# Patient Record
Sex: FEMALE | Race: WHITE | NOT HISPANIC OR LATINO | Employment: OTHER | URBAN - METROPOLITAN AREA
[De-identification: names, ages, dates, MRNs, and addresses within clinical notes are randomized per-mention and may not be internally consistent; named-entity substitution may affect disease eponyms.]

---

## 2017-07-25 ENCOUNTER — ALLSCRIPTS OFFICE VISIT (OUTPATIENT)
Dept: OTHER | Facility: OTHER | Age: 67
End: 2017-07-25

## 2017-12-04 ENCOUNTER — GENERIC CONVERSION - ENCOUNTER (OUTPATIENT)
Dept: OTHER | Facility: OTHER | Age: 67
End: 2017-12-04

## 2018-01-12 VITALS
WEIGHT: 158.13 LBS | SYSTOLIC BLOOD PRESSURE: 120 MMHG | HEART RATE: 64 BPM | BODY MASS INDEX: 23.35 KG/M2 | DIASTOLIC BLOOD PRESSURE: 78 MMHG

## 2018-01-23 NOTE — MISCELLANEOUS
Message  GI Reminder Recall [facility]:   Date: 12/04/2017   Dear Cameron Mejia:     Review of our records shows you are due for the following: EGD  Or records indicate that you are due at this time to have a follow-up examination for a EGD  As you know, these tests are done to prevent cancer, a very common disease in the [place] and responsible for thousands of patient deaths each year  We at [facility] are concerned for your health, and would very much appreciate you getting in touch with us at your earliest convenience  Again, this examination is vital to your proper health maintenance and for the prevention of cancer  Please call the following office to schedule your appointment:   [address] [phone]  We look forward to hearing from you!      Sincerely,         Signatures   Electronically signed by : Linda Lesches, ; Dec  4 2017 12:21PM EST                       (Author)

## 2018-03-23 DIAGNOSIS — G62.9 NEUROPATHY: Primary | ICD-10-CM

## 2018-03-23 RX ORDER — GABAPENTIN 300 MG/1
CAPSULE ORAL
Qty: 270 CAPSULE | Refills: 0 | Status: SHIPPED | OUTPATIENT
Start: 2018-03-23 | End: 2018-06-24 | Stop reason: SDUPTHER

## 2018-06-24 DIAGNOSIS — G62.9 NEUROPATHY: ICD-10-CM

## 2018-06-25 RX ORDER — GABAPENTIN 300 MG/1
CAPSULE ORAL
Qty: 270 CAPSULE | Refills: 0 | Status: SHIPPED | OUTPATIENT
Start: 2018-06-25 | End: 2018-06-29 | Stop reason: SDUPTHER

## 2018-06-29 DIAGNOSIS — G62.9 NEUROPATHY: ICD-10-CM

## 2018-06-29 RX ORDER — GABAPENTIN 300 MG/1
CAPSULE ORAL
Qty: 270 CAPSULE | Refills: 0 | Status: SHIPPED | OUTPATIENT
Start: 2018-06-29 | End: 2020-08-25

## 2018-12-12 DIAGNOSIS — G62.9 NEUROPATHY: ICD-10-CM

## 2018-12-13 RX ORDER — GABAPENTIN 300 MG/1
CAPSULE ORAL
Qty: 270 CAPSULE | Refills: 0 | OUTPATIENT
Start: 2018-12-13

## 2018-12-13 NOTE — TELEPHONE ENCOUNTER
Left message to return our call to let her know that she needs an appointment before we can refill her gabapentin

## 2018-12-22 DIAGNOSIS — G62.9 NEUROPATHY: ICD-10-CM

## 2018-12-24 RX ORDER — GABAPENTIN 300 MG/1
CAPSULE ORAL
Qty: 270 CAPSULE | Refills: 0 | OUTPATIENT
Start: 2018-12-24

## 2019-01-14 ENCOUNTER — TELEPHONE (OUTPATIENT)
Dept: INTERNAL MEDICINE CLINIC | Facility: CLINIC | Age: 69
End: 2019-01-14

## 2019-04-04 ENCOUNTER — TELEPHONE (OUTPATIENT)
Dept: INTERNAL MEDICINE CLINIC | Facility: CLINIC | Age: 69
End: 2019-04-04

## 2020-08-13 ENCOUNTER — OFFICE VISIT (OUTPATIENT)
Dept: OBGYN CLINIC | Facility: CLINIC | Age: 70
End: 2020-08-13
Payer: MEDICARE

## 2020-08-13 VITALS
DIASTOLIC BLOOD PRESSURE: 82 MMHG | BODY MASS INDEX: 24.28 KG/M2 | TEMPERATURE: 96.4 F | WEIGHT: 164.4 LBS | SYSTOLIC BLOOD PRESSURE: 112 MMHG

## 2020-08-13 DIAGNOSIS — N90.89 VULVAR FISSURE: Primary | ICD-10-CM

## 2020-08-13 DIAGNOSIS — L30.9 VULVAR DERMATITIS: ICD-10-CM

## 2020-08-13 PROCEDURE — 4040F PNEUMOC VAC/ADMIN/RCVD: CPT | Performed by: OBSTETRICS & GYNECOLOGY

## 2020-08-13 PROCEDURE — 99203 OFFICE O/P NEW LOW 30 MIN: CPT | Performed by: OBSTETRICS & GYNECOLOGY

## 2020-08-13 PROCEDURE — 1036F TOBACCO NON-USER: CPT | Performed by: OBSTETRICS & GYNECOLOGY

## 2020-08-13 PROCEDURE — 1160F RVW MEDS BY RX/DR IN RCRD: CPT | Performed by: OBSTETRICS & GYNECOLOGY

## 2020-08-13 RX ORDER — TRIAMCINOLONE ACETONIDE 1 MG/G
CREAM TOPICAL 2 TIMES DAILY
Qty: 30 G | Refills: 0 | Status: SHIPPED | OUTPATIENT
Start: 2020-08-13 | End: 2020-09-09

## 2020-08-13 RX ORDER — ESTRADIOL 0.1 MG/G
1 CREAM VAGINAL
Qty: 42.5 G | Refills: 0 | Status: SHIPPED | OUTPATIENT
Start: 2020-08-13 | End: 2020-09-09

## 2020-08-14 NOTE — PROGRESS NOTES
Gynecology   Lamar Zimmer 79 y o  female MRN: [id number]    Assessment/Plan     Assessment:  Atrophic vaginitis  Vulvar fissure  Vulvar dermatitis, allergic    Plan:  Estrace nightly x 14 days  Kenalog BID x 14 days  Recheck 2-3 weeks to ensure healed    History of Present Illness     HPI:  Lamar Zimmer is a 79 y o  female who presents with vulvar swelling, pain, and cuts  States she used a tampon for a full day this past weekend in order to prevent urinary incontinence while swimming  Noted symptoms within 24 hours of removal   Burning and tender  No bleeding, dicharge, or fever  Denies abdominal pain or cramping  Historical Information   Past Medical History:   Diagnosis Date    Esophagitis     Urinary incontinence         Past Surgical History:   Procedure Laterality Date    ANKLE SURGERY Right     BREAST LUMPECTOMY       OB/GYN History:   OB History        1    Para   1    Term                AB        Living           SAB        TAB        Ectopic        Multiple        Live Births                       Family History   Problem Relation Age of Onset    Heart disease Father      Social History   Social History     Substance and Sexual Activity   Alcohol Use Yes    Comment: Social     Social History     Substance and Sexual Activity   Drug Use Never     Social History     Tobacco Use   Smoking Status Former Smoker    Last attempt to quit:     Years since quittin 6   Smokeless Tobacco Never Used     E-Cigarette/Vaping    E-Cigarette Use Never User      E-Cigarette/Vaping Substances    Nicotine No     THC No     CBD No     Flavoring No     Other No     Unknown No        Meds/Allergies   Current Outpatient Medications on File Prior to Visit   Medication Sig    gabapentin (NEURONTIN) 300 mg capsule TAKE ONE CAPSULE BY MOUTH 3 TIMES A DAY (Patient not taking: Reported on 2020)     No current facility-administered medications on file prior to visit        Allergies Allergen Reactions    Penicillins      Review of Systems   Constitution: Negative for chills, decreased appetite, fever and malaise/fatigue  Respiratory: Negative for cough, shortness of breath, sputum production and wheezing  Gastrointestinal: Negative for abdominal pain, change in bowel habit, nausea and vomiting  Genitourinary: Positive for bladder incontinence  Negative for dysuria, frequency and non-menstrual bleeding  Objective   Vitals: Blood pressure 112/82, temperature (!) 96 4 °F (35 8 °C), temperature source Tympanic, weight 74 6 kg (164 lb 6 4 oz)  Physical Exam  Constitutional:       Appearance: Normal appearance  She is not ill-appearing  Genitourinary:      Urethra normal             Vaginal atrophy present  No vaginal bleeding  Uterus is tender  Genitourinary Comments: Bilateral vulvar edema and fissures noted   HENT:      Head: Normocephalic  Cardiovascular:      Rate and Rhythm: Normal rate and regular rhythm  Pulmonary:      Effort: Pulmonary effort is normal    Abdominal:      Palpations: Abdomen is soft  Tenderness: There is no abdominal tenderness  Musculoskeletal:         General: No swelling  Neurological:      General: No focal deficit present  Mental Status: She is alert and oriented to person, place, and time  Skin:     General: Skin is warm and dry     Psychiatric:         Mood and Affect: Mood normal          Behavior: Behavior normal

## 2020-08-25 ENCOUNTER — OFFICE VISIT (OUTPATIENT)
Dept: OBGYN CLINIC | Age: 70
End: 2020-08-25
Payer: MEDICARE

## 2020-08-25 VITALS
SYSTOLIC BLOOD PRESSURE: 110 MMHG | WEIGHT: 165 LBS | HEIGHT: 69 IN | BODY MASS INDEX: 24.44 KG/M2 | DIASTOLIC BLOOD PRESSURE: 70 MMHG

## 2020-08-25 DIAGNOSIS — N90.89 VULVAR FISSURE: ICD-10-CM

## 2020-08-25 DIAGNOSIS — Z78.0 ASYMPTOMATIC POSTMENOPAUSAL STATUS: ICD-10-CM

## 2020-08-25 DIAGNOSIS — Z01.411 ENCOUNTER FOR GYNECOLOGICAL EXAMINATION WITH ABNORMAL FINDING: Primary | ICD-10-CM

## 2020-08-25 DIAGNOSIS — Z12.31 ENCOUNTER FOR SCREENING MAMMOGRAM FOR MALIGNANT NEOPLASM OF BREAST: ICD-10-CM

## 2020-08-25 DIAGNOSIS — L30.9 VULVAR DERMATITIS: ICD-10-CM

## 2020-08-25 DIAGNOSIS — Z13.820 SCREENING FOR OSTEOPOROSIS: ICD-10-CM

## 2020-08-25 PROCEDURE — G0124 SCREEN C/V THIN LAYER BY MD: HCPCS | Performed by: PATHOLOGY

## 2020-08-25 PROCEDURE — G0145 SCR C/V CYTO,THINLAYER,RESCR: HCPCS | Performed by: PATHOLOGY

## 2020-08-25 PROCEDURE — G0101 CA SCREEN;PELVIC/BREAST EXAM: HCPCS | Performed by: NURSE PRACTITIONER

## 2020-08-25 NOTE — PATIENT INSTRUCTIONS
Vaginal Atrophy   WHAT YOU NEED TO KNOW:   What is vaginal atrophy? Vaginal atrophy is a condition that causes thinning, drying, and inflammation of vaginal tissue  This condition is caused by decreased levels of estrogen (a female sex hormone)  Vaginal atrophy can increase your risk for vaginal and urinary tract infections  Vaginal atrophy can worsen over time if not treated  What causes or increases your risk of vaginal atrophy? · Menopause     · Medicines that lower your estrogen levels, such as those used to treat breast cancer, endometriosis, or fibroids    · Radiation to your pelvic area     · Surgery to remove the ovaries    · Breastfeeding  What are the signs and symptoms of vaginal atrophy? · Vaginal dryness, itching, and burning    · Vaginal discharge    · Pain or discomfort during sex    · Light bleeding after sex    · Burning during urination    · Frequent, sudden, strong urges to urinate    · Urinary incontinence (loss of control of your bladder)  How is vaginal atrophy diagnosed? Your healthcare provider will ask about your symptoms  A pelvic exam will be done to examine your vagina and cervix  Your healthcare provider will place a speculum into your vagina to open and examine it  A sample of discharge from your vagina may be collected and tested  A urine test may also be done  How is vaginal atrophy treated? · Over-the counter vaginal moisturizers  can help reduce dryness  Your healthcare provider may recommend that you use a vaginal moisturizer several times each week and during sex  Only use creams that are made for vaginal use  Do  not  use petroleum jelly  Lubricants can be used during sex to decrease pain and discomfort  · Estrogen  may help decrease dryness  It may also lower your risk of vaginal infections if you are going through menopause  It can also help to relieve urinary symptoms  Estrogen may be prescribed in the form of a cream, tablet, or ring   These medicines can be applied or inserted into the vagina  Estrogen can also be prescribed in the form of a pill  When should I contact my healthcare provider? · You have a foul-smelling odor coming from your vagina  · You have a thick, cheese-like discharge from your vagina  · You have itching, swelling, or redness in your vagina  · You have pain or burning when you urinate  · Your urine smells bad  · Your symptoms do not improve, or they get worse  · You have questions or concerns about your condition or care  CARE AGREEMENT:   You have the right to help plan your care  Learn about your health condition and how it may be treated  Discuss treatment options with your caregivers to decide what care you want to receive  You always have the right to refuse treatment  The above information is an  only  It is not intended as medical advice for individual conditions or treatments  Talk to your doctor, nurse or pharmacist before following any medical regimen to see if it is safe and effective for you  © 2017 [employer] Information is for End User's use only and may not be sold, redistributed or otherwise used for commercial purposes  All illustrations and images included in CareNotes® are the copyrighted property of A D A M , Inc  or [employer]

## 2020-08-25 NOTE — PROGRESS NOTES
Diagnoses and all orders for this visit:    Encounter for gynecological examination with abnormal finding  -     Liquid-based pap, screening    Asymptomatic postmenopausal status  -     DXA bone density spine hip and pelvis; Future    Encounter for screening mammogram for malignant neoplasm of breast  -     Mammo screening bilateral w 3d & cad; Future    Screening for osteoporosis  -     DXA bone density spine hip and pelvis; Future    Vulvar fissure    Vulvar dermatitis        Call as needed, encouraged calcium/vit D in her diet, call with any PMB, all questions answered  ADVISED TO DECREASE VAGINAL ESTROGEN TO TWICE WEEKLY NOW  DOES NOT NEED REFILLS YET  Pleasant 79 y o  postmenopausal female here for annual exam and follow up to vulvar fissures which resolved with estrogen cream  She denies postmenopausal bleeding  +history of abnormal pap smears, last Pap ASCUS HPV NEG , pap today  Denies vaginal issues  Denies pelvic pain  Denies postmenopausal issues  Sexually active without concerns  Colonoscopy due ? Meme Gaytanny DXA and mammo overdue      Past Medical History:   Diagnosis Date    Esophagitis     Urinary incontinence      Past Surgical History:   Procedure Laterality Date    ANKLE SURGERY Right     BREAST LUMPECTOMY       Family History   Problem Relation Age of Onset    Heart disease Father      Social History     Tobacco Use    Smoking status: Former Smoker     Last attempt to quit:      Years since quittin 6    Smokeless tobacco: Never Used   Substance Use Topics    Alcohol use: Yes     Comment: Social    Drug use: Never       Current Outpatient Medications:     estradiol (ESTRACE) 0 1 mg/g vaginal cream, Insert 1 g into the vagina daily at bedtime for 14 days, Disp: 42 5 g, Rfl: 0    triamcinolone (KENALOG) 0 1 % cream, Apply topically 2 (two) times a day for 14 days, Disp: 30 g, Rfl: 0  Patient Active Problem List    Diagnosis Date Noted    Asymptomatic postmenopausal status 2020    Hemorrhoids, external 10/15/2015    Gastroparesis 10/01/2015    Dysphagia, pharyngoesophageal 2015    Vitamin B12 deficiency 2015    Chronic atrophic gastritis 2014    Urinary incontinence 2014       Allergies   Allergen Reactions    Penicillins        OB History    Para Term  AB Living   1 1           SAB TAB Ectopic Multiple Live Births                  # Outcome Date GA Lbr Mehdi/2nd Weight Sex Delivery Anes PTL Lv   1 Para 1969     Vag-Spont        2 grandchildren    Vitals:    20 0909   BP: 110/70   BP Location: Right arm   Patient Position: Sitting   Weight: 74 8 kg (165 lb)   Height: 5' 9" (1 753 m)     Body mass index is 24 37 kg/m²  Review of Systems   Constitutional: Negative for chills, fatigue, fever and unexpected weight change  Respiratory: Negative for shortness of breath  Gastrointestinal: Negative for anal bleeding, blood in stool, constipation and diarrhea  Genitourinary: Negative for difficulty urinating, dysuria and hematuria  Physical Exam   Constitutional: She appears well-developed and well-nourished  No distress  HENT: atraumatic, EOMI  Head: Normocephalic  Neck: Normal range of motion  Neck supple  Pulmonary: Effort normal   Breasts: bilateral without masses, skin changes or nipple discharge  Bilaterally soft and warm to touch  No areas of erythema or pain  Abdominal: Soft  Pelvic exam was performed with patient supine  No labial fusion  There is no rash, tenderness, lesion or injury on the right labia  There is no rash, tenderness, lesion or injury on the left labia  SOME  ATROPHY NOTED  BILATERAL MILD INGUINAL ERYTHEMA FOR WHICH SHE STATES IT IMPROVED WITH KENALOG/ESTROGEN  SEE PREVIOUS NOTE  NO FISSURES SEEN TODAY  Urethral meatus does not show any tenderness, inflammation or discharge  Palpation of midline bladder without pain or discomfort   Uterus is not deviated, not enlarged, not fixed and not tender  Cervix exhibits no motion tenderness, no discharge and no friability  Right adnexum displays no mass, no tenderness and no fullness  Left adnexum displays no mass, no tenderness and no fullness  No erythema or tenderness in the vagina  No foreign body in the vagina  No signs of injury around the vagina or anus  Perineum without lesions, signs of injury, erythema or swelling  No vaginal discharge found  Lymphadenopathy:        Right: No inguinal adenopathy present  Left: No inguinal adenopathy present

## 2020-09-04 LAB
LAB AP GYN PRIMARY INTERPRETATION: NORMAL
Lab: NORMAL
PATH INTERP SPEC-IMP: NORMAL

## 2020-09-09 ENCOUNTER — OFFICE VISIT (OUTPATIENT)
Dept: INTERNAL MEDICINE CLINIC | Facility: CLINIC | Age: 70
End: 2020-09-09
Payer: MEDICARE

## 2020-09-09 VITALS
HEART RATE: 77 BPM | DIASTOLIC BLOOD PRESSURE: 66 MMHG | WEIGHT: 166 LBS | OXYGEN SATURATION: 98 % | HEIGHT: 69 IN | BODY MASS INDEX: 24.59 KG/M2 | SYSTOLIC BLOOD PRESSURE: 150 MMHG | TEMPERATURE: 98.5 F

## 2020-09-09 DIAGNOSIS — K29.40 CHRONIC ATROPHIC GASTRITIS: ICD-10-CM

## 2020-09-09 DIAGNOSIS — Z78.0 ASYMPTOMATIC POSTMENOPAUSAL STATUS: ICD-10-CM

## 2020-09-09 DIAGNOSIS — E53.8 VITAMIN B12 DEFICIENCY: Primary | ICD-10-CM

## 2020-09-09 DIAGNOSIS — Z11.59 ENCOUNTER FOR HEPATITIS C SCREENING TEST FOR LOW RISK PATIENT: ICD-10-CM

## 2020-09-09 DIAGNOSIS — I10 ESSENTIAL HYPERTENSION: ICD-10-CM

## 2020-09-09 DIAGNOSIS — Z12.11 ENCOUNTER FOR SCREENING FOR MALIGNANT NEOPLASM OF COLON: ICD-10-CM

## 2020-09-09 PROBLEM — Z12.39 ENCOUNTER FOR SCREENING FOR MALIGNANT NEOPLASM OF BREAST: Status: ACTIVE | Noted: 2020-09-09

## 2020-09-09 PROCEDURE — 99203 OFFICE O/P NEW LOW 30 MIN: CPT | Performed by: INTERNAL MEDICINE

## 2020-09-09 NOTE — PROGRESS NOTES
Assessment/Plan:       Diagnoses and all orders for this visit:    Vitamin B12 deficiency  -     Ambulatory referral to Gastroenterology; Future  -     Vitamin B12; Future    Chronic atrophic gastritis  -     Ambulatory referral to Gastroenterology; Future    Asymptomatic postmenopausal status    Essential hypertension  -     CBC and differential; Future  -     Lipid Panel with Direct LDL reflex; Future  -     Comprehensive metabolic panel; Future  -     TSH, 3rd generation with Free T4 reflex; Future  -     UA (URINE) with reflex to Scope    Encounter for screening for malignant neoplasm of colon  -     Ambulatory referral to Gastroenterology; Future    Encounter for hepatitis C screening test for low risk patient  -     Hepatitis C antibody; Future                Subjective:      Patient ID: Harpal Arguello is a 79 y o  female  HPI    The following portions of the patient's history were reviewed and updated as appropriate:   She has a past medical history of Esophagitis and Urinary incontinence  ,  does not have any pertinent problems on file  ,   has a past surgical history that includes Breast lumpectomy (1998) and Ankle surgery (Right)  ,  family history includes Heart disease in her father  ,   reports that she quit smoking about 19 years ago  She has never used smokeless tobacco  She reports current alcohol use  She reports that she does not use drugs  ,  is allergic to penicillins     No current outpatient medications on file  No current facility-administered medications for this visit  Review of Systems   Constitutional: Negative for chills and fever  HENT: Negative for sore throat and trouble swallowing  Eyes: Negative for pain  Respiratory: Negative for cough, shortness of breath and wheezing  Cardiovascular: Negative for chest pain and leg swelling  Gastrointestinal: Negative for abdominal pain, diarrhea, nausea and vomiting     Endocrine: Negative for cold intolerance and heat intolerance  Genitourinary: Negative for dysuria, frequency and pelvic pain  Musculoskeletal: Negative for arthralgias and joint swelling  Skin: Negative for rash and wound  Allergic/Immunologic: Negative for immunocompromised state  Neurological: Negative for dizziness, seizures, syncope and headaches  Psychiatric/Behavioral: Negative for dysphoric mood  The patient is not nervous/anxious  Objective:  Vitals:    09/09/20 1820   BP: 150/66   Pulse: 77   Temp: 98 5 °F (36 9 °C)   SpO2: 98%      Physical Exam  Constitutional:       Appearance: She is well-developed  HENT:      Head: Normocephalic and atraumatic  Eyes:      Pupils: Pupils are equal, round, and reactive to light  Neck:      Musculoskeletal: Normal range of motion and neck supple  Thyroid: No thyromegaly  Trachea: No tracheal deviation  Cardiovascular:      Rate and Rhythm: Normal rate and regular rhythm  Heart sounds: Normal heart sounds  No murmur  No gallop  Pulmonary:      Effort: No respiratory distress  Breath sounds: No wheezing or rales  Abdominal:      General: Bowel sounds are normal       Palpations: Abdomen is soft  Tenderness: There is no abdominal tenderness  Musculoskeletal: Normal range of motion  General: No tenderness or deformity  Skin:     General: Skin is warm  Neurological:      Mental Status: She is alert and oriented to person, place, and time  Coordination: Coordination normal    Psychiatric:         Judgment: Judgment normal            Patient Instructions    A patient with no complaints and a normal exam except for elevated blood pressure   History of atrophic gastritis  Recommendations: Laboratory testing  GI referral for atrophic gastritis and colon screen   Mammogram  Monitor blood pressure at home and return in 6 weeks with a written record

## 2020-09-09 NOTE — PATIENT INSTRUCTIONS
A patient with no complaints and a normal exam except for elevated blood pressure   History of atrophic gastritis  Recommendations: Laboratory testing  GI referral for atrophic gastritis and colon screen   Mammogram  Monitor blood pressure at home and return in 6 weeks with a written record

## 2021-05-07 ENCOUNTER — OFFICE VISIT (OUTPATIENT)
Dept: GASTROENTEROLOGY | Facility: CLINIC | Age: 71
End: 2021-05-07
Payer: MEDICARE

## 2021-05-07 VITALS
SYSTOLIC BLOOD PRESSURE: 140 MMHG | HEIGHT: 69 IN | RESPIRATION RATE: 18 BRPM | WEIGHT: 165 LBS | DIASTOLIC BLOOD PRESSURE: 62 MMHG | BODY MASS INDEX: 24.44 KG/M2

## 2021-05-07 DIAGNOSIS — K59.09 OTHER CONSTIPATION: Primary | ICD-10-CM

## 2021-05-07 DIAGNOSIS — R15.9 INCONTINENCE OF FECES, UNSPECIFIED FECAL INCONTINENCE TYPE: ICD-10-CM

## 2021-05-07 PROCEDURE — 99204 OFFICE O/P NEW MOD 45 MIN: CPT | Performed by: INTERNAL MEDICINE

## 2021-05-07 NOTE — LETTER
May 7, 2021     Joanne Ross MD  [address] [address]    Patient: Jordy Nelson   YOB: 1950   Date of Visit: 5/7/2021       Dear Dr  [address] : Thank you for referring Sofya Lees to me for evaluation  Below are my notes for this consultation  If you have questions, please do not hesitate to call me  I look forward to following your patient along with you  Sincerely,        Javon Choi MD        CC: No Recipients  Javon Choi MD  5/7/2021  9:21 AM  Incomplete  [facility]    Dear   Cheo Lima,     I had the pleasure of seeing your patient Jordy Nelson in the office today and I thank you for this kind referral        Chief Complaint:  Stool problems      HPI:  Jordy Nelson is a 70 y o  female who presents with  A long history of chronic constipation  Patient has been having fecal incontinence with no warning while she is walking  She does not have it at other times  She does not have any nocturnal symptomatology  There is no apparent exacerbating or remitting factor for this  This had happened years ago and the patient was advised Metamucil  Last colonoscopy was in 2013 and was normal at that time period she has no history of colon polyps  No family history of colon cancer  Patient has been meaning to go back on Metamucil but has not yet started it  She has no abdominal pain, change in stool caliber, rectal bleeding, tenesmus, sense of urgency, or any other significant GI symptomatology  She has had no weight loss fever or chills  She has no upper GI symptomatology  There is no association with any particular type of food  She has chronic urinary incontinence and has to wear depends for this  Patient has no other symptomatology  She denies any chest pain, shortness of breath, loss of consciousness, dizziness, joint pains or any other issues         Review of Systems:   Constitutional: No fever or chills, feels well, no tiredness, no recent weight gain or weight loss  HENT: No complaints of earache, no hearing loss, no nosebleeds, no nasal discharge, no sore throat, no hoarseness  Eyes: No complaints of eye pain, no red eyes, no discharge from eyes, no itchy eyes  Cardiovascular: No complaints of slow heart rate, no fast heart rate, no chest pain, no palpitations, no leg claudication, no lower extremity edema  Respiratory: No complaints of shortness of breath, no wheezing, no cough, no SOB on exertion, no orthopnea  Gastrointestinal: As noted in HPI  Genitourinary: No complaints of dysuria, no incontinence, no hesitancy, no nocturia  Positive incontinence  Musculoskeletal: No complaints of arthralgia, no myalgias, no joint swelling or stiffness, no limb pain or swelling  Neurological: No complaints of headache, no confusion, no convulsions, no numbness or tingling, no dizziness or fainting, no limb weakness, no difficulty walking  Skin: No complaints of skin rash or skin lesions, no itching, no skin wound, no dry skin  Hematological/Lymphatic: No complaints of swollen glands, does not bleed easy  Allergic/Immunologic: No immunocompromised state  Endocrine:  No complaints of polyuria, no polydipsia  Psychiatric/Behavioral: is not suicidal, no sleep disturbances, no anxiety or depression, no change in personality, no emotional problems         Historical Information   Past Medical History:   Diagnosis Date    Esophagitis     Urinary incontinence      Past Surgical History:   Procedure Laterality Date    ANKLE SURGERY Right     BREAST LUMPECTOMY       Social History   Social History     Substance and Sexual Activity   Alcohol Use Yes    Comment: Social     Social History     Substance and Sexual Activity   Drug Use Never     Social History     Tobacco Use   Smoking Status Former Smoker    Quit date:     Years since quittin 3   Smokeless Tobacco Never Used     Family History   Problem Relation Age of Onset    Heart disease Father          Current Medications: currently has no medications in their medication list        Vital Signs: /62   Resp 18   Ht 5' 9" (1 753 m)   Wt 74 8 kg (165 lb)   BMI 24 37 kg/m²     Physical Exam:   Constitutional  General Appearance: No acute distress, well appearing and well nourished  Head  Normocephalic  Eyes  Conjunctivae and lids: No swelling, erythema, or discharge  Pupils and irises: Equal, round and reactive to light  Ears, Nose, Mouth, and Throat  External inspection of ears and nose: Normal  Nasal mucosa, septum and turbinates: Normal without edema or erythema/   Oropharynx: Normal with no erythema, edema, exudate or lesions  Neck  Normal range of motion  Neck supple  Cardiovascular  Auscultation of the heart: Normal rate and rhythm, normal S1 and S2 without murmurs  Examination of the extremities for edema and/or varicosities: Normal  Pulmonary/Chest  Respiratory effort: No increased work of breathing or signs of respiratory distress  Auscultation of lungs: Clear to auscultation, equal breath sounds bilaterally, no wheezes, rales, no rhonchi  Abdomen  Abdomen: Non-tender, no masses  Liver and spleen: No hepatomegaly or splenomegaly  Musculoskeletal  Gait and station: normal   Digits and Nails: normal without clubbing or cyanosis  Inspection/palpation of joints, bones, and muscles: Normal  Neurological  No nystagmus or asterixis  Skin  Skin and subcutaneous tissue: Normal without rashes or lesions  Lymphatic  Palpation of the lymph nodes in neck: No lymphadenopathy     Psychiatric  Orientation to person, place and time: Normal   Mood and affect: Normal          Labs:   Lab Results   Component Value Date    ALT 32 08/25/2015    AST 21 08/25/2015    BUN 12 08/25/2015    CALCIUM 8 5 08/25/2015     08/25/2015    CO2 27 8 08/25/2015    CREATININE 0 5 (L) 08/25/2015    HCT 37 3 08/25/2015    HGB 12 5 08/25/2015     08/25/2015    K 3 8 08/25/2015     08/25/2015    WBC 4 0 (L) 08/25/2015         X-Rays & Procedures:   No orders to display         ______________________________________________________________________      Assessment & Plan:      Diagnoses and all orders for this visit:    Other constipation  -     Colonoscopy; Future    Incontinence of feces, unspecified fecal incontinence type  -     Colonoscopy; Future         I have taken liberty of scheduling the patient for colonoscopy  Metamucil has worked for her in the past for this particular symptom complex  I have advised that she begin this again  I will be happy to inform you of her progress and any further recommendations  I would like to thank you for allowing me to participate in her care              With warmest regards,    Eve Gardner MD, [facility]

## 2021-05-07 NOTE — H&P (VIEW-ONLY)
[facility]    Dear   Chuck,     I had the pleasure of seeing your patient Abdias Devi in the office today and I thank you for this kind referral        Chief Complaint:  Stool problems      HPI:  Abdias Devi is a 70 y o  female who presents with  A long history of chronic constipation  Patient has been having fecal incontinence with no warning while she is walking  She does not have it at other times  She does not have any nocturnal symptomatology  There is no apparent exacerbating or remitting factor for this  This had happened years ago and the patient was advised Metamucil  Last colonoscopy was in 2013 and was normal at that time period she has no history of colon polyps  No family history of colon cancer  Patient has been meaning to go back on Metamucil but has not yet started it  She has no abdominal pain, change in stool caliber, rectal bleeding, tenesmus, sense of urgency, or any other significant GI symptomatology  She has had no weight loss fever or chills  She has no upper GI symptomatology  There is no association with any particular type of food  She has chronic urinary incontinence and has to wear depends for this  Patient has no other symptomatology  She denies any chest pain, shortness of breath, loss of consciousness, dizziness, joint pains or any other issues         Review of Systems:   Constitutional: No fever or chills, feels well, no tiredness, no recent weight gain or weight loss  HENT: No complaints of earache, no hearing loss, no nosebleeds, no nasal discharge, no sore throat, no hoarseness  Eyes: No complaints of eye pain, no red eyes, no discharge from eyes, no itchy eyes  Cardiovascular: No complaints of slow heart rate, no fast heart rate, no chest pain, no palpitations, no leg claudication, no lower extremity edema  Respiratory: No complaints of shortness of breath, no wheezing, no cough, no SOB on exertion, no orthopnea  Gastrointestinal: As noted in HPI  Genitourinary: No complaints of dysuria, no incontinence, no hesitancy, no nocturia  Positive incontinence  Musculoskeletal: No complaints of arthralgia, no myalgias, no joint swelling or stiffness, no limb pain or swelling  Neurological: No complaints of headache, no confusion, no convulsions, no numbness or tingling, no dizziness or fainting, no limb weakness, no difficulty walking  Skin: No complaints of skin rash or skin lesions, no itching, no skin wound, no dry skin  Hematological/Lymphatic: No complaints of swollen glands, does not bleed easy  Allergic/Immunologic: No immunocompromised state  Endocrine:  No complaints of polyuria, no polydipsia  Psychiatric/Behavioral: is not suicidal, no sleep disturbances, no anxiety or depression, no change in personality, no emotional problems  Historical Information   Past Medical History:   Diagnosis Date    Esophagitis     Urinary incontinence      Past Surgical History:   Procedure Laterality Date    ANKLE SURGERY Right     BREAST LUMPECTOMY       Social History   Social History     Substance and Sexual Activity   Alcohol Use Yes    Comment: Social     Social History     Substance and Sexual Activity   Drug Use Never     Social History     Tobacco Use   Smoking Status Former Smoker    Quit date:     Years since quittin 3   Smokeless Tobacco Never Used     Family History   Problem Relation Age of Onset    Heart disease Father          Current Medications: currently has no medications in their medication list        Vital Signs: /62   Resp 18   Ht 5' 9" (1 753 m)   Wt 74 8 kg (165 lb)   BMI 24 37 kg/m²     Physical Exam:   Constitutional  General Appearance: No acute distress, well appearing and well nourished  Head  Normocephalic  Eyes  Conjunctivae and lids: No swelling, erythema, or discharge  Pupils and irises: Equal, round and reactive to light     Ears, Nose, Mouth, and Throat  External inspection of ears and nose: Normal  Nasal mucosa, septum and turbinates: Normal without edema or erythema/   Oropharynx: Normal with no erythema, edema, exudate or lesions  Neck  Normal range of motion  Neck supple  Cardiovascular  Auscultation of the heart: Normal rate and rhythm, normal S1 and S2 without murmurs  Examination of the extremities for edema and/or varicosities: Normal  Pulmonary/Chest  Respiratory effort: No increased work of breathing or signs of respiratory distress  Auscultation of lungs: Clear to auscultation, equal breath sounds bilaterally, no wheezes, rales, no rhonchi  Abdomen  Abdomen: Non-tender, no masses  Liver and spleen: No hepatomegaly or splenomegaly  Musculoskeletal  Gait and station: normal   Digits and Nails: normal without clubbing or cyanosis  Inspection/palpation of joints, bones, and muscles: Normal  Neurological  No nystagmus or asterixis  Skin  Skin and subcutaneous tissue: Normal without rashes or lesions  Lymphatic  Palpation of the lymph nodes in neck: No lymphadenopathy  Psychiatric  Orientation to person, place and time: Normal   Mood and affect: Normal          Labs:   Lab Results   Component Value Date    ALT 32 08/25/2015    AST 21 08/25/2015    BUN 12 08/25/2015    CALCIUM 8 5 08/25/2015     08/25/2015    CO2 27 8 08/25/2015    CREATININE 0 5 (L) 08/25/2015    HCT 37 3 08/25/2015    HGB 12 5 08/25/2015     08/25/2015    K 3 8 08/25/2015     08/25/2015    WBC 4 0 (L) 08/25/2015         X-Rays & Procedures:   No orders to display         ______________________________________________________________________      Assessment & Plan:      Diagnoses and all orders for this visit:    Other constipation  -     Colonoscopy; Future    Incontinence of feces, unspecified fecal incontinence type  -     Colonoscopy; Future         I have taken liberty of scheduling the patient for colonoscopy    Metamucil has worked for her in the past for this particular symptom complex  I have advised that she begin this again  I will be happy to inform you of her progress and any further recommendations  I would like to thank you for allowing me to participate in her care              With warmest regards,    Irene Mahoney MD, [facility]

## 2021-05-07 NOTE — PROGRESS NOTES
[facility]    Dear   Reyes Escobar,     I had the pleasure of seeing your patient Bobby Xavier in the office today and I thank you for this kind referral        Chief Complaint:  Stool problems      HPI:  Bobby Xavier is a 70 y o  female who presents with  A long history of chronic constipation  Patient has been having fecal incontinence with no warning while she is walking  She does not have it at other times  She does not have any nocturnal symptomatology  There is no apparent exacerbating or remitting factor for this  This had happened years ago and the patient was advised Metamucil  Last colonoscopy was in 2013 and was normal at that time period she has no history of colon polyps  No family history of colon cancer  Patient has been meaning to go back on Metamucil but has not yet started it  She has no abdominal pain, change in stool caliber, rectal bleeding, tenesmus, sense of urgency, or any other significant GI symptomatology  She has had no weight loss fever or chills  She has no upper GI symptomatology  There is no association with any particular type of food  She has chronic urinary incontinence and has to wear depends for this  Patient has no other symptomatology  She denies any chest pain, shortness of breath, loss of consciousness, dizziness, joint pains or any other issues         Review of Systems:   Constitutional: No fever or chills, feels well, no tiredness, no recent weight gain or weight loss  HENT: No complaints of earache, no hearing loss, no nosebleeds, no nasal discharge, no sore throat, no hoarseness  Eyes: No complaints of eye pain, no red eyes, no discharge from eyes, no itchy eyes  Cardiovascular: No complaints of slow heart rate, no fast heart rate, no chest pain, no palpitations, no leg claudication, no lower extremity edema  Respiratory: No complaints of shortness of breath, no wheezing, no cough, no SOB on exertion, no orthopnea  Gastrointestinal: As noted in HPI  Genitourinary: No complaints of dysuria, no incontinence, no hesitancy, no nocturia  Positive incontinence  Musculoskeletal: No complaints of arthralgia, no myalgias, no joint swelling or stiffness, no limb pain or swelling  Neurological: No complaints of headache, no confusion, no convulsions, no numbness or tingling, no dizziness or fainting, no limb weakness, no difficulty walking  Skin: No complaints of skin rash or skin lesions, no itching, no skin wound, no dry skin  Hematological/Lymphatic: No complaints of swollen glands, does not bleed easy  Allergic/Immunologic: No immunocompromised state  Endocrine:  No complaints of polyuria, no polydipsia  Psychiatric/Behavioral: is not suicidal, no sleep disturbances, no anxiety or depression, no change in personality, no emotional problems  Historical Information   Past Medical History:   Diagnosis Date    Esophagitis     Urinary incontinence      Past Surgical History:   Procedure Laterality Date    ANKLE SURGERY Right     BREAST LUMPECTOMY       Social History   Social History     Substance and Sexual Activity   Alcohol Use Yes    Comment: Social     Social History     Substance and Sexual Activity   Drug Use Never     Social History     Tobacco Use   Smoking Status Former Smoker    Quit date:     Years since quittin 3   Smokeless Tobacco Never Used     Family History   Problem Relation Age of Onset    Heart disease Father          Current Medications: currently has no medications in their medication list        Vital Signs: /62   Resp 18   Ht 5' 9" (1 753 m)   Wt 74 8 kg (165 lb)   BMI 24 37 kg/m²     Physical Exam:   Constitutional  General Appearance: No acute distress, well appearing and well nourished  Head  Normocephalic  Eyes  Conjunctivae and lids: No swelling, erythema, or discharge  Pupils and irises: Equal, round and reactive to light     Ears, Nose, Mouth, and Throat  External inspection of ears and nose: Normal  Nasal mucosa, septum and turbinates: Normal without edema or erythema/   Oropharynx: Normal with no erythema, edema, exudate or lesions  Neck  Normal range of motion  Neck supple  Cardiovascular  Auscultation of the heart: Normal rate and rhythm, normal S1 and S2 without murmurs  Examination of the extremities for edema and/or varicosities: Normal  Pulmonary/Chest  Respiratory effort: No increased work of breathing or signs of respiratory distress  Auscultation of lungs: Clear to auscultation, equal breath sounds bilaterally, no wheezes, rales, no rhonchi  Abdomen  Abdomen: Non-tender, no masses  Liver and spleen: No hepatomegaly or splenomegaly  Musculoskeletal  Gait and station: normal   Digits and Nails: normal without clubbing or cyanosis  Inspection/palpation of joints, bones, and muscles: Normal  Neurological  No nystagmus or asterixis  Skin  Skin and subcutaneous tissue: Normal without rashes or lesions  Lymphatic  Palpation of the lymph nodes in neck: No lymphadenopathy  Psychiatric  Orientation to person, place and time: Normal   Mood and affect: Normal          Labs:   Lab Results   Component Value Date    ALT 32 08/25/2015    AST 21 08/25/2015    BUN 12 08/25/2015    CALCIUM 8 5 08/25/2015     08/25/2015    CO2 27 8 08/25/2015    CREATININE 0 5 (L) 08/25/2015    HCT 37 3 08/25/2015    HGB 12 5 08/25/2015     08/25/2015    K 3 8 08/25/2015     08/25/2015    WBC 4 0 (L) 08/25/2015         X-Rays & Procedures:   No orders to display         ______________________________________________________________________      Assessment & Plan:      Diagnoses and all orders for this visit:    Other constipation  -     Colonoscopy; Future    Incontinence of feces, unspecified fecal incontinence type  -     Colonoscopy; Future         I have taken liberty of scheduling the patient for colonoscopy    Metamucil has worked for her in the past for this particular symptom complex  I have advised that she begin this again  I will be happy to inform you of her progress and any further recommendations  I would like to thank you for allowing me to participate in her care              With warmest regards,    Chi Garcia MD, [facility]

## 2021-05-24 ENCOUNTER — ANESTHESIA EVENT (OUTPATIENT)
Dept: GASTROENTEROLOGY | Facility: HOSPITAL | Age: 71
End: 2021-05-24

## 2021-05-25 ENCOUNTER — ANESTHESIA (OUTPATIENT)
Dept: GASTROENTEROLOGY | Facility: HOSPITAL | Age: 71
End: 2021-05-25

## 2021-05-25 ENCOUNTER — HOSPITAL ENCOUNTER (OUTPATIENT)
Dept: GASTROENTEROLOGY | Facility: HOSPITAL | Age: 71
Setting detail: OUTPATIENT SURGERY
Discharge: HOME | End: 2021-05-25
Attending: INTERNAL MEDICINE | Admitting: INTERNAL MEDICINE
Payer: MEDICARE

## 2021-05-25 VITALS
DIASTOLIC BLOOD PRESSURE: 60 MMHG | RESPIRATION RATE: 17 BRPM | SYSTOLIC BLOOD PRESSURE: 138 MMHG | HEIGHT: 69 IN | TEMPERATURE: 98 F | WEIGHT: 162.92 LBS | BODY MASS INDEX: 24.13 KG/M2 | HEART RATE: 60 BPM | OXYGEN SATURATION: 96 %

## 2021-05-25 DIAGNOSIS — R15.9 INCONTINENCE OF FECES, UNSPECIFIED FECAL INCONTINENCE TYPE: ICD-10-CM

## 2021-05-25 DIAGNOSIS — K59.09 OTHER CONSTIPATION: ICD-10-CM

## 2021-05-25 PROCEDURE — 88305 TISSUE EXAM BY PATHOLOGIST: CPT | Performed by: PATHOLOGY

## 2021-05-25 PROCEDURE — 45385 COLONOSCOPY W/LESION REMOVAL: CPT | Performed by: INTERNAL MEDICINE

## 2021-05-25 RX ORDER — SODIUM CHLORIDE, SODIUM LACTATE, POTASSIUM CHLORIDE, CALCIUM CHLORIDE 600; 310; 30; 20 MG/100ML; MG/100ML; MG/100ML; MG/100ML
INJECTION, SOLUTION INTRAVENOUS CONTINUOUS PRN
Status: DISCONTINUED | OUTPATIENT
Start: 2021-05-25 | End: 2021-05-25

## 2021-05-25 RX ORDER — PROPOFOL 10 MG/ML
INJECTION, EMULSION INTRAVENOUS AS NEEDED
Status: DISCONTINUED | OUTPATIENT
Start: 2021-05-25 | End: 2021-05-25

## 2021-05-25 RX ORDER — SODIUM CHLORIDE, SODIUM LACTATE, POTASSIUM CHLORIDE, CALCIUM CHLORIDE 600; 310; 30; 20 MG/100ML; MG/100ML; MG/100ML; MG/100ML
125 INJECTION, SOLUTION INTRAVENOUS CONTINUOUS
Status: DISCONTINUED | OUTPATIENT
Start: 2021-05-25 | End: 2021-05-29 | Stop reason: HOSPADM

## 2021-05-25 RX ORDER — MULTIVIT-MIN/IRON FUM/FOLIC AC 7.5 MG-4
1 TABLET ORAL DAILY
COMMUNITY

## 2021-05-25 RX ORDER — RIBOFLAVIN (VITAMIN B2) 100 MG
100 TABLET ORAL DAILY
COMMUNITY

## 2021-05-25 RX ORDER — B-COMPLEX WITH VITAMIN C
TABLET ORAL
COMMUNITY

## 2021-05-25 RX ADMIN — PROPOFOL 80 MG: 10 INJECTION, EMULSION INTRAVENOUS at 09:27

## 2021-05-25 RX ADMIN — SODIUM CHLORIDE, SODIUM LACTATE, POTASSIUM CHLORIDE, AND CALCIUM CHLORIDE: .6; .31; .03; .02 INJECTION, SOLUTION INTRAVENOUS at 09:16

## 2021-05-25 RX ADMIN — PROPOFOL 10 MG: 10 INJECTION, EMULSION INTRAVENOUS at 09:41

## 2021-05-25 RX ADMIN — PROPOFOL 20 MG: 10 INJECTION, EMULSION INTRAVENOUS at 09:36

## 2021-05-25 RX ADMIN — PROPOFOL 30 MG: 10 INJECTION, EMULSION INTRAVENOUS at 09:32

## 2021-05-25 RX ADMIN — SODIUM CHLORIDE, SODIUM LACTATE, POTASSIUM CHLORIDE, AND CALCIUM CHLORIDE 125 ML/HR: .6; .31; .03; .02 INJECTION, SOLUTION INTRAVENOUS at 08:44

## 2021-05-25 NOTE — ANESTHESIA PREPROCEDURE EVALUATION
Procedure:  COLONOSCOPY    Relevant Problems   CARDIO   (+) Essential hypertension      GI/HEPATIC   (+) Dysphagia, pharyngoesophageal      Other   (+) Gastroparesis      Chronic atrophic gastritis   Dysphagia, pharyngoesophageal   Urinary incontinence   Gastroparesis   Hemorrhoids, external   Vitamin B12 deficiency   Asymptomatic postmenopausal status   Colon cancer screening   Encounter for screening for malignant neoplasm of breast   Essential hypertension       Physical Exam    Airway    Mallampati score: I  TM Distance: >3 FB  Neck ROM: full     Dental       Cardiovascular  Cardiovascular exam normal    Pulmonary  Pulmonary exam normal     Other Findings        Anesthesia Plan  ASA Score- 2     Anesthesia Type- IV sedation with anesthesia with ASA Monitors  Additional Monitors:   Airway Plan:           Plan Factors-Exercise tolerance (METS): >4 METS  Chart reviewed  EKG reviewed  Imaging results reviewed  Existing labs reviewed  Patient summary reviewed  Induction- intravenous  Postoperative Plan-     Informed Consent- Anesthetic plan and risks discussed with patient  I personally reviewed this patient with the CRNA  Discussed and agreed on the Anesthesia Plan with the CRNA  Srinivasan Delarosa

## 2021-05-25 NOTE — INTERVAL H&P NOTE
H&P reviewed  After examining the patient I find no changes in the patients condition since the H&P had been written      Vitals:    05/25/21 0831   BP: (!) 202/84   Pulse: 69   Resp: 16   Temp: 97 9 °F (36 6 °C)   SpO2: 97%

## 2021-06-03 ENCOUNTER — TELEPHONE (OUTPATIENT)
Dept: GASTROENTEROLOGY | Facility: CLINIC | Age: 71
End: 2021-06-03

## 2021-09-16 ENCOUNTER — OFFICE VISIT (OUTPATIENT)
Dept: INTERNAL MEDICINE CLINIC | Facility: CLINIC | Age: 71
End: 2021-09-16
Payer: MEDICARE

## 2021-09-16 ENCOUNTER — APPOINTMENT (OUTPATIENT)
Dept: LAB | Facility: CLINIC | Age: 71
End: 2021-09-16
Payer: MEDICARE

## 2021-09-16 VITALS
HEIGHT: 69 IN | OXYGEN SATURATION: 97 % | SYSTOLIC BLOOD PRESSURE: 134 MMHG | RESPIRATION RATE: 14 BRPM | TEMPERATURE: 97 F | BODY MASS INDEX: 23.11 KG/M2 | DIASTOLIC BLOOD PRESSURE: 82 MMHG | WEIGHT: 156 LBS | HEART RATE: 62 BPM

## 2021-09-16 DIAGNOSIS — I10 ESSENTIAL HYPERTENSION: ICD-10-CM

## 2021-09-16 DIAGNOSIS — Z13.29 SCREENING FOR THYROID DISORDER: ICD-10-CM

## 2021-09-16 DIAGNOSIS — Z13.220 SCREENING CHOLESTEROL LEVEL: ICD-10-CM

## 2021-09-16 DIAGNOSIS — R20.2 PARESTHESIA: ICD-10-CM

## 2021-09-16 DIAGNOSIS — R20.2 PARESTHESIA OF SKIN: ICD-10-CM

## 2021-09-16 DIAGNOSIS — Z79.899 OTHER LONG TERM (CURRENT) DRUG THERAPY: ICD-10-CM

## 2021-09-16 DIAGNOSIS — R47.9 DIFFICULTY WITH SPEECH: ICD-10-CM

## 2021-09-16 DIAGNOSIS — R53.83 FATIGUE, UNSPECIFIED TYPE: ICD-10-CM

## 2021-09-16 DIAGNOSIS — E53.8 VITAMIN B12 DEFICIENCY: Primary | ICD-10-CM

## 2021-09-16 LAB
25(OH)D3 SERPL-MCNC: 14.1 NG/ML (ref 30–100)
ALBUMIN SERPL BCP-MCNC: 4.2 G/DL (ref 3.5–5)
ALP SERPL-CCNC: 87 U/L (ref 46–116)
ALT SERPL W P-5'-P-CCNC: 29 U/L (ref 12–78)
ANION GAP SERPL CALCULATED.3IONS-SCNC: 2 MMOL/L (ref 4–13)
AST SERPL W P-5'-P-CCNC: 25 U/L (ref 5–45)
BASOPHILS # BLD AUTO: 0.03 THOUSANDS/ΜL (ref 0–0.1)
BASOPHILS NFR BLD AUTO: 1 % (ref 0–1)
BILIRUB SERPL-MCNC: 1.63 MG/DL (ref 0.2–1)
BUN SERPL-MCNC: 13 MG/DL (ref 5–25)
CALCIUM SERPL-MCNC: 9.6 MG/DL (ref 8.3–10.1)
CHLORIDE SERPL-SCNC: 106 MMOL/L (ref 100–108)
CHOLEST SERPL-MCNC: 298 MG/DL (ref 50–200)
CO2 SERPL-SCNC: 28 MMOL/L (ref 21–32)
CREAT SERPL-MCNC: 0.72 MG/DL (ref 0.6–1.3)
EOSINOPHIL # BLD AUTO: 0.1 THOUSAND/ΜL (ref 0–0.61)
EOSINOPHIL NFR BLD AUTO: 3 % (ref 0–6)
ERYTHROCYTE [DISTWIDTH] IN BLOOD BY AUTOMATED COUNT: 14.9 % (ref 11.6–15.1)
GFR SERPL CREATININE-BSD FRML MDRD: 85 ML/MIN/1.73SQ M
GLUCOSE SERPL-MCNC: 97 MG/DL (ref 65–140)
HCT VFR BLD AUTO: 42.6 % (ref 34.8–46.1)
HDLC SERPL-MCNC: 44 MG/DL
HGB BLD-MCNC: 13.6 G/DL (ref 11.5–15.4)
IMM GRANULOCYTES # BLD AUTO: 0.01 THOUSAND/UL (ref 0–0.2)
IMM GRANULOCYTES NFR BLD AUTO: 0 % (ref 0–2)
LDLC SERPL CALC-MCNC: 210 MG/DL (ref 0–100)
LYMPHOCYTES # BLD AUTO: 0.93 THOUSANDS/ΜL (ref 0.6–4.47)
LYMPHOCYTES NFR BLD AUTO: 23 % (ref 14–44)
MAGNESIUM SERPL-MCNC: 2.1 MG/DL (ref 1.6–2.6)
MCH RBC QN AUTO: 30.8 PG (ref 26.8–34.3)
MCHC RBC AUTO-ENTMCNC: 31.9 G/DL (ref 31.4–37.4)
MCV RBC AUTO: 96 FL (ref 82–98)
MONOCYTES # BLD AUTO: 0.36 THOUSAND/ΜL (ref 0.17–1.22)
MONOCYTES NFR BLD AUTO: 9 % (ref 4–12)
NEUTROPHILS # BLD AUTO: 2.55 THOUSANDS/ΜL (ref 1.85–7.62)
NEUTS SEG NFR BLD AUTO: 64 % (ref 43–75)
NONHDLC SERPL-MCNC: 254 MG/DL
NRBC BLD AUTO-RTO: 0 /100 WBCS
PLATELET # BLD AUTO: 149 THOUSANDS/UL (ref 149–390)
PMV BLD AUTO: 11.6 FL (ref 8.9–12.7)
POTASSIUM SERPL-SCNC: 4.4 MMOL/L (ref 3.5–5.3)
PROT SERPL-MCNC: 7.4 G/DL (ref 6.4–8.2)
RBC # BLD AUTO: 4.42 MILLION/UL (ref 3.81–5.12)
SODIUM SERPL-SCNC: 136 MMOL/L (ref 136–145)
TRIGL SERPL-MCNC: 221 MG/DL
TSH SERPL DL<=0.05 MIU/L-ACNC: 1.32 UIU/ML (ref 0.36–3.74)
VIT B12 SERPL-MCNC: 118 PG/ML (ref 100–900)
WBC # BLD AUTO: 3.98 THOUSAND/UL (ref 4.31–10.16)

## 2021-09-16 PROCEDURE — 82306 VITAMIN D 25 HYDROXY: CPT

## 2021-09-16 PROCEDURE — 80053 COMPREHEN METABOLIC PANEL: CPT

## 2021-09-16 PROCEDURE — 36415 COLL VENOUS BLD VENIPUNCTURE: CPT

## 2021-09-16 PROCEDURE — 84443 ASSAY THYROID STIM HORMONE: CPT

## 2021-09-16 PROCEDURE — 85025 COMPLETE CBC W/AUTO DIFF WBC: CPT

## 2021-09-16 PROCEDURE — 1124F ACP DISCUSS-NO DSCNMKR DOCD: CPT | Performed by: NURSE PRACTITIONER

## 2021-09-16 PROCEDURE — 99214 OFFICE O/P EST MOD 30 MIN: CPT | Performed by: NURSE PRACTITIONER

## 2021-09-16 PROCEDURE — 83735 ASSAY OF MAGNESIUM: CPT

## 2021-09-16 PROCEDURE — 82607 VITAMIN B-12: CPT

## 2021-09-16 PROCEDURE — 80061 LIPID PANEL: CPT

## 2021-09-16 NOTE — PROGRESS NOTES
INTERNAL MEDICINE FOLLOW-UP OFFICE VISIT  [facility] - [facility] BOBBI NESBITT    NAME: Glory Yusuf  AGE: 70 y o  SEX: female    DATE OF ENCOUNTER: 9/16/2021   Assessment and Plan:   Patient with complaints of paresthesia of the left arm, now resolving and only present in the left hand  Does have history of raynaud's and carpal tunnel  Patient is alert and oriented today in office  She has normal strength, sensation and pulses to bilateral UE's  Her recalled symptoms from the weekend of paresthesia to the left arm, face, loss of balance and speech difficulty are suspicious for TIA  Will check labs today and advised MRI of brain to rule out underlying pathology  Advised if these symptoms present again to go to ED immediately for work up  Problem List Items Addressed This Visit        Cardiovascular and Mediastinum    Essential hypertension    Relevant Orders    CBC and differential    Comprehensive metabolic panel    Vitamin B12       Other    Vitamin B12 deficiency - Primary      Other Visit Diagnoses     Fatigue, unspecified type        Relevant Orders    Vitamin D 25 hydroxy    Screening for thyroid disorder        Relevant Orders    TSH, 3rd generation with Free T4 reflex    Screening cholesterol level        Relevant Orders    Lipid panel    Paresthesia        Relevant Orders    Magnesium    MRI brain w wo contrast    Other long term (current) drug therapy         Relevant Orders    Vitamin D 25 hydroxy    Paresthesia of skin         Relevant Orders    Vitamin B12    Difficulty with speech        Relevant Orders    MRI brain w wo contrast          No follow-ups on file       Counseling:     · Medication Side Effects - Adverse side effects of medications were reviewed with the patient/guardian today: Yes  · Counseling was given regarding: Prognosis, Risks and benefits of tx options, Intructions for management, Patient and family education, Importance of tx compliance, Risk factor reductions and Impressions  · Barriers to treatment include: No identified barriers      Chief Complaint:     Chief Complaint   Patient presents with    Numbness     left arm/hand        History of Present Illness:     Patient states over the weekend, 5 days ago she was out to lunch with her friend  She thought she was talking funny and could not get her words out  This lasted about 30 minutes, states her friend did not notice this  She also was having numbness to the left arm and left side of face  The facial numbness lasted about one minute  The arm numbness has persisted since this time  It is slightly improving to now it is more the hand that is numb versus the whole arm  She did need help from her friend walking to the bathroom initially and felt off balanced  This has since resolved  The following portions of the patient's history were reviewed and updated as appropriate: allergies, current medications, past family history, past medical history, past social history, past surgical history and problem list      Review of Systems:     Review of Systems   Constitutional: Negative for chills, diaphoresis, fatigue and fever  Respiratory: Negative for chest tightness and shortness of breath  Cardiovascular: Negative for chest pain, palpitations and leg swelling  Musculoskeletal: Negative for gait problem  Neurological: Positive for weakness (now resolved) and numbness (left arm)  Negative for dizziness and light-headedness  Psychiatric/Behavioral: Negative for confusion and decreased concentration          Problem List:     Patient Active Problem List   Diagnosis    Chronic atrophic gastritis    Dysphagia, pharyngoesophageal    Urinary incontinence    Gastroparesis    Hemorrhoids, external    Vitamin B12 deficiency    Asymptomatic postmenopausal status    Colon cancer screening    Encounter for screening for malignant neoplasm of breast    Essential hypertension        Objective: /82 Comment: unable to read  Pulse 62   Temp (!) 97 °F (36 1 °C) (Tympanic)   Resp 14   Ht 5' 9" (1 753 m)   Wt 70 8 kg (156 lb)   SpO2 97%   BMI 23 04 kg/m²     Physical Exam  Vitals reviewed  Constitutional:       General: She is not in acute distress  Appearance: Normal appearance  She is well-developed, well-groomed and normal weight  She is not ill-appearing  HENT:      Head: Normocephalic and atraumatic  Eyes:      General: Lids are normal       Extraocular Movements: Extraocular movements intact  Conjunctiva/sclera: Conjunctivae normal       Pupils: Pupils are equal, round, and reactive to light  Cardiovascular:      Rate and Rhythm: Normal rate and regular rhythm  Pulses: Normal pulses  Heart sounds: Normal heart sounds, S1 normal and S2 normal    Pulmonary:      Effort: Pulmonary effort is normal  No accessory muscle usage  Breath sounds: Normal breath sounds  No wheezing  Musculoskeletal:      Left shoulder: Normal  No tenderness or bony tenderness  Normal strength  Normal pulse  Left upper arm: Normal  No tenderness  Left elbow: Normal  Normal range of motion  No tenderness  Left forearm: Normal  No swelling or tenderness  Left wrist: Normal  No tenderness  Normal range of motion  Left hand: Normal  No swelling or tenderness  Normal range of motion  Normal strength  Normal sensation  Normal capillary refill  Normal pulse  Right lower leg: No edema  Left lower leg: No edema  Skin:     General: Skin is warm and dry  Capillary Refill: Capillary refill takes less than 2 seconds  Findings: No rash  Neurological:      General: No focal deficit present  Mental Status: She is alert and oriented to person, place, and time  Sensory: No sensory deficit  Motor: Motor function is intact  Coordination: Coordination is intact  Gait: Gait is intact     Psychiatric:         Attention and Perception: Attention and perception normal          Mood and Affect: Mood and affect normal          Speech: Speech normal          Behavior: Behavior normal  Behavior is cooperative  Thought Content: Thought content normal          Pertinent Laboratory/Diagnostic Studies:    Laboratory Results: I have personally reviewed the pertinent laboratory results/reports   Radiology/Other Diagnostic Testing Results: I have personally reviewed pertinent reports  Current Medications:     Current Outpatient Medications   Medication Sig Dispense Refill    Ascorbic Acid (vitamin C) 100 MG tablet Take 100 mg by mouth daily      Multiple Vitamins-Minerals (multivitamin with minerals) tablet Take 1 tablet by mouth daily      Zinc 100 MG TABS Take by mouth       No current facility-administered medications for this visit  Patient Instructions     Transient Ischemic Attack   WHAT YOU NEED TO KNOW:   What is a transient ischemic attack (TIA)? A TIA, or mini-stroke, happens when blood cannot flow to part of the brain  A TIA only lasts minutes to hours and does not cause lasting damage  It is still important to get immediate medical care  A TIA may be a warning that you are about to have an ischemic stroke  An ischemic stroke happens when blood flow to the brain is suddenly blocked, usually by a blood clot  What are the warning signs of a stroke? The words BE FAST can help you remember and recognize warning signs of a stroke:  · B = Balance:  Sudden loss of balance    · E = Eyes:  Loss of vision in one or both eyes    · F = Face:  Face droops on one side    · A = Arms:  Arm drops when both arms are raised    · S = Speech:  Speech is slurred or sounds different    · T = Time:  Time to get help immediately     What are the signs and symptoms of a TIA?   Any of the following may happen suddenly and be gone quickly:  · Numb or weak areas of your face, arm, or leg, or paralysis on one side of your body    · Trouble walking or keeping your balance    · Dizziness or a severe headache    · Slurred speech, trouble talking, or not understanding language    · Blurry or double vision, or sudden blindness in one or both eyes    What increases my risk for a TIA? · Being male or 54years of age or older    · Use of birth control pills or hormone replacement medicine (in women)    · A family history of stroke, or a low birthweight    · High blood pressure, blood vessel disease, or sickle cell anemia that is not being treated    · Atrial fibrillation, diabetes, or another heart condition    How is a TIA diagnosed? Your healthcare provider will ask you about the TIA  He or she may want to talk to anyone who witnessed the TIA or found you after it  You or the person should describe your signs and symptoms and when they started  Tell your provider about any medical conditions you have  You may also need any of the following:  · Blood tests  may be used to check your overall condition and how well your blood clots  The tests may also check for a high or low glucose (sugar) level  This can sometimes cause effects that are like a stroke or TIA  · A carotid ultrasound  shows the blood flow in your carotid arteries  The carotid arteries are blood vessels in your neck that carry blood to your brain  A carotid ultrasound checks for narrow or blocked carotid arteries  · CT or MRI  pictures may show blood flow blockage in your brain  You may be given contrast liquid to help the pictures show up better  Tell the healthcare provider if you have ever had an allergic reaction to contrast liquid  Do not enter the MRI room with anything metal  Metal can cause serious injury  Tell the healthcare provider if you have any metal in or on your body  How is a TIA treated? A TIA does not need to be treated  The following may be used to treat the cause of your TIA to prevent a stroke:  · Medicines  may be used to prevent blood clots from forming   Other medicines may be needed to treat diabetes, depression, high cholesterol, or blood pressure problems  You may also need medicine to decrease the pressure in your brain, reduce pain, or prevent seizures  · Surgery  may be needed to open a blocked artery (blood vessel)  Blocked carotid arteries cause poor blood flow to the brain or heart  What can I do to prevent another TIA or a stroke? · Manage health conditions  A condition such as diabetes can increase your risk for a stroke  Control your blood sugar level if you have hyperglycemia or diabetes  Take your prescribed medicines and check your blood sugar level as directed  · Check your blood pressure as directed  High blood pressure can increase your risk for a stroke  If you have high blood pressure, follow your healthcare provider's directions  · Do not use nicotine products or illegal drugs  Nicotine and other chemicals in cigarettes and cigars can cause blood vessel damage  Nicotine and illegal drugs both increase your risk for a stroke  Ask your healthcare provider for information if you currently smoke or use drugs and need help to quit  E- cigarettes or smokeless tobacco still contain nicotine  Talk to your healthcare provider before you use these products  · Talk to your healthcare provider about alcohol  Alcohol can raise your blood pressure  The recommended limit is 2 drinks in a day for men and 1 drink in a day for women  Do not binge drink or save a week's worth of alcohol to drink in 1 or 2 days  Limit weekly amounts as directed by your provider  · Eat a variety of healthy foods  Healthy foods include whole-grain breads, low-fat dairy products, beans, lean meats, and fish  Eat at least 5 servings of fruits and vegetables each day  Choose foods that are low in fat, cholesterol, salt, and sugar  Eat foods that are high in potassium, such as potatoes and bananas  A dietitian can help you create healthy meal plans           · Maintain a healthy weight  Ask your healthcare provider how much you should weigh  Ask him or her to help you create a weight loss plan if you are overweight  He or she can help you create small goals if you have a lot of weight to lose  · Exercise as directed  Exercise can lower your blood pressure, cholesterol, weight, and blood sugar levels  Healthcare providers will help you create exercise goals  They can also help you make a plan to reach your goals  For example, you can break exercise into 10 minute periods, 3 times in the day  Find an exercise that you enjoy  This will make it easier for you to reach your exercise goals  · Manage stress  Stress can raise your blood pressure  Find ways to relax, such as deep breathing or listening to music  Call your local emergency number (911 in the [address] [facility]) or have someone else call if:   · You have any of the following signs of a stroke:      ? Numbness or drooping on one side of your face     ? Weakness in an arm or leg    ? Confusion or difficulty speaking    ? Dizziness, a severe headache, or vision loss       · You have a seizure  · You have chest pain or shortness of breath  · You cough up blood  When should I seek immediate care? · Your arm or leg feels warm, tender, and painful  It may look swollen and red  · You have unusual or heavy bleeding  · You have a severe headache or feel dizzy  When should I call my doctor? · Your blood pressure or blood sugar level is higher or lower than you were told it should be  · You have questions or concerns about your condition or care  CARE AGREEMENT:   You have the right to help plan your care  Learn about your health condition and how it may be treated  Discuss treatment options with your healthcare providers to decide what care you want to receive  You always have the right to refuse treatment  The above information is an  only   It is not intended as medical advice for individual conditions or treatments  Talk to your doctor, nurse or pharmacist before following any medical regimen to see if it is safe and effective for you  © Copyright [facility] 2021 Information is for End User's use only and may not be sold, redistributed or otherwise used for commercial purposes   All illustrations and images included in CareNotes® are the copyrighted property of Javier WEISS  or [address] [place] [address]

## 2021-09-16 NOTE — PATIENT INSTRUCTIONS
Transient Ischemic Attack   WHAT YOU NEED TO KNOW:   What is a transient ischemic attack (TIA)? A TIA, or mini-stroke, happens when blood cannot flow to part of the brain  A TIA only lasts minutes to hours and does not cause lasting damage  It is still important to get immediate medical care  A TIA may be a warning that you are about to have an ischemic stroke  An ischemic stroke happens when blood flow to the brain is suddenly blocked, usually by a blood clot  What are the warning signs of a stroke? The words BE FAST can help you remember and recognize warning signs of a stroke:  · B = Balance:  Sudden loss of balance    · E = Eyes:  Loss of vision in one or both eyes    · F = Face:  Face droops on one side    · A = Arms:  Arm drops when both arms are raised    · S = Speech:  Speech is slurred or sounds different    · T = Time:  Time to get help immediately     What are the signs and symptoms of a TIA? Any of the following may happen suddenly and be gone quickly:  · Numb or weak areas of your face, arm, or leg, or paralysis on one side of your body    · Trouble walking or keeping your balance    · Dizziness or a severe headache    · Slurred speech, trouble talking, or not understanding language    · Blurry or double vision, or sudden blindness in one or both eyes    What increases my risk for a TIA? · Being male or 54years of age or older    · Use of birth control pills or hormone replacement medicine (in women)    · A family history of stroke, or a low birthweight    · High blood pressure, blood vessel disease, or sickle cell anemia that is not being treated    · Atrial fibrillation, diabetes, or another heart condition    How is a TIA diagnosed? Your healthcare provider will ask you about the TIA  He or she may want to talk to anyone who witnessed the TIA or found you after it  You or the person should describe your signs and symptoms and when they started   Tell your provider about any medical conditions you have  You may also need any of the following:  · Blood tests  may be used to check your overall condition and how well your blood clots  The tests may also check for a high or low glucose (sugar) level  This can sometimes cause effects that are like a stroke or TIA  · A carotid ultrasound  shows the blood flow in your carotid arteries  The carotid arteries are blood vessels in your neck that carry blood to your brain  A carotid ultrasound checks for narrow or blocked carotid arteries  · CT or MRI  pictures may show blood flow blockage in your brain  You may be given contrast liquid to help the pictures show up better  Tell the healthcare provider if you have ever had an allergic reaction to contrast liquid  Do not enter the MRI room with anything metal  Metal can cause serious injury  Tell the healthcare provider if you have any metal in or on your body  How is a TIA treated? A TIA does not need to be treated  The following may be used to treat the cause of your TIA to prevent a stroke:  · Medicines  may be used to prevent blood clots from forming  Other medicines may be needed to treat diabetes, depression, high cholesterol, or blood pressure problems  You may also need medicine to decrease the pressure in your brain, reduce pain, or prevent seizures  · Surgery  may be needed to open a blocked artery (blood vessel)  Blocked carotid arteries cause poor blood flow to the brain or heart  What can I do to prevent another TIA or a stroke? · Manage health conditions  A condition such as diabetes can increase your risk for a stroke  Control your blood sugar level if you have hyperglycemia or diabetes  Take your prescribed medicines and check your blood sugar level as directed  · Check your blood pressure as directed  High blood pressure can increase your risk for a stroke  If you have high blood pressure, follow your healthcare provider's directions           · Do not use nicotine products or illegal drugs  Nicotine and other chemicals in cigarettes and cigars can cause blood vessel damage  Nicotine and illegal drugs both increase your risk for a stroke  Ask your healthcare provider for information if you currently smoke or use drugs and need help to quit  E- cigarettes or smokeless tobacco still contain nicotine  Talk to your healthcare provider before you use these products  · Talk to your healthcare provider about alcohol  Alcohol can raise your blood pressure  The recommended limit is 2 drinks in a day for men and 1 drink in a day for women  Do not binge drink or save a week's worth of alcohol to drink in 1 or 2 days  Limit weekly amounts as directed by your provider  · Eat a variety of healthy foods  Healthy foods include whole-grain breads, low-fat dairy products, beans, lean meats, and fish  Eat at least 5 servings of fruits and vegetables each day  Choose foods that are low in fat, cholesterol, salt, and sugar  Eat foods that are high in potassium, such as potatoes and bananas  A dietitian can help you create healthy meal plans  · Maintain a healthy weight  Ask your healthcare provider how much you should weigh  Ask him or her to help you create a weight loss plan if you are overweight  He or she can help you create small goals if you have a lot of weight to lose  · Exercise as directed  Exercise can lower your blood pressure, cholesterol, weight, and blood sugar levels  Healthcare providers will help you create exercise goals  They can also help you make a plan to reach your goals  For example, you can break exercise into 10 minute periods, 3 times in the day  Find an exercise that you enjoy  This will make it easier for you to reach your exercise goals  · Manage stress  Stress can raise your blood pressure  Find ways to relax, such as deep breathing or listening to music      Call your local emergency number [phone] in the [address]) or have someone else call if: · You have any of the following signs of a stroke:      ? Numbness or drooping on one side of your face     ? Weakness in an arm or leg    ? Confusion or difficulty speaking    ? Dizziness, a severe headache, or vision loss       · You have a seizure  · You have chest pain or shortness of breath  · You cough up blood  When should I seek immediate care? · Your arm or leg feels warm, tender, and painful  It may look swollen and red  · You have unusual or heavy bleeding  · You have a severe headache or feel dizzy  When should I call my doctor? · Your blood pressure or blood sugar level is higher or lower than you were told it should be  · You have questions or concerns about your condition or care  CARE AGREEMENT:   You have the right to help plan your care  Learn about your health condition and how it may be treated  Discuss treatment options with your healthcare providers to decide what care you want to receive  You always have the right to refuse treatment  The above information is an  only  It is not intended as medical advice for individual conditions or treatments  Talk to your doctor, nurse or pharmacist before following any medical regimen to see if it is safe and effective for you  © Copyright [employer] 2021 Information is for End User's use only and may not be sold, redistributed or otherwise used for commercial purposes   All illustrations and images included in CareNotes® are the copyrighted property of [employer]  or [address] [place] [address] [facility]

## 2021-09-17 ENCOUNTER — TELEPHONE (OUTPATIENT)
Dept: INTERNAL MEDICINE CLINIC | Facility: CLINIC | Age: 71
End: 2021-09-17

## 2021-09-17 NOTE — TELEPHONE ENCOUNTER
----- Message from Bakari Figueroa, Jerson Alexia  sent at 9/17/2021  8:09 AM EDT -----  Vitamin D is low- advise vitamin d 5000 IU daily  Cholesterol and triglycerides are high  Would recommend starting a statin medication  Pravastatin 20 mg daily if agreeable

## 2021-09-18 ENCOUNTER — TELEPHONE (OUTPATIENT)
Dept: INTERNAL MEDICINE CLINIC | Facility: CLINIC | Age: 71
End: 2021-09-18

## 2021-09-18 DIAGNOSIS — E78.2 MIXED HYPERLIPIDEMIA: ICD-10-CM

## 2021-09-18 RX ORDER — PRAVASTATIN SODIUM 20 MG
20 TABLET ORAL
Qty: 30 TABLET | Refills: 5 | Status: CANCELLED | OUTPATIENT
Start: 2021-09-18

## 2021-10-04 ENCOUNTER — HOSPITAL ENCOUNTER (OUTPATIENT)
Dept: MRI IMAGING | Facility: HOSPITAL | Age: 71
Discharge: HOME | End: 2021-10-04

## 2021-10-04 DIAGNOSIS — R47.9 DIFFICULTY WITH SPEECH: ICD-10-CM

## 2021-10-04 DIAGNOSIS — R20.2 PARESTHESIA: ICD-10-CM

## 2021-10-06 ENCOUNTER — TELEPHONE (OUTPATIENT)
Dept: INTERNAL MEDICINE CLINIC | Facility: CLINIC | Age: 71
End: 2021-10-06

## 2021-10-06 NOTE — TELEPHONE ENCOUNTER
Patient called to say that she did not get the MRI brain done  She had a panic attack and could not go into the tube

## 2022-01-10 ENCOUNTER — OFFICE VISIT (OUTPATIENT)
Dept: INTERNAL MEDICINE CLINIC | Facility: CLINIC | Age: 72
End: 2022-01-10
Payer: MEDICARE

## 2022-01-10 VITALS
WEIGHT: 156.4 LBS | TEMPERATURE: 98.6 F | HEART RATE: 97 BPM | RESPIRATION RATE: 18 BRPM | OXYGEN SATURATION: 97 % | DIASTOLIC BLOOD PRESSURE: 72 MMHG | BODY MASS INDEX: 23.16 KG/M2 | HEIGHT: 69 IN | SYSTOLIC BLOOD PRESSURE: 162 MMHG

## 2022-01-10 DIAGNOSIS — L97.311 VENOUS STASIS ULCER OF RIGHT ANKLE LIMITED TO BREAKDOWN OF SKIN WITHOUT VARICOSE VEINS (HCC): Primary | ICD-10-CM

## 2022-01-10 DIAGNOSIS — I87.2 VENOUS STASIS ULCER OF RIGHT ANKLE LIMITED TO BREAKDOWN OF SKIN WITHOUT VARICOSE VEINS (HCC): Primary | ICD-10-CM

## 2022-01-10 PROBLEM — I83.003 VENOUS ULCER OF ANKLE (HCC): Status: ACTIVE | Noted: 2022-01-10

## 2022-01-10 PROBLEM — L97.309 VENOUS ULCER OF ANKLE (HCC): Status: ACTIVE | Noted: 2022-01-10

## 2022-01-10 PROCEDURE — 99213 OFFICE O/P EST LOW 20 MIN: CPT | Performed by: INTERNAL MEDICINE

## 2022-01-10 NOTE — PATIENT INSTRUCTIONS
Venous insufficiency with chronic ulcer noted:  Culture to look for predominant organism but refer to wound care

## 2022-01-10 NOTE — PROGRESS NOTES
Assessment/Plan:       Diagnoses and all orders for this visit:    Venous stasis ulcer of right ankle limited to breakdown of skin without varicose veins ([facility] )  -     Ambulatory referral to Wound Care; Future  -     Wound culture and Gram stain; Future                Subjective:      Patient ID: Nidia Wilkerson is a 70 y o  female  A six-month venous insufficiency ulcer of the right medial calf just above the malleolus  No systemic symptoms  No documented precipitant  A podiatrist instructed the patient to apply topical antibiotic which she has been doing but the wound is not healing  She has pretty minimal venous insufficiency edema but the ulcer is clearly present and has white eschar  Needs to go to [address] [facility],[address]  The following portions of the patient's history were reviewed and updated as appropriate:   She has a past medical history of Colon polyp, Esophagitis, History of benign breast tumor, and Urinary incontinence  ,  does not have any pertinent problems on file  ,   has a past surgical history that includes Breast lumpectomy (1998); Ankle surgery (Right); Bunionectomy (Bilateral); and Correction hammer toe (Bilateral)  ,  family history includes Heart disease in her father  ,   reports that she quit smoking about 21 years ago  She has never used smokeless tobacco  She reports current alcohol use of about 3 0 standard drinks of alcohol per week  She reports that she does not use drugs  ,  is allergic to penicillins     Current Outpatient Medications   Medication Sig Dispense Refill    Ascorbic Acid (vitamin C) 100 MG tablet Take 100 mg by mouth daily      Multiple Vitamins-Minerals (multivitamin with minerals) tablet Take 1 tablet by mouth daily      pravastatin (PRAVACHOL) 20 mg tablet Take 1 tablet (20 mg total) by mouth daily at bedtime 30 tablet 5    Zinc 100 MG TABS Take by mouth       No current facility-administered medications for this visit         Review of Systems   Skin: Venous ulcer   All other systems reviewed and are negative  Objective:  Vitals:    01/10/22 0842   BP: 162/72   Pulse: 97   Resp: 18   Temp: 98 6 °F (37 °C)   SpO2: 97%      Physical Exam  Constitutional:       Appearance: Normal appearance  Cardiovascular:      Rate and Rhythm: Normal rate  Pulmonary:      Effort: Pulmonary effort is normal    Skin:     General: Skin is warm  Findings: Lesion present  Comments:  0 5 cm venous ulcer right medial ankle  Pretty modest ankle edema   Associated stasis dermatosis           Patient Instructions    Venous insufficiency with chronic ulcer noted:  Culture to look for predominant organism but refer to wound care

## 2022-01-13 ENCOUNTER — OFFICE VISIT (OUTPATIENT)
Dept: WOUND CARE | Facility: CLINIC | Age: 72
End: 2022-01-13
Payer: MEDICARE

## 2022-01-13 VITALS — TEMPERATURE: 97.6 F | BODY MASS INDEX: 23.11 KG/M2 | WEIGHT: 156 LBS | RESPIRATION RATE: 18 BRPM | HEIGHT: 69 IN

## 2022-01-13 DIAGNOSIS — L97.312 VENOUS STASIS ULCER OF RIGHT ANKLE WITH FAT LAYER EXPOSED WITH VARICOSE VEINS (HCC): Primary | ICD-10-CM

## 2022-01-13 DIAGNOSIS — R60.0 LEG EDEMA, RIGHT: ICD-10-CM

## 2022-01-13 DIAGNOSIS — I83.013 VENOUS STASIS ULCER OF RIGHT ANKLE WITH FAT LAYER EXPOSED WITH VARICOSE VEINS (HCC): Primary | ICD-10-CM

## 2022-01-13 PROCEDURE — 87070 CULTURE OTHR SPECIMN AEROBIC: CPT | Performed by: PODIATRIST

## 2022-01-13 PROCEDURE — 99213 OFFICE O/P EST LOW 20 MIN: CPT | Performed by: PODIATRIST

## 2022-01-13 PROCEDURE — 11042 DBRDMT SUBQ TIS 1ST 20SQCM/<: CPT | Performed by: PODIATRIST

## 2022-01-13 PROCEDURE — 87205 SMEAR GRAM STAIN: CPT | Performed by: PODIATRIST

## 2022-01-13 RX ORDER — LIDOCAINE HYDROCHLORIDE 40 MG/ML
5 SOLUTION TOPICAL ONCE
Status: COMPLETED | OUTPATIENT
Start: 2022-01-13 | End: 2022-01-13

## 2022-01-13 RX ADMIN — LIDOCAINE HYDROCHLORIDE 5 ML: 40 SOLUTION TOPICAL at 14:08

## 2022-01-13 NOTE — PATIENT INSTRUCTIONS
Orders Placed This Encounter   Procedures    Wound cleansing and dressings     Right ankle    Wash your hands with soap and water  Remove old dressing, discard into plastic bag and place in trash  Cleanse the wound with mild soap and water or nss prior to applying a clean dressing  Do not use tissue or cotton balls  Do not scrub the wound  Pat dry using gauze  Shower yes but  do not get wet in the shower      Apply silver alginate to the  wound    Cover with gauze  Secure with drew and tape  Change dressing three times a week  Wrap with ace wrap for light compression        MD ordered vascular studies for your legs today    Tissue culture taken today at wound center    Elevate your legs as much as possible     Standing Status:   Future     Standing Expiration Date:   1/13/2023

## 2022-01-16 LAB
BACTERIA TISS AEROBE CULT: NO GROWTH
GRAM STN SPEC: NORMAL

## 2022-01-20 ENCOUNTER — OFFICE VISIT (OUTPATIENT)
Dept: WOUND CARE | Facility: CLINIC | Age: 72
End: 2022-01-20
Payer: MEDICARE

## 2022-01-20 VITALS
HEART RATE: 64 BPM | SYSTOLIC BLOOD PRESSURE: 180 MMHG | TEMPERATURE: 98 F | RESPIRATION RATE: 18 BRPM | DIASTOLIC BLOOD PRESSURE: 78 MMHG

## 2022-01-20 DIAGNOSIS — L97.312 VENOUS STASIS ULCER OF RIGHT ANKLE WITH FAT LAYER EXPOSED WITH VARICOSE VEINS (HCC): Primary | ICD-10-CM

## 2022-01-20 DIAGNOSIS — I83.013 VENOUS STASIS ULCER OF RIGHT ANKLE WITH FAT LAYER EXPOSED WITH VARICOSE VEINS (HCC): Primary | ICD-10-CM

## 2022-01-20 PROCEDURE — 97597 DBRDMT OPN WND 1ST 20 CM/<: CPT | Performed by: PODIATRIST

## 2022-01-20 RX ORDER — LIDOCAINE HYDROCHLORIDE 40 MG/ML
5 SOLUTION TOPICAL ONCE
Status: COMPLETED | OUTPATIENT
Start: 2022-01-20 | End: 2022-01-20

## 2022-01-20 RX ADMIN — LIDOCAINE HYDROCHLORIDE 5 ML: 40 SOLUTION TOPICAL at 15:02

## 2022-01-20 NOTE — PATIENT INSTRUCTIONS
Orders Placed This Encounter   Procedures    Wound cleansing and dressings     Wound cleansing and dressings        Right ankle     Wash your hands with soap and water  Remove old dressing, discard into plastic bag and place in trash  Cleanse the wound with mild soap and water or nss prior to applying a clean dressing  Do not use tissue or cotton balls  Do not scrub the wound  Pat dry using gauze      Shower yes but  do not get wet in the shower        Apply silver alginate to the  wound    Cover with gauze  Secure with drew and tape  Change dressing three times a week  Wrap with ace wrap for light compression          Elevate your legs as much as possible     Standing Status:   Future     Standing Expiration Date:   1/20/2023

## 2022-01-20 NOTE — PROGRESS NOTES
Patient ID: Vikash Turner is a 70 y o  female Date of Birth 1950       Chief Complaint   Patient presents with    Follow Up Wound Care Visit     right ankle stasis ulcer       Allergies:  Penicillins    Diagnosis:  1  Venous stasis ulcer of right ankle with fat layer exposed with varicose veins (HCC)  -     lidocaine (XYLOCAINE) 4 % topical solution 5 mL  -     Wound cleansing and dressings; Future       Diagnosis ICD-10-CM Associated Orders   1  Venous stasis ulcer of right ankle with fat layer exposed with varicose veins (HCC)  I83 013 lidocaine (XYLOCAINE) 4 % topical solution 5 mL    L97 312 Wound cleansing and dressings        Assessment & Plan:  See wound orders  Wound culture reviewed with the patient  There is not growth or need for oral antibiotics  Continue to elevate her leg and wrap with an ACE  It is somewhat improved  Plan to keep appointment for venous study on Monday  Subjective: The patient has been keeping her leg elevated as much as possible          The following portions of the patient's history were reviewed and updated as appropriate:   Patient Active Problem List   Diagnosis    Chronic atrophic gastritis    Dysphagia, pharyngoesophageal    Urinary incontinence    Gastroparesis    Hemorrhoids, external    Vitamin B12 deficiency    Asymptomatic postmenopausal status    Colon cancer screening    Encounter for screening for malignant neoplasm of breast    Essential hypertension    Venous ulcer of ankle ([address] )     Past Medical History:   Diagnosis Date    Colon polyp     Esophagitis     History of benign breast tumor     Urinary incontinence      Past Surgical History:   Procedure Laterality Date    ANKLE SURGERY Right     BREAST LUMPECTOMY  1998    BUNIONECTOMY Bilateral     CORRECTION HAMMER TOE Bilateral      Social History     Socioeconomic History    Marital status: /Civil Union     Spouse name: None    Number of children: None    Years of education: None    Highest education level: None   Occupational History    None   Tobacco Use    Smoking status: Former Smoker     Quit date:      Years since quittin 0    Smokeless tobacco: Never Used   Vaping Use    Vaping Use: Never used   Substance and Sexual Activity    Alcohol use: Yes     Alcohol/week: 3 0 standard drinks     Types: 3 Standard drinks or equivalent per week     Comment: Social    Drug use: Never    Sexual activity: Yes     Partners: Male   Other Topics Concern    None   Social History Narrative    None     Social Determinants of Health     Financial Resource Strain: Not on file   Food Insecurity: Not on file   Transportation Needs: Not on file   Physical Activity: Not on file   Stress: Not on file   Social Connections: Not on file   Intimate Partner Violence: Not on file   Housing Stability: Not on file        Current Outpatient Medications:     Ascorbic Acid (vitamin C) 100 MG tablet, Take 100 mg by mouth daily, Disp: , Rfl:     Multiple Vitamins-Minerals (multivitamin with minerals) tablet, Take 1 tablet by mouth daily, Disp: , Rfl:     pravastatin (PRAVACHOL) 20 mg tablet, Take 1 tablet (20 mg total) by mouth daily at bedtime, Disp: 30 tablet, Rfl: 5    Zinc 100 MG TABS, Take by mouth, Disp: , Rfl:   No current facility-administered medications for this visit  Family History   Problem Relation Age of Onset    Heart disease Father       Review of Systems   Constitutional: Negative for chills and fever  Objective:  BP (!) 180/78   Pulse 64   Temp 98 °F (36 7 °C)   Resp 18     Physical Exam  Neurological:      Mental Status: She is alert  Decreased right leg edema        Wound 22 Venous Ulcer Pretibial Distal;Right (Active)   Wound Image   22 1423   Wound Description Yellow 22 1406   Julia-wound Assessment Pink;Fragile 22 1406   Wound Length (cm) 0 7 cm 22 1406   Wound Width (cm) 0 6 cm 22 1406   Wound Depth (cm) 0 2 cm 22 1406   Wound Surface Area (cm^2) 0 42 cm^2 01/13/22 1406   Wound Volume (cm^3) 0 084 cm^3 01/13/22 1406   Calculated Wound Volume (cm^3) 0 08 cm^3 01/13/22 1406   Drainage Amount Moderate 01/13/22 1406   Drainage Description Serosanguineous 01/13/22 1406   Non-staged Wound Description Full thickness 01/13/22 1406   Dressing Status Intact 01/13/22 1406         Culture, tissue and Gram stain  Order: [id number]   Status: Final result     Visible to patient: Yes (not seen)     Next appt: Today at 03:15 PM in [facility]  ([place], [place])     Dx: Venous stasis ulcer of right ankle wi       Specimen Information: Ankle; Tissue         0 Result Notes    Tissue Culture No growth                GRAM STAIN RESULT  No Polys or Bacteria seen                    Specimen Collected: 01/13/22 15:29 Last Resulted: 01/16/22 07:50                              Debridement   Wound 01/13/22 Venous Ulcer Pretibial Distal;Right    Universal Protocol:  Consent: Verbal consent obtained  Consent given by: patient  Patient understanding: patient states understanding of the procedure being performed  Patient identity confirmed: verbally with patient      Debridement type: selective  Pain control: lidocaine 1%  Pre-debridement measurements  Length (cm): 0 7  Width (cm): 0 6  Depth (cm): 0 2  Surface Area (cm^2): 0 42  Volume (cm^3): 0 08    Post-debridement measurements  Length (cm): 0 7  Width (cm): 0 6  Depth (cm): 0 2  Percent debrided: 100%  Surface Area (cm^2): 0 42  Area debrided (cm^2): 0 42  Volume (cm^3): 0 08  Devitalized tissue debrided: slough  Instrument(s) utilized: curette  Bleeding: small  Procedural pain (0-10): 6  Post-procedural pain: 0   Response to treatment: procedure was tolerated well                   Wound Instructions:  Orders Placed This Encounter   Procedures    Wound cleansing and dressings     Wound cleansing and dressings        Right ankle     Wash your hands with soap and water    Remove old dressing, discard into plastic bag and place in trash  Cleanse the wound with mild soap and water or nss prior to applying a clean dressing  Do not use tissue or cotton balls  Do not scrub the wound  Pat dry using gauze      Shower yes but  do not get wet in the shower        Apply silver alginate to the  wound  Cover with gauze  Secure with drew and tape  Change dressing three times a week  Wrap with ace wrap for light compression          Elevate your legs as much as possible     Standing Status:   Future     Standing Expiration Date:   1/20/2023         Mary Benavidez DPM      Portions of the record may have been created with voice recognition software  Occasional wrong word or "sound a like" substitutions may have occurred due to the inherent limitations of voice recognition software  Read the chart carefully and recognize, using context, where substitutions have occurred

## 2022-01-24 ENCOUNTER — HOSPITAL ENCOUNTER (OUTPATIENT)
Dept: VASCULAR ULTRASOUND | Facility: HOSPITAL | Age: 72
Discharge: HOME | End: 2022-01-24
Attending: PODIATRIST
Payer: MEDICARE

## 2022-01-24 DIAGNOSIS — I83.013 VENOUS STASIS ULCER OF RIGHT ANKLE WITH FAT LAYER EXPOSED WITH VARICOSE VEINS (HCC): ICD-10-CM

## 2022-01-24 DIAGNOSIS — L97.312 VENOUS STASIS ULCER OF RIGHT ANKLE WITH FAT LAYER EXPOSED WITH VARICOSE VEINS (HCC): ICD-10-CM

## 2022-01-24 PROCEDURE — 93970 EXTREMITY STUDY: CPT

## 2022-01-24 PROCEDURE — 93970 EXTREMITY STUDY: CPT | Performed by: SURGERY

## 2022-01-26 ENCOUNTER — OFFICE VISIT (OUTPATIENT)
Dept: WOUND CARE | Facility: CLINIC | Age: 72
End: 2022-01-26
Payer: MEDICARE

## 2022-01-26 VITALS
RESPIRATION RATE: 18 BRPM | DIASTOLIC BLOOD PRESSURE: 78 MMHG | HEART RATE: 73 BPM | SYSTOLIC BLOOD PRESSURE: 174 MMHG | TEMPERATURE: 97.5 F

## 2022-01-26 DIAGNOSIS — R60.0 LEG EDEMA, RIGHT: ICD-10-CM

## 2022-01-26 DIAGNOSIS — L97.312 VENOUS STASIS ULCER OF RIGHT ANKLE WITH FAT LAYER EXPOSED WITH VARICOSE VEINS (HCC): Primary | ICD-10-CM

## 2022-01-26 DIAGNOSIS — I83.013 VENOUS STASIS ULCER OF RIGHT ANKLE WITH FAT LAYER EXPOSED WITH VARICOSE VEINS (HCC): Primary | ICD-10-CM

## 2022-01-26 PROCEDURE — 99213 OFFICE O/P EST LOW 20 MIN: CPT | Performed by: STUDENT IN AN ORGANIZED HEALTH CARE EDUCATION/TRAINING PROGRAM

## 2022-01-26 PROCEDURE — 97597 DBRDMT OPN WND 1ST 20 CM/<: CPT | Performed by: STUDENT IN AN ORGANIZED HEALTH CARE EDUCATION/TRAINING PROGRAM

## 2022-01-26 RX ORDER — LIDOCAINE HYDROCHLORIDE 40 MG/ML
5 SOLUTION TOPICAL ONCE
Status: COMPLETED | OUTPATIENT
Start: 2022-01-26 | End: 2022-01-26

## 2022-01-26 RX ADMIN — LIDOCAINE HYDROCHLORIDE 5 ML: 40 SOLUTION TOPICAL at 08:01

## 2022-01-26 NOTE — PROGRESS NOTES
Patient ID: Joy Vazquez is a 70 y o  female Date of Birth 1950       Chief Complaint   Patient presents with    Follow Up Wound Care Visit     R ankle wound       Allergies:  Penicillins    Diagnosis:  1  Venous stasis ulcer of right ankle with fat layer exposed with varicose veins (HCC)  -     lidocaine (XYLOCAINE) 4 % topical solution 5 mL  -     Wound cleansing and dressings; Future  -     Wound compression and edema control; Future  -     Ambulatory Referral to Vascular Surgery; Future  -     Debridement    2  Leg edema, right  -     Debridement       Diagnosis ICD-10-CM Associated Orders   1  Venous stasis ulcer of right ankle with fat layer exposed with varicose veins (HCC)  I83 013 lidocaine (XYLOCAINE) 4 % topical solution 5 mL    L97 312 Wound cleansing and dressings     Wound compression and edema control     Ambulatory Referral to Vascular Surgery     Debridement   2  Leg edema, right  R60 0 Debridement        Assessment & Plan:   Wound is much improved today and decreased in size since last visit  No signs of infection today   Debrided as below   Continue wound management with silver alginate, DSD and ACE wrap for light compression, see wound orders below    No harsh cleansers such as alcohol, peroxide, or antibacterial soap, do not submerge in water   Patient instructed to use gentle fragrance free lotion to periwound skin at dressing changes   Continue compression with ACE wrap daily    Counseled on importance of frequent elevation of leg for edema control    Continue proper offloading with pillows in bed   Counseled on eating protein to aid in wound healing    Reviewed patients venous duplex studied today with the patient  Patient instructed to follow up and make appointment with vascular surgeon to review study and see if intervention is warranted to prevent future ulceration formation   Followup in 1 week or call sooner with questions or concerns          Subjective:    This is a 70year old female patient that presents for follow up for her right lower extremity venous stasis ulceration  Patient states that she has been dressing the ulcer every other day with silver alginate  She states that she has been elevating and wearing the ace wrap  States that she had the venous studies completed on Monday  States that the ulcer is painful during debridements and when touched  No other pedal or LE problems at this time  The following portions of the patient's history were reviewed and updated as appropriate:   Patient Active Problem List   Diagnosis    Chronic atrophic gastritis    Dysphagia, pharyngoesophageal    Urinary incontinence    Gastroparesis    Hemorrhoids, external    Vitamin B12 deficiency    Asymptomatic postmenopausal status    Colon cancer screening    Encounter for screening for malignant neoplasm of breast    Essential hypertension    Venous ulcer of ankle ([address] )     Past Medical History:   Diagnosis Date    Colon polyp     Esophagitis     History of benign breast tumor     Urinary incontinence      Past Surgical History:   Procedure Laterality Date    ANKLE SURGERY Right     BREAST LUMPECTOMY      BUNIONECTOMY Bilateral     CORRECTION HAMMER TOE Bilateral      Social History     Socioeconomic History    Marital status: /Civil Union     Spouse name: Not on file    Number of children: Not on file    Years of education: Not on file    Highest education level: Not on file   Occupational History    Not on file   Tobacco Use    Smoking status: Former Smoker     Quit date:      Years since quittin 0    Smokeless tobacco: Never Used   Vaping Use    Vaping Use: Never used   Substance and Sexual Activity    Alcohol use:  Yes     Alcohol/week: 3 0 standard drinks     Types: 3 Standard drinks or equivalent per week     Comment: Social    Drug use: Never    Sexual activity: Yes     Partners: Male   Other Topics Concern    Not on file Social History Narrative    Not on file     Social Determinants of Health     Financial Resource Strain: Not on file   Food Insecurity: Not on file   Transportation Needs: Not on file   Physical Activity: Not on file   Stress: Not on file   Social Connections: Not on file   Intimate Partner Violence: Not on file   Housing Stability: Not on file        Current Outpatient Medications:     Ascorbic Acid (vitamin C) 100 MG tablet, Take 100 mg by mouth daily, Disp: , Rfl:     Multiple Vitamins-Minerals (multivitamin with minerals) tablet, Take 1 tablet by mouth daily, Disp: , Rfl:     pravastatin (PRAVACHOL) 20 mg tablet, Take 1 tablet (20 mg total) by mouth daily at bedtime, Disp: 30 tablet, Rfl: 5    Zinc 100 MG TABS, Take by mouth, Disp: , Rfl:   No current facility-administered medications for this visit  Family History   Problem Relation Age of Onset    Heart disease Father       Review of Systems   Constitutional: Negative for chills and fever  Respiratory: Negative for shortness of breath  Cardiovascular: Positive for leg swelling  Gastrointestinal: Negative for diarrhea, nausea and vomiting  Skin: Positive for wound  Objective:  BP (!) 174/78   Pulse 73   Temp 97 5 °F (36 4 °C)   Resp 18     Physical Exam  Vitals and nursing note reviewed  Constitutional:       Appearance: Normal appearance  Cardiovascular:      Pulses:           Dorsalis pedis pulses are 1+ on the right side  Posterior tibial pulses are 1+ on the right side  Musculoskeletal:      Right lower leg: Edema present  Feet:    Feet:      Right foot:      Skin integrity: Ulcer and dry skin present  No erythema  Comments: #1- Right distal LE venous stasis ulceration with granular wound base and moderate serosang  Drainage  Mild POP  No signs of infection  Decreased in size  Skin:     General: Skin is dry  Capillary Refill: Capillary refill takes less than 2 seconds  Findings: No erythema  Neurological:      Mental Status: She is alert  Psychiatric:         Mood and Affect: Mood normal          Behavior: Behavior normal              Wound 01/13/22 Venous Ulcer Pretibial Distal;Right (Active)   Wound Image   01/26/22 0756   Wound Description Pink 01/26/22 0756   Julia-wound Assessment Pink;Fragile 01/26/22 0756   Wound Length (cm) 0 4 cm 01/26/22 0756   Wound Width (cm) 0 3 cm 01/26/22 0756   Wound Depth (cm) 0 1 cm 01/26/22 0756   Wound Surface Area (cm^2) 0 12 cm^2 01/26/22 0756   Wound Volume (cm^3) 0 012 cm^3 01/26/22 0756   Calculated Wound Volume (cm^3) 0 01 cm^3 01/26/22 0756   Change in Wound Size % 87 5 01/26/22 0756   Drainage Amount Small 01/26/22 0756   Drainage Description Serosanguineous 01/26/22 0756   Non-staged Wound Description Full thickness 01/26/22 0756   Dressing Status Intact 01/26/22 0756                         Debridement   Wound 01/13/22 Venous Ulcer Pretibial Distal;Right    Universal Protocol:  Consent: Verbal consent obtained  Written consent not obtained  Risks and benefits: risks, benefits and alternatives were discussed  Consent given by: patient  Patient understanding: patient states understanding of the procedure being performed  Patient identity confirmed: verbally with patient      Performed by: physician  Debridement type: selective  Pain control: lidocaine 4%  Post-debridement measurements  Length (cm): 0 4  Width (cm): 0 3  Depth (cm): 0 2  Percent debrided: 100%  Surface Area (cm^2): 0 12  Area debrided (cm^2): 0 12  Volume (cm^3): 0 02  Devitalized tissue debrided: biofilm, exudate, fibrin and slough  Instrument(s) utilized: curette  Bleeding: small  Hemostasis obtained with: pressure  Procedural pain (0-10): 1  Post-procedural pain: 1   Response to treatment: procedure was tolerated well                   Wound Instructions:  Orders Placed This Encounter   Procedures    Wound cleansing and dressings     Right ankle     Wash your hands with soap and water  Remove old dressing, discard into plastic bag and place in trash  Cleanse the wound with mild soap and water or nss prior to applying a clean dressing  Do not use tissue or cotton balls  Do not scrub the wound  Pat dry using gauze      Ok to shower but do not get wound wet in the shower        Apply silver alginate to the  wound  Cover with gauze, secure with rolled gauze and tape  Change dressing three times a week  Wrap with ace wrap for light compression    Referral to a vascular doctor to discuss vascular issues      Elevate your legs as much as possible  This was done today at the wound care center  Standing Status:   Future     Standing Expiration Date:   1/26/2023    Wound compression and edema control     Elevate legs as much as possible  Continue to wear ace wrap for light compression     Standing Status:   Future     Standing Expiration Date:   1/26/2023    Debridement     This order was created via procedure documentation    Ambulatory Referral to Vascular Surgery     Standing Status:   Future     Standing Expiration Date:   1/26/2023     Referral Priority:   Routine     Referral Type:   Consult - AMB     Referral Reason:   Specialty Services Required     Requested Specialty:   Vascular Surgery     Number of Visits Requested:   1     Expiration Date:   1/26/2023         Emilie Moore DPM      Portions of the record may have been created with voice recognition software  Occasional wrong word or "sound a like" substitutions may have occurred due to the inherent limitations of voice recognition software  Read the chart carefully and recognize, using context, where substitutions have occurred

## 2022-01-26 NOTE — PATIENT INSTRUCTIONS
Orders Placed This Encounter   Procedures    Wound cleansing and dressings     Right ankle     Wash your hands with soap and water  Remove old dressing, discard into plastic bag and place in trash  Cleanse the wound with mild soap and water or nss prior to applying a clean dressing  Do not use tissue or cotton balls  Do not scrub the wound  Pat dry using gauze      Ok to shower but do not get wound wet in the shower        Apply silver alginate to the  wound  Cover with gauze, secure with rolled gauze and tape  Change dressing three times a week  Wrap with ace wrap for light compression    Referral to a vascular doctor to discuss vascular issues      Elevate your legs as much as possible  This was done today at the wound care center  Standing Status:   Future     Standing Expiration Date:   1/26/2023    Wound compression and edema control     Elevate legs as much as possible   Continue to wear ace wrap for light compression     Standing Status:   Future     Standing Expiration Date:   1/26/2023

## 2022-02-03 ENCOUNTER — OFFICE VISIT (OUTPATIENT)
Dept: WOUND CARE | Facility: CLINIC | Age: 72
End: 2022-02-03
Payer: MEDICARE

## 2022-02-03 ENCOUNTER — APPOINTMENT (OUTPATIENT)
Dept: WOUND CARE | Facility: CLINIC | Age: 72
End: 2022-02-03
Payer: MEDICARE

## 2022-02-03 VITALS — RESPIRATION RATE: 18 BRPM | TEMPERATURE: 98.7 F

## 2022-02-03 DIAGNOSIS — L97.312 VENOUS STASIS ULCER OF RIGHT ANKLE WITH FAT LAYER EXPOSED WITH VARICOSE VEINS (HCC): Primary | ICD-10-CM

## 2022-02-03 DIAGNOSIS — I83.013 VENOUS STASIS ULCER OF RIGHT ANKLE WITH FAT LAYER EXPOSED WITH VARICOSE VEINS (HCC): Primary | ICD-10-CM

## 2022-02-03 PROCEDURE — 99213 OFFICE O/P EST LOW 20 MIN: CPT | Performed by: PODIATRIST

## 2022-02-03 RX ORDER — LIDOCAINE HYDROCHLORIDE 40 MG/ML
5 SOLUTION TOPICAL ONCE
Status: COMPLETED | OUTPATIENT
Start: 2022-02-03 | End: 2022-02-03

## 2022-02-03 RX ADMIN — LIDOCAINE HYDROCHLORIDE 5 ML: 40 SOLUTION TOPICAL at 13:43

## 2022-02-03 NOTE — PROGRESS NOTES
Patient ID: Danielle Garcia is a 67 y o  female Date of Birth 1950       Chief Complaint   Patient presents with    Follow Up Wound Care Visit     right ankle ulcer       Allergies:  Penicillins    Diagnosis:  1  Venous stasis ulcer of right ankle with fat layer exposed with varicose veins (HCC)  -     lidocaine (XYLOCAINE) 4 % topical solution 5 mL  -     Wound cleansing and dressings; Future       Diagnosis ICD-10-CM Associated Orders   1  Venous stasis ulcer of right ankle with fat layer exposed with varicose veins (HCC)  I83 013 lidocaine (XYLOCAINE) 4 % topical solution 5 mL    L97 312 Wound cleansing and dressings        Assessment & Plan:  Venous Stasis Ulcer right ankle and venous incompetence: Moderate risk problem  See wound orders  The patient will change from an alginate to topical bacitracin as the wound is dry  Continue leg elevation and compression  Can be changed daily with showers  Patient can use ACE or spandigrip  Patient is also advised to go for a vascular consultation  She needs to explore surgical options for her vein incompetence  This may help avoid the ulcer from becoming a recurrent ulcer  Subjective: The patient is doing well with less pain in the wound  She is using the ACE wrap and elevating her leg        The following portions of the patient's history were reviewed and updated as appropriate:   Patient Active Problem List   Diagnosis    Chronic atrophic gastritis    Dysphagia, pharyngoesophageal    Urinary incontinence    Gastroparesis    Hemorrhoids, external    Vitamin B12 deficiency    Asymptomatic postmenopausal status    Colon cancer screening    Encounter for screening for malignant neoplasm of breast    Essential hypertension    Venous ulcer of ankle ([address] )     Past Medical History:   Diagnosis Date    Colon polyp     Esophagitis     History of benign breast tumor     Urinary incontinence      Past Surgical History:   Procedure Laterality Date    ANKLE SURGERY Right     BREAST LUMPECTOMY  1998    BUNIONECTOMY Bilateral     CORRECTION HAMMER TOE Bilateral      Social History     Socioeconomic History    Marital status: /Civil Union     Spouse name: None    Number of children: None    Years of education: None    Highest education level: None   Occupational History    None   Tobacco Use    Smoking status: Former Smoker     Quit date:      Years since quittin 1    Smokeless tobacco: Never Used   Vaping Use    Vaping Use: Never used   Substance and Sexual Activity    Alcohol use: Yes     Alcohol/week: 3 0 standard drinks     Types: 3 Standard drinks or equivalent per week     Comment: Social    Drug use: Never    Sexual activity: Yes     Partners: Male   Other Topics Concern    None   Social History Narrative    None     Social Determinants of Health     Financial Resource Strain: Not on file   Food Insecurity: Not on file   Transportation Needs: Not on file   Physical Activity: Not on file   Stress: Not on file   Social Connections: Not on file   Intimate Partner Violence: Not on file   Housing Stability: Not on file        Current Outpatient Medications:     Ascorbic Acid (vitamin C) 100 MG tablet, Take 100 mg by mouth daily, Disp: , Rfl:     Multiple Vitamins-Minerals (multivitamin with minerals) tablet, Take 1 tablet by mouth daily, Disp: , Rfl:     pravastatin (PRAVACHOL) 20 mg tablet, Take 1 tablet (20 mg total) by mouth daily at bedtime, Disp: 30 tablet, Rfl: 5    Zinc 100 MG TABS, Take by mouth, Disp: , Rfl:   No current facility-administered medications for this visit  Family History   Problem Relation Age of Onset    Heart disease Father       Review of Systems   Constitutional: Negative for chills and fever           Objective:  Temp 98 7 °F (37 1 °C)   Resp 18     Physical Exam        Wound 22 Venous Ulcer Pretibial Distal;Right (Active)   Wound Image   22 1342   Wound Description Pink;Yellow;Eschar 02/03/22 1342   Julia-wound Assessment Pink;Fragile 02/03/22 1342   Wound Length (cm) 0 7 cm 02/03/22 1342   Wound Width (cm) 0 4 cm 02/03/22 1342   Wound Depth (cm) 0 1 cm 02/03/22 1342   Wound Surface Area (cm^2) 0 28 cm^2 02/03/22 1342   Wound Volume (cm^3) 0 028 cm^3 02/03/22 1342   Calculated Wound Volume (cm^3) 0 03 cm^3 02/03/22 1342   Change in Wound Size % 62 5 02/03/22 1342   Drainage Amount Small 02/03/22 1342   Drainage Description Serosanguineous 02/03/22 1342   Non-staged Wound Description Full thickness 02/03/22 1342   Dressing Status Intact 02/03/22 1342           [facility] REPORT  CLINICAL:  Indications: Varicose veins of right lower extremity with ulcer of ankle  [I83 013]  Patient presents with non-healing right leg veins ulcer     Risk Factors  The patient has history of previous smoking (quit >10yrs ago)  She has no  history of DVT       FINDINGS:     FV Prox, Right       Valve: Reflux       Reflux Time: 1 12  FV Mid, Right       Valve: Reflux       Reflux Time: 1 74  FV Dist, Right       Valve: Reflux       Reflux Time: 0 93  GSV Inguinal, Right       Diameter AP: 6 5  GSV Prox Thigh, Right       Diameter AP: 4 6  GSV Mid Thigh, Right       Diameter AP: 5 2       Valve: Reflux       Reflux Time: 2 02  GSV Dist Thigh, Right       Diameter AP: 4 5       Valve: Reflux       Reflux Time: 2 07  GSV Knee, Right       Impression: Multiple branches       Diameter AP: 3 4       Valve: Reflux       Reflux Time: 1 24  GSV Prox Calf, Right       Diameter AP: 8 2       Valve: Reflux       Reflux Time: 1 49  GSV Mid Calf, Right       Impression: Multiple branches       Diameter AP: 4 3  GSV Dist Calf, Right       Diameter AP: 2 8  SSV Mid Calf, Right       Impression: Multiple branches       Diameter AP: 4 6       Valve: Reflux       Reflux Time: 2 17  SSV Knee, Right       Diameter AP: 7 8       Valve: Reflux       Reflux Time: 3 49  SSV Ankle, Right       Diameter AP: 3 5 Valve: Reflux       Reflux Time: 1 19  Popliteal, Right       Valve: Reflux       Reflux Time: 1 44  Cockett's perforators (1,23), Right       Diameter AP: 3 0       Valve: Reflux       Reflux Time: 0 69  FV Dist, Left       Valve: Reflux       Reflux Time: 0 89  GSV Inguinal, Left       Diameter AP: 6 6  GSV Prox Thigh, Left       Diameter AP: 5 9  GSV Mid Thigh, Left       Diameter AP: 5 0       Valve: Reflux       Reflux Time: 0 56  GSV Dist Thigh, Left       Diameter AP: 5 6       Valve: Reflux       Reflux Time: 2 35  GSV Knee, Left       Diameter AP: 3 8       Valve: Reflux       Reflux Time: 3 34  GSV Prox Calf, Left       Diameter AP: 4 1       Valve: Reflux       Reflux Time: 1 86  GSV Mid Calf, Left       Diameter AP: 2 5       Valve: Reflux       Reflux Time: 1 37  GSV Dist Calf, Left       Diameter AP: 1 7  SSV Mid Calf, Left       Diameter AP: 1 9  SSV Knee, Left       Diameter AP: 2 2  SSV Ankle, Left       Diameter AP: 2 2  Popliteal, Left       Valve: Reflux       Reflux Time: 0 83           CONCLUSION:     Impression:     RIGHT LIMB:  Deep venous incompetence is noted  The great saphenous vein is incompetent  The great saphenous vein exits the saphenous compartment in the mid thigh  The small saphenous vein is incompetent and communicates with the popliteal  vein  There is evidence of incompetent  in the mid calf  There is no evidence of deep vein thrombosis in the CFV, the proximal PFV, the  femoral vein and the popliteal vein  LEFT LIMB:  Deep venous incompetence is noted  The great saphenous vein is incompetent  The great saphenous vein exits the saphenous compartment in the mid thigh  The small saphenous vein is competent and does not communicate with the  popliteal vein  There is no evidence of incompetent perforators in the thigh or calf  There is no evidence of deep vein thrombosis in the CFV, the proximal PFV, the  femoral vein and the popliteal vein       Study performed with patient in steep Reverse Trendelenburg  SIGNATURE:  Electronically Signed by: Yessica Caraballo on 2022-01-24 08:56:08 PM               Procedures             Wound Instructions:  Orders Placed This Encounter   Procedures    Wound cleansing and dressings     Right ankle     Wash your hands with soap and water  Remove old dressing, discard into plastic bag and place in trash  Cleanse the wound with mild soap and water or nss prior to applying a clean dressing  Do not use tissue or cotton balls  Do not scrub the wound  Pat dry using gauze      Ok to shower         Apply bacitracin to the  wound  Cover with gauze, secure with rolled gauze and tape  Change dressing daily  Spandigrip F or Ace wrap for light compression          Elevate your legs as much as possible      This was done today at the wound care center                Wound compression and edema control      Elevate legs as much as possible  Wear spandigrip F or ace wrap for light compression     Standing Status:   Future     Standing Expiration Date:   2/3/2023         Freddie Vivas DPM      Portions of the record may have been created with voice recognition software  Occasional wrong word or "sound a like" substitutions may have occurred due to the inherent limitations of voice recognition software  Read the chart carefully and recognize, using context, where substitutions have occurred

## 2022-02-03 NOTE — PATIENT INSTRUCTIONS
Orders Placed This Encounter   Procedures    Wound cleansing and dressings     Right ankle     Wash your hands with soap and water  Remove old dressing, discard into plastic bag and place in trash  Cleanse the wound with mild soap and water or nss prior to applying a clean dressing  Do not use tissue or cotton balls  Do not scrub the wound  Pat dry using gauze      Ok to shower         Apply bacitracin to the  wound  Cover with gauze, secure with rolled gauze and tape  Change dressing daily  Spandigrip F or Ace wrap for light compression          Elevate your legs as much as possible      This was done today at the wound care center                Wound compression and edema control      Elevate legs as much as possible   Wear spandigrip F or ace wrap for light compression     Standing Status:   Future     Standing Expiration Date:   2/3/2023

## 2022-02-10 ENCOUNTER — OFFICE VISIT (OUTPATIENT)
Dept: WOUND CARE | Facility: CLINIC | Age: 72
End: 2022-02-10
Payer: MEDICARE

## 2022-02-10 VITALS
HEART RATE: 62 BPM | SYSTOLIC BLOOD PRESSURE: 182 MMHG | DIASTOLIC BLOOD PRESSURE: 77 MMHG | TEMPERATURE: 98.1 F | RESPIRATION RATE: 18 BRPM

## 2022-02-10 DIAGNOSIS — L97.312 VENOUS STASIS ULCER OF RIGHT ANKLE WITH FAT LAYER EXPOSED WITH VARICOSE VEINS (HCC): Primary | ICD-10-CM

## 2022-02-10 DIAGNOSIS — L97.421 NON-PRESSURE CHRONIC ULCER OF LEFT HEEL AND MIDFOOT LIMITED TO BREAKDOWN OF SKIN (HCC): ICD-10-CM

## 2022-02-10 DIAGNOSIS — I83.013 VENOUS STASIS ULCER OF RIGHT ANKLE WITH FAT LAYER EXPOSED WITH VARICOSE VEINS (HCC): Primary | ICD-10-CM

## 2022-02-10 PROCEDURE — 99213 OFFICE O/P EST LOW 20 MIN: CPT | Performed by: PODIATRIST

## 2022-02-10 RX ORDER — LIDOCAINE HYDROCHLORIDE 40 MG/ML
5 SOLUTION TOPICAL ONCE
Status: COMPLETED | OUTPATIENT
Start: 2022-02-10 | End: 2022-02-10

## 2022-02-10 RX ADMIN — LIDOCAINE HYDROCHLORIDE 5 ML: 40 SOLUTION TOPICAL at 12:59

## 2022-02-10 NOTE — PROGRESS NOTES
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 ([address] )       Diagnosis ICD-10-CM Associated Orders   1  Venous stasis ulcer of right ankle with fat layer exposed with varicose veins (HCC)  I83 013 lidocaine (XYLOCAINE) 4 % topical solution 5 mL    L97 312 Wound cleansing and dressings   2  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 ([address] )  L97 421         Assessment & Plan:  See wound orders  1   Venous stasis ulcer right ankle  Patient will continue topical antibiotic ointment and DSD  Compression and leg elevation  Patient will see vascular surgery  She is encouraged to pursue surgical intervention if offered to avoid recurrence of the ulcer as venous ulcers are likely to return  2   Non-pressure ulcer left heel to skin  At this point I recommend topical antibiotic ointment and a bandaid  She will call if the site becomes more sore or red as this may indicate infection  Try to stay off the foot to avoid pressure on the wound  3   HTN  I do not treat this condition  I do recommend the patient discuss with her PCP  She states it is elevated due to aggravation  She also notes some white coat syndrome  Suggest checking herself at home and reviewing these values with her primary care physician  Subjective: The patient is doing better  She is going to Vascular on Monday  She has a new sore left heel  She went to podiatry who cut a corn  It bled and now it is sore to walk on        The following portions of the patient's history were reviewed and updated as appropriate:   Patient Active Problem List   Diagnosis    Chronic atrophic gastritis    Dysphagia, pharyngoesophageal    Urinary incontinence    Gastroparesis    Hemorrhoids, external    Vitamin B12 deficiency    Asymptomatic postmenopausal status    Colon cancer screening    Encounter for screening for malignant neoplasm of breast    Essential hypertension    Venous ulcer of ankle ([address] )     Past Medical History:   Diagnosis Date    Colon polyp     Esophagitis     History of benign breast tumor     Urinary incontinence      Past Surgical History:   Procedure Laterality Date    ANKLE SURGERY Right     BREAST LUMPECTOMY      BUNIONECTOMY Bilateral     CORRECTION HAMMER TOE Bilateral      Social History     Socioeconomic History    Marital status: /Civil Union     Spouse name: None    Number of children: None    Years of education: None    Highest education level: None   Occupational History    None   Tobacco Use    Smoking status: Former Smoker     Quit date:      Years since quittin 1    Smokeless tobacco: Never Used   Vaping Use    Vaping Use: Never used   Substance and Sexual Activity    Alcohol use:  Yes     Alcohol/week: 3 0 standard drinks     Types: 3 Standard drinks or equivalent per week     Comment: Social    Drug use: Never    Sexual activity: Yes     Partners: Male   Other Topics Concern    None   Social History Narrative    None     Social Determinants of Health     Financial Resource Strain: Not on file   Food Insecurity: Not on file   Transportation Needs: Not on file   Physical Activity: Not on file   Stress: Not on file   Social Connections: Not on file   Intimate Partner Violence: Not on file   Housing Stability: Not on file        Current Outpatient Medications:     Ascorbic Acid (vitamin C) 100 MG tablet, Take 100 mg by mouth daily, Disp: , Rfl:     Multiple Vitamins-Minerals (multivitamin with minerals) tablet, Take 1 tablet by mouth daily, Disp: , Rfl:     pravastatin (PRAVACHOL) 20 mg tablet, Take 1 tablet (20 mg total) by mouth daily at bedtime, Disp: 30 tablet, Rfl: 5    Zinc 100 MG TABS, Take by mouth, Disp: , Rfl:   No current facility-administered medications for this visit  Family History   Problem Relation Age of Onset    Heart disease Father       Review of Systems   Constitutional: Negative for chills and fever  Objective:  BP (!) 182/77 Comment: recommended follow up with pcp  Pulse 62   Temp 98 1 °F (36 7 °C)   Resp 18     Physical Exam  Neurological:      Mental Status: She is alert               Wound 01/13/22 Venous Ulcer Pretibial Distal;Right (Active)   Wound Image   02/10/22 1256   Wound Description Pink;Yellow 02/10/22 1256   Julia-wound Assessment Pink;Fragile 02/10/22 1256   Wound Length (cm) 0 4 cm 02/10/22 1256   Wound Width (cm) 0 3 cm 02/10/22 1256   Wound Depth (cm) 0 1 cm 02/10/22 1256   Wound Surface Area (cm^2) 0 12 cm^2 02/10/22 1256   Wound Volume (cm^3) 0 012 cm^3 02/10/22 1256   Calculated Wound Volume (cm^3) 0 01 cm^3 02/10/22 1256   Change in Wound Size % 87 5 02/10/22 1256   Drainage Amount Scant 02/10/22 1256   Drainage Description Serosanguineous 02/10/22 1256   Non-staged Wound Description Full thickness 02/10/22 1256   Dressing Status Intact 02/10/22 1256       Wound 02/10/22 Traumatic Heel Left;Posterior (Active)   Wound Image   02/10/22 1321   Wound Description Pink 02/10/22 1320   Julia-wound Assessment Dry 02/10/22 1320   Wound Length (cm) 0 3 cm 02/10/22 1320   Wound Width (cm) 0 4 cm 02/10/22 1320   Wound Depth (cm) 0 1 cm 02/10/22 1320   Wound Surface Area (cm^2) 0 12 cm^2 02/10/22 1320   Wound Volume (cm^3) 0 012 cm^3 02/10/22 1320   Calculated Wound Volume (cm^3) 0 01 cm^3 02/10/22 1320   Drainage Amount None 02/10/22 1320   Dressing Status Intact 02/10/22 1320                         Procedures             Wound Instructions:  Orders Placed This Encounter Procedures    Wound cleansing and dressings     Wound cleansing and dressings         Right ankle     Wash your hands with soap and water  Remove old dressing, discard into plastic bag and place in trash  Cleanse the wound with mild soap and water or nss prior to applying a clean dressing  Do not use tissue or cotton balls  Do not scrub the wound  Pat dry using gauze      Ok to shower       Right ankle and Left bottom foot  Apply bacitracin to the wound  Cover with gauze, secure with rolled gauze and tape  Change dressing daily  Spandigrip F or Ace wrap for light compression           Elevate your legs as much as possible      This was done today at the wound care center                                                             Wound compression and edema control                          Elevate legs as much as possible  Wear spandigrip F or ace wrap for light compression     Standing Status:   Future     Standing Expiration Date:   2/10/2023         Shary Sever, DPM      Portions of the record may have been created with voice recognition software  Occasional wrong word or "sound a like" substitutions may have occurred due to the inherent limitations of voice recognition software  Read the chart carefully and recognize, using context, where substitutions have occurred

## 2022-02-10 NOTE — PATIENT INSTRUCTIONS
Orders Placed This Encounter   Procedures    Wound cleansing and dressings     Wound cleansing and dressings         Right ankle     Wash your hands with soap and water  Remove old dressing, discard into plastic bag and place in trash  Cleanse the wound with mild soap and water or nss prior to applying a clean dressing  Do not use tissue or cotton balls  Do not scrub the wound  Pat dry using gauze      Ok to shower       Right ankle and Left bottom foot  Apply bacitracin to the wound  Cover with gauze, secure with rolled gauze and tape  Change dressing daily  Spandigrip F or Ace wrap for light compression           Elevate your legs as much as possible      This was done today at the wound care center                                                             Wound compression and edema control                          Elevate legs as much as possible   Wear spandigrip F or ace wrap for light compression     Standing Status:   Future     Standing Expiration Date:   2/10/2023

## 2022-02-14 ENCOUNTER — CONSULT (OUTPATIENT)
Dept: VASCULAR SURGERY | Facility: CLINIC | Age: 72
End: 2022-02-14
Payer: MEDICARE

## 2022-02-14 VITALS
HEIGHT: 69 IN | HEART RATE: 62 BPM | SYSTOLIC BLOOD PRESSURE: 132 MMHG | BODY MASS INDEX: 23.31 KG/M2 | DIASTOLIC BLOOD PRESSURE: 82 MMHG | WEIGHT: 157.4 LBS

## 2022-02-14 DIAGNOSIS — L97.312 VENOUS STASIS ULCER OF RIGHT ANKLE WITH FAT LAYER EXPOSED WITH VARICOSE VEINS (HCC): ICD-10-CM

## 2022-02-14 DIAGNOSIS — I83.013 VENOUS STASIS ULCER OF RIGHT ANKLE WITH FAT LAYER EXPOSED WITH VARICOSE VEINS (HCC): ICD-10-CM

## 2022-02-14 PROCEDURE — 99203 OFFICE O/P NEW LOW 30 MIN: CPT | Performed by: NURSE PRACTITIONER

## 2022-02-14 NOTE — PATIENT INSTRUCTIONS
Continue daily wound care as prescribed  Follow up with wound care/ podiatry as scheduled  Return to office with surgeon to review LEVDR and make further recommendations if indicated    Venous Insufficiency   AMBULATORY CARE:   Venous insufficiency  is a condition that prevents blood from flowing out of your legs and back to your heart  Veins contain valves that help blood flow in one direction  Venous insufficiency means the valves do not close correctly or fully  Blood flows back and pools in your leg  This can cause problems such as varicose veins  Venous insufficiency may also be called chronic venous insufficiency or venous stasis  Common signs and symptoms:   · Visible veins on your legs that may be small and red or large, thick, and blue         · Swelling in your ankles or calves         · Changes in skin color, such as dark or purple skin    · An ulcer (open sore) on your leg    · Leg pain that is worse when you are menstruating (women) or when you stand, and better when you elevate your legs    · Burning or itching    · Cramps that happen at night    · Thick, hard skin on your legs and ankles    · Feeling of heaviness in your legs    Seek care immediately if:   · You have a wound that does not heal or is infected  · You have an injury that has broken your skin and caused your varicose veins to bleed  · Your leg is swollen and hard  · You have pain in your leg that does not go away or gets worse  · Your legs or feet are turning blue or black  · Your leg feels warm, tender, and painful  It may look swollen and red  Call your doctor if:   · You have a fever  · You have varicose veins and they are painful  · You have new or worsening leg pain, swelling, or redness  · You have new or worsening ulcers or other sores on your leg  · You have questions or concerns about your condition or care      Treatment  may include any of the following:  · Medicine  may be given to improve blood flow  The medicines may thin your blood or reduce swelling to help blood flow  You may also need medicine to treat a bacterial infection  · Ablation  is a procedure used to close varicose veins  A catheter is guided until it is near the vein  A device will then be guided to the area  The device may produce energy through radiofrequency or a laser  The energy creates heat that will close the blood vessel  · Sclerotherapy  is a procedure used to fade visible veins  Your healthcare provider will inject a liquid into a spider vein or varicose vein  The liquid causes irritation in the vein  The vein swells and sticks together  Your body will then absorb the vein  · Surgery  may be needed if other treatments do not work  Surgery may be used to repair a leg vein valve or to clip or tie off a vein so blood cannot flow through it  You may need to have a veins removed during surgery called stripping  Surgery may be used to bypass (go around) the damaged vein  Blood will flow through a vein transplanted from another part of your body  Manage your symptoms:   · Wear pressure stockings as directed  Pressure stockings help keep blood from pooling in your leg veins  Your healthcare provider can prescribe stockings that are right for you  Do not buy over-the-counter pressure stockings unless your healthcare provider says it is okay  They may not fit correctly or may have elastic that cuts off your circulation  Ask your healthcare provider when to start wearing pressure stockings and how long to wear them each day  · Do not sit or stand for long periods of time  If you have to sit for a long time, flex and extend your legs, feet, and ankles  Do this about 10 times every 30 minutes to help keep blood flowing  If you have to stand for a long time, take breaks and sit with your legs elevated  · Elevate your legs  Elevate your legs above the level of your heart to reduce swelling   Your healthcare provider may recommend that you keep your legs elevated for 30 minutes at a time  You may need to do this 3 to 4 times per day, or more if your healthcare provider recommends  · Do not smoke  Nicotine and other chemicals in cigarettes and cigars can cause blood vessel damage  Ask your healthcare provider for information if you currently smoke and need help to quit  E-cigarettes or smokeless tobacco still contain nicotine  Talk to your healthcare provider before you use these products  · Reach or maintain a healthy weight  Extra weight can make venous insufficiency worse  Ask your healthcare provider what a healthy weight is for you  He or she can help you create a weight loss plan if you need to lose weight  · Exercise as directed  Walking can help increase blood flow in your calves  Ask your healthcare provider how much exercise you need each day and which exercises are best for you  · Care for your skin  Keep your skin clean  Do not use any soaps or lotions that may dry your skin  For example, do not use products that contain fragrance or alcohol  If you have a skin ulcer, your healthcare provider may recommend a wet-to-dry bandage  To do this, apply a wet bandage to your wound and allow it to dry  This will help remove drainage from your wound each time you change the bandage  Your healthcare provider will tell you how often to change your bandage and which kind of bandage to use  Check your wound for signs of infection, such as swelling or pus  · Go to physical therapy (PT) as directed  A physical therapist can help you increase movement and range of motion in your legs  Follow up with your doctor as directed:  Write down your questions so you remember to ask them during your visits  © Copyright [employer] 2021 Information is for End User's use only and may not be sold, redistributed or otherwise used for commercial purposes   All illustrations and images included in CareNotes® are the copyrighted property of A [employer] M [employer] Inc  or [facility]   The above information is an  only  It is not intended as medical advice for individual conditions or treatments  Talk to your doctor, nurse or pharmacist before following any medical regimen to see if it is safe and effective for you

## 2022-02-14 NOTE — PROGRESS NOTES
Assessment/Plan:    Venous ulcer of ankle [facility])  72-year-old female, former smoker with HTN, multiple bilateral foot/toes surgeries and venous insufficiency presents with right medial ankle venous ulceration x 6 months     - right medial ankle ulceration, healing  0 5 cm x 0 5 cm, pink, no drainage or bleeding  Noninfected  - patient complains of pain just at wound  - patient follows with Podiatry  - daily wound care with bacitracin ointment, and compression  - h/o multiple foot/toe surgeries (bunionectomy, hammertoe) 2006, 2009 with diminished ROM  Sensation intact   - BLE dilated varicosities and telangiectasia with venous stasis changes  - no edema  - patient denies claudication or rest pain  - DP/PT Doppler signals appreciated    Plan:   - continue daily local wound care per Podiatry recommendations  - although ulcer is healing improving, recent imaging demonstrates vein incompetence  Return to office with surgeon to review LEVDR and make further recommendations if indicated  - Tubigrip compression daily  - follow-up with Podiatry as scheduled       Diagnoses and all orders for this visit:    Venous stasis ulcer of right ankle with fat layer exposed with varicose veins ([address] )  -     Ambulatory Referral to Vascular Surgery          Subjective:      Patient ID: Sue Stern is a 67 y o  female  Pt is new and was referred by Maggie Denny DPM for venous stasis ulcer of right ankle  Pt c/o ulcer on right ankle that she has had since last august  Pt denies swelling, tiredness and heaviness in legs  Pt is taking Pravastatin  Pt wears compression everyday  72-year-old female, former smoker with venous insufficiency presents with right medial ankle ulcer x 6 months  Patient complains of pain at site of wound only  No edema  Patient denies claudication, rest pain  She has PT/DP Doppler signals bilaterally      Lynn Rodriguez has history of multiple foot/toe surgeries for bunionectomy and hammertoes bilaterally  She reports right ankle ulcer began about 6 months ago with what she thought may have been spider bite  She follows with wound care/Podiatry and currently does daily local wound care with bacitracin ointment and nonstick dressing  Media images reviewed, ulceration is improving  Today wound bed is pink, superficial, no drainage or bleeding, noninfected  Recent LEVDR  (ordered by Podiatry)  demonstrates reflux  We discussed follow-up with vascular surgeon to review imaging and make further recommendations for possible intervention  Although ulceration appears to be healing, has been nonhealing for quite some time and also may be recommended for prevention of future opening  Patient wears daily Tubigrip compression  Patient verbalized understanding is without further questions  The following portions of the patient's history were reviewed and updated as appropriate: allergies, current medications, past family history, past medical history, past social history, past surgical history and problem list     Review of Systems   Constitutional: Negative  HENT: Negative  Eyes: Negative  Respiratory: Negative  Cardiovascular: Negative  Gastrointestinal: Negative  Endocrine: Negative  Genitourinary: Negative  Musculoskeletal: Negative  Skin: Positive for color change (red) and wound (right ankle )  Allergic/Immunologic: Negative  Neurological: Negative  Hematological: Negative  Psychiatric/Behavioral: Negative  I have reviewed and made appropriate changes to the review of systems input by the medical assistant  Objective:      /82 (BP Location: Right arm, Patient Position: Sitting, Cuff Size: Standard)   Pulse 62   Ht 5' 9" (1 753 m)   Wt 71 4 kg (157 lb 6 4 oz)   BMI 23 24 kg/m²          Physical Exam  Vitals reviewed  Constitutional:       Appearance: Normal appearance     Cardiovascular:      Rate and Rhythm: Normal rate and regular rhythm  Pulses:           Radial pulses are 2+ on the right side and 2+ on the left side  Femoral pulses are 2+ on the right side and 2+ on the left side  Dorsalis pedis pulses are detected w/ Doppler on the right side and detected w/ Doppler on the left side  Posterior tibial pulses are detected w/ Doppler on the right side and detected w/ Doppler on the left side  Heart sounds: Normal heart sounds  Pulmonary:      Effort: Pulmonary effort is normal       Breath sounds: Normal breath sounds  Musculoskeletal:         General: Deformity present  Right lower leg: No edema  Left lower leg: No edema  Comments: -Diminished ROM bilaterally at level of toes  Sensation intact  - bilateral hammertoes    Skin:     General: Skin is warm and dry  Capillary Refill: Capillary refill takes less than 2 seconds  Comments: -BLE Dilated varicose veins and telangiectasia R>L  -BLE venous stasis changes  - R medial ankle ulcer 0 5cm x 0 5cm superficial, pink, no drainage or bleeding  Non- infected   Neurological:      Mental Status: She is alert and oriented to person, place, and time     Psychiatric:         Behavior: Behavior normal                  Vitals:    02/14/22 0856   BP: 132/82   BP Location: Right arm   Patient Position: Sitting   Cuff Size: Standard   Pulse: 62   Weight: 71 4 kg (157 lb 6 4 oz)   Height: 5' 9" (1 753 m)       Patient Active Problem List   Diagnosis    Chronic atrophic gastritis    Dysphagia, pharyngoesophageal    Urinary incontinence    Gastroparesis    Hemorrhoids, external    Vitamin B12 deficiency    Asymptomatic postmenopausal status    Colon cancer screening    Encounter for screening for malignant neoplasm of breast    Essential hypertension    Venous ulcer of ankle (HCC)       Past Surgical History:   Procedure Laterality Date    ANKLE SURGERY Right     BREAST LUMPECTOMY  1998    BUNIONECTOMY Bilateral     CORRECTION HAMMER TOE Bilateral        Family History   Problem Relation Age of Onset    Heart disease Father        Social History     Socioeconomic History    Marital status: /Civil Union     Spouse name: Not on file    Number of children: Not on file    Years of education: Not on file    Highest education level: Not on file   Occupational History    Not on file   Tobacco Use    Smoking status: Former Smoker     Quit date:      Years since quittin 1    Smokeless tobacco: Never Used   Vaping Use    Vaping Use: Never used   Substance and Sexual Activity    Alcohol use:  Yes     Alcohol/week: 3 0 standard drinks     Types: 3 Standard drinks or equivalent per week     Comment: Social    Drug use: Never    Sexual activity: Yes     Partners: Male   Other Topics Concern    Not on file   Social History Narrative    Not on file     Social Determinants of Health     Financial Resource Strain: Not on file   Food Insecurity: Not on file   Transportation Needs: Not on file   Physical Activity: Not on file   Stress: Not on file   Social Connections: Not on file   Intimate Partner Violence: Not on file   Housing Stability: Not on file       Allergies   Allergen Reactions    Penicillins Other (See Comments)     Unknown reaction, positive on allergy testing         Current Outpatient Medications:     Ascorbic Acid (vitamin C) 100 MG tablet, Take 100 mg by mouth daily, Disp: , Rfl:     Multiple Vitamins-Minerals (multivitamin with minerals) tablet, Take 1 tablet by mouth daily, Disp: , Rfl:     pravastatin (PRAVACHOL) 20 mg tablet, Take 1 tablet (20 mg total) by mouth daily at bedtime, Disp: 30 tablet, Rfl: 5    Zinc 100 MG TABS, Take by mouth, Disp: , Rfl:

## 2022-02-14 NOTE — ASSESSMENT & PLAN NOTE
80-year-old female, former smoker with HTN, multiple bilateral foot/toes surgeries and venous insufficiency presents with right medial ankle venous ulceration x 6 months     - right medial ankle ulceration, healing  0 5 cm x 0 5 cm, pink, no drainage or bleeding  Noninfected  - patient complains of pain just at wound  - patient follows with Podiatry  - daily wound care with bacitracin ointment, and compression  - h/o multiple foot/toe surgeries (bunionectomy, hammertoe) 2006, 2009 with diminished ROM  Sensation intact   - BLE dilated varicosities and telangiectasia with venous stasis changes  - no edema  - patient denies claudication or rest pain  - DP/PT Doppler signals appreciated    Plan:   - continue daily local wound care per Podiatry recommendations  - although ulcer is healing improving, recent imaging demonstrates vein incompetence    Return to office with surgeon to review LEVDR and make further recommendations if indicated  - Tubigrip compression daily  - follow-up with Podiatry as scheduled

## 2022-03-03 ENCOUNTER — OFFICE VISIT (OUTPATIENT)
Dept: WOUND CARE | Facility: CLINIC | Age: 72
End: 2022-03-03
Payer: MEDICARE

## 2022-03-03 VITALS
RESPIRATION RATE: 20 BRPM | DIASTOLIC BLOOD PRESSURE: 75 MMHG | SYSTOLIC BLOOD PRESSURE: 178 MMHG | TEMPERATURE: 99 F | HEART RATE: 68 BPM

## 2022-03-03 DIAGNOSIS — L97.312 VENOUS STASIS ULCER OF RIGHT ANKLE WITH FAT LAYER EXPOSED WITH VARICOSE VEINS (HCC): Primary | ICD-10-CM

## 2022-03-03 DIAGNOSIS — I83.013 VENOUS STASIS ULCER OF RIGHT ANKLE WITH FAT LAYER EXPOSED WITH VARICOSE VEINS (HCC): Primary | ICD-10-CM

## 2022-03-03 PROCEDURE — 99212 OFFICE O/P EST SF 10 MIN: CPT | Performed by: PODIATRIST

## 2022-03-03 NOTE — PROGRESS NOTES
Patient ID: Joy Vazquez is a 67 y o  female Date of Birth 1950       Chief Complaint   Patient presents with    Follow Up Wound Care Visit     right ankle wound       Allergies:  Penicillins    Diagnosis:  1  Venous stasis ulcer of right ankle with fat layer exposed with varicose veins (HCC)  -     Wound cleansing and dressings; Future       Diagnosis ICD-10-CM Associated Orders   1  Venous stasis ulcer of right ankle with fat layer exposed with varicose veins (HCC)  I83 013 Wound cleansing and dressings    L97 [id number]         Assessment & Plan:  See wound orders  1   Venous stasis with ulcer right ankle  2   Venous incompetence  Continue local wound care for one week or until the skin is not sticky to a tissue  Continue leg elevation and compression  Await guidance from the vascular service regarding surgical treatment of venous disease  Subjective: The patient is doing well  No pain in the leg  She saw the PA from vascular and is scheduled to see the physician in April        The following portions of the patient's history were reviewed and updated as appropriate:   Patient Active Problem List   Diagnosis    Chronic atrophic gastritis    Dysphagia, pharyngoesophageal    Urinary incontinence    Gastroparesis    Hemorrhoids, external    Vitamin B12 deficiency    Asymptomatic postmenopausal status    Colon cancer screening    Encounter for screening for malignant neoplasm of breast    Essential hypertension    Venous ulcer of ankle ([address] )     Past Medical History:   Diagnosis Date    Colon polyp     Esophagitis     History of benign breast tumor     Urinary incontinence      Past Surgical History:   Procedure Laterality Date    ANKLE SURGERY Right     BREAST LUMPECTOMY  1998    BUNIONECTOMY Bilateral     CORRECTION HAMMER TOE Bilateral      Social History     Socioeconomic History    Marital status: /Civil Union     Spouse name: None    Number of children: None    Years of education: None    Highest education level: None   Occupational History    None   Tobacco Use    Smoking status: Former Smoker     Quit date:      Years since quittin 1    Smokeless tobacco: Never Used   Vaping Use    Vaping Use: Never used   Substance and Sexual Activity    Alcohol use: Yes     Alcohol/week: 3 0 standard drinks     Types: 3 Standard drinks or equivalent per week     Comment: Social    Drug use: Never    Sexual activity: Yes     Partners: Male   Other Topics Concern    None   Social History Narrative    None     Social Determinants of Health     Financial Resource Strain: Not on file   Food Insecurity: Not on file   Transportation Needs: Not on file   Physical Activity: Not on file   Stress: Not on file   Social Connections: Not on file   Intimate Partner Violence: Not on file   Housing Stability: Not on file        Current Outpatient Medications:     Ascorbic Acid (vitamin C) 100 MG tablet, Take 100 mg by mouth daily, Disp: , Rfl:     Multiple Vitamins-Minerals (multivitamin with minerals) tablet, Take 1 tablet by mouth daily, Disp: , Rfl:     pravastatin (PRAVACHOL) 20 mg tablet, Take 1 tablet (20 mg total) by mouth daily at bedtime, Disp: 30 tablet, Rfl: 5    Zinc 100 MG TABS, Take by mouth, Disp: , Rfl:   Family History   Problem Relation Age of Onset    Heart disease Father       Review of Systems   Constitutional: Negative for chills and fever  Objective:  BP (!) 178/75   Pulse 68   Temp 99 °F (37 2 °C)   Resp 20     Physical Exam  Neurological:      Mental Status: She is alert  Wound 22 Venous Ulcer Pretibial Distal;Right (Active)   Wound Image   22 1414   Wound Description Epithelialization 22 1422   Julia-wound Assessment Fragile; Hyperpigmented 22 1422   Wound Length (cm) 0 cm 22 1422   Wound Width (cm) 0 cm 22 1422   Wound Depth (cm) 0 cm 22 1422   Wound Surface Area (cm^2) 0 cm^2 22 1422   Wound Volume (cm^3) 0 cm^3 03/03/22 1422   Calculated Wound Volume (cm^3) 0 cm^3 03/03/22 1422   Change in Wound Size % 100 03/03/22 1422   Drainage Amount None 03/03/22 1422   Drainage Description Serosanguineous 02/10/22 1256   Non-staged Wound Description Not applicable 68/33/87 8260   Dressing Status Intact 03/03/22 1422       Wound 02/10/22 Traumatic Heel Left;Posterior (Active)   Wound Image   03/03/22 1425   Wound Description Epithelialization 03/03/22 1429   Julia-wound Assessment Dry 03/03/22 1429   Wound Length (cm) 0 cm 03/03/22 1429   Wound Width (cm) 0 cm 03/03/22 1429   Wound Depth (cm) 0 cm 03/03/22 1429   Wound Surface Area (cm^2) 0 cm^2 03/03/22 1429   Wound Volume (cm^3) 0 cm^3 03/03/22 1429   Calculated Wound Volume (cm^3) 0 cm^3 03/03/22 1429   Change in Wound Size % 100 03/03/22 1429   Drainage Amount None 03/03/22 1429   Non-staged Wound Description Not applicable 80/89/18 6991   Dressing Status Intact 03/03/22 1429            2/14/2022 note from JAQUELIN Gu  Venous ulcer of ankle [facility])  45-year-old female, former smoker with HTN, multiple bilateral foot/toes surgeries and venous insufficiency presents with right medial ankle venous ulceration x 6 months      - right medial ankle ulceration, healing  0 5 cm x 0 5 cm, pink, no drainage or bleeding  Noninfected  - patient complains of pain just at wound  - patient follows with Podiatry  - daily wound care with bacitracin ointment, and compression  - h/o multiple foot/toe surgeries (bunionectomy, hammertoe) 2006, 2009 with diminished ROM  Sensation intact   - BLE dilated varicosities and telangiectasia with venous stasis changes  - no edema  - patient denies claudication or rest pain  - DP/PT Doppler signals appreciated     Plan:   - continue daily local wound care per Podiatry recommendations  - although ulcer is healing improving, recent imaging demonstrates vein incompetence    Return to office with surgeon to review LEVDR and make further recommendations if indicated  - Tubigrip compression daily  - follow-up with Podiatry as scheduled              Procedures             Wound Instructions:  Orders Placed This Encounter   Procedures    Wound cleansing and dressings     Left heel and Right medial ankle  Healed  Cover right medial ankle with dry dressing for 1 week then open to air  Continue with compression stocking daily on in am and off in pm     Standing Status:   Future     Standing Expiration Date:   3/3/2023         Adin Desai DPM      Portions of the record may have been created with voice recognition software  Occasional wrong word or "sound a like" substitutions may have occurred due to the inherent limitations of voice recognition software  Read the chart carefully and recognize, using context, where substitutions have occurred

## 2022-03-03 NOTE — PATIENT INSTRUCTIONS
Orders Placed This Encounter   Procedures    Wound cleansing and dressings     Left heel and Right medial ankle  Healed  Cover right medial ankle with dry dressing for 1 week then open to air  Continue with compression stocking daily on in am and off in pm     Standing Status:   Future     Standing Expiration Date:   3/3/2023

## 2022-04-07 DIAGNOSIS — E78.2 MIXED HYPERLIPIDEMIA: ICD-10-CM

## 2022-04-07 RX ORDER — PRAVASTATIN SODIUM 20 MG
TABLET ORAL
Qty: 90 TABLET | Refills: 1 | Status: SHIPPED | OUTPATIENT
Start: 2022-04-07

## 2022-04-19 ENCOUNTER — TELEPHONE (OUTPATIENT)
Dept: VASCULAR SURGERY | Facility: CLINIC | Age: 72
End: 2022-04-19

## 2022-04-19 ENCOUNTER — OFFICE VISIT (OUTPATIENT)
Dept: VASCULAR SURGERY | Facility: CLINIC | Age: 72
End: 2022-04-19
Payer: MEDICARE

## 2022-04-19 VITALS
TEMPERATURE: 99.2 F | DIASTOLIC BLOOD PRESSURE: 72 MMHG | HEART RATE: 69 BPM | BODY MASS INDEX: 23.11 KG/M2 | HEIGHT: 69 IN | WEIGHT: 156 LBS | SYSTOLIC BLOOD PRESSURE: 130 MMHG

## 2022-04-19 DIAGNOSIS — L97.312 VENOUS STASIS ULCER OF RIGHT ANKLE WITH FAT LAYER EXPOSED WITH VARICOSE VEINS (HCC): Primary | ICD-10-CM

## 2022-04-19 DIAGNOSIS — I83.013 VENOUS STASIS ULCER OF RIGHT ANKLE WITH FAT LAYER EXPOSED WITH VARICOSE VEINS (HCC): Primary | ICD-10-CM

## 2022-04-19 PROCEDURE — 99213 OFFICE O/P EST LOW 20 MIN: CPT | Performed by: SURGERY

## 2022-04-19 RX ORDER — CLINDAMYCIN PHOSPHATE 900 MG/50ML
900 INJECTION INTRAVENOUS ONCE
Status: CANCELLED | OUTPATIENT
Start: 2022-04-19 | End: 2022-04-19

## 2022-04-19 NOTE — PROGRESS NOTES
Assessment/Plan:    Venous ulcer of ankle (HCC)  Right leg non healing venous ulcer that was ongong for about 6 months  She went to wound care and it eventually healed after months of compression and local wound care  This is first time she had wound like this  It was very painful  She would like to have procedure to prevent recurrence  Venous doppler shows entire length of GSV has reflux with several large branches in calf  She will benefit from venaseal treatment of the GSV and continued compression therapy  Risks of DVT and phlebitis  EVLT Operative Scheduling Information:    Hospital:  [place] [address]    Physician:  Clare Otoole    Surgery: Right Leg venaseal treatment of GSV    Urgency:  Standard    Level:  Level 4: Outpatients to be scheduled for screening procedures and elective surgery that can be delayed for longer than one month without reasonable expectation of detriment to patient  Case Length:  Normal    Post-op Bed:  Outpatient    OR Table:  Standard    Equipment Needs:  Rep: veaseal    Medication Instructions:  None    Hydration:  No    Venous Clinical Severity Scores (VCSS)  Item Absent   (0 points) Mild   (1 point) Moderate   (2 points) Severe   (3 points)   Pain [] None [] Occasional [] Daily [x] Daily limiting   Varicose veins [] None [] Few [] Calf or thigh [x] Calf and thigh   Venous edema [x] None [] Foot and ankle [] Above ankle, below knee [] To knee of above   Skin pigmentation [] None [x] Perimalleolar [] Diffuse, lower 1/3 calf [] Wider, above lower 1/3 calf   Inflammation [] None [x] Perimalleolar [] Diffuse, lower 1/3 calf [] Wider, above lower 1/3 calf   Induration [] None [x] Perimalleolar [] Diffuse, lower 1/3 calf [] Wider, above lower 1/3 calf   No  active ulcers [] None [x] 1 [] 2 [] ? 3   Active ulcer size [] None [x] <2 cm [] 2 - 6 cm [] >6 cm   Ulcer duration [] None [] <3 months [] 3 - 12 months [] >1 year   Compression therapy [] None [] Intermittent [] Most days [x] Fully comply   Total 14          CEAP Clinical Classification  [x] Symptomatic   [] Asymptomatic     [] Class 0 No visible or palpable signs of venous disease   [] Class 1 Telangiectasies or reticular veins   [] Class 2 Varicose veins; distinguished from reticular veins by a diameter of 3mm or more   [] Class 3 Edema   [] Class 4 Changes in skin and subcutaneous tissue secondary to CVD    [] Class 4a Pigmentation or eczema   [] Class 4b Lipodermatosclerosis or atrophie viki   [x] Class 5 Healed venous ulcer   [] Class 6 Active venous ulcer                Diagnoses and all orders for this visit:    Venous stasis ulcer of right ankle with fat layer exposed with varicose veins ([address] )  -     Case request operating room: Venaseal thearpy  and stab phlebectomy; Standing  -     Ambulatory referral to Cardiology; Future  -     CBC and Platelet; Future  -     Basic metabolic panel; Future  -     EKG 12 lead; Future  -     Case request operating room: Venaseal thearpy  and stab phlebectomy          Subjective:      Patient ID: Nette Spann is a 67 y o  female  Pt is here to rev LEVDR done on 1/24/22  Pt has a R ankle ulcer that is now healed  HPI  Right leg non healing venous ulcer that was ongong for about 6 months  She went to wound care and it eventually healed after months of compression and local wound care  This is first time she had wound like this  It was very painful  She would like to have procedure to prevent recurrence  The following portions of the patient's history were reviewed and updated as appropriate: allergies, current medications, past family history, past medical history, past social history, past surgical history and problem list     Review of Systems   Constitutional: Negative  HENT: Negative  Eyes: Negative  Respiratory: Negative  Cardiovascular: Negative  Gastrointestinal: Negative  Endocrine: Negative  Genitourinary: Negative  Musculoskeletal: Negative      Skin: Positive for wound  Allergic/Immunologic: Negative  Neurological: Negative  Hematological: Negative  Psychiatric/Behavioral: Negative  I have reviewed the ROS as entered and made changes as necessary  Objective:      /72 (BP Location: Right arm, Patient Position: Sitting, Cuff Size: Standard)   Pulse 69   Temp 99 2 °F (37 3 °C) (Tympanic)   Ht 5' 9" (1 753 m)   Wt 70 8 kg (156 lb)   BMI 23 04 kg/m²          Physical Exam  Vitals and nursing note reviewed  Constitutional:       Appearance: Normal appearance  HENT:      Head: Normocephalic and atraumatic  Cardiovascular:      Rate and Rhythm: Normal rate and regular rhythm  Pulses:           Dorsalis pedis pulses are 2+ on the right side and 2+ on the left side  Posterior tibial pulses are 2+ on the right side and 2+ on the left side  Heart sounds: Normal heart sounds  Pulmonary:      Effort: Pulmonary effort is normal       Breath sounds: Normal breath sounds  Musculoskeletal:      Right lower leg: Edema present  Left lower leg: Edema present  Skin:     General: Skin is warm and dry  Capillary Refill: Capillary refill takes less than 2 seconds  Comments: Healed ulcer over right lower leg medial malleolar area  Neurological:      General: No focal deficit present  Mental Status: She is alert and oriented to person, place, and time     Psychiatric:         Mood and Affect: Mood normal          Behavior: Behavior normal

## 2022-04-19 NOTE — ASSESSMENT & PLAN NOTE
Right leg non healing venous ulcer that was ongong for about 6 months  She went to wound care and it eventually healed after months of compression and local wound care  This is first time she had wound like this  It was very painful  She would like to have procedure to prevent recurrence  Venous doppler shows entire length of GSV has reflux with several large branches in calf  She will benefit from venaseal treatment of the GSV and continued compression therapy  Risks of DVT and phlebitis  EVLT Operative Scheduling Information:    Hospital:  [facility]    Physician:  Suellen Jenkins    Surgery: Right Leg venaseal treatment of GSV    Urgency:  Standard    Level:  Level 4: Outpatients to be scheduled for screening procedures and elective surgery that can be delayed for longer than one month without reasonable expectation of detriment to patient  Case Length:  Normal    Post-op Bed:  Outpatient    OR Table:  Standard    Equipment Needs:  Rep: veaseal    Medication Instructions:  None    Hydration:  No    Venous Clinical Severity Scores (VCSS)  Item Absent   (0 points) Mild   (1 point) Moderate   (2 points) Severe   (3 points)   Pain [] None [] Occasional [] Daily [x] Daily limiting   Varicose veins [] None [] Few [] Calf or thigh [x] Calf and thigh   Venous edema [x] None [] Foot and ankle [] Above ankle, below knee [] To knee of above   Skin pigmentation [] None [x] Perimalleolar [] Diffuse, lower 1/3 calf [] Wider, above lower 1/3 calf   Inflammation [] None [x] Perimalleolar [] Diffuse, lower 1/3 calf [] Wider, above lower 1/3 calf   Induration [] None [x] Perimalleolar [] Diffuse, lower 1/3 calf [] Wider, above lower 1/3 calf   No  active ulcers [] None [x] 1 [] 2 [] ? 3   Active ulcer size [] None [x] <2 cm [] 2 - 6 cm [] >6 cm   Ulcer duration [] None [] <3 months [] 3 - 12 months [] >1 year   Compression therapy [] None [] Intermittent [] Most days [x] Fully comply   Total 14          CEAP Clinical Classification  [x] Symptomatic   [] Asymptomatic     [] Class 0 No visible or palpable signs of venous disease   [] Class 1 Telangiectasies or reticular veins   [] Class 2 Varicose veins; distinguished from reticular veins by a diameter of 3mm or more   [] Class 3 Edema   [] Class 4 Changes in skin and subcutaneous tissue secondary to CVD    [] Class 4a Pigmentation or eczema   [] Class 4b Lipodermatosclerosis or atrophie viki   [x] Class 5 Healed venous ulcer   [] Class 6 Active venous ulcer

## 2022-04-19 NOTE — PATIENT INSTRUCTIONS
Venous ulcer of ankle (HCC)  Right leg non healing venous ulcer that was ongong for about 6 months  She went to wound care and it eventually healed after months of compression and local wound care  This is first time she had wound like this  It was very painful  She would like to have procedure to prevent recurrence  Recommend venaseal procedure to shut leaky vein with leaky valves causing problem

## 2022-04-19 NOTE — TELEPHONE ENCOUNTER
REMINDER: Under Reason For Call, comments MUST be formatted as:   (Surgeon's Initials) / (Procedure)    Physician / DANIKA MCCRACKEN  PHILLIP: GAY // Christopher Felix (NPI: [id number]) / [facility] (Tax: [id number] / NPI: [id number])    Procedure: Right Leg venaseal treatment of GSV    Level: 4 - Route clearance(s) to [facility] Surgery Coordinator Pool    Equipment / Rep Needs: Yes, Rep: Jase Benavidez  Assistant Surgeon: No    Allergies: Penicillins    Instructions Given: EVLT Packet with NO Bowel Prep General Instructions     Blood Thinners / Medication Hold: Patient is not taking any blood thinners  Hydration Required: Patient does not require hydration  Dialysis: Patient is not on dialysis  Consent: I certify that patient has signed, printed, timed, and dated their surgery consent  I certify that the patient's LEGAL NAME and DATE OF BIRTH are written in the upper left corner on BOTH sides of the consent  I certify that BOTH sides of the completed surgery consent have been scanned into the patient's Epic chart by myself on 4/19/2022  Yes, I have LABELED the consent in Epic as Consent for Vascular Procedure  Yes, I have LABELED the consent in Epic as Consent for Vascular Procedure  Clearances     Levels   1-3 ROUTE this encounter to [facility] Clearance Pool   AND   SEND Clearance Form(s) to Vascular Nursing e-mail group   Level   4 ROUTE this encounter to [facility] Surgery Coordinator Pool  AND   SEND Clearance Form(s) to Vascular Surgery Schedulers e-mail group     Patient does not require any pre operative clearance  Yes, I have ROUTED this encounter to [facility] Surgery Coordinator and/or [facility] Clearance Pool  CARDIAC CLEARANCE NOT NEEDED PER DR Gautam 39  ORDER ENTERED IN ERROR

## 2022-04-19 NOTE — LETTER
22   RE: Medicare ID: [id number]    To whom it may concern:    Patient, Zuly Dowd (1950), has a routine procedure scheduled on 2022 at [address] [place] [address] (Tax: [id number] / NPI: [id number]) [place] with Dr Art Gtz // Tiffanie Maldonado (NPI: [id number])  We are requesting authorization for one (1) unit each of outpatient CPT code(s) [id number], AND 97063  Patient's VCSS = 14 and CEAP Classification = Class 5  Please review the attached clinical documentation and provide your determination  Please Note:   The right lower extremity tributaries will be treated with CPT code 98062  Should you have any questions or concerns regarding the details of this case, please do not hesitate to reach out  Respectfully,      Meme Perea  Prior Authorization & Referral  [facility]  [place] [address] [facility]  [address]  O Gov [facility], [address] [place] [address]   P: [phone]  F: [phone]  [email or web address]  [email or web address]                            Please Do Not Copy        Prior [address] Outpatient Procedure  Medicare Part A Fax/Mail Cover Sheet  Complete all fields; attach supporting medical documentation and fax to [phone] or mail to the applicable address/number provided at the bottom of the page  Complete ONE (1) Medicare Fax/ Mail Cover Sheet for each prior authorization request for which documentation is being submitted      Beneficiary Last Name  Abimbola Self First Name  Raji Moralez ID       [id number] Gender  [] Male [x] Female   1950    Facility/Agency NPIs      [id number] CMS Certification Number        [id number]   Facility Name and Address  [address] [place] [address] // [address] [place] [address] Rowan MURRELLgatanvir    Provider's NPI      [id number]   Provider's CMS Certification Number  [id number]          Provider's Name and Address  Tiffanie Maldonado // [address], [address], [place], [place] [address]     Requestor Name  Bart Ray Phone Number   [phone]   Requestor Fax Number/Email address  [phone] / [email or web address] Procedure Code(s)   [id number] AND 99390[right]   Paired Code(s) for Botulinum Toxin Injections                                                                                                  Not Applicable (N/A)    Diagnosis Codes (providers who submit using esMD must include diagnosis code(s)):   I83 813   Start Date of Authorization    06/06/22 State (location) [place] [address] Units of Service  One (1) unit of each code   Request Completed by: (please print and sign)  [facility] Date  04/29/22        This document is intended solely for the use of the individual or entity to which it is addressed and may contain information that is privileged, confidential, and exempt from disclosure under applicable law  If the reader of this notice is not the intended recipient or individual responsible for delivering the message to the intended recipient, you are hereby advised that any dissemination, distribution or copying of this information is strictly prohibited  If you receive this communication in error, please advise us by telephone and destroy these papers

## 2022-04-21 NOTE — TELEPHONE ENCOUNTER
Left message for patient to call me back so that we can schedule her surgery for 6-6-22 at [facility]/OR with Dr Flaco Smith

## 2022-04-28 NOTE — TELEPHONE ENCOUNTER
Verified patient's insurance   CONFIRMED - Patient's insurance is Medicare  Is patient requesting a call when authorization has been obtained? Patient did not request a call  Surgery Date: 6-6-22  Primary Surgeon: GAY // Ritu Hunt (NPI: [id number])  Assisting Surgeon: Not Applicable (N/A)  Facility: [facility] (Tax: [id number] / NPI: [id number])  Inpatient / Outpatient: Outpatient  Level: 4    Clearance Received: No clearance ordered  Consent Received: Yes, scanned into Epic on 4-19-22  Medication Hold / Last Dose: Not Applicable (N/A)  VQI Spreadsheet: Not Applicable (N/A)  IR Notified: Not Applicable (N/A)  Rep  Notified: Venaseal  Equipment Needs: Not Applicable (N/A)  Vas Lab Requested: Yes  Patient Contacted: 4-28-22    Diagnosis: I83 813    Procedure/ CPT Code(s): VenaSeal WITH Stab Phlebectomies of the right upper leg // CPT: 88815, 64175     For varicose vein related procedures:   Last LEVDR: 1-24-22, patient's Rufina Denver was completed within 12-months of their procedure date  CEAP Classification: Symptomatic class 5  VCSS: 14    Post Operative Date/ Time: To Be Determined (TBD)     *Please review medication hold(s), PATs, and check H&P with patient  *  PATIENT WAS MAILED SURGERY/SHOWERING/DISCHARGE/COVID INSTRUCTIONS AFTER REVIEWING WITH THEM VIA PHONE CALL       Spoke to patient to schedule her surgery patient was mailed out pre-op testing and post op appointments [facility]

## 2022-04-29 ENCOUNTER — PREP FOR PROCEDURE (OUTPATIENT)
Dept: VASCULAR SURGERY | Facility: CLINIC | Age: 72
End: 2022-04-29

## 2022-04-29 DIAGNOSIS — L97.312 VENOUS STASIS ULCER OF RIGHT ANKLE WITH FAT LAYER EXPOSED WITH VARICOSE VEINS (HCC): Primary | ICD-10-CM

## 2022-04-29 DIAGNOSIS — I83.013 VENOUS STASIS ULCER OF RIGHT ANKLE WITH FAT LAYER EXPOSED WITH VARICOSE VEINS (HCC): Primary | ICD-10-CM

## 2022-04-29 NOTE — TELEPHONE ENCOUNTER
Authorization requirements reviewed  Please refer to Charly García / Lev Suarez number [id number] for case updates

## 2022-05-10 ENCOUNTER — OFFICE VISIT (OUTPATIENT)
Dept: LAB | Facility: HOSPITAL | Age: 72
End: 2022-05-10
Payer: MEDICARE

## 2022-05-10 ENCOUNTER — APPOINTMENT (OUTPATIENT)
Dept: LAB | Facility: HOSPITAL | Age: 72
End: 2022-05-10
Payer: MEDICARE

## 2022-05-10 DIAGNOSIS — I83.013 VENOUS STASIS ULCER OF RIGHT ANKLE WITH FAT LAYER EXPOSED WITH VARICOSE VEINS (HCC): ICD-10-CM

## 2022-05-10 DIAGNOSIS — L97.312 VENOUS STASIS ULCER OF RIGHT ANKLE WITH FAT LAYER EXPOSED WITH VARICOSE VEINS (HCC): ICD-10-CM

## 2022-05-10 LAB
ANION GAP SERPL CALCULATED.3IONS-SCNC: 8 MMOL/L (ref 4–13)
ATRIAL RATE: 59 BPM
BUN SERPL-MCNC: 18 MG/DL (ref 5–25)
CALCIUM SERPL-MCNC: 9 MG/DL (ref 8.3–10.1)
CHLORIDE SERPL-SCNC: 104 MMOL/L (ref 100–108)
CO2 SERPL-SCNC: 29 MMOL/L (ref 21–32)
CREAT SERPL-MCNC: 0.7 MG/DL (ref 0.6–1.3)
ERYTHROCYTE [DISTWIDTH] IN BLOOD BY AUTOMATED COUNT: 14.6 % (ref 11.6–15.1)
GFR SERPL CREATININE-BSD FRML MDRD: 86 ML/MIN/1.73SQ M
GLUCOSE SERPL-MCNC: 121 MG/DL (ref 65–140)
HCT VFR BLD AUTO: 39.6 % (ref 34.8–46.1)
HGB BLD-MCNC: 12.8 G/DL (ref 11.5–15.4)
MCH RBC QN AUTO: 31.2 PG (ref 26.8–34.3)
MCHC RBC AUTO-ENTMCNC: 32.3 G/DL (ref 31.4–37.4)
MCV RBC AUTO: 97 FL (ref 82–98)
P AXIS: 39 DEGREES
PLATELET # BLD AUTO: 126 THOUSANDS/UL (ref 149–390)
PMV BLD AUTO: 10.5 FL (ref 8.9–12.7)
POTASSIUM SERPL-SCNC: 4.6 MMOL/L (ref 3.5–5.3)
PR INTERVAL: 156 MS
QRS AXIS: -16 DEGREES
QRSD INTERVAL: 78 MS
QT INTERVAL: 428 MS
QTC INTERVAL: 423 MS
RBC # BLD AUTO: 4.1 MILLION/UL (ref 3.81–5.12)
SODIUM SERPL-SCNC: 141 MMOL/L (ref 136–145)
T WAVE AXIS: 135 DEGREES
VENTRICULAR RATE: 59 BPM
WBC # BLD AUTO: 3.82 THOUSAND/UL (ref 4.31–10.16)

## 2022-05-10 PROCEDURE — 93005 ELECTROCARDIOGRAM TRACING: CPT

## 2022-05-10 PROCEDURE — 93010 ELECTROCARDIOGRAM REPORT: CPT | Performed by: INTERNAL MEDICINE

## 2022-05-10 PROCEDURE — 36415 COLL VENOUS BLD VENIPUNCTURE: CPT

## 2022-05-10 PROCEDURE — 85027 COMPLETE CBC AUTOMATED: CPT

## 2022-05-10 PROCEDURE — 80048 BASIC METABOLIC PNL TOTAL CA: CPT

## 2022-06-02 RX ORDER — ACETAMINOPHEN 325 MG/1
650 TABLET ORAL EVERY 6 HOURS PRN
COMMUNITY

## 2022-06-02 NOTE — PRE-PROCEDURE INSTRUCTIONS
Pre-Surgery Instructions:   Medication Instructions    acetaminophen (TYLENOL) 325 mg tablet Uses PRN- OK to take day of surgery    Ascorbic Acid (vitamin C) 100 MG tablet Stop 6/2  Do not take morning of surgery    Multiple Vitamins-Minerals (multivitamin with minerals) tablet Stop 6/2  Do not take morning of surgery    pravastatin (PRAVACHOL) 20 mg tablet Take night before surgery    Zinc 100 MG TABS Stop 6/2  Do not take morning of surgery   Covid screening negative as per patient  Fully vaccinated  Reviewed showering and medication instructions  Instructed to stop NSAIDS and non prescribed vitamins today  Tylenol is OK to take  Patient verbalized understanding  Advised NPO after MN and ASC will call with scheduled surgical time

## 2022-06-03 ENCOUNTER — ANESTHESIA EVENT (OUTPATIENT)
Dept: PERIOP | Facility: HOSPITAL | Age: 72
End: 2022-06-03
Payer: MEDICARE

## 2022-06-06 ENCOUNTER — HOSPITAL ENCOUNTER (OUTPATIENT)
Facility: HOSPITAL | Age: 72
Setting detail: OUTPATIENT SURGERY
Discharge: HOME | End: 2022-06-06
Attending: SURGERY | Admitting: SURGERY
Payer: MEDICARE

## 2022-06-06 ENCOUNTER — ANESTHESIA (OUTPATIENT)
Dept: PERIOP | Facility: HOSPITAL | Age: 72
End: 2022-06-06
Payer: MEDICARE

## 2022-06-06 ENCOUNTER — HOSPITAL ENCOUNTER (OUTPATIENT)
Dept: VASCULAR ULTRASOUND | Facility: HOSPITAL | Age: 72
Discharge: HOME | End: 2022-06-06
Payer: MEDICARE

## 2022-06-06 VITALS
OXYGEN SATURATION: 93 % | HEIGHT: 69 IN | BODY MASS INDEX: 23.12 KG/M2 | DIASTOLIC BLOOD PRESSURE: 77 MMHG | HEART RATE: 81 BPM | WEIGHT: 156.09 LBS | SYSTOLIC BLOOD PRESSURE: 174 MMHG | TEMPERATURE: 98.5 F | RESPIRATION RATE: 20 BRPM

## 2022-06-06 DIAGNOSIS — L97.312 VENOUS STASIS ULCER OF RIGHT ANKLE WITH FAT LAYER EXPOSED WITH VARICOSE VEINS (HCC): ICD-10-CM

## 2022-06-06 DIAGNOSIS — I83.013 VENOUS STASIS ULCER OF RIGHT ANKLE WITH FAT LAYER EXPOSED WITH VARICOSE VEINS (HCC): ICD-10-CM

## 2022-06-06 PROCEDURE — 93971 EXTREMITY STUDY: CPT

## 2022-06-06 PROCEDURE — C1888 ENDOVAS NON-CARDIAC ABL CATH: HCPCS | Performed by: SURGERY

## 2022-06-06 PROCEDURE — 99024 POSTOP FOLLOW-UP VISIT: CPT | Performed by: SURGERY

## 2022-06-06 PROCEDURE — 36482 ENDOVEN THER CHEM ADHES 1ST: CPT | Performed by: SURGERY

## 2022-06-06 PROCEDURE — 37799 UNLISTED PX VASCULAR SURGERY: CPT | Performed by: SURGERY

## 2022-06-06 PROCEDURE — C1894 INTRO/SHEATH, NON-LASER: HCPCS | Performed by: SURGERY

## 2022-06-06 DEVICE — VENASEAL CLOSURE SYSTEM: Type: IMPLANTABLE DEVICE | Site: LEG | Status: FUNCTIONAL

## 2022-06-06 RX ORDER — CLINDAMYCIN PHOSPHATE 900 MG/50ML
900 INJECTION INTRAVENOUS ONCE
Status: COMPLETED | OUTPATIENT
Start: 2022-06-06 | End: 2022-06-06

## 2022-06-06 RX ORDER — PROPOFOL 10 MG/ML
INJECTION, EMULSION INTRAVENOUS AS NEEDED
Status: DISCONTINUED | OUTPATIENT
Start: 2022-06-06 | End: 2022-06-06

## 2022-06-06 RX ORDER — FENTANYL CITRATE 50 UG/ML
INJECTION, SOLUTION INTRAMUSCULAR; INTRAVENOUS AS NEEDED
Status: DISCONTINUED | OUTPATIENT
Start: 2022-06-06 | End: 2022-06-06

## 2022-06-06 RX ORDER — ONDANSETRON 2 MG/ML
INJECTION INTRAMUSCULAR; INTRAVENOUS AS NEEDED
Status: DISCONTINUED | OUTPATIENT
Start: 2022-06-06 | End: 2022-06-06

## 2022-06-06 RX ORDER — GLYCOPYRROLATE 0.2 MG/ML
INJECTION INTRAMUSCULAR; INTRAVENOUS AS NEEDED
Status: DISCONTINUED | OUTPATIENT
Start: 2022-06-06 | End: 2022-06-06

## 2022-06-06 RX ORDER — LIDOCAINE HYDROCHLORIDE 10 MG/ML
INJECTION, SOLUTION EPIDURAL; INFILTRATION; INTRACAUDAL; PERINEURAL AS NEEDED
Status: DISCONTINUED | OUTPATIENT
Start: 2022-06-06 | End: 2022-06-06

## 2022-06-06 RX ORDER — SODIUM CHLORIDE, SODIUM LACTATE, POTASSIUM CHLORIDE, CALCIUM CHLORIDE 600; 310; 30; 20 MG/100ML; MG/100ML; MG/100ML; MG/100ML
50 INJECTION, SOLUTION INTRAVENOUS CONTINUOUS
Status: DISPENSED | OUTPATIENT
Start: 2022-06-06 | End: 2022-06-06

## 2022-06-06 RX ORDER — ACETAMINOPHEN 325 MG/1
650 TABLET ORAL EVERY 6 HOURS PRN
Status: DISCONTINUED | OUTPATIENT
Start: 2022-06-06 | End: 2022-06-06 | Stop reason: HOSPADM

## 2022-06-06 RX ORDER — EPHEDRINE SULFATE 50 MG/ML
INJECTION INTRAVENOUS AS NEEDED
Status: DISCONTINUED | OUTPATIENT
Start: 2022-06-06 | End: 2022-06-06

## 2022-06-06 RX ORDER — SODIUM CHLORIDE, SODIUM LACTATE, POTASSIUM CHLORIDE, CALCIUM CHLORIDE 600; 310; 30; 20 MG/100ML; MG/100ML; MG/100ML; MG/100ML
125 INJECTION, SOLUTION INTRAVENOUS CONTINUOUS
Status: DISCONTINUED | OUTPATIENT
Start: 2022-06-06 | End: 2022-06-06 | Stop reason: HOSPADM

## 2022-06-06 RX ORDER — DEXAMETHASONE SODIUM PHOSPHATE 10 MG/ML
INJECTION, SOLUTION INTRAMUSCULAR; INTRAVENOUS AS NEEDED
Status: DISCONTINUED | OUTPATIENT
Start: 2022-06-06 | End: 2022-06-06

## 2022-06-06 RX ORDER — FENTANYL CITRATE/PF 50 MCG/ML
25 SYRINGE (ML) INJECTION
Status: DISCONTINUED | OUTPATIENT
Start: 2022-06-06 | End: 2022-06-06 | Stop reason: HOSPADM

## 2022-06-06 RX ORDER — ONDANSETRON 2 MG/ML
4 INJECTION INTRAMUSCULAR; INTRAVENOUS ONCE AS NEEDED
Status: DISCONTINUED | OUTPATIENT
Start: 2022-06-06 | End: 2022-06-06 | Stop reason: HOSPADM

## 2022-06-06 RX ORDER — MAGNESIUM HYDROXIDE 1200 MG/15ML
LIQUID ORAL AS NEEDED
Status: DISCONTINUED | OUTPATIENT
Start: 2022-06-06 | End: 2022-06-06 | Stop reason: HOSPADM

## 2022-06-06 RX ADMIN — EPHEDRINE SULFATE 5 MG: 50 INJECTION, SOLUTION INTRAVENOUS at 12:11

## 2022-06-06 RX ADMIN — LIDOCAINE HYDROCHLORIDE 50 MG: 10 INJECTION, SOLUTION EPIDURAL; INFILTRATION; INTRACAUDAL at 11:40

## 2022-06-06 RX ADMIN — FENTANYL CITRATE 50 MCG: 50 INJECTION INTRAMUSCULAR; INTRAVENOUS at 11:34

## 2022-06-06 RX ADMIN — SODIUM CHLORIDE, SODIUM LACTATE, POTASSIUM CHLORIDE, AND CALCIUM CHLORIDE 125 ML/HR: .6; .31; .03; .02 INJECTION, SOLUTION INTRAVENOUS at 10:42

## 2022-06-06 RX ADMIN — ONDANSETRON 4 MG: 2 INJECTION INTRAMUSCULAR; INTRAVENOUS at 11:43

## 2022-06-06 RX ADMIN — CLINDAMYCIN PHOSPHATE 900 MG: 900 INJECTION, SOLUTION INTRAVENOUS at 11:31

## 2022-06-06 RX ADMIN — PROPOFOL 150 MG: 10 INJECTION, EMULSION INTRAVENOUS at 11:40

## 2022-06-06 RX ADMIN — EPHEDRINE SULFATE 10 MG: 50 INJECTION, SOLUTION INTRAVENOUS at 11:58

## 2022-06-06 RX ADMIN — DEXAMETHASONE SODIUM PHOSPHATE 10 MG: 10 INJECTION INTRAMUSCULAR; INTRAVENOUS at 11:43

## 2022-06-06 RX ADMIN — EPHEDRINE SULFATE 5 MG: 50 INJECTION, SOLUTION INTRAVENOUS at 12:21

## 2022-06-06 RX ADMIN — SODIUM CHLORIDE, SODIUM LACTATE, POTASSIUM CHLORIDE, AND CALCIUM CHLORIDE 50 ML/HR: .6; .31; .03; .02 INJECTION, SOLUTION INTRAVENOUS at 13:11

## 2022-06-06 RX ADMIN — EPHEDRINE SULFATE 10 MG: 50 INJECTION, SOLUTION INTRAVENOUS at 11:51

## 2022-06-06 RX ADMIN — GLYCOPYRROLATE 0.2 MCG: 0.2 INJECTION, SOLUTION INTRAMUSCULAR; INTRAVENOUS at 12:24

## 2022-06-06 RX ADMIN — FENTANYL CITRATE 50 MCG: 50 INJECTION INTRAMUSCULAR; INTRAVENOUS at 11:41

## 2022-06-06 RX ADMIN — PHENYLEPHRINE HYDROCHLORIDE 10 MCG/MIN: 10 INJECTION INTRAVENOUS at 12:36

## 2022-06-06 NOTE — H&P
Assessment/Plan:     Venous ulcer of ankle (HCC)  Right leg non healing venous ulcer that was ongong for about 6 months  She went to wound care and it eventually healed after months of compression and local wound care  This is first time she had wound like this  It was very painful  She would like to have procedure to prevent recurrence      Venous doppler shows entire length of GSV has reflux with several large branches in calf  She will benefit from venaseal treatment of the GSV and continued compression therapy  Risks of DVT and phlebitis         EVLT Operative Scheduling Information:     Hospital:  [facility]     Physician:  Raven Machuca     Surgery: Right Leg venaseal treatment of GSV     Urgency:  Standard     Level:  Level 4: Outpatients to be scheduled for screening procedures and elective surgery that can be delayed for longer than one month without reasonable expectation of detriment to patient      Case Length:  Normal     Post-op Bed:  Outpatient     OR Table:  Standard     Equipment Needs:  Rep: veaseal     Medication Instructions:  None     Hydration:  No     Venous Clinical Severity Scores (VCSS)  Item Absent   (0 points) Mild   (1 point) Moderate   (2 points) Severe   (3 points)   Pain []? None []? Occasional []? Daily [x]? Daily limiting   Varicose veins []? None []? Few []? Calf or thigh [x]? Calf and thigh   Venous edema [x]? None []? Foot and ankle []? Above ankle, below knee []? To knee of above   Skin pigmentation []? None [x]? Perimalleolar []? Diffuse, lower 1/3 calf []? Wider, above lower 1/3 calf   Inflammation []? None [x]? Perimalleolar []? Diffuse, lower 1/3 calf []? Wider, above lower 1/3 calf   Induration []? None [x]? Perimalleolar []? Diffuse, lower 1/3 calf []? Wider, above lower 1/3 calf   No  active ulcers []? None [x]? 1 []? 2 []? ? 3   Active ulcer size []? None [x]? <2 cm []? 2 - 6 cm []? >6 cm   Ulcer duration []? None []? <3 months []?  3 - 12 months []? >1 year   Compression therapy []? None []? Intermittent []? Most days [x]? Fully comply   Total 14               CEAP Clinical Classification  [x]? Symptomatic   []? Asymptomatic      []? Class 0 No visible or palpable signs of venous disease   []? Class 1 Telangiectasies or reticular veins   []? Class 2 Varicose veins; distinguished from reticular veins by a diameter of 3mm or more   []? Class 3 Edema   []? Class 4 Changes in skin and subcutaneous tissue secondary to CVD    []? Class 4a Pigmentation or eczema   []? Class 4b Lipodermatosclerosis or atrophie viki   [x]? Class 5 Healed venous ulcer   []? Class 6 Active venous ulcer                      Diagnoses and all orders for this visit:     Venous stasis ulcer of right ankle with fat layer exposed with varicose veins ([address] )  -     Case request operating room: Elta Agustina  and stab phlebectomy; Standing  -     Ambulatory referral to Cardiology; Future  -     CBC and Platelet; Future  -     Basic metabolic panel; Future  -     EKG 12 lead; Future  -     Case request operating room: Elta Agustina  and stab phlebectomy            Subjective:       Patient ID: Ericka Shah is a 67 y o  female      Pt is here to rev LEVDR done on 1/24/22  Pt has a R ankle ulcer that is now healed      HPI  Right leg non healing venous ulcer that was ongong for about 6 months  She went to wound care and it eventually healed after months of compression and local wound care  This is first time she had wound like this  It was very painful  She would like to have procedure to prevent recurrence      The following portions of the patient's history were reviewed and updated as appropriate: allergies, current medications, past family history, past medical history, past social history, past surgical history and problem list      Review of Systems   Constitutional: Negative  HENT: Negative  Eyes: Negative  Respiratory: Negative  Cardiovascular: Negative  Gastrointestinal: Negative  Endocrine: Negative  Genitourinary: Negative  Musculoskeletal: Negative  Skin: Positive for wound  Allergic/Immunologic: Negative  Neurological: Negative  Hematological: Negative  Psychiatric/Behavioral: Negative  I have reviewed the ROS as entered and made changes as necessary         Objective:     BP (!) 225/92   Pulse 60   Temp 97 6 °F (36 4 °C) (Oral)   Resp 17   Ht 5' 9" (1 753 m)   Wt 70 8 kg (156 lb 1 4 oz)   SpO2 100%   BMI 23 05 kg/m²               Physical Exam  Vitals and nursing note reviewed  Constitutional:       Appearance: Normal appearance  HENT:      Head: Normocephalic and atraumatic  Cardiovascular:      Rate and Rhythm: Normal rate and regular rhythm  Pulses:           Dorsalis pedis pulses are 2+ on the right side and 2+ on the left side  Posterior tibial pulses are 2+ on the right side and 2+ on the left side  Heart sounds: Normal heart sounds  Pulmonary:      Effort: Pulmonary effort is normal       Breath sounds: Normal breath sounds  Musculoskeletal:      Right lower leg: Edema present  Left lower leg: Edema present  Skin:     General: Skin is warm and dry  Capillary Refill: Capillary refill takes less than 2 seconds  Comments: Healed ulcer over right lower leg medial malleolar area  Neurological:      General: No focal deficit present  Mental Status: She is alert and oriented to person, place, and time     Psychiatric:         Mood and Affect: Mood normal          Behavior: Behavior normal

## 2022-06-06 NOTE — OP NOTE
OPERATIVE REPORT  PATIENT NAME: Jaida Busby    :  1950  MRN: [id number]  Pt Location: [facility] OR ROOM 02    SURGERY DATE: 2022    Surgeon(s) and Role:     Janny Nguyen MD - Primary    Preop Diagnosis:  Venous stasis ulcer of right ankle with fat layer exposed with varicose veins ([facility] [address] ) [I83 013, L97 312]    Post-Op Diagnosis Codes: * Venous stasis ulcer of right ankle with fat layer exposed with varicose veins (HCC) [I83 013, L97 312]    Procedure(s) (LRB):  Venaseal therapy and stab phlebectomy x3  (Right)    Specimen(s):  * No specimens in log *    Estimated Blood Loss:   Minimal    Drains:  * No LDAs found *    Anesthesia Type:   General/LMA    Operative Indications:  Venous stasis ulcer of right ankle with fat layer exposed with varicose veins ([facility] [address] ) [I83 013, L97 312]      Operative Findings:  Successful closure of right GSV  There is discontinuous segment of right GSV  Complications:   None    Procedure and Technique: In the preoperative holding area the patient was examined in standing position and superficial varicosities were marked  Patient was brought to the operating room placed in the supine position and general anesthesia was induced via endotracheal intubation  Intravenous antibiotic prophylaxis was administered  Ultrasound was performed and the greater saphenous vein was identified and marked in the medial thigh  Patient's entire    right leg was then prepped and draped in the usual standard sterile fashion  Duplex ultrasound was used to map out the insufficient saphenous vein, and access was determined and marked on the   overlying skin  The depth and diameter of the vein to be treated was documented  Using ultrasound guidance, access was gained at this location with the 19 gauge thin walled access needle and followed by introduction of a short guidewire, location confirmed with ultrasound   A small, 3 mm incision was made at the access site to allow for introduction and placement of the 7 Fr x7cm introducer/dilator  The dilator and guidewire were removed  The 0 035 guidewire from the  [facility] kit was then introduced and positioned at the saphenofemoral junction using ultrasound guidance  The 80 cm 7 Fr introducer sheath/dilator was positioned 5cm from the saphenofemoral junction  The guidewire and dilator were removed, and the remaining sheath was flushed with sterile saline, with the syringe remaining in place prior to the next steps  The cyanoacrylate adhesive was precisely primed into the 5 F delivery catheter and this catheter/syringe combination was attached within the dispenser gun  This "assembly" was introduced through the 7 F sheath and positioned 5 cm caudal of the saphenofemoral junction under ultrasound guidance  The steps from the IFU were followed for dispensing amounts, locations and compression times, 2 aliquots proximally with 3 minutes of compression, and 1 aliquot every 3cm distally with 30 sec of compression along the course of the vessel  Following the last injection and compression sequence, the catheter and introducer sheath were pulled out from the access site  Hemostasis was achieved with manual compression and an adhesive bandage was applied to the incision  Ultrasound confirmed complete coaptation and closure of the treated segments of the GSV, and the absence of any DVT at the saphenofemoral junction  Multiple stab incisions (total 3) were made using 11 blade over the previously marked varicosities  Using mosquito forceps and  Stab phlebectomy hooks we removed these varicosities  Manual pressure was held to achieve hemostasis over the stab incisions  Histoacryl was applied over the stab incisions  The leg was wrapped in a multilayered compression bandage with web roll, Ace wrap  Patient was awakened from general anesthesia and transferred to recovery room in a stable fashion   In the end of the case instrument sponge and needle counts were found to be correct      I was present for the entire procedure  A qualified resident physician was not available      Patient Disposition:  PACU       SIGNATURE: Alysa Pickett MD  DATE: June 6, 2022  TIME: 1:00 PM

## 2022-06-06 NOTE — ANESTHESIA PREPROCEDURE EVALUATION
Procedure:  Venaseal therapy and stab phlebectomy (Right Leg)    Relevant Problems   CARDIO   (+) Essential hypertension      Musculoskeletal and Integument   (+) Venous ulcer of ankle (HCC)      Former smoker  White coat HTN  Vitals:    06/06/22 1120   BP: (!) 177/75   Pulse:    Resp:    Temp:    SpO2:        Physical Exam    Airway    Mallampati score: III  TM Distance: >3 FB  Neck ROM: full     Dental   Comment: edentulous,     Cardiovascular  Cardiovascular exam normal    Pulmonary  Pulmonary exam normal     Other Findings  Portions of exam deferred due to low yield and/or unknown COVID status      Anesthesia Plan  ASA Score- 2     Anesthesia Type- general with ASA Monitors  Additional Monitors:   Airway Plan: LMA  Plan Factors-Exercise tolerance (METS): >4 METS  Chart reviewed  Existing labs reviewed  Patient summary reviewed  Patient is not a current smoker  Induction- intravenous  Postoperative Plan-     Informed Consent- Anesthetic plan and risks discussed with patient  I personally reviewed this patient with the CRNA  Discussed and agreed on the Anesthesia Plan with the CRNA  Anisa Ortiz

## 2022-06-06 NOTE — ANESTHESIA POSTPROCEDURE EVALUATION
Post-Op Assessment Note    CV Status:  Stable    Pain management: adequate     Mental Status:  Awake and sleepy   Hydration Status:  Euvolemic   PONV Controlled:  Controlled   Airway Patency:  Patent      Post Op Vitals Reviewed: Yes      Staff: Anesthesiologist         No complications documented    Vitals:    06/06/22 1304   BP: 155/70   Pulse: 85   Resp: 14   Temp: 98 5 °F (36 9 °C)   SpO2: 95

## 2022-06-06 NOTE — DISCHARGE INSTRUCTIONS
DISCHARGE INSTRUCTIONS   VARICOSE VEIN SURGERY    When released from the hospital, you should have a compression bandage in place on the operated leg  This bandage should feel snug but not too tight  If the bandage becomes blood soaked or painfully tight, elevate your leg and call your surgeon immediately  If the operated leg becomes increasingly painful or swollen, or if there is increasing redness or pain around your incisions, contact our office  On the day of your operation, take it easy and elevate your leg as much as possible  You can take short walks around the house  When sitting, the leg should be elevated  The preferred position is to have the leg at or above the level of the heart  Starting on the first day after surgery, light walking is strongly encouraged as tolerated  After your ultrasound test, you can resume your normal activity, but no heavy lifting or strenuous exercise for 2 weeks  Some bruising and redness of the skin is common after varicose vein surgery  This can be lessened by strict elevation of the leg  Many patients will notice some numbness of the shin, ankle, calf, or the top of the foot  This usually fades with time, but may be persistent  After surgery you can expect bruising, swelling and hard knots on your leg  As your body heals the bruising will fade and the swelling and knots will subside  Keep your operative dressings on for till your scheduled followup  However if the bandages feel too tight before the office visit then  you can remove all bandages  Your incisions are covered with a surgical glue which will wear away in 1-2 weeks  Start wearing your compression stockings after the bandage is removed  You can use the ACE wraps instead if your leg is too swollen for the compression stocking  Observe incisions daily  Report to our office any of the following:  Any areas that are red and angry in appearance    Any drainage that is milky or cloudy in appearance or that has a foul odor  Elevated temperature of 100 5 degrees F or greater  Apply sunscreen with SPF 30 to incisions while sun bathing for up to one year after surgery to reduce the chances of your incisions darkening  Your first post-operative appointment will be 2 to 3 days after your surgery  At this appointment, you will have an ultrasound  You will follow-up with your surgeon ~2 weeks after your procedure  If have any questions, please call our office at [phone]  [phone]  [facility] FREE [phone]  [address] [facility] , [address] [facility], [place], [address]  [address], [place], [address]  [address], [facility], [address]  [address] [facility], [address] [facility] [address]  [address], [address], [address]  [facility] [address], [address] [facility], [address] [facility] [address]   [address] [facility], [address] [facility], [address]  [address] [facility], [address] [facility], [facility],[address], [place] [address]

## 2022-06-07 PROCEDURE — NC001 PR NO CHARGE: Performed by: SURGERY

## 2022-06-09 ENCOUNTER — HOSPITAL ENCOUNTER (OUTPATIENT)
Dept: NON INVASIVE DIAGNOSTICS | Facility: CLINIC | Age: 72
Discharge: HOME | End: 2022-06-09
Payer: MEDICARE

## 2022-06-09 ENCOUNTER — OFFICE VISIT (OUTPATIENT)
Dept: VASCULAR SURGERY | Facility: CLINIC | Age: 72
End: 2022-06-09

## 2022-06-09 VITALS
SYSTOLIC BLOOD PRESSURE: 162 MMHG | WEIGHT: 154.6 LBS | BODY MASS INDEX: 22.9 KG/M2 | DIASTOLIC BLOOD PRESSURE: 74 MMHG | TEMPERATURE: 97.6 F | HEIGHT: 69 IN | HEART RATE: 82 BPM

## 2022-06-09 DIAGNOSIS — I83.013 VENOUS STASIS ULCER OF RIGHT ANKLE WITH FAT LAYER EXPOSED WITH VARICOSE VEINS (HCC): ICD-10-CM

## 2022-06-09 DIAGNOSIS — L97.312 VENOUS STASIS ULCER OF RIGHT ANKLE WITH FAT LAYER EXPOSED WITH VARICOSE VEINS (HCC): ICD-10-CM

## 2022-06-09 DIAGNOSIS — L97.312 VENOUS STASIS ULCER OF RIGHT ANKLE WITH FAT LAYER EXPOSED WITH VARICOSE VEINS (HCC): Primary | ICD-10-CM

## 2022-06-09 DIAGNOSIS — I83.013 VENOUS STASIS ULCER OF RIGHT ANKLE WITH FAT LAYER EXPOSED WITH VARICOSE VEINS (HCC): Primary | ICD-10-CM

## 2022-06-09 PROCEDURE — 99024 POSTOP FOLLOW-UP VISIT: CPT | Performed by: NURSE PRACTITIONER

## 2022-06-09 PROCEDURE — 93971 EXTREMITY STUDY: CPT

## 2022-06-09 PROCEDURE — 93971 EXTREMITY STUDY: CPT | Performed by: SURGERY

## 2022-06-09 NOTE — PROGRESS NOTES
Assessment/Plan:    Venous ulcer of ankle [facility])  77-year-old female with HTN, HLD, smoker, bilateral lower extremity venous insufficiency, right medial ankle venous ulceration s/p R GSV Venaseal therapy and stab phlebectomy x3  by Dr Nedra Vizcarra 6/6/22  Patient presents to the office for post op visit   -postoperative dressing removed   -Phlebectomy sites intact, mild ecchymosis and swelling  -Venous ulcer healed  -Postoperative venous duplex today   -Resume compression  -No contralateral left lower extremity symptoms or venous ulceration  Recommended compression  -Follow up in the office in 4-6 weeks to recheck  Ok w/ AP   -Notify the office if any increased pain, redness around incisions or drainage       Diagnoses and all orders for this visit:    Venous stasis ulcer of right ankle with fat layer exposed with varicose veins (HCC)          Subjective:      Patient ID: Darline Sifuentes is a 67 y o  female  Pt is p/o RL venaseal w/ stab and phleb 6/6/22  Pt denies fevers , chills and pain  Pt is taking Pravastatin  Pt is a former smoker  HPI  Patient presents to the office for postoperative visit  She is status post right TSA venous seal and stab phlebectomies by Dr Nedra Vizcarra  She had mild lower extremity discomfort relieved with Tylenol  Postoperative dressing removed  The following portions of the patient's history were reviewed and updated as appropriate: allergies, current medications, past family history, past medical history, past social history, past surgical history and problem list   ROS reviewed    Review of Systems   Constitutional: Negative  HENT: Negative  Eyes: Negative  Respiratory: Negative  Cardiovascular: Negative  Gastrointestinal: Negative  Endocrine: Negative  Genitourinary: Negative  Musculoskeletal: Negative  Skin: Negative  Allergic/Immunologic: Negative  Neurological: Negative  Hematological: Negative  Psychiatric/Behavioral: Negative  Objective:  I have reviewed and made appropriate changes to the review of systems input by the medical assistant  Vitals:    22 1422   BP: 162/74   BP Location: Right arm   Patient Position: Sitting   Cuff Size: Standard   Pulse: 82   Temp: 97 6 °F (36 4 °C)   TempSrc: Tympanic   Weight: 70 1 kg (154 lb 9 6 oz)   Height: 5' 9" (1 753 m)       Patient Active Problem List   Diagnosis    Chronic atrophic gastritis    Dysphagia, pharyngoesophageal    Urinary incontinence    Gastroparesis    Hemorrhoids, external    Vitamin B12 deficiency    Asymptomatic postmenopausal status    Colon cancer screening    Encounter for screening for malignant neoplasm of breast    Essential hypertension    Venous ulcer of ankle (HCC)       Past Surgical History:   Procedure Laterality Date    ANKLE SURGERY Right     BREAST LUMPECTOMY      BUNIONECTOMY Bilateral     COLONOSCOPY      CORRECTION HAMMER TOE Bilateral     VEIN LIGATION Right 2022    Procedure: Venaseal therapy and stab phlebectomy;  Surgeon: Kristen Gee MD;  Location: [facility] MAIN OR;  Service: Vascular       Family History   Problem Relation Age of Onset    Heart disease Father        Social History     Socioeconomic History    Marital status: /Civil Union     Spouse name: Not on file    Number of children: Not on file    Years of education: Not on file    Highest education level: Not on file   Occupational History    Not on file   Tobacco Use    Smoking status: Former Smoker     Years:      Quit date:      Years since quittin 4    Smokeless tobacco: Never Used   Vaping Use    Vaping Use: Never used   Substance and Sexual Activity    Alcohol use:  Yes     Alcohol/week: 1 0 standard drink     Types: 1 Standard drinks or equivalent per week     Comment: Socially    Drug use: Never    Sexual activity: Yes     Partners: Male   Other Topics Concern    Not on file   Social History Narrative    Not on file Social Determinants of Health     Financial Resource Strain: Not on file   Food Insecurity: Not on file   Transportation Needs: Not on file   Physical Activity: Not on file   Stress: Not on file   Social Connections: Not on file   Intimate Partner Violence: Not on file   Housing Stability: Not on file       Allergies   Allergen Reactions    Penicillins Other (See Comments)     Unknown reaction, positive on allergy testing         Current Outpatient Medications:     acetaminophen (TYLENOL) 325 mg tablet, Take 650 mg by mouth every 6 (six) hours as needed for mild pain, Disp: , Rfl:     Ascorbic Acid (vitamin C) 100 MG tablet, Take 100 mg by mouth daily, Disp: , Rfl:     Multiple Vitamins-Minerals (multivitamin with minerals) tablet, Take 1 tablet by mouth daily, Disp: , Rfl:     pravastatin (PRAVACHOL) 20 mg tablet, TAKE 1 TABLET BY MOUTH DAILY AT BEDTIME, Disp: 90 tablet, Rfl: 1    Zinc 100 MG TABS, Take by mouth, Disp: , Rfl:       /74 (BP Location: Right arm, Patient Position: Sitting, Cuff Size: Standard)   Pulse 82   Temp 97 6 °F (36 4 °C) (Tympanic)   Ht 5' 9" (1 753 m)   Wt 70 1 kg (154 lb 9 6 oz)   BMI 22 83 kg/m²          Physical Exam  Vitals reviewed  Constitutional:       Appearance: Normal appearance  HENT:      Head: Normocephalic and atraumatic  Eyes:      Extraocular Movements: Extraocular movements intact  Cardiovascular:      Heart sounds: Normal heart sounds  Pulmonary:      Effort: Pulmonary effort is normal       Breath sounds: Normal breath sounds  Musculoskeletal:         General: No swelling  Comments: Right medial proximal thigh flexor sites intact with surgical glue  Mild swelling and ecchymosis  No dehiscence or drainage  Skin:     General: Skin is warm  Neurological:      Mental Status: She is alert and oriented to person, place, and time

## 2022-06-09 NOTE — ASSESSMENT & PLAN NOTE
69-year-old female with HTN, HLD, smoker, bilateral lower extremity venous insufficiency, right medial ankle venous ulceration s/p R GSV Venaseal therapy and stab phlebectomy x3  by Dr Reinier Sun 6/6/22  Patient presents to the office for post op visit   -postoperative dressing removed   -Phlebectomy sites intact, mild ecchymosis and swelling  -Venous ulcer healed  -Postoperative venous duplex today   -Resume compression  -No contralateral left lower extremity symptoms or venous ulceration  Recommended compression  -Follow up in the office in 4-6 weeks to recheck   Ok w/ AP   -Notify the office if any increased pain, redness around incisions or drainage

## 2022-07-26 ENCOUNTER — OFFICE VISIT (OUTPATIENT)
Dept: VASCULAR SURGERY | Facility: CLINIC | Age: 72
End: 2022-07-26

## 2022-07-26 VITALS
BODY MASS INDEX: 21.92 KG/M2 | HEIGHT: 69 IN | HEART RATE: 62 BPM | WEIGHT: 148 LBS | SYSTOLIC BLOOD PRESSURE: 156 MMHG | DIASTOLIC BLOOD PRESSURE: 62 MMHG

## 2022-07-26 DIAGNOSIS — I83.013 VENOUS STASIS ULCER OF RIGHT ANKLE WITH FAT LAYER EXPOSED WITH VARICOSE VEINS (HCC): Primary | ICD-10-CM

## 2022-07-26 DIAGNOSIS — L97.312 VENOUS STASIS ULCER OF RIGHT ANKLE WITH FAT LAYER EXPOSED WITH VARICOSE VEINS (HCC): Primary | ICD-10-CM

## 2022-07-26 PROCEDURE — 99024 POSTOP FOLLOW-UP VISIT: CPT | Performed by: SURGERY

## 2022-07-26 NOTE — PATIENT INSTRUCTIONS
Venous ulcer of ankle ([address] )  Underwent venaseal treatment of right  leg vein for venous wound over the ankle area  No issues postop  There is harness along the saphenous vein that is expected and should settle down over time

## 2022-07-26 NOTE — PROGRESS NOTES
Assessment/Plan:    Venous ulcer of ankle ([address] )  Underwent venaseal treatment of right  leg vein for venous wound over the ankle area  No issues postop  There is harness along the saphenous vein that is expected and should settle down over time  Diagnoses and all orders for this visit:    Venous stasis ulcer of right ankle with fat layer exposed with varicose veins (HCC)          Subjective:      Patient ID: Graciela Morrison is a 67 y o  female  Pt is here s/p R venaseal and stab phlebs done on 6/06  Pt R ankle ulcer has healed  HPI    The following portions of the patient's history were reviewed and updated as appropriate: allergies, current medications, past family history, past medical history, past social history, past surgical history and problem list     Review of Systems   Constitutional: Negative  HENT: Negative  Eyes: Negative  Respiratory: Negative  Cardiovascular: Negative  Gastrointestinal: Negative  Endocrine: Negative  Genitourinary: Negative  Musculoskeletal: Negative  Skin: Negative  Allergic/Immunologic: Negative  Neurological: Negative  Hematological: Negative  Psychiatric/Behavioral: Negative  I have reviewed the ROS as entered and made changes as necessary  Objective:      /62 (BP Location: Left arm, Patient Position: Sitting, Cuff Size: Standard)   Pulse 62   Ht 5' 9" (1 753 m)   Wt 67 1 kg (148 lb)   BMI 21 86 kg/m²          Physical Exam    Indurated cord over the right GSV in thigh  No erythema or venous ulcer

## 2022-10-21 DIAGNOSIS — E78.2 MIXED HYPERLIPIDEMIA: ICD-10-CM

## 2022-10-21 RX ORDER — PRAVASTATIN SODIUM 20 MG
TABLET ORAL
Qty: 90 TABLET | Refills: 1 | Status: SHIPPED | OUTPATIENT
Start: 2022-10-21

## 2022-11-29 ENCOUNTER — TELEPHONE (OUTPATIENT)
Dept: VASCULAR SURGERY | Facility: CLINIC | Age: 72
End: 2022-11-29

## 2022-11-29 DIAGNOSIS — I83.013 VENOUS STASIS ULCER OF RIGHT ANKLE WITH FAT LAYER EXPOSED WITH VARICOSE VEINS (HCC): Primary | ICD-10-CM

## 2022-11-29 DIAGNOSIS — L97.312 VENOUS STASIS ULCER OF RIGHT ANKLE WITH FAT LAYER EXPOSED WITH VARICOSE VEINS (HCC): Primary | ICD-10-CM

## 2022-11-29 NOTE — TELEPHONE ENCOUNTER
Spoke with pt  Reviewed all recommendations with her  Pt verbalized understanding of all  Transferred to the Call Center to schedule the LEV and OV

## 2022-11-29 NOTE — TELEPHONE ENCOUNTER
Pt s/p Venaseal therapy and stab phlebectomy (Right: Leg) by Dr Atif Grimaldo 6/6/22  Pt called because she suddenly developed pain in her right lateral ankle, at the same site where her venous ulcer was  Pt denies open areas/wounds at this time  Has pain in her ankle at rest, but it is worse when she walks  No known injury to this area  Denies swelling or redness  Has not taken any medication for the pain  Pt states that she was supposed to follow up with Dr Atif Grimaldo, but an OV was not made  Routed to triage for recommendations

## 2022-11-29 NOTE — TELEPHONE ENCOUNTER
Reviewed  Recommend LEV for completeness with OV with Dr Jackson Iba to review  Please advise patient to wear daily compression hose as tolerated, leg elevation, warm compresses for discomfort, tylenol prn for pain, and keep skin well moisturized

## 2022-12-02 ENCOUNTER — HOSPITAL ENCOUNTER (OUTPATIENT)
Dept: NON INVASIVE DIAGNOSTICS | Facility: CLINIC | Age: 72
Discharge: HOME | End: 2022-12-02

## 2022-12-02 DIAGNOSIS — I83.013 VENOUS STASIS ULCER OF RIGHT ANKLE WITH FAT LAYER EXPOSED WITH VARICOSE VEINS (HCC): ICD-10-CM

## 2022-12-02 DIAGNOSIS — L97.312 VENOUS STASIS ULCER OF RIGHT ANKLE WITH FAT LAYER EXPOSED WITH VARICOSE VEINS (HCC): ICD-10-CM

## 2022-12-20 ENCOUNTER — OFFICE VISIT (OUTPATIENT)
Dept: VASCULAR SURGERY | Facility: CLINIC | Age: 72
End: 2022-12-20

## 2022-12-20 VITALS
WEIGHT: 151 LBS | SYSTOLIC BLOOD PRESSURE: 156 MMHG | HEART RATE: 67 BPM | DIASTOLIC BLOOD PRESSURE: 70 MMHG | HEIGHT: 69 IN | BODY MASS INDEX: 22.36 KG/M2

## 2022-12-20 DIAGNOSIS — L97.312 VENOUS STASIS ULCER OF RIGHT ANKLE WITH FAT LAYER EXPOSED WITH VARICOSE VEINS (HCC): Primary | ICD-10-CM

## 2022-12-20 DIAGNOSIS — I83.013 VENOUS STASIS ULCER OF RIGHT ANKLE WITH FAT LAYER EXPOSED WITH VARICOSE VEINS (HCC): Primary | ICD-10-CM

## 2022-12-20 NOTE — PATIENT INSTRUCTIONS
Venous ulcer of ankle ([facility] [address] )  Venous ulcer has completely healed  There is no recurrence  Some hardness in the area of the saphenous vein is expected and will slowly heal over time  Continue use of compression stockings and good quality moisturizer and avoid standing for prolonged periods of time

## 2022-12-20 NOTE — PROGRESS NOTES
Assessment/Plan:    Venous ulcer of ankle ([address] )  Venous ulcer has completely healed  There is no recurrence  Some hardness in the area of the saphenous vein is expected and will slowly heal over time  2+ DP pulse bilateral     Continue use of compression stockings and good quality moisturizer and avoid standing for prolonged periods of time  Diagnoses and all orders for this visit:    Venous stasis ulcer of right ankle with fat layer exposed with varicose veins (HCC)        Subjective:      Patient ID: Chalo De Los Santos is a 67 y o  female  Pt presents to rev LEV 12/02 s/p R venaseal and stabs 6/06/22    HPI    The following portions of the patient's history were reviewed and updated as appropriate: allergies, current medications, past family history, past medical history, past social history, past surgical history and problem list     Review of Systems   Constitutional: Negative  HENT: Negative  Eyes: Negative  Respiratory: Negative  Cardiovascular: Negative  Gastrointestinal: Negative  Endocrine: Negative  Genitourinary: Negative  Musculoskeletal: Negative  Skin: Positive for wound  Allergic/Immunologic: Negative  Neurological: Negative  Hematological: Negative  Psychiatric/Behavioral: Negative  I have reviewed the ROS as entered and made changes as necessary  Objective:      /70 (BP Location: Right arm, Patient Position: Sitting, Cuff Size: Standard)   Pulse 67   Ht 5' 9" (1 753 m)   Wt 68 5 kg (151 lb)   BMI 22 30 kg/m²          Physical Exam  Vitals and nursing note reviewed  Constitutional:       Appearance: Normal appearance  HENT:      Head: Normocephalic and atraumatic  Cardiovascular:      Rate and Rhythm: Normal rate and regular rhythm  Pulses: Normal pulses  Dorsalis pedis pulses are 2+ on the right side and 2+ on the left side  Skin:     General: Skin is warm and dry        Capillary Refill: Capillary refill takes less than 2 seconds  Comments: Truncal varicosities  No new venous ulcers  Old ulcer site is completely healed  Neurological:      General: No focal deficit present  Mental Status: She is alert and oriented to person, place, and time

## 2022-12-20 NOTE — ASSESSMENT & PLAN NOTE
Venous ulcer has completely healed  There is no recurrence  Some hardness in the area of the saphenous vein is expected and will slowly heal over time  2+ DP pulse bilateral     Continue use of compression stockings and good quality moisturizer and avoid standing for prolonged periods of time

## 2023-04-21 DIAGNOSIS — E78.2 MIXED HYPERLIPIDEMIA: ICD-10-CM

## 2023-04-24 RX ORDER — PRAVASTATIN SODIUM 20 MG
TABLET ORAL
Qty: 90 TABLET | Refills: 1 | Status: SHIPPED | OUTPATIENT
Start: 2023-04-24

## 2023-11-16 ENCOUNTER — OFFICE VISIT (OUTPATIENT)
Age: 73
End: 2023-11-16
Payer: MEDICARE

## 2023-11-16 VITALS
RESPIRATION RATE: 18 BRPM | HEART RATE: 62 BPM | BODY MASS INDEX: 21.91 KG/M2 | DIASTOLIC BLOOD PRESSURE: 72 MMHG | OXYGEN SATURATION: 98 % | SYSTOLIC BLOOD PRESSURE: 142 MMHG | WEIGHT: 148.4 LBS | TEMPERATURE: 97.5 F

## 2023-11-16 DIAGNOSIS — E78.2 MIXED HYPERLIPIDEMIA: ICD-10-CM

## 2023-11-16 DIAGNOSIS — K59.00 CONSTIPATION, UNSPECIFIED CONSTIPATION TYPE: ICD-10-CM

## 2023-11-16 DIAGNOSIS — Z78.0 ASYMPTOMATIC POSTMENOPAUSAL STATUS: ICD-10-CM

## 2023-11-16 DIAGNOSIS — Z00.00 WELCOME TO MEDICARE PREVENTIVE VISIT: Primary | ICD-10-CM

## 2023-11-16 DIAGNOSIS — I10 ESSENTIAL HYPERTENSION: ICD-10-CM

## 2023-11-16 DIAGNOSIS — M79.671 BILATERAL FOOT PAIN: ICD-10-CM

## 2023-11-16 DIAGNOSIS — Z12.31 BREAST CANCER SCREENING BY MAMMOGRAM: ICD-10-CM

## 2023-11-16 DIAGNOSIS — L97.312 VENOUS STASIS ULCER OF RIGHT ANKLE WITH FAT LAYER EXPOSED WITH VARICOSE VEINS (HCC): ICD-10-CM

## 2023-11-16 DIAGNOSIS — E53.8 VITAMIN B12 DEFICIENCY: ICD-10-CM

## 2023-11-16 DIAGNOSIS — Z12.31 ENCOUNTER FOR SCREENING MAMMOGRAM FOR BREAST CANCER: ICD-10-CM

## 2023-11-16 DIAGNOSIS — M79.672 BILATERAL FOOT PAIN: ICD-10-CM

## 2023-11-16 DIAGNOSIS — I83.013 VENOUS STASIS ULCER OF RIGHT ANKLE WITH FAT LAYER EXPOSED WITH VARICOSE VEINS (HCC): ICD-10-CM

## 2023-11-16 PROBLEM — I83.003 VENOUS ULCER OF ANKLE (HCC): Status: RESOLVED | Noted: 2022-01-10 | Resolved: 2023-11-16

## 2023-11-16 PROBLEM — L97.309 VENOUS ULCER OF ANKLE (HCC): Status: RESOLVED | Noted: 2022-01-10 | Resolved: 2023-11-16

## 2023-11-16 PROCEDURE — G0438 PPPS, INITIAL VISIT: HCPCS | Performed by: FAMILY MEDICINE

## 2023-11-16 PROCEDURE — 99214 OFFICE O/P EST MOD 30 MIN: CPT | Performed by: FAMILY MEDICINE

## 2023-11-16 RX ORDER — PRAVASTATIN SODIUM 20 MG
20 TABLET ORAL
Qty: 90 TABLET | Refills: 1 | Status: SHIPPED | OUTPATIENT
Start: 2023-11-16

## 2023-11-16 RX ORDER — HYDROCHLOROTHIAZIDE 25 MG/1
25 TABLET ORAL DAILY
Qty: 90 TABLET | Refills: 0 | Status: SHIPPED | OUTPATIENT
Start: 2023-11-16

## 2023-11-16 RX ORDER — NAPROXEN 500 MG/1
500 TABLET ORAL 2 TIMES DAILY WITH MEALS
Qty: 30 TABLET | Refills: 0 | Status: SHIPPED | OUTPATIENT
Start: 2023-11-16

## 2023-11-16 NOTE — PROGRESS NOTES
Assessment and Plan:     Problem List Items Addressed This Visit       Vitamin B12 deficiency-noted on chart. Patient not on supplementation. We will check current serum level and resolve issue if indicated. Relevant Orders    Vitamin B12    Asymptomatic postmenopausal status    Relevant Orders    DXA bone density spine hip and pelvis    Essential hypertension-history of elevated BPs. Not currently on medication. Patient reports elevated BPs at home. Amendable to antihypertensive. We will start with thiazide. Patient to follow-up Monday for BP check. Relevant Medications    hydrochlorothiazide (HYDRODIURIL) 25 mg tablet    Other Relevant Orders    Comprehensive metabolic panel    RESOLVED: Venous ulcer of ankle (HCC)     Other Visit Diagnoses       Welcome to Medicare preventive visit    -  Primary    Constipation, unspecified constipation type    -longstanding history. Patient encouraged encouraged to use daily fiber supplementation and increase water intake. Will evaluate for secondary causes with TSH. Relevant Orders    TSH, 3rd generation with Free T4 reflex    Mixed hyperlipidemia    -on statin. Tolerating. Will evaluate efficacy with lipid panel. Relevant Medications    pravastatin (PRAVACHOL) 20 mg tablet    Other Relevant Orders    Lipid Panel with Direct LDL reflex    Bilateral foot pain    -chronic. h/o multiple foot/toe surgeries (bunionectomy, hammertoe). Pt say toes are shifting again. On exam there is diminished ROM of the toes. Needs to see a new podiatrist concerning surgical intervention. Referral placed. Pain refractory to Tylenol. Will trial naproxen as alternative.     Relevant Medications    naproxen (Naprosyn) 500 mg tablet    Other Relevant Orders    Ambulatory Referral to Podiatry    Breast cancer screening by mammogram        Relevant Medications    hydrochlorothiazide (HYDRODIURIL) 25 mg tablet    Encounter for screening mammogram for breast cancer        Relevant Orders    Mammo screening bilateral w 3d & cad              Depression Screening and Follow-up Plan: Patient was screened for depression during today's encounter. They screened negative with a PHQ-2 score of 0. Preventive health issues were discussed with patient, and age appropriate screening tests were ordered as noted in patient's After Visit Summary. Personalized health advice and appropriate referrals for health education or preventive services given if needed, as noted in patient's After Visit Summary. History of Present Illness:     Patient presents for a Medicare Wellness Visit    Patient presents to establish care. Previous PCP has since retired. Discussed medical history with patient. Pt c/o b/l foot pain. Most aggrevated with prolonged stanidng. Throbbing type pain. Does not radiate. Using Tylenol without significant relief. Retired. Lives with . Denies personal history of CVA or MI. Patient Care Team:  Lula Kamara DO as PCP - General (Family Medicine)     Review of Systems:     Review of Systems   Constitutional:  Negative for fever. Respiratory:  Negative for shortness of breath. Cardiovascular:  Negative for chest pain. Gastrointestinal:  Positive for constipation. Negative for diarrhea. Musculoskeletal:  Positive for arthralgias and gait problem (Uses cane).         Problem List:     Patient Active Problem List   Diagnosis    Chronic atrophic gastritis    Dysphagia, pharyngoesophageal    Urinary incontinence    Gastroparesis    Hemorrhoids, external    Vitamin B12 deficiency    Asymptomatic postmenopausal status    Colon cancer screening    Encounter for screening for malignant neoplasm of breast    Essential hypertension      Past Medical and Surgical History:     Past Medical History:   Diagnosis Date    Colon polyp     Esophagitis     History of benign breast tumor     Hyperlipidemia     Urinary incontinence     Venous ulcer of ankle ([address]) 01/10/2022     Past Surgical History:   Procedure Laterality Date    ANKLE SURGERY Right     BREAST LUMPECTOMY  1998    BUNIONECTOMY Bilateral     COLONOSCOPY      CORRECTION HAMMER TOE Bilateral     VEIN LIGATION Right 2022    Procedure: Venaseal therapy and stab phlebectomy;  Surgeon: Nella Franco MD;  Location: [facility];  Service: Vascular      Family History:     Family History   Problem Relation Age of Onset    Heart disease Father       Social History:     Social History     Socioeconomic History    Marital status: /Civil Union     Spouse name: None    Number of children: None    Years of education: None    Highest education level: None   Occupational History    None   Tobacco Use    Smoking status: Former     Years: 25.00     Types: Cigarettes     Quit date:      Years since quittin.8    Smokeless tobacco: Never   Vaping Use    Vaping Use: Never used   Substance and Sexual Activity    Alcohol use: Yes     Alcohol/week: 1.0 standard drink of alcohol     Types: 1 Standard drinks or equivalent per week     Comment: Socially    Drug use: Never    Sexual activity: Yes     Partners: Male   Other Topics Concern    None   Social History Narrative    None     Social Determinants of Health     Financial Resource Strain: Low Risk  (2023)    Overall Financial Resource Strain (CARDIA)     Difficulty of Paying Living Expenses: Not hard at all   Food Insecurity: Not on file   Transportation Needs: No Transportation Needs (2023)    PRAPARE - Transportation     Lack of Transportation (Medical): No     Lack of Transportation (Non-Medical):  No   Physical Activity: Inactive (2020)    Exercise Vital Sign     Days of Exercise per Week: 0 days     Minutes of Exercise per Session: 0 min   Stress: No Stress Concern Present (2020)    [address] [facility]     Feeling of Stress : Not at all   Social Connections: Not on file Intimate Partner Violence: Not on file   Housing Stability: Not on file      Medications and Allergies:     Current Outpatient Medications   Medication Sig Dispense Refill    acetaminophen (TYLENOL) 325 mg tablet Take 650 mg by mouth every 6 (six) hours as needed for mild pain      Ascorbic Acid (vitamin C) 100 MG tablet Take 100 mg by mouth daily      hydrochlorothiazide (HYDRODIURIL) 25 mg tablet Take 1 tablet (25 mg total) by mouth daily 90 tablet 0    Multiple Vitamins-Minerals (multivitamin with minerals) tablet Take 1 tablet by mouth daily      naproxen (Naprosyn) 500 mg tablet Take 1 tablet (500 mg total) by mouth 2 (two) times a day with meals 30 tablet 0    pravastatin (PRAVACHOL) 20 mg tablet Take 1 tablet (20 mg total) by mouth daily at bedtime 90 tablet 1    Zinc 100 MG TABS Take by mouth       No current facility-administered medications for this visit. Allergies   Allergen Reactions    Penicillins Other (See Comments)     Unknown reaction, positive on allergy testing      Immunizations:     Immunization History   Administered Date(s) Administered    COVID-19 MODERNA VACC 0.5 ML IM 05/15/2021, 06/12/2021    Influenza, seasonal, injectable 1950    Pneumococcal Polysaccharide PPV23 1950    Tdap 1950    Zoster 1950      Health Maintenance:         Topic Date Due    Hepatitis C Screening  Never done    Breast Cancer Screening: Mammogram  10/30/2016    Colorectal Cancer Screening  05/25/2026         Topic Date Due    Pneumococcal Vaccine: 65+ Years (1 - PCV) 01/31/2015    COVID-19 Vaccine (3 - Moderna series) 08/07/2021    Influenza Vaccine (1) 09/01/2023      Medicare Screening Tests and Risk Assessments:     Divya Puga is here for her Welcome to Medicare visit. Health Risk Assessment:   Patient rates overall health as fair. Patient feels that their physical health rating is same. Patient is very satisfied with their life. Eyesight was rated as same.  Hearing was rated as slightly worse. Patient feels that their emotional and mental health rating is slightly better. Patients states they are never, rarely angry. Patient states they are always unusually tired/fatigued. Pain experienced in the last 7 days has been a lot. Patient's pain rating has been 10/10. Patient states that she has experienced weight loss or gain in last 6 months. Depression Screening:   PHQ-2 Score: 0      Fall Risk Screening: In the past year, patient has experienced: no history of falling in past year      Urinary Incontinence Screening:   Patient has leaked urine accidently in the last six months. Home Safety:  Patient does not have trouble with stairs inside or outside of their home. Patient has working smoke alarms and has working carbon monoxide detector. Home safety hazards include: none. Nutrition:   Current diet is Regular. Medications:   Patient is not currently taking any over-the-counter supplements. Patient is able to manage medications. Activities of Daily Living (ADLs)/Instrumental Activities of Daily Living (IADLs):   Walk and transfer into and out of bed and chair?: Yes  Dress and groom yourself?: Yes    Bathe or shower yourself?: Yes    Feed yourself?  Yes  Do your laundry/housekeeping?: Yes  Manage your money, pay your bills and track your expenses?: Yes  Make your own meals?: Yes    Do your own shopping?: Yes    Previous Hospitalizations:   Any hospitalizations or ED visits within the last 12 months?: No      Advance Care Planning:   Living will: No      PREVENTIVE SCREENINGS      Cardiovascular Screening:    General: Screening Not Indicated and History Lipid Disorder      Colorectal Cancer Screening:     General: Screening Current      Cervical Cancer Screening:    General: Screening Not Indicated      Lung Cancer Screening:     General: Screening Not Indicated    Screening, Brief Intervention, and Referral to Treatment (SBIRT)    Screening  Typical number of drinks in a day: 0  Typical number of drinks in a week: 0  Interpretation: Low risk drinking behavior. Single Item Drug Screening:  How often have you used an illegal drug (including marijuana) or a prescription medication for non-medical reasons in the past year? never    Single Item Drug Screen Score: 0  Interpretation: Negative screen for possible drug use disorder    Vision Screening    Right eye Left eye Both eyes   Without correction 20/30 20/30 20/15   With correction 20/25 20/25 20/25        Physical Exam:     /72 (BP Location: Left arm, Patient Position: Sitting, Cuff Size: Standard)   Pulse 62   Temp 97.5 °F (36.4 °C) (Temporal)   Resp 18   Wt 67.3 kg (148 lb 6.4 oz)   SpO2 98%   BMI 21.91 kg/m²     Physical Exam  HENT:      Head: Normocephalic and atraumatic. Right Ear: External ear normal.      Left Ear: External ear normal.   Eyes:      Conjunctiva/sclera: Conjunctivae normal.      Pupils: Pupils are equal, round, and reactive to light. Cardiovascular:      Rate and Rhythm: Normal rate and regular rhythm. Pulmonary:      Effort: Pulmonary effort is normal.      Breath sounds: Normal breath sounds. Abdominal:      General: Bowel sounds are normal.      Palpations: Abdomen is soft. Musculoskeletal:      Right lower leg: No edema. Left lower leg: No edema. Right foot: Decreased range of motion. Deformity present. Left foot: Decreased range of motion. Deformity present. Neurological:      Mental Status: She is alert and oriented to person, place, and time. Gait: Gait abnormal (Antalgic).    Psychiatric:         Mood and Affect: Mood normal.         Behavior: Behavior normal.          Ashly Lafleur DO

## 2023-11-20 ENCOUNTER — APPOINTMENT (OUTPATIENT)
Age: 73
End: 2023-11-20
Payer: MEDICARE

## 2023-11-20 DIAGNOSIS — K59.00 CONSTIPATION, UNSPECIFIED CONSTIPATION TYPE: ICD-10-CM

## 2023-11-20 DIAGNOSIS — E78.2 MIXED HYPERLIPIDEMIA: ICD-10-CM

## 2023-11-20 DIAGNOSIS — I10 ESSENTIAL HYPERTENSION: ICD-10-CM

## 2023-11-20 DIAGNOSIS — E53.8 VITAMIN B12 DEFICIENCY: ICD-10-CM

## 2023-11-20 LAB
ALBUMIN SERPL BCP-MCNC: 4.3 G/DL (ref 3.5–5)
ALP SERPL-CCNC: 61 U/L (ref 34–104)
ALT SERPL W P-5'-P-CCNC: 13 U/L (ref 7–52)
ANION GAP SERPL CALCULATED.3IONS-SCNC: 6 MMOL/L
AST SERPL W P-5'-P-CCNC: 20 U/L (ref 13–39)
BILIRUB SERPL-MCNC: 1.71 MG/DL (ref 0.2–1)
BUN SERPL-MCNC: 19 MG/DL (ref 5–25)
CALCIUM SERPL-MCNC: 9.4 MG/DL (ref 8.4–10.2)
CHLORIDE SERPL-SCNC: 103 MMOL/L (ref 96–108)
CHOLEST SERPL-MCNC: 210 MG/DL
CO2 SERPL-SCNC: 31 MMOL/L (ref 21–32)
CREAT SERPL-MCNC: 0.82 MG/DL (ref 0.6–1.3)
GFR SERPL CREATININE-BSD FRML MDRD: 71 ML/MIN/1.73SQ M
GLUCOSE P FAST SERPL-MCNC: 95 MG/DL (ref 65–99)
HDLC SERPL-MCNC: 44 MG/DL
LDLC SERPL CALC-MCNC: 135 MG/DL (ref 0–100)
POTASSIUM SERPL-SCNC: 4.6 MMOL/L (ref 3.5–5.3)
PROT SERPL-MCNC: 6.1 G/DL (ref 6.4–8.4)
SODIUM SERPL-SCNC: 140 MMOL/L (ref 135–147)
TRIGL SERPL-MCNC: 154 MG/DL
TSH SERPL DL<=0.05 MIU/L-ACNC: 1.75 UIU/ML (ref 0.45–4.5)
VIT B12 SERPL-MCNC: <50 PG/ML (ref 180–914)

## 2023-11-20 PROCEDURE — 36415 COLL VENOUS BLD VENIPUNCTURE: CPT

## 2023-11-20 PROCEDURE — 84443 ASSAY THYROID STIM HORMONE: CPT

## 2023-11-20 PROCEDURE — 80053 COMPREHEN METABOLIC PANEL: CPT

## 2023-11-20 PROCEDURE — 80061 LIPID PANEL: CPT

## 2023-11-20 PROCEDURE — 82607 VITAMIN B-12: CPT

## 2023-12-04 DIAGNOSIS — M79.671 BILATERAL FOOT PAIN: ICD-10-CM

## 2023-12-04 DIAGNOSIS — M79.672 BILATERAL FOOT PAIN: ICD-10-CM

## 2023-12-04 RX ORDER — NAPROXEN 500 MG/1
500 TABLET ORAL 2 TIMES DAILY WITH MEALS
Qty: 30 TABLET | Refills: 0 | Status: SHIPPED | OUTPATIENT
Start: 2023-12-04

## 2023-12-05 ENCOUNTER — TELEPHONE (OUTPATIENT)
Age: 73
End: 2023-12-05

## 2023-12-05 DIAGNOSIS — E53.8 VITAMIN B12 DEFICIENCY: Primary | ICD-10-CM

## 2023-12-05 RX ORDER — CYANOCOBALAMIN 1000 UG/ML
1000 INJECTION, SOLUTION INTRAMUSCULAR; SUBCUTANEOUS
Status: SHIPPED | OUTPATIENT
Start: 2023-12-07

## 2023-12-05 NOTE — TELEPHONE ENCOUNTER
----- Message from Vibha Gallardo DO sent at 12/5/2023 10:59 AM EST -----  Blood work is significant for extremely low vitamin D, high cholesterol and high liver enzymes. I recommend follow up blood work to evaluate as to why her b12 is so low and to start monthly B12 shots for at least 6 months.  Please schedule pt

## 2023-12-07 ENCOUNTER — APPOINTMENT (OUTPATIENT)
Age: 73
End: 2023-12-07
Payer: MEDICARE

## 2023-12-07 ENCOUNTER — CLINICAL SUPPORT (OUTPATIENT)
Age: 73
End: 2023-12-07
Payer: MEDICARE

## 2023-12-07 DIAGNOSIS — E53.8 VITAMIN B12 DEFICIENCY: Primary | ICD-10-CM

## 2023-12-07 DIAGNOSIS — E53.8 VITAMIN B12 DEFICIENCY: ICD-10-CM

## 2023-12-07 LAB — FOLATE SERPL-MCNC: 21.8 NG/ML

## 2023-12-07 PROCEDURE — 86340 INTRINSIC FACTOR ANTIBODY: CPT

## 2023-12-07 PROCEDURE — 83918 ORGANIC ACIDS TOTAL QUANT: CPT

## 2023-12-07 PROCEDURE — 82746 ASSAY OF FOLIC ACID SERUM: CPT

## 2023-12-07 PROCEDURE — 96372 THER/PROPH/DIAG INJ SC/IM: CPT

## 2023-12-07 PROCEDURE — 36415 COLL VENOUS BLD VENIPUNCTURE: CPT

## 2023-12-07 RX ADMIN — CYANOCOBALAMIN 1000 MCG: 1000 INJECTION, SOLUTION INTRAMUSCULAR; SUBCUTANEOUS at 08:56

## 2023-12-12 LAB — IF BLOCK AB SER QL RIA: 16.2 AU/ML (ref 0–1.1)

## 2023-12-14 ENCOUNTER — TELEPHONE (OUTPATIENT)
Age: 73
End: 2023-12-14

## 2023-12-14 PROBLEM — D51.0 PERNICIOUS ANEMIA: Status: ACTIVE | Noted: 2023-12-14

## 2023-12-14 NOTE — TELEPHONE ENCOUNTER
----- Message from Soco Irizarry DO sent at 12/14/2023  1:30 PM EST -----  Notify patient that her blood work is positive for something called pernicious anemia. This means that she has autoimmune disorder that is causing her low vitamin B12. Basically she will have to be on B12 shots once a month for at least 12 months. After that we will check the blood level of B12.   If it is normal she can switch from once a month injections to daily oral B12

## 2023-12-14 NOTE — TELEPHONE ENCOUNTER
Spoke with patient and informed her with Dr. rodriguez and she said that she already started taking them and has one scheduled for January.

## 2023-12-14 NOTE — TELEPHONE ENCOUNTER
----- Message from Barbara Nguyen DO sent at 12/14/2023  1:30 PM EST -----  Notify patient that her blood work is positive for something called pernicious anemia. This means that she has autoimmune disorder that is causing her low vitamin B12. Basically she will have to be on B12 shots once a month for at least 12 months. After that we will check the blood level of B12.   If it is normal she can switch from once a month injections to daily oral B12

## 2023-12-17 LAB — METHYLMALONATE SERPL-SCNC: 9833 NMOL/L (ref 0–378)

## 2023-12-23 DIAGNOSIS — M79.672 BILATERAL FOOT PAIN: ICD-10-CM

## 2023-12-23 DIAGNOSIS — M79.671 BILATERAL FOOT PAIN: ICD-10-CM

## 2023-12-23 RX ORDER — NAPROXEN 500 MG/1
500 TABLET ORAL 2 TIMES DAILY WITH MEALS
Qty: 30 TABLET | Refills: 0 | Status: SHIPPED | OUTPATIENT
Start: 2023-12-23

## 2024-01-09 ENCOUNTER — CLINICAL SUPPORT (OUTPATIENT)
Age: 74
End: 2024-01-09
Payer: MEDICARE

## 2024-01-09 DIAGNOSIS — D51.0 PERNICIOUS ANEMIA: Primary | ICD-10-CM

## 2024-01-09 PROCEDURE — 96372 THER/PROPH/DIAG INJ SC/IM: CPT

## 2024-01-09 RX ADMIN — CYANOCOBALAMIN 1000 MCG: 1000 INJECTION, SOLUTION INTRAMUSCULAR; SUBCUTANEOUS at 08:57

## 2024-02-13 DIAGNOSIS — Z12.31 BREAST CANCER SCREENING BY MAMMOGRAM: ICD-10-CM

## 2024-02-13 RX ORDER — HYDROCHLOROTHIAZIDE 25 MG/1
25 TABLET ORAL DAILY
Qty: 90 TABLET | Refills: 0 | Status: SHIPPED | OUTPATIENT
Start: 2024-02-13

## 2024-02-15 ENCOUNTER — CLINICAL SUPPORT (OUTPATIENT)
Age: 74
End: 2024-02-15
Payer: MEDICARE

## 2024-02-15 DIAGNOSIS — Z23 ENCOUNTER FOR IMMUNIZATION: Primary | ICD-10-CM

## 2024-02-15 PROCEDURE — 96372 THER/PROPH/DIAG INJ SC/IM: CPT

## 2024-02-15 RX ADMIN — CYANOCOBALAMIN 1000 MCG: 1000 INJECTION, SOLUTION INTRAMUSCULAR; SUBCUTANEOUS at 09:05

## 2024-02-21 PROBLEM — Z12.39 ENCOUNTER FOR SCREENING FOR MALIGNANT NEOPLASM OF BREAST: Status: RESOLVED | Noted: 2020-09-09 | Resolved: 2024-02-21

## 2024-02-21 PROBLEM — Z12.11 COLON CANCER SCREENING: Status: RESOLVED | Noted: 2020-09-09 | Resolved: 2024-02-21

## 2024-02-29 ENCOUNTER — HOSPITAL ENCOUNTER (OUTPATIENT)
Age: 74
Discharge: HOME | End: 2024-02-29
Payer: MEDICARE

## 2024-02-29 VITALS — BODY MASS INDEX: 23.59 KG/M2 | HEIGHT: 67 IN

## 2024-02-29 DIAGNOSIS — Z78.0 ASYMPTOMATIC POSTMENOPAUSAL STATUS: ICD-10-CM

## 2024-02-29 DIAGNOSIS — Z12.31 ENCOUNTER FOR SCREENING MAMMOGRAM FOR BREAST CANCER: ICD-10-CM

## 2024-02-29 PROCEDURE — 77080 DXA BONE DENSITY AXIAL: CPT

## 2024-03-03 DIAGNOSIS — M79.671 BILATERAL FOOT PAIN: ICD-10-CM

## 2024-03-03 DIAGNOSIS — M79.672 BILATERAL FOOT PAIN: ICD-10-CM

## 2024-03-04 ENCOUNTER — TELEPHONE (OUTPATIENT)
Age: 74
End: 2024-03-04

## 2024-03-04 RX ORDER — NAPROXEN 500 MG/1
500 TABLET ORAL 2 TIMES DAILY WITH MEALS
Qty: 30 TABLET | Refills: 0 | Status: SHIPPED | OUTPATIENT
Start: 2024-03-04

## 2024-03-14 ENCOUNTER — CLINICAL SUPPORT (OUTPATIENT)
Age: 74
End: 2024-03-14
Payer: MEDICARE

## 2024-03-14 DIAGNOSIS — D51.0 PERNICIOUS ANEMIA: Primary | ICD-10-CM

## 2024-03-14 PROCEDURE — 96372 THER/PROPH/DIAG INJ SC/IM: CPT

## 2024-03-14 RX ADMIN — CYANOCOBALAMIN 1000 MCG: 1000 INJECTION, SOLUTION INTRAMUSCULAR; SUBCUTANEOUS at 09:47

## 2024-03-27 ENCOUNTER — HOSPITAL ENCOUNTER (OUTPATIENT)
Dept: MAMMOGRAPHY | Facility: CLINIC | Age: 74
Discharge: HOME | End: 2024-03-27
Payer: MEDICARE

## 2024-03-27 ENCOUNTER — HOSPITAL ENCOUNTER (OUTPATIENT)
Dept: ULTRASOUND IMAGING | Facility: CLINIC | Age: 74
Discharge: HOME | End: 2024-03-27
Payer: MEDICARE

## 2024-03-27 VITALS — BODY MASS INDEX: 23.29 KG/M2 | WEIGHT: 148.4 LBS | HEIGHT: 67 IN

## 2024-03-27 DIAGNOSIS — R92.8 ABNORMAL MAMMOGRAM: ICD-10-CM

## 2024-03-27 PROCEDURE — 77066 DX MAMMO INCL CAD BI: CPT

## 2024-03-27 PROCEDURE — G0279 TOMOSYNTHESIS, MAMMO: HCPCS

## 2024-03-27 PROCEDURE — 76642 ULTRASOUND BREAST LIMITED: CPT

## 2024-03-27 NOTE — PROGRESS NOTES
Met with patient and    regarding recommendation for;    __X___ RIGHT ______LEFT      __X___Ultrasound guided  ______Stereotactic breast biopsy x 3 with Lymph node      __X___Verbalized understanding.      Blood thinners:  No: _X____ Yes: ______ What:                 Biopsy teaching sheet given:  Yes: ___X___ No: ________    Pt given contact information and adv to call with any questions/needs    Patient advised to arrive at 0730 for a 0800 appointment

## 2024-03-27 NOTE — PROGRESS NOTES
Met with patient and Dr. Ascencio regarding recommendation for;    ___X__ RIGHT ___X___LEFT      _____Ultrasound guided  ___X___Stereotactic breast biopsy.      __X___Verbalized understanding.      Blood thinners:  No: _X____ Yes: ______ What:                 Biopsy teaching sheet given:  Yes: ___X___ No: ________    Pt given contact information and adv to call with any questions/needs    Patient advised to arrive at 0730 for a 0800 appointment

## 2024-04-04 ENCOUNTER — HOSPITAL ENCOUNTER (OUTPATIENT)
Dept: MAMMOGRAPHY | Facility: CLINIC | Age: 74
Discharge: HOME | End: 2024-04-04
Payer: MEDICARE

## 2024-04-04 ENCOUNTER — APPOINTMENT (EMERGENCY)
Dept: CT IMAGING | Facility: HOSPITAL | Age: 74
End: 2024-04-04
Payer: MEDICARE

## 2024-04-04 ENCOUNTER — HOSPITAL ENCOUNTER (OUTPATIENT)
Dept: ULTRASOUND IMAGING | Facility: CLINIC | Age: 74
End: 2024-04-04
Payer: MEDICARE

## 2024-04-04 ENCOUNTER — HOSPITAL ENCOUNTER (EMERGENCY)
Facility: HOSPITAL | Age: 74
Discharge: HOME | End: 2024-04-04
Attending: EMERGENCY MEDICINE
Payer: MEDICARE

## 2024-04-04 ENCOUNTER — HOSPITAL ENCOUNTER (OUTPATIENT)
Dept: MAMMOGRAPHY | Facility: CLINIC | Age: 74
End: 2024-04-04
Payer: MEDICARE

## 2024-04-04 VITALS — DIASTOLIC BLOOD PRESSURE: 88 MMHG | HEART RATE: 80 BPM | SYSTOLIC BLOOD PRESSURE: 226 MMHG

## 2024-04-04 VITALS
HEIGHT: 67 IN | SYSTOLIC BLOOD PRESSURE: 184 MMHG | HEART RATE: 57 BPM | BODY MASS INDEX: 23.23 KG/M2 | WEIGHT: 148 LBS | DIASTOLIC BLOOD PRESSURE: 58 MMHG

## 2024-04-04 VITALS
TEMPERATURE: 97.7 F | OXYGEN SATURATION: 96 % | SYSTOLIC BLOOD PRESSURE: 159 MMHG | HEART RATE: 64 BPM | DIASTOLIC BLOOD PRESSURE: 74 MMHG | RESPIRATION RATE: 18 BRPM

## 2024-04-04 DIAGNOSIS — R92.8 ABNORMAL MAMMOGRAM: ICD-10-CM
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
## 2024-04-04 DIAGNOSIS — S16.1XXA ACUTE STRAIN OF NECK MUSCLE, INITIAL ENCOUNTER: ICD-10-CM

## 2024-04-04 PROCEDURE — A4648 IMPLANTABLE TISSUE MARKER: HCPCS

## 2024-04-04 PROCEDURE — 88341 IMHCHEM/IMCYTCHM EA ADD ANTB: CPT | Performed by: STUDENT IN AN ORGANIZED HEALTH CARE EDUCATION/TRAINING PROGRAM

## 2024-04-04 PROCEDURE — 88305 TISSUE EXAM BY PATHOLOGIST: CPT | Performed by: STUDENT IN AN ORGANIZED HEALTH CARE EDUCATION/TRAINING PROGRAM

## 2024-04-04 PROCEDURE — 19082 BX BREAST ADD LESION STRTCTC: CPT

## 2024-04-04 PROCEDURE — 76942 ECHO GUIDE FOR BIOPSY: CPT

## 2024-04-04 PROCEDURE — 88360 TUMOR IMMUNOHISTOCHEM/MANUAL: CPT | Performed by: STUDENT IN AN ORGANIZED HEALTH CARE EDUCATION/TRAINING PROGRAM

## 2024-04-04 PROCEDURE — 19084 BX BREAST ADD LESION US IMAG: CPT

## 2024-04-04 PROCEDURE — 19081 BX BREAST 1ST LESION STRTCTC: CPT

## 2024-04-04 PROCEDURE — 99284 EMERGENCY DEPT VISIT MOD MDM: CPT

## 2024-04-04 PROCEDURE — 72125 CT NECK SPINE W/O DYE: CPT

## 2024-04-04 PROCEDURE — 19083 BX BREAST 1ST LESION US IMAG: CPT

## 2024-04-04 PROCEDURE — 88374 M/PHMTRC ALYS ISHQUANT/SEMIQ: CPT | Performed by: STUDENT IN AN ORGANIZED HEALTH CARE EDUCATION/TRAINING PROGRAM

## 2024-04-04 PROCEDURE — 70450 CT HEAD/BRAIN W/O DYE: CPT

## 2024-04-04 PROCEDURE — 99284 EMERGENCY DEPT VISIT MOD MDM: CPT | Performed by: EMERGENCY MEDICINE

## 2024-04-04 PROCEDURE — 88342 IMHCHEM/IMCYTCHM 1ST ANTB: CPT | Performed by: STUDENT IN AN ORGANIZED HEALTH CARE EDUCATION/TRAINING PROGRAM

## 2024-04-04 PROCEDURE — 38505 NEEDLE BIOPSY LYMPH NODES: CPT

## 2024-04-04 RX ORDER — LIDOCAINE HCL/EPINEPHRINE/PF 2%-1:200K
10 VIAL (ML) INJECTION ONCE
Status: COMPLETED | OUTPATIENT
Start: 2024-04-04 | End: 2024-04-04

## 2024-04-04 RX ORDER — LIDOCAINE HYDROCHLORIDE 10 MG/ML
5 INJECTION, SOLUTION EPIDURAL; INFILTRATION; INTRACAUDAL; PERINEURAL ONCE
Status: COMPLETED | OUTPATIENT
Start: 2024-04-04 | End: 2024-04-04

## 2024-04-04 RX ORDER — OXYCODONE HYDROCHLORIDE 5 MG/1
5 TABLET ORAL ONCE
Status: COMPLETED | OUTPATIENT
Start: 2024-04-04 | End: 2024-04-04

## 2024-04-04 RX ADMIN — LIDOCAINE HYDROCHLORIDE AND EPINEPHRINE 10 ML: 20; 5 INJECTION, SOLUTION EPIDURAL; INFILTRATION; INTRACAUDAL; PERINEURAL at 09:03

## 2024-04-04 RX ADMIN — LIDOCAINE HYDROCHLORIDE AND EPINEPHRINE 10 ML: 20; 5 INJECTION, SOLUTION EPIDURAL; INFILTRATION; INTRACAUDAL; PERINEURAL at 08:25

## 2024-04-04 RX ADMIN — OXYCODONE HYDROCHLORIDE 5 MG: 5 TABLET ORAL at 11:08

## 2024-04-04 RX ADMIN — LIDOCAINE HYDROCHLORIDE 5 ML: 10 INJECTION, SOLUTION EPIDURAL; INFILTRATION; INTRACAUDAL; PERINEURAL at 09:56

## 2024-04-04 RX ADMIN — LIDOCAINE HYDROCHLORIDE 5 ML: 10 INJECTION, SOLUTION EPIDURAL; INFILTRATION; INTRACAUDAL; PERINEURAL at 09:03

## 2024-04-04 RX ADMIN — LIDOCAINE HYDROCHLORIDE 5 ML: 10 INJECTION, SOLUTION EPIDURAL; INFILTRATION; INTRACAUDAL; PERINEURAL at 09:49

## 2024-04-04 RX ADMIN — LIDOCAINE HYDROCHLORIDE 5 ML: 10 INJECTION, SOLUTION EPIDURAL; INFILTRATION; INTRACAUDAL; PERINEURAL at 09:52

## 2024-04-04 RX ADMIN — LIDOCAINE HYDROCHLORIDE 5 ML: 10 INJECTION, SOLUTION EPIDURAL; INFILTRATION; INTRACAUDAL; PERINEURAL at 08:25

## 2024-04-04 NOTE — PROGRESS NOTES
Procedure type:    __x___ultrasound guided _____stereotactic    Breast:    _____Left __x___Right    Location: 7 o'clock 7 cmfn    Needle: 12 gauge kelsie    # of passes: 3    Clip: car     Performed by: Dr. Kris Ascencio    Pressure held for 5 minutes by: Karen Akers    Steryasmine Strips:    ___X__yes _____no    Band aid:    __X___yes_____no    Tolerated procedure:    __X___yes _____no

## 2024-04-04 NOTE — PROGRESS NOTES
Procedure type:    __x___ultrasound guided _____stereotactic    Breast:    _____Left ___x__Right    Location: 9 o'clock 7 cmfn    Needle: 12 gauge kelsie    # of passes: 3    Clip: car     Performed by: Dr. Kris Ascencio    Pressure held for 5 minutes by: Karen Akers    Steryasmine Strips:    ___X__yes _____no    Band aid:    __X___yes_____no    Tolerated procedure:    __X___yes _____no

## 2024-04-04 NOTE — PROGRESS NOTES
Procedure type:    _____ultrasound guided __x___stereotactic    Breast:    __x___Left _____Right    Location: 2 o'clock    Needle: 8 gauge 12 cm    # of passes: 4 (2 with, 2 without)    Clip: u clip    Performed by: Dr. Kris Ascencio    Pressure held for 5 minutes by: Karen Akers    Steryasmine Strips:    ___X__yes _____no    Band aid:    __X___yes_____no    Tolerated procedure:    __X___yes _____no

## 2024-04-04 NOTE — DISCHARGE INSTRUCTIONS
A  personal message from Dr. Berhane Smiley,  Thank you so much for allowing me to care for you today.    I pride myself in the care and attention I give all my patients.  I hope you were a witness to this tonight.   If for any reason your condition does not improve or worsens, or you have a question that was not answered during your visit you can feel free to text me on my personal phone #  # [phone].   I will answer to your message and continue your care past your emergency room visit.     Please understand that although you are being discharged because your condition has been deemed stable and able to be managed on an outpatient setting. However your condition may worsen as part of the natural progression of the illness/condition, if this occurs please come back to the emergency department for a repeat evaluation.

## 2024-04-04 NOTE — PROGRESS NOTES
Arrived in patient holding area to reassess blood pressure at the request of PCA, upon arrival into holding area, pt turned around to sit in chair and tripped over black pull out step attached to bottom of chair, pt fell forward striking right side of face/head on corner of closet, pt with no LOC, pt assisted to seated position in chair, pt denies any dizziness/blurred vision/nausea, pt also tried to brace fall with arms, pt with c/o R upper arm pain, small bilat hand lacerations, R thumb nail bleeding from broken nail and bilat knee pain,  Upon assessment, pt with redness to R ear, no lacerations noted, pt with slight pain, no bumps/bruising noted, pt denied neck/back pain, no pain on palpation, no deformity noted, pt with R upper arm pain, no deformity noted, pt with blood noted on bilat hands, blood coming from R thumb where her nail broke, pt has a few small lacerations to bilateral hands, pt c/o bilateral knee pain, upon inspection pt with bilat bruising and hematomas forming to both knees, ice packs applied to pt R side of head, bilateral knees, band aid applied to pt R thumb, Medical emergency called to send pt to ER for evaluation for fall/head strike, pt  brought to holding area, updated about pt condition, VS obtained (see flowsheet), ER staff arrived in breast center and transported pt to ER via  without further incident

## 2024-04-04 NOTE — PROGRESS NOTES
Procedure type:    _____ultrasound guided __x___stereotactic    Breast:    _____Left __x___Right    Location: 8 o'clock    Needle: 8 gauge 12 cm    # of passes: 4 (4 with, 0 without)    Clip: ene    Performed by: Dr. Kris Ascencio    Pressure held for 5 minutes by: Karen Akers    Steryasmine Strips:    ___X__yes _____no    Band aid:    __X___yes_____no    Tolerated procedure:    __X___yes _____no

## 2024-04-04 NOTE — PROGRESS NOTES
Ice pack given:    __X___yes _____no    Discharge instructions reviewed and given to patient:    __X___yes _____no    Discharged via:    ___amulatory    ___x_wheelchair    _____stretcher    Biopsy site clean and dry with no bleeding on discharge:    __X___yes ____no    Patient left with ER staff in wheelchair due to a medical emergency

## 2024-04-04 NOTE — PROGRESS NOTES
Procedure type:    __x___ultrasound guided _____stereotactic    Breast:    _____Left ___x__Right    Location: right axilla 10 o'clock 12 cmfn    Needle: 12 gauge kelsie    # of passes: 3    Clip: car      Performed by: Dr. Kris Ascencio    Pressure held for 5 minutes by: Karen Harris Strips:    ___X__yes _____no    Band aid:    __X___yes_____no    Tolerated procedure:    __X___yes _____no

## 2024-04-05 NOTE — PROGRESS NOTES
Post procedure call completed    Bleeding: _____yes __X___no, pt denies    Pain: _____yes ___X___no, pt took Oxycodone x1 after being released from ER then took Tylenol with relief.    Redness/Swelling: ______yes ___X___no, pt denies    Band aid removed: _____yes ___X__no (discussed removing when she showers)    Steri-Strips intact: ___X___yes _____no (discussed with patient to remove steri strips on 4/7 if they have not come off on their own)    Pt with no questions at this time, adv will call when results available, adv to call with any questions or concerns, has name/# for contact

## 2024-04-09 ENCOUNTER — TELEPHONE (OUTPATIENT)
Dept: MAMMOGRAPHY | Facility: CLINIC | Age: 74
End: 2024-04-09

## 2024-04-09 ENCOUNTER — DOCUMENTATION (OUTPATIENT)
Dept: HEMATOLOGY ONCOLOGY | Facility: CLINIC | Age: 74
End: 2024-04-09

## 2024-04-09 ENCOUNTER — TELEPHONE (OUTPATIENT)
Dept: GENETICS | Facility: CLINIC | Age: 74
End: 2024-04-09

## 2024-04-09 DIAGNOSIS — C50.919 INVASIVE CARCINOMA OF BREAST (HCC): Primary | ICD-10-CM

## 2024-04-09 NOTE — TELEPHONE ENCOUNTER
I introduced myself to Juve and let her know that her breast surgeon reached out to the cancer risk and genetics program on her behalf.    I reviewed the following with Juve:    While the majority of cancer occurs by chance, approximately 5-10% of breast cancer has an underlying genetic cause.  Genetic testing is available which can determine if there is an underlying genetic cause to your cancer.  Understanding if there is an underlying genetic cause can:  Provide your surgeon with additional information to help with surgical decisions, treatment decisions and eligibility for clinical trials.    It can determine if you have an increased risk for any additional cancers.  Help family members understand their cancer risk.       We work closely with the [facility] breast surgeons and are reaching out to see if you have interest in genetic testing. The reason we are reaching out at this time is that this result may help your surgeon determine the appropriate type of surgery (i.e. lumpectomy vs mastectomy). This test is not a requirement but can take 5-10 days to complete so we would like to start the process as soon as possible so the results are ready for your appointment with your surgeon.       If you are interested in genetic testing, I can collect your family history and initiate genetic testing for you.        Patient elected to pursue testing     Diagnosis Details:  Invasive Ductal Carcinoma  Right  Hormone receptors pending    Personal History:  Do you have a personal history of any other cancer? No  If yes type/age of diagnosis: Left Benign Breast Tumor    Family history:   Do you have Ashkenazi Mormonism ancestry? No  If yes, maternal, paternal, or both?    Do you have any children? Yes  How many sons? 0  How many daughters? 1  Do any of your children have a history of cancer? No  If yes type/age of diagnosis:     Do you have any brothers or sisters? Yes  How many brothers? 1  How many sisters? 0  Are they from  the same parents? Yes  If no how maternal/paternal half-siblings:  Do any of your brothers or sisters have a history of cancer? Yes  If yes who and the type/age of diagnosis:   Brother - Throat Cancer age ~63 ()          Do you have nieces or nephews? Yes  Do any of them have a history of cancer? No  If yes type/age of diagnosis:    Does your mother have a history of cancer? No  If yes, cancer type and age of diagnosis:   Is your mother still living? No  Age/Age of death: 86    Thinking about your mother's family (aunts, uncles, cousins, grandparents) is there anyone with a history of cancer? Yes. Mother is 1 of 10 children.  If yes, list relationship, cancer type and age of diagnosis:  Maternal Uncle - Lung Cancer age 50s ()    Does your father have a history of cancer? No  If yes, cancer type and age of diagnosis:  Is your father still living? No  Age/age of death: 54    Thinking about your father's family (aunts, uncles, cousins, grandparents) is there anyone with a history of cancer? Yes  If yes, list relationship, cancer type and age of diagnosis:   Paternal Aunt - Unknown Cancer ()     Types of Results - Positive, Negative, VUS  Positive Result - May explain personal diagnosis/family history. Can give surgeon information on treatment plan, inform future screening/management or tell a person about other possible risks. Positive results can initiate testing for other family members who may be at risk (children, siblings, etc)  Negative Result - Does not give an explanation. Surgical/treatment plan will be based on clinical presentation and will be part of discussion with surgeon. Negative result cannot be passed down to children, but they are still at elevated risk.  Uncertain Result - Common, but treated like a negative result clinically. 90% are downgraded over time.     [facility] billing policy   Most insurance plans cover the cost of genetic testing. The out-of-pocket cost  varies due to the differences in deductibles, co-payments and co-insurance defined by individual plans but 90% of people pay $100 or less for a genetic test     A blood kit will be mailed to you overnight. Please take the blood kit along with packet of paperwork to any [facility]'s lab to have your blood drawn.    We have genetic counselors available, if you have any additional questions or would like to speak with them we can schedule you a 15 minute appointment.      Plan:  A blood kit was mailed out on 4/9/2024 along with information on genetic testing and the lab's billing policy.     Genetic Testing Preformed: [facility] BRCAplus STAT Panel (13 genes): MIKE, BARD1, BRCA1, BRCA2, CDH1, CHEK2, NF1, PALB2, PTEN, RAD51C, RAD51D, STK11, TP53 with reflex to [facility]: Cancer+RNAinsight (59 genes): APC, MIKE, AXIN2, BAP1, BARD1, BMPR1A, BRCA1, BRCA2, BRIP1, CDH1, CDK4, CDKN1B, CDKN2A, CHEK2, CTNNA1, DICER1, EGLN1, EPCAM, FH, FLCN, GREM1, HOXB13, KIF1B, KIT, MAX, MEN1, MET, MITF, MLH1, MSH2, MSH3, MSH6, MUTYH, NF1, NTHL1, PALB2, PDGFRA PMS2, POLD1, POLE, POT1, PTEN, RAD51C, RAD51D, RB1, RET, SDHA, SDHAF2, SDHB, SDHC, SDHD, SMAD4, SMARCA4, STK11, TLIH137, TP53, TSC1, TSC2, VHL     Result Call Information:  I confirmed the patient's mobile number on file as the best number to call with results  I confirmed with the patient that we can leave a voicemail on the provided numbers    Initial results will take approximately 5-12 days to return     Additional results may take up to 2-3 weeks to complete once test is started.    Patient does not have any further questions, declined meeting with genetic counselor    When your results are ready, someone from the genetics team will call you, review the results, and contact your breast surgeon. You will be contacted with any type of result- positive, negative, or uncertain.

## 2024-04-09 NOTE — ED PROVIDER NOTES
History  Chief Complaint   Patient presents with    Fall     Presents as medical emergency from [facility], slip and trip, complaints of bilateral knee pain, right sided head, and arm pain     Patient had a slip and a fall.  Comes emergency department with pain on the head and arm.  No loss of consciousness.  However she does have some neck pain and soreness to palpation and range of motion.      History provided by:  Patient   used: No    Fall  Associated symptoms: headaches    Associated symptoms: no abdominal pain, no back pain, no chest pain, no seizures and no vomiting        Prior to Admission Medications   Prescriptions Last Dose Informant Patient Reported? Taking?   Ascorbic Acid (vitamin C) 100 MG tablet  Self Yes No   Sig: Take 100 mg by mouth daily   Multiple Vitamins-Minerals (multivitamin with minerals) tablet  Self Yes No   Sig: Take 1 tablet by mouth daily   Zinc 100 MG TABS  Self Yes No   Sig: Take by mouth   acetaminophen (TYLENOL) 325 mg tablet  Self Yes No   Sig: Take 650 mg by mouth every 6 (six) hours as needed for mild pain   hydroCHLOROthiazide 25 mg tablet   No No   Sig: TAKE 1 TABLET (25 MG TOTAL) BY MOUTH DAILY.   naproxen (NAPROSYN) 500 mg tablet   No No   Sig: TAKE 1 TABLET BY MOUTH TWICE A DAY WITH FOOD   pravastatin (PRAVACHOL) 20 mg tablet   No No   Sig: Take 1 tablet (20 mg total) by mouth daily at bedtime      Facility-Administered Medications Last Administration Doses Remaining   cyanocobalamin injection 1,000 mcg 3/14/2024  9:47 AM           Past Medical History:   Diagnosis Date    Colon polyp     Esophagitis     History of benign breast tumor     Hyperlipidemia     Urinary incontinence     Venous ulcer of ankle (HCC) 01/10/2022       Past Surgical History:   Procedure Laterality Date    ANKLE SURGERY Right     BREAST EXCISIONAL BIOPSY Left 1998    benign    BUNIONECTOMY Bilateral     COLONOSCOPY      CORRECTION HAMMER TOE Bilateral     MAMMO STEREOTACTIC  BREAST BIOPSY LEFT (ALL INC) Left 2024    MAMMO STEREOTACTIC BREAST BIOPSY RIGHT (ALL INC) EACH ADD Right 2024    US GUIDANCE BREAST BIOPSY RIGHT EACH ADDITIONAL Right 2024    US GUIDED BREAST BIOPSY RIGHT COMPLETE Right 2024    US GUIDED BREAST LYMPH NODE BIOPSY RIGHT Right 2024    VEIN LIGATION Right 2022    Procedure: Venaseal therapy and stab phlebectomy;  Surgeon: Gosia Shepherd MD;  Location: [facility];  Service: Vascular       Family History   Problem Relation Age of Onset    No Known Problems Mother     Heart disease Father     No Known Problems Daughter     No Known Problems Maternal Grandmother     No Known Problems Maternal Grandfather     No Known Problems Paternal Grandmother     No Known Problems Paternal Grandfather     Throat cancer Brother     No Known Problems Maternal Aunt     No Known Problems Maternal Aunt     No Known Problems Maternal Aunt     No Known Problems Maternal Aunt     No Known Problems Paternal Aunt     No Known Problems Paternal Aunt     No Known Problems Paternal Aunt     No Known Problems Paternal Aunt     BRCA2 Negative Neg Hx     BRCA2 Positive Neg Hx     BRCA1 Positive Neg Hx     BRCA1 Negative Neg Hx     BRCA 1/2 Neg Hx     Ovarian cancer Neg Hx     Endometrial cancer Neg Hx     Colon cancer Neg Hx     Breast cancer additional onset Neg Hx     Breast cancer Neg Hx      I have reviewed and agree with the history as documented.    E-Cigarette/Vaping    E-Cigarette Use Never User      E-Cigarette/Vaping Substances    Nicotine No     THC No     CBD No     Flavoring No     Other No     Unknown No      Social History     Tobacco Use    Smoking status: Former     Current packs/day: 0.00     Types: Cigarettes     Start date:      Quit date:      Years since quittin.2    Smokeless tobacco: Never   Vaping Use    Vaping status: Never Used   Substance Use Topics    Alcohol use: Yes     Alcohol/week: 1.0 standard drink of alcohol     Types: 1  Standard drinks or equivalent per week     Comment: Socially    Drug use: Never       Review of Systems   Constitutional:  Negative for chills and fever.   HENT:  Negative for ear pain and sore throat.    Eyes:  Negative for pain and visual disturbance.   Respiratory:  Negative for cough and shortness of breath.    Cardiovascular:  Negative for chest pain and palpitations.   Gastrointestinal:  Negative for abdominal pain and vomiting.   Genitourinary:  Negative for dysuria and hematuria.   Musculoskeletal:  Negative for arthralgias and back pain.   Skin:  Negative for color change and rash.   Neurological:  Positive for headaches. Negative for seizures and syncope.   All other systems reviewed and are negative.      Physical Exam  Physical Exam  Vitals and nursing note reviewed.   Constitutional:       General: She is not in acute distress.     Appearance: Normal appearance. She is well-developed and normal weight.   HENT:      Head: Normocephalic and atraumatic.      Right Ear: External ear normal.      Left Ear: External ear normal.      Nose: Nose normal.      Mouth/Throat:      Mouth: Mucous membranes are moist.   Eyes:      Conjunctiva/sclera: Conjunctivae normal.      Pupils: Pupils are equal, round, and reactive to light.   Cardiovascular:      Rate and Rhythm: Normal rate and regular rhythm.      Pulses: Normal pulses.      Heart sounds: No murmur heard.  Pulmonary:      Effort: Pulmonary effort is normal. No respiratory distress.      Breath sounds: Normal breath sounds.   Abdominal:      Palpations: Abdomen is soft.      Tenderness: There is no abdominal tenderness.   Musculoskeletal:         General: No swelling.      Cervical back: Neck supple.   Skin:     General: Skin is warm and dry.      Capillary Refill: Capillary refill takes less than 2 seconds.   Neurological:      General: No focal deficit present.      Mental Status: She is alert and oriented to person, place, and time.      Cranial Nerves: No  cranial nerve deficit.      Motor: No weakness.      Gait: Gait normal.   Psychiatric:         Mood and Affect: Mood normal.         Thought Content: Thought content normal.         Vital Signs  ED Triage Vitals   Temperature Pulse Respirations Blood Pressure SpO2   04/04/24 1058 04/04/24 1058 04/04/24 1058 04/04/24 1058 04/04/24 1058   97.7 °F (36.5 °C) 71 18 (!) 222/86 96 %      Temp Source Heart Rate Source Patient Position - Orthostatic VS BP Location FiO2 (%)   04/04/24 1058 04/04/24 1058 -- -- --   Oral Monitor         Pain Score       04/04/24 1108       10 - Worst Possible Pain           Vitals:    04/04/24 1058 04/04/24 1200   BP: (!) 222/86 159/74   Pulse: 71 64         Visual Acuity  Visual Acuity      Flowsheet Row Most Recent Value   L Pupil Size (mm) 3   R Pupil Size (mm) 3            ED Medications  Medications   oxyCODONE (ROXICODONE) IR tablet 5 mg (5 mg Oral Given 4/4/24 1108)       Diagnostic Studies  Results Reviewed       None                   CT head without contrast   Final Result by Jesus Cardoso MD (04/04 1221)      No acute intracranial abnormality.                  Workstation performed: [id number]         CT spine cervical without contrast   Final Result by Jesus Cardoso MD (04/04 1223)      No cervical spine fracture or traumatic malalignment.                  Workstation performed: [id number]                    Procedures  Procedures         ED Course                               SBIRT 22yo+      Flowsheet Row Most Recent Value   Initial Alcohol Screen: US AUDIT-C     1. How often do you have a drink containing alcohol? 0 Filed at: 04/04/2024 1109   2. How many drinks containing alcohol do you have on a typical day you are drinking?  0 Filed at: 04/04/2024 1109   3a. Male UNDER 65: How often do you have five or more drinks on one occasion? 0 Filed at: 04/04/2024 1109   3b. FEMALE Any Age, or MALE 65+: How often do you have 4 or more drinks on one occassion? 0 Filed at:  04/04/2024 1109   Audit-C Score 0 Filed at: 04/04/2024 1109   JOSEPH: How many times in the past year have you...    Used an illegal drug or used a prescription medication for non-medical reasons? Never Filed at: 04/04/2024 1109                      Medical Decision Making  Problems Addressed:  Acute strain of neck muscle, initial encounter: acute illness or injury  Fall, initial encounter: acute illness or injury    Amount and/or Complexity of Data Reviewed  Radiology: ordered.  Discussion of management or test interpretation with external provider(s): Patient clinical presentation is benign.    Meaning patient's vital signs are normal and stable ED Triage Vitals  Temperature: 97.7 °F (36.5 °C) [04/04/24 1058]  Pulse: 71 [04/04/24 1058]  Respirations: 18 [04/04/24 1058]  Blood Pressure: (!) 222/86 [04/04/24 1058]  SpO2: 96 % [04/04/24 1058]  Temp Source: Oral [04/04/24 1058]  Heart Rate Source: Monitor [04/04/24 1058]  Patient Position - Orthostatic VS: n/a  BP Location: n/a  FiO2 (%): n/a  Pain Score: 10 - Worst Possible Pain [04/04/24 1108].    Patient in no distress.    Chief complaint, vital signs, physical examination does not suggest an acute medical emergency at this time.       Risk  OTC drugs.  Prescription drug management.             Disposition  Final diagnoses:   Fall, initial encounter   Acute strain of neck muscle, initial encounter     Time reflects when diagnosis was documented in both MDM as applicable and the Disposition within this note       Time User Action Codes Description Comment    4/4/2024 12:29 PM Berhane Smiley Add [W19.XXXA] Fall, initial encounter     4/4/2024 12:29 PM Berhane Smiley Add [S16.1XXA] Acute strain of neck muscle, initial encounter           ED Disposition       ED Disposition   Discharge    Condition   Stable    Date/Time   Thu Apr 4, 2024 1229    Comment   Juve Duarte discharge to home/self care.                   Follow-up Information       Follow up With Specialties  Details Why Contact Luis Fernando Dennis, DO Family Medicine In 3 days If symptoms worsen [address] [id number]  [phone]              Discharge Medication List as of 4/4/2024 12:29 PM        CONTINUE these medications which have NOT CHANGED    Details   acetaminophen (TYLENOL) 325 mg tablet Take 650 mg by mouth every 6 (six) hours as needed for mild pain, Historical Med      Ascorbic Acid (vitamin C) 100 MG tablet Take 100 mg by mouth daily, Historical Med      hydroCHLOROthiazide 25 mg tablet TAKE 1 TABLET (25 MG TOTAL) BY MOUTH DAILY., Starting Tue 2/13/2024, Normal      Multiple Vitamins-Minerals (multivitamin with minerals) tablet Take 1 tablet by mouth daily, Historical Med      naproxen (NAPROSYN) 500 mg tablet TAKE 1 TABLET BY MOUTH TWICE A DAY WITH FOOD, Starting Mon 3/4/2024, Normal      pravastatin (PRAVACHOL) 20 mg tablet Take 1 tablet (20 mg total) by mouth daily at bedtime, Starting Thu 11/16/2023, Normal      Zinc 100 MG TABS Take by mouth, Historical Med             No discharge procedures on file.    PDMP Review       None            ED Provider  Electronically Signed by             Berhane Smiley MD  04/09/24 0035

## 2024-04-09 NOTE — TELEPHONE ENCOUNTER
[facility] Newly Diagnosed Genetics Checklist    Please route all intake forms to 'oncology genetics breast' pool in epic.  This includes patients that decline testing.    Patient is under 60 at the time of diagnosis discuss genetics (script below)    Script for Genetics:  Approximately 5-10% of cancer can have an underlying genetic cause.  Genetic testing is recommended for women diagnosed with breast cancer under the age of 60.  Your breast surgeon recommends that you have genetic testing to help determine your surgical plan.  Our genetics team will call you in 24-48 hours to discuss this test and schedule a blood draw.  The Genetics team will be able to address your questions.          Outcome: invasive mammary carcinoma    Patient is under 60: No    Referral Outcome: Patient does not meet under 60 criteria, but is still interested in pursuing genetic testing. A referral was placed to the oncology genetics program.

## 2024-04-09 NOTE — PROGRESS NOTES
Intake received/ Chart reviewed for services completed outside of [facility]    Pathology completed: Yes, left was performed by stereo biopsy. Right breast was  done by stereo and us guided biopsy including right axilla.    Imaging completed: Bilateral diagnostic mammogram done on 3- & right diagnostic breast us done on 3-    All records needed are in patients chart. No records retrieval needed at this time.

## 2024-04-09 NOTE — TELEPHONE ENCOUNTER
Call placed to patient after pt given biopsy results from radiologist, questions answered, support given, adv next step is to set patient up with surgeon, options discussed and pt would like to have appt made with Dr. Nolan Valdez, adv patient that  would call her back by tomorrow with appt, pt states understanding, pt with no questions at this time, has name/# for any further needs    Also discussed with patient about genetic testing, pt has not had this done and is interested in completing, adv that someone from genetic counseling will reach out to her to discuss, pt states understanding

## 2024-04-10 ENCOUNTER — PATIENT OUTREACH (OUTPATIENT)
Dept: HEMATOLOGY ONCOLOGY | Facility: CLINIC | Age: 74
End: 2024-04-10

## 2024-04-10 DIAGNOSIS — C50.911 MALIGNANT NEOPLASM OF RIGHT FEMALE BREAST, UNSPECIFIED ESTROGEN RECEPTOR STATUS, UNSPECIFIED SITE OF BREAST (HCC): Primary | ICD-10-CM

## 2024-04-10 NOTE — PROGRESS NOTES
Breast Oncology Nurse Navigator    Called patient for initial outreach from nurse navigator.  Introduced myself and my role.    Patient had a few questions regarding her recent breast cancer diagnosis.  Patient states she had not had a mammogram in a few years.  She felt a mass while bathing one day recently.  She was sent to the [facility] and diagnosis was made.  She asked about the size and the stage.  Advised that more information is needed, but that Dr Valdez would review at her consultation.  Encouraged patient to write down questions as she thought of them and bring them along to her consult.  Confirmed upcoming appointment with Dr. Valdez.  Patient knows when and where to go.  She will be accompanied by her .    Patient lives with her supportive .  Her daughter lives in [place], but wants to travel to be here for patient at time of surgery.  Patient also has some supportive friends.  Patient denies issues with transportation.  Referral placed to oncology social worker.    Cancer family history includes: Brother had throat cancer, maternal uncle had lung cancer, paternal aunt had unknown cancer.  Patient was referred to oncology genetics.  Awaiting the genetic test kit.  We briefly reviewed test instructions.  Patient receives monthly B 12 injections and wanted to be sure it would not interfere.     Discussed the [facility] Support Community and some of the programs and services offered, including the satellite location in [place].  Will send [employer] message with our team's contact information as well as [facility] phone number and website.    Patient has my contact information and knows she can reach out with questions.  General assessment completed.

## 2024-04-12 ENCOUNTER — PATIENT OUTREACH (OUTPATIENT)
Dept: CASE MANAGEMENT | Facility: HOSPITAL | Age: 74
End: 2024-04-12

## 2024-04-12 NOTE — PROGRESS NOTES
OncSW referral received, chart review completed.  Pt will consult with Dr. Valdez next week, MSW will outreach when a tx plan is in place.  No immediate needs noted on chart review.

## 2024-04-16 ENCOUNTER — CLINICAL SUPPORT (OUTPATIENT)
Age: 74
End: 2024-04-16
Payer: MEDICARE

## 2024-04-16 ENCOUNTER — APPOINTMENT (OUTPATIENT)
Age: 74
End: 2024-04-16
Payer: MEDICARE

## 2024-04-16 DIAGNOSIS — C77.9 MALIGNANT NEOPLASM METASTATIC TO LYMPH NODES, UNSPECIFIED LYMPH NODE REGION (HCC): ICD-10-CM

## 2024-04-16 DIAGNOSIS — D05.11 INTRADUCTAL CARCINOMA IN SITU OF RIGHT BREAST: ICD-10-CM

## 2024-04-16 DIAGNOSIS — Z23 ENCOUNTER FOR IMMUNIZATION: Primary | ICD-10-CM

## 2024-04-16 PROBLEM — C50.911 BREAST CANCER METASTASIZED TO AXILLARY LYMPH NODE, RIGHT (HCC): Status: ACTIVE | Noted: 2024-04-16

## 2024-04-16 PROBLEM — C77.3 BREAST CANCER METASTASIZED TO AXILLARY LYMPH NODE, RIGHT (HCC): Status: ACTIVE | Noted: 2024-04-16

## 2024-04-16 PROCEDURE — 36415 COLL VENOUS BLD VENIPUNCTURE: CPT

## 2024-04-16 PROCEDURE — 96372 THER/PROPH/DIAG INJ SC/IM: CPT

## 2024-04-16 RX ADMIN — CYANOCOBALAMIN 1000 MCG: 1000 INJECTION, SOLUTION INTRAMUSCULAR; SUBCUTANEOUS at 09:42

## 2024-04-17 ENCOUNTER — CONSULT (OUTPATIENT)
Dept: SURGICAL ONCOLOGY | Facility: CLINIC | Age: 74
End: 2024-04-17
Payer: MEDICARE

## 2024-04-17 ENCOUNTER — APPOINTMENT (OUTPATIENT)
Dept: LAB | Facility: HOSPITAL | Age: 74
End: 2024-04-17
Payer: MEDICARE

## 2024-04-17 VITALS
OXYGEN SATURATION: 96 % | SYSTOLIC BLOOD PRESSURE: 148 MMHG | HEART RATE: 67 BPM | DIASTOLIC BLOOD PRESSURE: 64 MMHG | BODY MASS INDEX: 23.86 KG/M2 | WEIGHT: 152 LBS | HEIGHT: 67 IN

## 2024-04-17 DIAGNOSIS — C50.919 INVASIVE CARCINOMA OF BREAST (HCC): ICD-10-CM

## 2024-04-17 DIAGNOSIS — C50.911 BREAST CANCER METASTASIZED TO AXILLARY LYMPH NODE, RIGHT (HCC): ICD-10-CM

## 2024-04-17 DIAGNOSIS — C77.3 BREAST CANCER METASTASIZED TO AXILLARY LYMPH NODE, RIGHT (HCC): Primary | ICD-10-CM

## 2024-04-17 DIAGNOSIS — C77.3 BREAST CANCER METASTASIZED TO AXILLARY LYMPH NODE, RIGHT (HCC): ICD-10-CM

## 2024-04-17 DIAGNOSIS — C50.911 BREAST CANCER METASTASIZED TO AXILLARY LYMPH NODE, RIGHT (HCC): Primary | ICD-10-CM

## 2024-04-17 LAB
BUN SERPL-MCNC: 26 MG/DL (ref 5–25)
CREAT SERPL-MCNC: 0.94 MG/DL (ref 0.6–1.3)
GFR SERPL CREATININE-BSD FRML MDRD: 59 ML/MIN/1.73SQ M

## 2024-04-17 PROCEDURE — 82565 ASSAY OF CREATININE: CPT

## 2024-04-17 PROCEDURE — 84520 ASSAY OF UREA NITROGEN: CPT

## 2024-04-17 PROCEDURE — 99205 OFFICE O/P NEW HI 60 MIN: CPT | Performed by: SURGERY

## 2024-04-17 PROCEDURE — 36415 COLL VENOUS BLD VENIPUNCTURE: CPT

## 2024-04-17 NOTE — PROGRESS NOTES
Surgical Oncology Consult Note       [facility]  [facility] [place]  [address] [id number]    Juve Duarte  1950  [id number]      Chief Complaint   Patient presents with    Consult     Ref by RBC, Bx 4/4/24, R invasive mammary carcinoma TWO SPOTS        Assessment/Plan    1. Breast cancer metastasized to axillary lymph node, right (HCC)    2. Invasive carcinoma of breast (HCC)  -     Ambulatory Referral to Surgical Oncology         Oncology History   Breast cancer metastasized to axillary lymph node, right (HCC)   4/4/2024 Biopsy    Breast, Right, US BX RT BREAST 700 7CMFN 3 PASSES 12G MARROBEL:      - Invasive mammary carcinoma of no special type (ductal NST/invasive ductal carcinoma) with apocrine features, see note      - Elsy grade 2 of 3 (total score: 6 of 9)          - Tubule formation: <10%, score 3          - Nuclear grade 2 of 3, score 2          - Mitoses < 3/mm2, (</= 7 mitoses/10HPF), score 1      - Invasive carcinoma involves 3 of 3 submitted core biopsies, max. dimension = 5 millimeters      - Ductal carcinoma in situ (DCIS): Not identified      - Microcalcifications: Absent      - Lymphovascular invasion: Present     Breast, Right, US BX RT BREAST 900 7CMFN 3 PASSES 12G MARROBEL:      - Invasive mammary carcinoma of no special type (ductal NST/invasive ductal carcinoma) with apocrine features, see note      - Elsy grade 2 of 3 (total score: 6 of 9)          - Tubule formation: <10%, score 3          - Nuclear grade 2 of 3, score 2          - Mitoses < 3/mm2, (</= 7 mitoses/10HPF), score 1      - Invasive carcinoma involves 3 of 3 submitted core biopsies, max. dimension = 14 millimeters      - Ductal carcinoma in situ (DCIS): Not identified      - Microcalcifications: Absent      - Lymphovascular invasion: Not identified    Axillary lymph node, US BX RT AXILLARY TAIL LN 10:00 12CMFN 3 PASSES 12G MAY:       - Metastatic carcinoma consistent with mammary primary,    BREAST TUMOR PROGNOSTIC PROFILE     Performed on invasive carcinoma block E1:  Test Description Result Prognostic Interpretation   Estrogen Receptor/ER  Primary Antibody: SP-1  Internal control: Positive   External control: Positive 90%  Staining Intensity: Strong  Isabela Score*: 8 Positive   Progesterone Receptor/PgR  Primary Antibody:1E2  Internal control: Positive  External control: Positive 5-7%  Staining Intensity: Strong  Isabela Score*: 4 Positive   HER2 by IHC   Primary Antibody: 4B5 2+ Equivocal *      Performed on invasive carcinoma block F2:  Test Description Result Prognostic Interpretation   Estrogen Receptor/ER  Primary Antibody: SP-1  Internal control: Positive   External control: Positive 95%  Staining Intensity: Strong  Isabela Score*: 8 Positive   Progesterone Receptor/PgR  Primary Antibody:1E2  Internal control: Positive  External control: Positive 30%  Staining Intensity: Moderate  Isabela Score*: 5 Positive   HER2 by IHC   Primary Antibody: 4B5 2+ Equivocal *        4/16/2024 Initial Diagnosis    Breast cancer metastasized to axillary lymph node, right (HCC)          This is 74-year-old female with no family history of breast cancer.  She felt a mass in her right breast and also had mammogram.  Followed by right breast biopsy x 2 as well as contralateral breast biopsy x 2 and right axillary lymph node biopsy.  Right breast both biopsies are consistent with invasive ductal carcinoma ER/DE positive HER2 negative.  Lymph node biopsy is consistent with metastatic breast cancer.  Left breast biopsy are benign.  She is a former smoker.  And retired.  She is here with her  to discuss further workup and management          Review of Systems   Constitutional:  Negative for chills and fever.   HENT:  Negative for ear pain and sore throat.    Eyes:  Negative for pain and visual disturbance.   Respiratory:  Negative for cough and shortness of  breath.    Cardiovascular:  Negative for chest pain and palpitations.   Gastrointestinal:  Negative for abdominal pain and vomiting.   Genitourinary:  Negative for dysuria and hematuria.   Musculoskeletal:  Negative for arthralgias and back pain.   Skin:  Negative for color change and rash.   Neurological:  Negative for seizures and syncope.   All other systems reviewed and are negative.       Past Medical History:      Patient Active Problem List   Diagnosis    Chronic atrophic gastritis    Dysphagia, pharyngoesophageal    Urinary incontinence    Gastroparesis    Hemorrhoids, external    Asymptomatic postmenopausal status    Essential hypertension    Pernicious anemia    Breast cancer metastasized to axillary lymph node, right (HCC)        Past Medical History:   Diagnosis Date    Colon polyp     Esophagitis     History of benign breast tumor     Hyperlipidemia     Urinary incontinence     Venous ulcer of ankle (HCC) 01/10/2022        Past Surgical History:   Procedure Laterality Date    ANKLE SURGERY Right     BREAST EXCISIONAL BIOPSY Left 1998    benign    BUNIONECTOMY Bilateral     COLONOSCOPY      CORRECTION HAMMER TOE Bilateral     MAMMO STEREOTACTIC BREAST BIOPSY LEFT (ALL INC) Left 4/4/2024    MAMMO STEREOTACTIC BREAST BIOPSY RIGHT (ALL INC) EACH ADD Right 4/4/2024    US GUIDANCE BREAST BIOPSY RIGHT EACH ADDITIONAL Right 4/4/2024    US GUIDED BREAST BIOPSY RIGHT COMPLETE Right 4/4/2024    US GUIDED BREAST LYMPH NODE BIOPSY RIGHT Right 4/4/2024    VEIN LIGATION Right 06/06/2022    Procedure: Venaseal therapy and stab phlebectomy;  Surgeon: Gosia Shepherd MD;  Location: [facility] OR;  Service: Vascular        Family History   Problem Relation Age of Onset    No Known Problems Mother     Heart disease Father     No Known Problems Daughter     No Known Problems Maternal Grandmother     No Known Problems Maternal Grandfather     No Known Problems Paternal Grandmother     No Known Problems Paternal Grandfather      Throat cancer Brother     No Known Problems Maternal Aunt     No Known Problems Maternal Aunt     No Known Problems Maternal Aunt     No Known Problems Maternal Aunt     No Known Problems Paternal Aunt     No Known Problems Paternal Aunt     No Known Problems Paternal Aunt     No Known Problems Paternal Aunt     BRCA2 Negative Neg Hx     BRCA2 Positive Neg Hx     BRCA1 Positive Neg Hx     BRCA1 Negative Neg Hx     BRCA 1/2 Neg Hx     Ovarian cancer Neg Hx     Endometrial cancer Neg Hx     Colon cancer Neg Hx     Breast cancer additional onset Neg Hx     Breast cancer Neg Hx         Social History     Socioeconomic History    Marital status: /Civil Union     Spouse name: Not on file    Number of children: Not on file    Years of education: Not on file    Highest education level: Not on file   Occupational History    Not on file   Tobacco Use    Smoking status: Former     Current packs/day: 0.00     Types: Cigarettes     Start date:      Quit date:      Years since quittin.3    Smokeless tobacco: Never   Vaping Use    Vaping status: Never Used   Substance and Sexual Activity    Alcohol use: Yes     Alcohol/week: 1.0 standard drink of alcohol     Types: 1 Standard drinks or equivalent per week     Comment: Socially    Drug use: Never    Sexual activity: Yes     Partners: Male     Birth control/protection: None   Other Topics Concern    Not on file   Social History Narrative    Not on file     Social Determinants of Health     Financial Resource Strain: Low Risk  (2023)    Overall Financial Resource Strain (CARDIA)     Difficulty of Paying Living Expenses: Not hard at all   Food Insecurity: Not on file   Transportation Needs: No Transportation Needs (2023)    PRAPARE - Transportation     Lack of Transportation (Medical): No     Lack of Transportation (Non-Medical): No   Physical Activity: Inactive (2020)    Exercise Vital Sign     Days of Exercise per Week: 0 days     Minutes of  Exercise per Session: 0 min   Stress: No Stress Concern Present (9/9/2020)    [employer] - Occupational Stress Questionnaire     Feeling of Stress : Not at all   Social Connections: Not on file   Intimate Partner Violence: Not on file   Housing Stability: Not on file        Current Outpatient Medications:     acetaminophen (TYLENOL) 325 mg tablet, Take 650 mg by mouth every 6 (six) hours as needed for mild pain, Disp: , Rfl:     Ascorbic Acid (vitamin C) 100 MG tablet, Take 100 mg by mouth daily, Disp: , Rfl:     hydroCHLOROthiazide 25 mg tablet, TAKE 1 TABLET (25 MG TOTAL) BY MOUTH DAILY., Disp: 90 tablet, Rfl: 0    Multiple Vitamins-Minerals (multivitamin with minerals) tablet, Take 1 tablet by mouth daily, Disp: , Rfl:     naproxen (NAPROSYN) 500 mg tablet, TAKE 1 TABLET BY MOUTH TWICE A DAY WITH FOOD, Disp: 30 tablet, Rfl: 0    pravastatin (PRAVACHOL) 20 mg tablet, Take 1 tablet (20 mg total) by mouth daily at bedtime, Disp: 90 tablet, Rfl: 1    Zinc 100 MG TABS, Take by mouth, Disp: , Rfl:     Current Facility-Administered Medications:     cyanocobalamin injection 1,000 mcg, 1,000 mcg, Intramuscular, Q30 Days, Lorraine Mirlande Dennis, DO, 1,000 mcg at 04/16/24 0942     Allergies   Allergen Reactions    Penicillins Other (See Comments)     Unknown reaction, positive on allergy testing       Physical Exam:     Vitals:    04/17/24 1414   BP: 148/64   Pulse: 67   SpO2: 96%     Physical Exam  Constitutional:       Appearance: Normal appearance.   HENT:      Head: Normocephalic and atraumatic.      Nose: Nose normal.      Mouth/Throat:      Mouth: Mucous membranes are moist.   Eyes:      Pupils: Pupils are equal, round, and reactive to light.   Cardiovascular:      Rate and Rhythm: Normal rate.      Pulses: Normal pulses.      Heart sounds: Normal heart sounds.   Pulmonary:      Effort: Pulmonary effort is normal.      Breath sounds: Normal breath sounds.   Chest:          Comments:  Right breast palpable masses not attached to deep structures skin bruise from recent biopsies.  Right axillary palpable lymph node.  Right supraclavicular examination no palpable adenopathy.  Left breast no palpable mass masses nipple discharge nipple retraction or skin changes.  Left axillary and supraclavicular examination no palpable adenopathy.  Patient was examined seated as well as supine position.  Abdominal:      General: Bowel sounds are normal.      Palpations: Abdomen is soft.   Musculoskeletal:         General: Normal range of motion.      Cervical back: Normal range of motion and neck supple.   Skin:     General: Skin is warm.   Neurological:      General: No focal deficit present.      Mental Status: She is alert and oriented to person, place, and time.   Psychiatric:         Mood and Affect: Mood normal.         Behavior: Behavior normal.         Thought Content: Thought content normal.         Judgment: Judgment normal.         Results:   DIAGNOSIS: Abnormal mammogram      TECHNIQUE:   Digital diagnostic mammography was performed. Computer Aided Detection (CAD) analyzed all applicable images.  Right breast ultrasound was performed.      COMPARISONS: Prior breast imaging dated: 10/30/2015 and 10/15/2015     RELEVANT HISTORY:   Family Breast Cancer History: History of breast cancer in Neg Hx.  Family Medical History: No known relevant family medical history.   Personal History: Hormone history includes birth control. Surgical history includes breast biopsy. No known relevant medical history.     RISK ASSESSMENT:   5 Year Tyrer-Cuzick: 0.7%  10 Year Tyrer-Cuzick: 1.49%  Lifetime Tyrer-Cuzick: 1.65%     TISSUE DENSITY:   There are scattered areas of fibroglandular density.     INDICATION: Juve Duarte is a 74 y.o. female presenting for abnormal mammogram.     FINDINGS:   LEFT  1) CALCIFICATIONS [D]  Mammo diagnostic bilateral w 3d & cad: There are punctate calcifications in a grouped distribution seen in  the left breast at 2 o'clock.      RIGHT  2) MASS [A]  Mammo diagnostic bilateral w 3d & cad: There is an oval mass with spiculated margins seen in the upper outer quadrant of the right breast.   US breast right limited (diagnostic): There is a 24 mm x 21 mm x 23 mm oval, hypoechoic mass with indistinct margins seen in the right breast at 9 o'clock, 7 cm from the nipple.   3) MASS [B]  Mammo diagnostic bilateral w 3d & cad: There is a round mass with indistinct margins seen in the lower central region of the right breast.   US breast right limited (diagnostic): There is a 6 mm x 6 mm x 5 mm oval, hypoechoic mass with indistinct margins seen in the right breast at 7 o'clock, 7 cm from the nipple.   4) CALCIFICATIONS [C]  Mammo diagnostic bilateral w 3d & cad: There are coarse heterogeneous calcifications in a segmental distribution seen in the right breast at 8 o'clock.   US breast right limited (diagnostic): There are no corresponding calcifications seen on this modality.   5) LYMPH NODE [E]  US breast right limited (diagnostic): Abnormal appearing low axillary lymph node is noted in the 10 o'clock position right breast, 12 cm from the nipple.  Additional abnormal appearing level 2 axillary node is noted which is not amenable to ultrasound-guided core needle biopsy.        IMPRESSION:  Suspicious masses and calcifications in the right breast and abnormal appearing lymph node in the low axillary region of the right breast.  Ultrasound-guided core needle biopsies are recommended.  Suspicious calcifications in the 8 o'clock position of the right breast.  Right stereotactic guided core needle biopsy is recommended.     Calcifications in the left breast are indeterminate.  Left stereotactic guided core needle biopsy is recommended.     The findings and recommendations were discussed with the patient at the time of this exam.           ASSESSMENT/BI-RADS CATEGORY:  Left: 4 - Suspicious  Right: 5 - Highly Suggestive of  Malignancy  Overall: 5 - Highly Suggestive of Malignancy     RECOMMENDATION:       - Ultrasound-guided breast biopsy for the right breast.       - Stereotactic breast biopsy for both breasts.    Performed on invasive carcinoma block E1:  Test Description Result Prognostic Interpretation   Estrogen Receptor/ER  Primary Antibody: SP-1  Internal control: Positive   External control: Positive 90%  Staining Intensity: Strong  Isabela Score*: 8 Positive   Progesterone Receptor/PgR  Primary Antibody:1E2  Internal control: Positive  External control: Positive 5-7%  Staining Intensity: Strong  Isabela Score*: 4 Positive   HER2 by IHC   Primary Antibody: 4B5 2+ Equivocal *      Performed on invasive carcinoma block F2:  Test Description Result Prognostic Interpretation   Estrogen Receptor/ER  Primary Antibody: SP-1  Internal control: Positive   External control: Positive 95%  Staining Intensity: Strong  Isabela Score*: 8 Positive   Progesterone Receptor/PgR  Primary Antibody:1E2  Internal control: Positive  External control: Positive 30%  Staining Intensity: Moderate  Isabela Score*: 5 Positive   HER2 by IHC   Primary Antibody: 4B5 2+ Equivocal *   HER2 negative by FISH.    A. Breast, Left, Stereo bx left breast 2 o'clock, 2 cores with calcs, 8G 12cm:      - Benign breast parenchyma with usual ductal hyperplasia       - Microcalcification's present in benign epithelium      - Negative for atypia or carcinoma     B. Breast, Left, Stereo bx left breast 2 o'clock, 2 cores without calcs, 8G 12cm:      - Benign breast parenchyma with usual ductal hyperplasia and fibroadenomatoid change      - Microcalcification's present in benign epithelium      - Negative for atypia or carcinoma      C. Breast, Right, Stereo bx right breast 8 o'clock, 4 cores with calcs, 8g 12cm:      - Benign breast parenchyma with usual ductal hyperplasia and fibroadenomatoid change      - Microcalcification's present in benign epithelium, within stroma, and  involving ducts      - Negative for atypia or carcinoma     D. Breast, Right, US BX RT BREAST 700 7CMFN 3 PASSES 12G MARQUEE:      - Invasive mammary carcinoma of no special type (ductal NST/invasive ductal carcinoma) with apocrine features, see note      - [place] grade 2 of 3 (total score: 6 of 9)          - Tubule formation: <10%, score 3          - Nuclear grade 2 of 3, score 2          - Mitoses < 3/mm2, (</= 7 mitoses/10HPF), score 1      - Invasive carcinoma involves 3 of 3 submitted core biopsies, max. dimension = 5 millimeters      - Ductal carcinoma in situ (DCIS): Not identified      - Microcalcifications: Absent      - Lymphovascular invasion: Present       - See synoptic report for part D     E. Breast, Right, US BX RT BREAST 900 7CMFN 3 PASSES 12G MARQUEE:      - Invasive mammary carcinoma of no special type (ductal NST/invasive ductal carcinoma) with apocrine features, see note      - Elsy grade 2 of 3 (total score: 6 of 9)          - Tubule formation: <10%, score 3          - Nuclear grade 2 of 3, score 2          - Mitoses < 3/mm2, (</= 7 mitoses/10HPF), score 1      - Invasive carcinoma involves 3 of 3 submitted core biopsies, max. dimension = 14 millimeters      - Ductal carcinoma in situ (DCIS): Not identified      - Microcalcifications: Absent      - Lymphovascular invasion: Not identified      - See synoptic report for part E     F. Axillary lymph node, US BX RT AXILLARY TAIL LN 10:00 12CMFN 3 PASSES 12G MARQUEE:      - Metastatic carcinoma consistent with mammary primary,          Discussion/Summary:   Here we have a very pleasant 74-year-old female with multifocal right breast cancer ER/NY positive HER2 negative metastasis to lymph node.  We did review and discussed breast cancer initiation promotion progression further workup and management.  She has already in the process of getting process of getting genetic testing.  Given her lymph node metastasis we would like to obtain  additional staging workup to rule out any distant disease.  We will also obtain medical oncology consultation.  She is leaning towards bilateral mastectomy with reconstruction.  Given she has a palpable adenopathy in the right axilla I would recommend her to undergo targeted right axillary dissection.  We will also refer her to plastic surgery for reconstruction options for obstruction to discuss I did discuss disease status cancer treatment plan and cancer treatment goals with the patient as well as her  in detail.  I did spend more than 60 minutes reviewing all the films pathology results patient examination and consultation.  Patient and her  had several questions and I answered all of them to their satisfaction.    Advance Care Planning/Advance Directives:  I Jevon Valdez MD discussed the disease status, and treatment plans with Juve Duarte today 04/17/24 and will follow-up with the patient.

## 2024-04-18 ENCOUNTER — HOSPITAL ENCOUNTER (OUTPATIENT)
Dept: CT IMAGING | Facility: HOSPITAL | Age: 74
Discharge: HOME | End: 2024-04-18
Attending: SURGERY
Payer: MEDICARE

## 2024-04-18 ENCOUNTER — TELEPHONE (OUTPATIENT)
Dept: HEMATOLOGY ONCOLOGY | Facility: CLINIC | Age: 74
End: 2024-04-18

## 2024-04-18 ENCOUNTER — DOCUMENTATION (OUTPATIENT)
Dept: HEMATOLOGY ONCOLOGY | Facility: CLINIC | Age: 74
End: 2024-04-18

## 2024-04-18 DIAGNOSIS — C50.911 BREAST CANCER METASTASIZED TO AXILLARY LYMPH NODE, RIGHT (HCC): Primary | ICD-10-CM

## 2024-04-18 DIAGNOSIS — C50.919 INVASIVE CARCINOMA OF BREAST (HCC): ICD-10-CM

## 2024-04-18 DIAGNOSIS — C77.3 BREAST CANCER METASTASIZED TO AXILLARY LYMPH NODE, RIGHT (HCC): ICD-10-CM

## 2024-04-18 DIAGNOSIS — C50.911 BREAST CANCER METASTASIZED TO AXILLARY LYMPH NODE, RIGHT (HCC): ICD-10-CM

## 2024-04-18 DIAGNOSIS — C77.3 BREAST CANCER METASTASIZED TO AXILLARY LYMPH NODE, RIGHT (HCC): Primary | ICD-10-CM

## 2024-04-18 PROCEDURE — 74177 CT ABD & PELVIS W/CONTRAST: CPT

## 2024-04-18 PROCEDURE — 71260 CT THORAX DX C+: CPT

## 2024-04-18 RX ADMIN — IOHEXOL 100 ML: 350 INJECTION, SOLUTION INTRAVENOUS at 09:53

## 2024-04-18 NOTE — TELEPHONE ENCOUNTER
Call Transfer   Who are you speaking with?  [facility]   If it is not the patient, are they listed on an active communication consent form? N/A   Who is the patients HemOnc/SurgOnc provider? Dr. Valdez   What is the reason for this call? [facility] calling to speak with Laura Phillips in regards to patient's CT scan done today.    Person/Department that the call was transferred to?    Time that call was transferred?    Laura Phillips 12:05pm   Your call will be transferred now. If you receive a voicemail, please leave a detailed message and a member of the team will return your call as soon as possible.    Did you relay this information to the caller?  Yes

## 2024-04-19 ENCOUNTER — PATIENT OUTREACH (OUTPATIENT)
Dept: HEMATOLOGY ONCOLOGY | Facility: CLINIC | Age: 74
End: 2024-04-19

## 2024-04-19 NOTE — PROGRESS NOTES
Breast Oncology Nurse Navigator    Called patient to check in after recent consultation with Dr Valdez.  Reviewed upcoming appointments related to her breast cancer diagnosis.  Reviewed directions for bone scan on 4/23/24.  Patient states she is seeing Dr Valdez for a follow-up visit on 5/1/24, but her PET CT is scheduled for 5/2/24.  Changed follow-up with Dr Valdez to 5/6/24.  Patient in agreement.  Knows date, time and location of the appointment.      Patient has my contact information and knows to reach out with any further questions.

## 2024-04-22 NOTE — PROGRESS NOTES
Patient ID: Kenroy Barcenas is a 70 y o  female Date of Birth 1950       Chief Complaint   Patient presents with    New Patient Visit     right ankle ulcer       Allergies  Penicillins    Diagnosis:  1  Venous stasis ulcer of right ankle with fat layer exposed with varicose veins (HCC)  -     lidocaine (XYLOCAINE) 4 % topical solution 5 mL  -     Wound cleansing and dressings; Future  -     Debridement    2  Leg edema, right        Diagnosis ICD-10-CM Associated Orders   1  Venous stasis ulcer of right ankle with fat layer exposed with varicose veins (HCC)  I83 [id number] lidocaine (XYLOCAINE) 4 % topical solution 5 mL    L97 [id number] Wound cleansing and dressings     Debridement   2  Leg edema, right  R60 0           Assessment & Plan:  1  Venous stasis right ankle with varicose veins  This wound is acute (first evaluation) and chronic (present 6 months)  Wound tissue culture obtained at the time of debridement  Local wound care discussed  Explained how the scar tissue likely has an impact on the presence of the wound  2   Leg edema  Elevate right leg while seated  Wear ACE wrap ankle to knee for compression  Ordered venous duplex US to evaluate for reflux and DVT  Subjective: The patient reports an ulcer right ankle for 6 months  She denies any trauma  It was a scab at first which eventually fell off and the ulcer was underneath  She did see a podiatrist who treated the ulcer with oral antibiotics when it was red and with topical Menuca honey  She also tried peroxide on her own and topical antibiotic ointment like neosporin  She notes two prior injuries to the area including a fracture which was fixed surgically and a laceration which required sutures        The following portions of the patient's history were reviewed and updated as appropriate:   Patient Active Problem List   Diagnosis    Chronic atrophic gastritis    Dysphagia, pharyngoesophageal    Urinary incontinence    Gastroparesis    Hemorrhoids, external    Vitamin B12 deficiency    Asymptomatic postmenopausal status    Colon cancer screening    Encounter for screening for malignant neoplasm of breast    Essential hypertension    Venous ulcer of ankle (HCC)     Past Medical History:   Diagnosis Date    Colon polyp     Esophagitis     History of benign breast tumor     Urinary incontinence      Past Surgical History:   Procedure Laterality Date    ANKLE SURGERY Right     BREAST LUMPECTOMY      BUNIONECTOMY Bilateral     CORRECTION HAMMER TOE Bilateral      Social History     Socioeconomic History    Marital status: /Civil Union     Spouse name: None    Number of children: None    Years of education: None    Highest education level: None   Occupational History    None   Tobacco Use    Smoking status: Former Smoker     Quit date:      Years since quittin 0    Smokeless tobacco: Never Used   Vaping Use    Vaping Use: Never used   Substance and Sexual Activity    Alcohol use:  Yes     Alcohol/week: 3 0 standard drinks     Types: 3 Standard drinks or equivalent per week     Comment: Social    Drug use: Never    Sexual activity: Yes     Partners: Male   Other Topics Concern    None   Social History Narrative    None     Social Determinants of Health     Financial Resource Strain: Not on file   Food Insecurity: Not on file   Transportation Needs: Not on file   Physical Activity: Not on file   Stress: Not on file   Social Connections: Not on file   Intimate Partner Violence: Not on file   Housing Stability: Not on file        Current Outpatient Medications:     Ascorbic Acid (vitamin C) 100 MG tablet, Take 100 mg by mouth daily, Disp: , Rfl:     Multiple Vitamins-Minerals (multivitamin with minerals) tablet, Take 1 tablet by mouth daily, Disp: , Rfl:     pravastatin (PRAVACHOL) 20 mg tablet, Take 1 tablet (20 mg total) by mouth daily at bedtime, Disp: 30 tablet, Rfl: 5    Zinc 100 MG TABS, Take by mouth, Disp: , Rfl:   No current facility-administered medications for this visit  Family History   Problem Relation Age of Onset    Heart disease Father       Review of Systems   Constitutional: Negative for chills and fever  HENT: Negative for congestion  Eyes: Negative for visual disturbance  Respiratory: Negative for cough and shortness of breath  Cardiovascular: Positive for leg swelling  Gastrointestinal: Negative for nausea  Musculoskeletal: Positive for gait problem  Skin: Positive for wound  Allergic/Immunologic: Negative for immunocompromised state  Neurological: Positive for numbness  Objective:  Temp 97 6 °F (36 4 °C)   Resp 18   Ht 5' 9" (1 753 m)   Wt 70 8 kg (156 lb)   BMI 23 04 kg/m²     Physical Exam  Cardiovascular:      Pulses:           Dorsalis pedis pulses are 2+ on the right side and 2+ on the left side  Posterior tibial pulses are detected w/ Doppler on the right side and detected w/ Doppler on the left side  Musculoskeletal:      Right foot: Decreased range of motion  Deformity and prominent metatarsal heads present  Left foot: Decreased range of motion  Deformity and prominent metatarsal heads present  Feet:      Right foot:      Skin integrity: Callus present  Left foot:      Skin integrity: Callus present  Comments: Very stiff ROM of toes 1-5 bilateral   Pain with ROM and palpation along with paresthesias of the toes  Scar tissue inferior to the medial ankle ulcer  Varicose veins bilateral   1+ edema of the right lower leg  Neurological:      Mental Status: She is alert               Wound 01/13/22 Venous Ulcer Pretibial Distal;Right (Active)   Wound Image   01/13/22 1423   Wound Description Yellow 01/13/22 1406   Julia-wound Assessment Pink;Fragile 01/13/22 1406   Wound Length (cm) 0 7 cm 01/13/22 1406   Wound Width (cm) 0 6 cm 01/13/22 1406   Wound Depth (cm) 0 2 cm 01/13/22 1406   Wound Surface Area (cm^2) 0 42 cm^2 01/13/22 1406 Wound Volume (cm^3) 0 084 cm^3 01/13/22 1406   Calculated Wound Volume (cm^3) 0 08 cm^3 01/13/22 1406   Drainage Amount Moderate 01/13/22 1406   Drainage Description Serosanguineous 01/13/22 1406   Non-staged Wound Description Full thickness 01/13/22 1406   Dressing Status Intact 01/13/22 1406                             Debridement   Universal Protocol:  Consent: Verbal consent obtained  Consent given by: patient  Patient understanding: patient states understanding of the procedure being performed  Patient identity confirmed: verbally with patient      Debridement type: surgical  Level of debridement: subcutaneous tissue  Pain control: lidocaine 1%  Pre-debridement measurements  Length (cm): 0 7  Width (cm): 0 6  Depth (cm): 0 2  Surface Area (cm^2): 0 42  Volume (cm^3): 0 08    Post-debridement measurements  Length (cm): 0 8  Width (cm): 0 6  Depth (cm): 0 3  Percent debrided: 100%  Surface Area (cm^2): 0 48  Area debrided (cm^2): 0 48  Volume (cm^3): 0 14  Tissue and other material debrided: subcutaneous tissue  Devitalized tissue debrided: slough  Instrument(s) utilized: curette  Bleeding: small  Hemostasis obtained with: pressure  Procedural pain (0-10): 5  Post-procedural pain: 1   Response to treatment: procedure was tolerated well                 Wound Instructions:  Orders Placed This Encounter   Procedures    Wound cleansing and dressings     Right ankle    Wash your hands with soap and water  Remove old dressing, discard into plastic bag and place in trash  Cleanse the wound with mild soap and water or nss prior to applying a clean dressing  Do not use tissue or cotton balls  Do not scrub the wound  Pat dry using gauze  Shower yes but  do not get wet in the shower      Apply silver alginate to the  wound    Cover with gauze  Secure with drew and tape  Change dressing three times a week  Wrap with ace wrap for light compression        MD ordered vascular studies for your legs today    Tissue [No Acute Distress] : no acute distress culture taken today at wound center    Elevate your legs as much as possible     Standing Status:   Future     Standing Expiration Date:   1/13/2023    Debridement     This order was created via procedure documentation         Kia Toussaint DPM    Portions of the record may have been created with voice recognition software  Occasional wrong word or "sound a like" substitutions may have occurred due to the inherent limitations of voice recognition software  Read the chart carefully and recognize, using context, where substitutions have occurred  [Normal Oropharynx] : normal oropharynx [Normal Appearance] : normal appearance [No Neck Mass] : no neck mass [Normal Rate/Rhythm] : normal rate/rhythm [Normal S1, S2] : normal s1, s2 [No Murmurs] : no murmurs [No Resp Distress] : no resp distress [Clear to Auscultation Bilaterally] : clear to auscultation bilaterally [No Abnormalities] : no abnormalities [Benign] : benign [Normal Gait] : normal gait [No Clubbing] : no clubbing [No Cyanosis] : no cyanosis [No Edema] : no edema [FROM] : FROM [Normal Color/ Pigmentation] : normal color/ pigmentation [No Focal Deficits] : no focal deficits [Oriented x3] : oriented x3 [Normal Affect] : normal affect

## 2024-04-23 ENCOUNTER — HOSPITAL ENCOUNTER (OUTPATIENT)
Dept: NUCLEAR MEDICINE | Facility: HOSPITAL | Age: 74
Discharge: HOME | End: 2024-04-23
Attending: SURGERY
Payer: MEDICARE

## 2024-04-23 DIAGNOSIS — C50.911 BREAST CANCER METASTASIZED TO AXILLARY LYMPH NODE, RIGHT (HCC): ICD-10-CM

## 2024-04-23 DIAGNOSIS — C77.3 BREAST CANCER METASTASIZED TO AXILLARY LYMPH NODE, RIGHT (HCC): ICD-10-CM

## 2024-04-23 DIAGNOSIS — C50.919 INVASIVE CARCINOMA OF BREAST (HCC): ICD-10-CM

## 2024-04-23 PROCEDURE — A9503 TC99M MEDRONATE: HCPCS

## 2024-04-23 PROCEDURE — 78306 BONE IMAGING WHOLE BODY: CPT

## 2024-04-24 ENCOUNTER — OFFICE VISIT (OUTPATIENT)
Dept: PLASTIC SURGERY | Facility: CLINIC | Age: 74
End: 2024-04-24
Payer: MEDICARE

## 2024-04-24 VITALS
HEART RATE: 77 BPM | BODY MASS INDEX: 23.86 KG/M2 | RESPIRATION RATE: 20 BRPM | TEMPERATURE: 97.9 F | WEIGHT: 152 LBS | OXYGEN SATURATION: 97 % | HEIGHT: 67 IN | SYSTOLIC BLOOD PRESSURE: 192 MMHG | DIASTOLIC BLOOD PRESSURE: 72 MMHG

## 2024-04-24 DIAGNOSIS — C77.3 BREAST CANCER METASTASIZED TO AXILLARY LYMPH NODE, RIGHT (HCC): Primary | ICD-10-CM

## 2024-04-24 DIAGNOSIS — C50.911 BREAST CANCER METASTASIZED TO AXILLARY LYMPH NODE, RIGHT (HCC): Primary | ICD-10-CM

## 2024-04-24 PROCEDURE — 99204 OFFICE O/P NEW MOD 45 MIN: CPT | Performed by: PLASTIC SURGERY

## 2024-04-24 NOTE — PROGRESS NOTES
Assessment/Plan   74-year-old female here today to discuss breast reconstruction  1.)  Patient would be a very good candidate for implant versus autologous reconstruction, although given her age and patient desire she is more leaning towards implant-based reconstruction  2.)  I discussed with the patient that the first step for breast reconstruction would be for tissue expander placement at time of mastectomy  3.)  All her questions were answered to her satisfaction  4.)  We will coordinate with surgical oncology and be available for her reconstructive efforts as needed.    Discussion-- Discussion--discussed with patient her options for reconstruction, discussed with the patient the options for autologous versus implant based reconstruction.  I discussed with her that breast reconstruction is typically performed in stages with the 1st step being placement of a tissue expander at the time of mastectomy.  I discussed with her the risks, benefits, alternatives of tissue expander placement including the risk of bleeding, infection, scarring, poor wound healing, demonstrating underlying structures, need for further surgery, need for multiple procedures, mastectomy flap necrosis, partial mastectomy flap necrosis, the off-label use of acellular dermal matrix, the risk of breast implant associated anaplastic large cell lymphoma, the need for the expansion process, the risk of seroma, the risk of DVT, risk of asymmetry, the need for multiple procedures, the need for revision, poor aesthetic result, asymmetry.  I discussed with her the use of drains, the use of luzmaria 90 minutes negative pressure wound therapy dressings, the expected recovery time, the expected hospital stay, I discussed with her the reconstructive expansion process.  I discussed with her that at the completion of expansion we would need at least 3 months after the completion of expansion no post mastectomy treatment is needed.  I discussed with her the need  for chemotherapy radiation with push that time when back.  I discussed with her the next step for reconstruction options include the use of implant based reconstruction, I discussed with her the risks, benefits, alternatives exchange procedure, the implants do not typically fair as well with radiation, I discussed with her the use of silicone implants, the risk of leak, the clinical significance of implant leak, I discussed with her the screening for MRI recommendations.  I discussed with her the option of autologous based reconstruction.  I discussed with her the nature of tram flap surgery versus JAIDEN flap surgery, and discussion of complex nature of micro surgery, the risks, benefits and alternate is of abdominally based autologous reconstruction including the above with the additional risk of micro surgical failure, flap failure, need for ICU stay, need for multiple procedures, need for revision, we discussed the option of nipple reconstruction.  All the patient's questions were answered to her satisfaction.  The patient had several questions regarding her age and appropriateness for reconstruction, I did discuss with her that age is not necessarily a factor if she is otherwise in good health, which she appears to be.  The patient seemed less interested in autologous based reconstruction and more interested in implant-based reconstruction,, I did discuss with her that regardless of the final result the process would start with tissue expander placement at time of mastectomy, although that I would agree that she is likely a better candidate for implant-based reconstruction.    Counseling dominated visit, total counseling time 35 minutes, total visit time 45 minutes including documentation, review of her chart and coordination of care      Subjective   Patient ID: Juve Duarte is a 74 y.o. female.    Vitals:    04/24/24 1440   BP: (!) 192/72   Pulse: 77   Resp: 20   Temp: 97.9 °F (36.6 °C)   SpO2: 97%      HPI    Patient is a 74-year-old female who is here today to discuss breast reconstruction.  The patient felt a lump in her right breast which has led her to the diagnosis of breast cancer, she is considering bilateral mastectomy, she does not appear to have a strong family history of breast cancer, she has a history of hypertension which she states is well-controlled, she is a non-smoker, she does not take steroids or blood thinners.  She has never had any major intra-abdominal surgery, she has no history of stroke or DVT personally or in her family.  She had previously been up-to-date with her mammograms.      The following portions of the patient's history were reviewed and updated as appropriate: allergies, current medications, past family history, past medical history, past social history, past surgical history, and problem list.    Review of Systems    Objective   Physical Exam   Constitutional  She appears well-developed and well-nourished.     Eyes  Pupils are equal, round, and reactive to light. System normal.         Skin  Skin is warm.     Psychiatric  She has a normal mood and affect. Her behavior is normal. Judgment and thought content normal.     Bilateral breast--grade 1 ptosis, palpable mass in the outer quadrant of the right breast, no adenopathy appreciated.  Measurements- R- SN 24 NIMF 7.5 BW 13                             L- SN 23 NIMF7 BW 14 NN 24        Past Medical History:   Diagnosis Date    Colon polyp     Esophagitis     History of benign breast tumor     Hyperlipidemia     Urinary incontinence     Venous ulcer of ankle (HCC) 01/10/2022     Past Surgical History:   Procedure Laterality Date    ANKLE SURGERY Right     BREAST EXCISIONAL BIOPSY Left 1998    benign    BUNIONECTOMY Bilateral     COLONOSCOPY      CORRECTION HAMMER TOE Bilateral     MAMMO STEREOTACTIC BREAST BIOPSY LEFT (ALL INC) Left 4/4/2024    MAMMO STEREOTACTIC BREAST BIOPSY RIGHT (ALL INC) EACH ADD Right 4/4/2024      GUIDANCE BREAST BIOPSY RIGHT EACH ADDITIONAL Right 2024    US GUIDED BREAST BIOPSY RIGHT COMPLETE Right 2024    US GUIDED BREAST LYMPH NODE BIOPSY RIGHT Right 2024    VEIN LIGATION Right 2022    Procedure: Venaseal therapy and stab phlebectomy;  Surgeon: Gosia Shepherd MD;  Location: [facility];  Service: Vascular     Current Outpatient Medications   Medication Instructions    acetaminophen (TYLENOL) 650 mg, Oral, Every 6 hours PRN    hydroCHLOROthiazide 25 mg, Oral, Daily    Multiple Vitamins-Minerals (multivitamin with minerals) tablet 1 tablet, Oral, Daily    naproxen (NAPROSYN) 500 mg, Oral, 2 times daily with meals    pravastatin (PRAVACHOL) 20 mg, Oral, Daily at bedtime,       vitamin C 100 mg, Oral, Daily    Zinc 100 MG TABS Oral       Social History     Social History Narrative    Not on file     Social History     Tobacco Use   Smoking Status Former    Current packs/day: 0.00    Types: Cigarettes    Start date:     Quit date:     Years since quittin.3   Smokeless Tobacco Never

## 2024-04-29 ENCOUNTER — CONSULT (OUTPATIENT)
Dept: HEMATOLOGY ONCOLOGY | Facility: CLINIC | Age: 74
End: 2024-04-29
Payer: MEDICARE

## 2024-04-29 ENCOUNTER — TELEPHONE (OUTPATIENT)
Dept: GENETICS | Facility: CLINIC | Age: 74
End: 2024-04-29

## 2024-04-29 VITALS
HEIGHT: 67 IN | DIASTOLIC BLOOD PRESSURE: 74 MMHG | WEIGHT: 148 LBS | OXYGEN SATURATION: 96 % | SYSTOLIC BLOOD PRESSURE: 138 MMHG | RESPIRATION RATE: 17 BRPM | HEART RATE: 61 BPM | TEMPERATURE: 98 F | BODY MASS INDEX: 23.23 KG/M2

## 2024-04-29 DIAGNOSIS — K31.89 GASTRIC NODULE: ICD-10-CM

## 2024-04-29 DIAGNOSIS — C50.911 BREAST CANCER METASTASIZED TO AXILLARY LYMPH NODE, RIGHT (HCC): Primary | ICD-10-CM

## 2024-04-29 DIAGNOSIS — C77.3 BREAST CANCER METASTASIZED TO AXILLARY LYMPH NODE, RIGHT (HCC): Primary | ICD-10-CM

## 2024-04-29 DIAGNOSIS — C50.919 INVASIVE CARCINOMA OF BREAST (HCC): ICD-10-CM

## 2024-04-29 PROCEDURE — 99205 OFFICE O/P NEW HI 60 MIN: CPT | Performed by: INTERNAL MEDICINE

## 2024-04-29 NOTE — PROGRESS NOTES
Juve Duarte  1950  [facility] [place]  [address] [address] [phone]    Chief Complaint   Patient presents with    Follow-up         Oncology History   Breast cancer metastasized to axillary lymph node, right (HCC)   4/4/2024 Biopsy    Breast, Right, US BX RT BREAST 700 7CMFN 3 PASSES 12G MARQUEE:      - Invasive mammary carcinoma of no special type (ductal NST/invasive ductal carcinoma) with apocrine features, see note      - Creola grade 2 of 3 (total score: 6 of 9)          - Tubule formation: <10%, score 3          - Nuclear grade 2 of 3, score 2          - Mitoses < 3/mm2, (</= 7 mitoses/10HPF), score 1      - Invasive carcinoma involves 3 of 3 submitted core biopsies, max. dimension = 5 millimeters      - Ductal carcinoma in situ (DCIS): Not identified      - Microcalcifications: Absent      - Lymphovascular invasion: Present     Breast, Right, US BX RT BREAST 900 7CMFN 3 PASSES 12G MARQUEE:      - Invasive mammary carcinoma of no special type (ductal NST/invasive ductal carcinoma) with apocrine features, see note      - Elsy grade 2 of 3 (total score: 6 of 9)          - Tubule formation: <10%, score 3          - Nuclear grade 2 of 3, score 2          - Mitoses < 3/mm2, (</= 7 mitoses/10HPF), score 1      - Invasive carcinoma involves 3 of 3 submitted core biopsies, max. dimension = 14 millimeters      - Ductal carcinoma in situ (DCIS): Not identified      - Microcalcifications: Absent      - Lymphovascular invasion: Not identified    Axillary lymph node, US BX RT AXILLARY TAIL LN 10:00 12CMFN 3 PASSES 12G MARQU[place]:      - Metastatic carcinoma consistent with mammary primary,    BREAST TUMOR PROGNOSTIC PROFILE     Performed on invasive carcinoma block E1:  Test Description Result Prognostic Interpretation   Estrogen Receptor/ER  Primary Antibody: SP-1  Internal control: Positive   External control: Positive  90%  Staining Intensity: Strong  Isabela Score*: 8 Positive   Progesterone Receptor/PgR  Primary Antibody:1E2  Internal control: Positive  External control: Positive 5-7%  Staining Intensity: Strong  Isabela Score*: 4 Positive   HER2 by IHC   Primary Antibody: 4B5 2+ Equivocal *      Performed on invasive carcinoma block F2:  Test Description Result Prognostic Interpretation   Estrogen Receptor/ER  Primary Antibody: SP-1  Internal control: Positive   External control: Positive 95%  Staining Intensity: Strong  Isabela Score*: 8 Positive   Progesterone Receptor/PgR  Primary Antibody:1E2  Internal control: Positive  External control: Positive 30%  Staining Intensity: Moderate  Isabela Score*: 5 Positive   HER2 by IHC   Primary Antibody: 4B5 2+ Equivocal *        4/16/2024 Initial Diagnosis    Breast cancer metastasized to axillary lymph node, right (HCC)         History of Present Illness:  April 4, 2024 patient had biopsy of right breast mass showing invasive ductal carcinoma.  Right axillary node showed metastatic carcinoma consistent with breast primary.  ER 90%, MT 5-7%, HER2 +2.  FISH negative.  Similar findings in the axillary node although MT was 30%.  HER2 +2, FISH negative.  April 18, 2024 patient had CT chest ab pelvis showing soft tissue nodules right breast.  Right infrahilar and internal mammary nodes indeterminate.  Right axillary nodes up to 0.5 cm.  2 enhancing soft tissue nodules in the gastric fundus.  Bone scan showed focus of activity at anterior right seventh rib.    Review of Systems   Constitutional:  Negative for appetite change, diaphoresis, fatigue and fever.   HENT:  Negative for sinus pain.    Eyes:  Negative for discharge.   Respiratory:  Negative for cough and shortness of breath.    Cardiovascular:  Negative for chest pain.   Gastrointestinal:  Negative for abdominal pain, constipation and diarrhea.   Endocrine: Negative for cold intolerance.   Genitourinary:  Negative for difficulty  urinating and hematuria.   Musculoskeletal:  Negative for joint swelling.   Skin:  Negative for rash.   Allergic/Immunologic: Negative for environmental allergies.   Neurological:  Negative for dizziness and headaches.   Hematological:  Negative for adenopathy.   Psychiatric/Behavioral:  Negative for agitation.        Patient Active Problem List   Diagnosis    Chronic atrophic gastritis    Dysphagia, pharyngoesophageal    Urinary incontinence    Gastroparesis    Hemorrhoids, external    Asymptomatic postmenopausal status    Essential hypertension    Pernicious anemia    Breast cancer metastasized to axillary lymph node, right (HCC)     Past Medical History:   Diagnosis Date    Colon polyp     Esophagitis     History of benign breast tumor     Hyperlipidemia     Urinary incontinence     Venous ulcer of ankle (HCC) 01/10/2022     Past Surgical History:   Procedure Laterality Date    ANKLE SURGERY Right     BREAST EXCISIONAL BIOPSY Left 1998    benign    BUNIONECTOMY Bilateral     COLONOSCOPY      CORRECTION HAMMER TOE Bilateral     MAMMO STEREOTACTIC BREAST BIOPSY LEFT (ALL INC) Left 4/4/2024    MAMMO STEREOTACTIC BREAST BIOPSY RIGHT (ALL INC) EACH ADD Right 4/4/2024    US GUIDANCE BREAST BIOPSY RIGHT EACH ADDITIONAL Right 4/4/2024    US GUIDED BREAST BIOPSY RIGHT COMPLETE Right 4/4/2024    US GUIDED BREAST LYMPH NODE BIOPSY RIGHT Right 4/4/2024    VEIN LIGATION Right 06/06/2022    Procedure: Venaseal therapy and stab phlebectomy;  Surgeon: Gosia Shepherd MD;  Location: [facility];  Service: Vascular     Family History   Problem Relation Age of Onset    No Known Problems Mother     Heart disease Father     No Known Problems Daughter     No Known Problems Maternal Grandmother     No Known Problems Maternal Grandfather     No Known Problems Paternal Grandmother     No Known Problems Paternal Grandfather     Throat cancer Brother     No Known Problems Maternal Aunt     No Known Problems Maternal Aunt     No Known  Problems Maternal Aunt     No Known Problems Maternal Aunt     No Known Problems Paternal Aunt     No Known Problems Paternal Aunt     No Known Problems Paternal Aunt     No Known Problems Paternal Aunt     BRCA2 Negative Neg Hx     BRCA2 Positive Neg Hx     BRCA1 Positive Neg Hx     BRCA1 Negative Neg Hx     BRCA 1/2 Neg Hx     Ovarian cancer Neg Hx     Endometrial cancer Neg Hx     Colon cancer Neg Hx     Breast cancer additional onset Neg Hx     Breast cancer Neg Hx      Social History     Socioeconomic History    Marital status: /Civil Union     Spouse name: Not on file    Number of children: Not on file    Years of education: Not on file    Highest education level: Not on file   Occupational History    Not on file   Tobacco Use    Smoking status: Former     Current packs/day: 0.00     Types: Cigarettes     Start date:      Quit date:      Years since quittin.3    Smokeless tobacco: Never   Vaping Use    Vaping status: Never Used   Substance and Sexual Activity    Alcohol use: Yes     Alcohol/week: 1.0 standard drink of alcohol     Types: 1 Standard drinks or equivalent per week     Comment: Socially    Drug use: Never    Sexual activity: Yes     Partners: Male     Birth control/protection: None   Other Topics Concern    Not on file   Social History Narrative    Not on file     Social Determinants of Health     Financial Resource Strain: Low Risk  (2023)    Overall Financial Resource Strain (CARDIA)     Difficulty of Paying Living Expenses: Not hard at all   Food Insecurity: Not on file   Transportation Needs: No Transportation Needs (2023)    PRAPARE - Transportation     Lack of Transportation (Medical): No     Lack of Transportation (Non-Medical): No   Physical Activity: Inactive (2020)    Exercise Vital Sign     Days of Exercise per Week: 0 days     Minutes of Exercise per Session: 0 min   Stress: No Stress Concern Present (2020)    [employer] - Occupational Stress Questionnaire     Feeling of Stress : Not at all   Social Connections: Not on file   Intimate Partner Violence: Not on file   Housing Stability: Not on file       Current Outpatient Medications:     acetaminophen (TYLENOL) 325 mg tablet, Take 650 mg by mouth every 6 (six) hours as needed for mild pain, Disp: , Rfl:     Ascorbic Acid (vitamin C) 100 MG tablet, Take 100 mg by mouth daily, Disp: , Rfl:     hydroCHLOROthiazide 25 mg tablet, TAKE 1 TABLET (25 MG TOTAL) BY MOUTH DAILY., Disp: 90 tablet, Rfl: 0    Multiple Vitamins-Minerals (multivitamin with minerals) tablet, Take 1 tablet by mouth daily, Disp: , Rfl:     naproxen (NAPROSYN) 500 mg tablet, TAKE 1 TABLET BY MOUTH TWICE A DAY WITH FOOD, Disp: 30 tablet, Rfl: 0    pravastatin (PRAVACHOL) 20 mg tablet, Take 1 tablet (20 mg total) by mouth daily at bedtime, Disp: 90 tablet, Rfl: 1    Zinc 100 MG TABS, Take by mouth, Disp: , Rfl:     Current Facility-Administered Medications:     cyanocobalamin injection 1,000 mcg, 1,000 mcg, Intramuscular, Q30 Days, Lorraine Mirlande Dennis, DO, 1,000 mcg at 04/16/24 0942  Allergies   Allergen Reactions    Penicillins Other (See Comments)     Unknown reaction, positive on allergy testing     Vitals:    04/29/24 1103   BP: 138/74   Pulse: 61   Resp: 17   Temp: 98 °F (36.7 °C)   SpO2: 96%       Physical Exam  Constitutional:       Appearance: She is well-developed.   HENT:      Head: Normocephalic and atraumatic.   Eyes:      Pupils: Pupils are equal, round, and reactive to light.   Cardiovascular:      Rate and Rhythm: Normal rate.      Heart sounds: No murmur heard.  Pulmonary:      Effort: No respiratory distress.      Breath sounds: No wheezing or rales.   Abdominal:      General: There is no distension.      Palpations: Abdomen is soft.      Tenderness: There is no abdominal tenderness. There is no rebound.   Musculoskeletal:         General: No tenderness.      Cervical back: Neck supple.    Lymphadenopathy:      Cervical: No cervical adenopathy.   Skin:     General: Skin is warm.      Findings: No rash.   Neurological:      Mental Status: She is alert and oriented to person, place, and time.      Deep Tendon Reflexes: Reflexes normal.   Psychiatric:         Thought Content: Thought content normal.           Labs:  CBC, Coags, BMP, Mg, Phos     Imaging  See above.     Discussion/Summary: In summary, this is a 74-year-old female with a history of right breastInfiltrating ductal carcinoma,, T1, N1, MX.  CT scan showed 2 subcentimeter gastric nodules.  Changes in the right breast consistent with known breast primary.  No other convincing evidence of metastatic disease.  We reviewed that breast cancer metastases to the stomach is very unusual, though conceivable.  Alternatives could consider inflammatory change, other malignancy such as gastric CA, lymphoma, melanoma, other.  The most direct way to establish a diagnosis would be EGD, in my opinion.  We made arrangements for GI reevaluation for this purpose.  The solitary rib finding on bone scan is most likely posttraumatic.  Patient reports that she fell shortly prior to the procedure striking her right anterior chest wall.  PET/CT is arranged.  I conferred with Dr. Valdez who wishes to proceed with PET/CT as scheduled.  I reviewed the above considerations with the patient and her .  Presuming no metastatic disease is noted, I would anticipate proceeding with applicable surgery and considering the role of adjuvant medical therapy thereafter.  The patient and her  voiced understanding and agreement.

## 2024-04-29 NOTE — TELEPHONE ENCOUNTER
KAVITHAM informing Juve that her genetic test results have returned. I asked her to please call [phone] to discuss them

## 2024-04-30 ENCOUNTER — TELEPHONE (OUTPATIENT)
Dept: GENETICS | Facility: CLINIC | Age: 74
End: 2024-04-30

## 2024-04-30 NOTE — TELEPHONE ENCOUNTER
Genetic Test Results Disclosure     I spoke with Juve to review her STAT and final genetic test. She underwent genetic testing through our program on 4/9/2024 due to her recent diagnosis of breast cancer. Her results will be sent to her breast surgeon, Dr. Valdez.    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; Heterozygous; Uncertain Significance     Test Performed:  [facility] BRCAplus STAT Panel (13 genes): MIKE, BARD1, BRCA1, BRCA2, CDH1, CHEK2, NF1, PALB2, PTEN, RAD51C, RAD51D, STK11, TP53 with reflex to [facility]: Cancer+RNAinsight (46 genes): APC, AXIN2, BAP1, BMPR1A, BRIP1, CDH1, CDK4, CDKN1B, CDKN2A, CTNNA1, DICER1, EGLN1, EPCAM, FH, FLCN, GREM1, HOXB13, KIF1B, KIT, MAX, MEN1, MET, MITF, MLH1, MSH2, MSH3, MSH6, MUTYH, NTHL1, PDGFRA PMS2, POLD1, POLE, POT1, RB1, RET, SDHA, SDHAF2, SDHB, SDHC, SDHD, SMAD4, SMARCA4, ZKDK319, TSC1, TSC2, VHL     CHEK2 Assessment:   The CHEK2 gene is associated with an increased risk for autosomal dominant female and male breast cancer (with predisposition for ER+ disease), colon, thyroid and prostate cancers.  The risks of these cancers, particularly breast, have been determined to be both variant- and family history-dependent.    Additionally, there is preliminary evidence supporting a correlation with CHEK2 and autosomal dominant predisposition to other cancer types including urinary tract cancer, ovarian cancer and melanoma (PMID: 31723917, 71032278,36843242, 67755661, 82027417); however, the available evidence is insufficient to make a determination regarding these relationships.    Women with a mutation in the CHEK2 gene have an estimated 20-40% lifetime risk for breast cancer (PMID: 28409199, 08246032). Women with CHEK2 mutations who have had breast cancer may have a significantly increased risk of developing a second breast cancer depending on age at diagnosis of first  breast cancer, ER status, and/or family history (PMID:  43200645, 01212158).    The estimated absolute lifetime risk for colorectal cancer in men and women who are CHEK2 positive is 5-10%.    There is an increased risk for male breast, prostate and thyroid cancers, but lifetime risks are not established.      Risks and Testing for Family Members:  This test result may help clarify the risk for other family members to develop cancer. There is a 50% all first-degree relative (parents, siblings, children) inherited the CHEK2 likely pathogenic variant.  Other relatives such as aunts, uncles and cousins may also be at risk.  Since it is not known with one-hundred percent certainty which side of the family this CHEK2 likely pathogenic variant came from, we recommend that Juve share this test results with extended family members from both sides of her family.       We discussed that there is a 50% chance that Juve's daughter inherited the CHEK2 likely pathogenic variant. Testing is not recommended for individuals under the age of 18, as there are no childhood cancer risks known to be associated with a single pathogenic variant in this gene.    If Juve's relatives have any questions or are interested in testing they can reach out to the [facility] number at [phone] for additional information.      Variant of Uncertain Significance Assessment  A variant of uncertain significance (VUS) means that a change was identified in a specific gene but it cannot be determined whether the variant is associated with an increased risk of cancer or is a harmless genetic change. The significance of the SDHA variant is currently not known and therefore this test result cannot be used to help determine Juve's cancer risks.  This result does not have surgical implications.      It is possible that the variant was seen in only a handful of individuals, or there may be conflicting or incomplete information in the medical literature  about the variant and its association with hereditary cancer.     The laboratory will continue to accumulate information on this variant and will reclassify it as either a positive or negative genetic test result when they are confident that they have adequate information. We will notify Juve as updated information is obtained. It is important to note that the majority of variants of uncertain significance are reclassified as likely benign or benign as additional information about the variant becomes available.     Juve's SDHA variant is not classified as pathogenic at this time. We reviewed that individuals with confirmed pathogenic variants in the SDHA gene have an increased lifetime risk for paragangliomas, pheochromocytomas, GISTS, and renal cell carcinoma.     At this time, the SDHA VUS is not clinically actionable and is not recommended to be tested in other family members.    Management:  Management guidelines for individuals with pathogenic and likely pathogenic CHEK2 variants have been developed by the National Comprehensive Cancer Network (https://www.nccn.org/guidelines/category_2).      The recommendations listed below are specific to Juve and are are based on recommendations in the the NCCN guidelines as of 4/30/2024.     These recommendations are subject to change over time and the newest guidelines should be referenced for the most up to date recommendations.       Plan:   Breast Cancer  Juve was recently diagnosed with cancer of the right breast.    The risk of metachronous CBC in women >65 years of age with pathogenic/likely pathogenic variants in CHEK2 appears similar to non-carriers (PMID: 78326260).    Surgical implications should be discussed with Juve's breast surgeon, Dr. Valdez.     Colon Cancer Screening:  Colonoscopy screening every 5 years   Juve had a colonoscopy in 2021 and follow up was recommended in 5 years.     Other Screening:   There are currently no specific recommendations for  thyroid, prostate, or kidney cancer screening for individuals who carry a CHEK2 pathogenic/likely pathogenic variants.  Individuals may consider having an annual thyroid exam and urinalysis by their primary care physician.    Otherwise, Juve should continue cancer screening and medical management as clinically indicated and as determined appropriate by her healthcare providers.    Positive Result: Juve's breast surgeon was made aware of this result.   Juve  was strongly encouraged to follow up on with our office on an annual basis to review the most up to date guidelines as recommendations are subject to change over time

## 2024-05-02 ENCOUNTER — HOSPITAL ENCOUNTER (OUTPATIENT)
Dept: RADIOLOGY | Age: 74
Discharge: HOME | End: 2024-05-02
Payer: MEDICARE

## 2024-05-02 DIAGNOSIS — C77.3 BREAST CANCER METASTASIZED TO AXILLARY LYMPH NODE, RIGHT (HCC): ICD-10-CM

## 2024-05-02 DIAGNOSIS — C50.911 BREAST CANCER METASTASIZED TO AXILLARY LYMPH NODE, RIGHT (HCC): ICD-10-CM

## 2024-05-02 PROBLEM — Z15.89 CHEK2 GENE MUTATION POSITIVE: Status: ACTIVE | Noted: 2024-05-02

## 2024-05-02 LAB — GLUCOSE SERPL-MCNC: 97 MG/DL (ref 65–140)

## 2024-05-02 PROCEDURE — A9552 F18 FDG: HCPCS

## 2024-05-02 PROCEDURE — 78815 PET IMAGE W/CT SKULL-THIGH: CPT

## 2024-05-02 PROCEDURE — 82948 REAGENT STRIP/BLOOD GLUCOSE: CPT

## 2024-05-03 ENCOUNTER — TELEPHONE (OUTPATIENT)
Dept: SURGICAL ONCOLOGY | Facility: CLINIC | Age: 74
End: 2024-05-03

## 2024-05-03 PROBLEM — C50.811 MALIGNANT NEOPLASM OF OVERLAPPING SITES OF RIGHT BREAST IN FEMALE, ESTROGEN RECEPTOR POSITIVE (HCC): Status: ACTIVE | Noted: 2024-05-03

## 2024-05-03 PROBLEM — Z17.0 MALIGNANT NEOPLASM OF OVERLAPPING SITES OF RIGHT BREAST IN FEMALE, ESTROGEN RECEPTOR POSITIVE (HCC): Status: ACTIVE | Noted: 2024-05-03

## 2024-05-03 NOTE — TELEPHONE ENCOUNTER
Spoke with Juve to discuss surgery date with Dr. Valdez and Dr. Obrien. She confirmed 5/28 at [facility]. She is aware to expect a call from plastics to arrange further appointments.

## 2024-05-06 ENCOUNTER — HOSPITAL ENCOUNTER (OUTPATIENT)
Dept: ULTRASOUND IMAGING | Facility: HOSPITAL | Age: 74
Discharge: HOME | End: 2024-05-06
Attending: SURGERY
Payer: MEDICARE

## 2024-05-06 ENCOUNTER — OFFICE VISIT (OUTPATIENT)
Dept: SURGICAL ONCOLOGY | Facility: CLINIC | Age: 74
End: 2024-05-06
Payer: MEDICARE

## 2024-05-06 VITALS
HEART RATE: 64 BPM | RESPIRATION RATE: 18 BRPM | DIASTOLIC BLOOD PRESSURE: 80 MMHG | TEMPERATURE: 97.7 F | WEIGHT: 148 LBS | BODY MASS INDEX: 23.23 KG/M2 | SYSTOLIC BLOOD PRESSURE: 144 MMHG | OXYGEN SATURATION: 96 % | HEIGHT: 67 IN

## 2024-05-06 DIAGNOSIS — Z17.0 MALIGNANT NEOPLASM OF OVERLAPPING SITES OF RIGHT BREAST IN FEMALE, ESTROGEN RECEPTOR POSITIVE (HCC): ICD-10-CM

## 2024-05-06 DIAGNOSIS — C77.3 BREAST CANCER METASTASIZED TO AXILLARY LYMPH NODE, RIGHT (HCC): ICD-10-CM

## 2024-05-06 DIAGNOSIS — C50.911 BREAST CANCER METASTASIZED TO AXILLARY LYMPH NODE, RIGHT (HCC): ICD-10-CM

## 2024-05-06 DIAGNOSIS — Z15.89 CHEK2 GENE MUTATION POSITIVE: ICD-10-CM

## 2024-05-06 DIAGNOSIS — C50.811 MALIGNANT NEOPLASM OF OVERLAPPING SITES OF RIGHT BREAST IN FEMALE, ESTROGEN RECEPTOR POSITIVE (HCC): ICD-10-CM

## 2024-05-06 DIAGNOSIS — C50.811 MALIGNANT NEOPLASM OF OVERLAPPING SITES OF RIGHT BREAST IN FEMALE, ESTROGEN RECEPTOR POSITIVE (HCC): Primary | ICD-10-CM

## 2024-05-06 DIAGNOSIS — Z17.0 MALIGNANT NEOPLASM OF OVERLAPPING SITES OF RIGHT BREAST IN FEMALE, ESTROGEN RECEPTOR POSITIVE (HCC): Primary | ICD-10-CM

## 2024-05-06 PROCEDURE — 76536 US EXAM OF HEAD AND NECK: CPT

## 2024-05-06 PROCEDURE — 99215 OFFICE O/P EST HI 40 MIN: CPT | Performed by: SURGERY

## 2024-05-06 NOTE — PROGRESS NOTES
Surgical Oncology Follow Up  [facility]  [facility] [place]  [address] [address]    Juve Duarte  1950  [id number]      Chief Complaint   Patient presents with   • Follow-up     2 WEEK FOLLOW-UP        Assessment & Plan:   Is a 74-year-old female with a multifocal right breast cancer with axillary lymph node metastasis after staging workup and medical oncology consultation.  Studies were reviewed and discussed with the patient we will obtain a left neck cervical lymph node biopsy by IR and left neck ultrasound.  She has seen plastic surgery and plan for no reconstruction at this time.  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    Cancer History:     Oncology History   Breast cancer metastasized to axillary lymph node, right (HCC)   4/4/2024 Initial Diagnosis    Malignant neoplasm of overlapping sites of right breast in female, estrogen receptor positive (HCC)     4/4/2024 Initial Diagnosis    Breast cancer metastasized to axillary lymph node, right (HCC)     4/4/2024 Biopsy    Breast, Right, US BX RT BREAST 700 7CMFN 3 PASSES 12G MARQUEE:      - Invasive mammary carcinoma of no special type (ductal NST/invasive ductal carcinoma) with apocrine features, see note      - Maxwell grade 2 of 3 (total score: 6 of 9)          - Tubule formation: <10%, score 3          - Nuclear grade 2 of 3, score 2          - Mitoses < 3/mm2, (</= 7 mitoses/10HPF), score 1      - Invasive carcinoma involves 3 of 3 submitted core biopsies, max. dimension = 5 millimeters      - Ductal carcinoma in situ (DCIS): Not identified      - Microcalcifications: Absent      - Lymphovascular invasion: Present     Breast, Right, US BX RT BREAST 900 7CMFN 3 PASSES 12G MARQU[place]:      - Invasive mammary carcinoma of no special type (ductal NST/invasive ductal carcinoma) with apocrine  features, see note      - [place] grade 2 of 3 (total score: 6 of 9)          - Tubule formation: <10%, score 3          - Nuclear grade 2 of 3, score 2          - Mitoses < 3/mm2, (</= 7 mitoses/10HPF), score 1      - Invasive carcinoma involves 3 of 3 submitted core biopsies, max. dimension = 14 millimeters      - Ductal carcinoma in situ (DCIS): Not identified      - Microcalcifications: Absent      - Lymphovascular invasion: Not identified    Axillary lymph node, US BX RT AXILLARY TAIL LN 10:00 12CMFN 3 PASSES 12G [place]:      - Metastatic carcinoma consistent with mammary primary,    BREAST TUMOR PROGNOSTIC PROFILE     Performed on invasive carcinoma block E1:  Test Description Result Prognostic Interpretation   Estrogen Receptor/ER  Primary Antibody: SP-1  Internal control: Positive   External control: Positive 90%  Staining Intensity: Strong  Isabela Score*: 8 Positive   Progesterone Receptor/PgR  Primary Antibody:1E2  Internal control: Positive  External control: Positive 5-7%  Staining Intensity: Strong  Isabela Score*: 4 Positive   HER2 by IHC   Primary Antibody: 4B5 2+ Equivocal *      Performed on invasive carcinoma block F2:  Test Description Result Prognostic Interpretation   Estrogen Receptor/ER  Primary Antibody: SP-1  Internal control: Positive   External control: Positive 95%  Staining Intensity: Strong  Isabela Score*: 8 Positive   Progesterone Receptor/PgR  Primary Antibody:1E2  Internal control: Positive  External control: Positive 30%  Staining Intensity: Moderate  Isabela Score*: 5 Positive   HER2 by IHC   Primary Antibody: 4B5 2+ Equivocal *       Fluorescence in situ hybridization (FISH) analysis of HER2 overexpression by invasive breast carcinoma cells, block E1 performed at [facility], [place], [place], yields the following results:  Test Description                        Interpretation  HER2 by FISH analysis:              Negative / Not Amplified.  Results  HER2:  CEP-17 ratio :                  1.5: 1  Average HER2 Signal:                5.4  Average CEP-17 Signal:                          3.5  Number of selected invasive cells scanned           100     Fluorescence in situ hybridization (FISH) analysis of HER2 overexpression by invasive breast carcinoma cells, block F2 performed at [facility], [place], [place], yields the following results:  Test Description                        Interpretation  HER2 by FISH analysis:              Negative / Not Amplified.  Results  HER2: CEP-17 ratio :                  1.3: 1  Average HER2 Signal:                3.5  Average CEP-17 Signal:                          2.6  Number of selected invasive cells scanned           50        4/18/2024 Genetic Testing    AMBRY  A total of 36 genes were evaluated, including: APC, MIKE, BARD 1, BMPR1A, BRCA1, BRCA2, BRIP1, CDH1, CDK4, CKDN2A, CHEK2, DICER1, MLH1, MSH2, MSH6, MUTYH, NBN, NF1, NTHL1, PALB2, PMS2, PTEN, RAD51C, RECQL, SMAD4, SMARCA4, STK11, TP53, AXIN2, HOXB13, MSH3, POLD1, AND POLE, EPCAM, AND GREM1     LIKELY PATHOGENIC VARIANT CHEK2  VUS SDHA     Malignant neoplasm of overlapping sites of right breast in female, estrogen receptor positive (HCC)   4/4/2024 Initial Diagnosis    Malignant neoplasm of overlapping sites of right breast in female, estrogen receptor positive (HCC)           Interval History:   Follow-up with right breast cancer with axillary lymph node metastasis after workup    Review of Systems:   Review of Systems   Constitutional:  Negative for chills and fever.   HENT:  Negative for ear pain and sore throat.    Eyes:  Negative for pain and visual disturbance.   Respiratory:  Negative for cough and shortness of breath.    Cardiovascular:  Negative for chest pain and palpitations.   Gastrointestinal:  Negative for abdominal pain and vomiting.   Genitourinary:  Negative for dysuria and hematuria.   Musculoskeletal:  Negative for arthralgias and back pain.    Skin:  Negative for color change and rash.   Neurological:  Negative for seizures and syncope.   All other systems reviewed and are negative.    Past Medical History     Patient Active Problem List   Diagnosis   • Chronic atrophic gastritis   • Dysphagia, pharyngoesophageal   • Urinary incontinence   • Gastroparesis   • Hemorrhoids, external   • Asymptomatic postmenopausal status   • Essential hypertension   • Pernicious anemia   • Breast cancer metastasized to axillary lymph node, right (HCC)   • CHEK2 gene mutation positive   • Malignant neoplasm of overlapping sites of right breast in female, estrogen receptor positive (HCC)     Past Medical History:   Diagnosis Date   • Colon polyp    • Esophagitis    • History of benign breast tumor    • Hyperlipidemia    • Urinary incontinence    • Venous ulcer of ankle (HCC) 01/10/2022     Past Surgical History:   Procedure Laterality Date   • ANKLE SURGERY Right    • BREAST EXCISIONAL BIOPSY Left 1998    benign   • BUNIONECTOMY Bilateral    • COLONOSCOPY     • CORRECTION HAMMER TOE Bilateral    • MAMMO STEREOTACTIC BREAST BIOPSY LEFT (ALL INC) Left 4/4/2024   • MAMMO STEREOTACTIC BREAST BIOPSY RIGHT (ALL INC) EACH ADD Right 4/4/2024   • US GUIDANCE BREAST BIOPSY RIGHT EACH ADDITIONAL Right 4/4/2024   • US GUIDED BREAST BIOPSY RIGHT COMPLETE Right 4/4/2024   • US GUIDED BREAST LYMPH NODE BIOPSY RIGHT Right 4/4/2024   • VEIN LIGATION Right 06/06/2022    Procedure: Venaseal therapy and stab phlebectomy;  Surgeon: Gosia Shepherd MD;  Location: [facility];  Service: Vascular     Family History   Problem Relation Age of Onset   • No Known Problems Mother    • Heart disease Father    • No Known Problems Daughter    • No Known Problems Maternal Grandmother    • No Known Problems Maternal Grandfather    • No Known Problems Paternal Grandmother    • No Known Problems Paternal Grandfather    • Throat cancer Brother    • No Known Problems Maternal Aunt    • No Known Problems  Maternal Aunt    • No Known Problems Maternal Aunt    • No Known Problems Maternal Aunt    • No Known Problems Paternal Aunt    • No Known Problems Paternal Aunt    • No Known Problems Paternal Aunt    • No Known Problems Paternal Aunt    • BRCA2 Negative Neg Hx    • BRCA2 Positive Neg Hx    • BRCA1 Positive Neg Hx    • BRCA1 Negative Neg Hx    • BRCA 1/2 Neg Hx    • Ovarian cancer Neg Hx    • Endometrial cancer Neg Hx    • Colon cancer Neg Hx    • Breast cancer additional onset Neg Hx    • Breast cancer Neg Hx      Social History     Socioeconomic History   • Marital status: /Civil Union     Spouse name: Not on file   • Number of children: Not on file   • Years of education: Not on file   • Highest education level: Not on file   Occupational History   • Not on file   Tobacco Use   • Smoking status: Former     Current packs/day: 0.00     Types: Cigarettes     Start date:      Quit date:      Years since quittin.3   • Smokeless tobacco: Never   Vaping Use   • Vaping status: Never Used   Substance and Sexual Activity   • Alcohol use: Yes     Alcohol/week: 1.0 standard drink of alcohol     Types: 1 Standard drinks or equivalent per week     Comment: Socially   • Drug use: Never   • Sexual activity: Yes     Partners: Male     Birth control/protection: None   Other Topics Concern   • Not on file   Social History Narrative   • Not on file     Social Determinants of Health     Financial Resource Strain: Low Risk  (2023)    Overall Financial Resource Strain (CARDIA)    • Difficulty of Paying Living Expenses: Not hard at all   Food Insecurity: Not on file   Transportation Needs: No Transportation Needs (2023)    PRAPARE - Transportation    • Lack of Transportation (Medical): No    • Lack of Transportation (Non-Medical): No   Physical Activity: Inactive (2020)    Exercise Vital Sign    • Days of Exercise per Week: 0 days    • Minutes of Exercise per Session: 0 min   Stress: No Stress  Concern Present (9/9/2020)    [employer] - Occupational Stress Questionnaire    • Feeling of Stress : Not at all   Social Connections: Not on file   Intimate Partner Violence: Not on file   Housing Stability: Not on file       Current Outpatient Medications:   •  acetaminophen (TYLENOL) 325 mg tablet, Take 650 mg by mouth every 6 (six) hours as needed for mild pain, Disp: , Rfl:   •  Ascorbic Acid (vitamin C) 100 MG tablet, Take 100 mg by mouth daily, Disp: , Rfl:   •  hydroCHLOROthiazide 25 mg tablet, TAKE 1 TABLET (25 MG TOTAL) BY MOUTH DAILY., Disp: 90 tablet, Rfl: 0  •  Multiple Vitamins-Minerals (multivitamin with minerals) tablet, Take 1 tablet by mouth daily, Disp: , Rfl:   •  naproxen (NAPROSYN) 500 mg tablet, TAKE 1 TABLET BY MOUTH TWICE A DAY WITH FOOD, Disp: 30 tablet, Rfl: 0  •  pravastatin (PRAVACHOL) 20 mg tablet, Take 1 tablet (20 mg total) by mouth daily at bedtime, Disp: 90 tablet, Rfl: 1  •  Zinc 100 MG TABS, Take by mouth, Disp: , Rfl:     Current Facility-Administered Medications:   •  cyanocobalamin injection 1,000 mcg, 1,000 mcg, Intramuscular, Q30 Days, Lorraine Mirlande Dennis, DO, 1,000 mcg at 04/16/24 0942  Allergies   Allergen Reactions   • Penicillins Other (See Comments)     Unknown reaction, positive on allergy testing       Physical Exam:     Vitals:    05/06/24 1135   BP: 144/80   Pulse: 64   Resp: 18   Temp: 97.7 °F (36.5 °C)   SpO2: 96%     Physical Exam  Constitutional:       Appearance: Normal appearance.   HENT:      Head: Normocephalic and atraumatic.      Nose: Nose normal.      Mouth/Throat:      Mouth: Mucous membranes are moist.   Eyes:      Pupils: Pupils are equal, round, and reactive to light.   Cardiovascular:      Rate and Rhythm: Normal rate.      Pulses: Normal pulses.      Heart sounds: Normal heart sounds.   Pulmonary:      Effort: Pulmonary effort is normal.      Breath sounds: Normal breath sounds.   Chest:          Comments: Right  breast mass palpable mobile not attached to deep structures right axillary palpable adenopathy.  Left breast no palpable mass masses nipple discharge nipple retraction or skin  Abdominal:      General: Bowel sounds are normal.      Palpations: Abdomen is soft.   Musculoskeletal:         General: Normal range of motion.      Cervical back: Normal range of motion and neck supple.   Skin:     General: Skin is warm.   Neurological:      General: No focal deficit present.      Mental Status: She is alert and oriented to person, place, and time.   Psychiatric:         Mood and Affect: Mood normal.         Behavior: Behavior normal.         Thought Content: Thought content normal.         Judgment: Judgment normal.       Results & Discussion:     Narrative & Impression   CT CHEST, ABDOMEN AND PELVIS WITH IV CONTRAST     INDICATION: C50.911: Malignant neoplasm of unspecified site of right female breast  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 Patient recently underwent biopsy of 2 masses in the right breast at the 9:00 and 7:00 positions, both positive for invasive ductal carcinoma ER/CO positive, HER2 negative.   Right sided axillary lymph node biopsy was consistent with metastatic disease. Patient also underwent biopsy of a left breast nodule with benign results.     COMPARISON: Most recent prior CT scan for comparison is an unenhanced CT scan of the abdomen and pelvis dated September 13, 2016.     TECHNIQUE: CT examination of the chest, abdomen and pelvis was performed. Multiplanar 2D reformatted images were created from the source data.     This examination, like all CT scans performed in the [facility], was performed utilizing techniques to minimize radiation dose exposure, including the use of iterative reconstruction and automated exposure control. Radiation dose length    product (DLP) for this visit: 643 mGy-cm     IV Contrast: 100 mL of iohexol (OMNIPAQUE)  Enteric Contrast: Not administered.     FINDINGS:     CHEST     LUNGS: Symmetric biapical nodular pleural parenchymal thickening. No suspicious pulmonary nodules. Central airways are patent.     PLEURA: Focal pleural thickening involving the right inferior medial pleura adjacent to the T11 vertebral body, not significantly changed when compared to a prior CT scan of the chest dated September 25, 2015. There are associated pleural calcifications.     HEART/GREAT VESSELS: Coronary artery calcifications, which are an indicator of coronary artery disease.. No thoracic aortic aneurysm.     MEDIASTINUM AND MELODIE: Mildly enlarged right infrahilar lymph node measuring 9 mm in the short axis (2, 68). Few right internal mammary lymph nodes which measure up to 4 mm in the short axis (series 2 images 37, 47, and 50).     The esophagus is distended with air and fluid compatible with gastroesophageal reflux. Small hiatal hernia.     CHEST WALL AND LOWER NECK: At approximately the 9 o'clock position in the right breast, there is a 2.9 x 1.8 cm lobular enhancing mass containing a central localizer clip. At the 7 o'clock position, there is a 1.3 x 0.8 cm nodule, also containing a   central localizer clip. Within the left breast, there is a 1.5 cm nodule containing a central localizer clip corresponding to the benign nodule which was biopsied.     Few right axillary and subpectoral lymph nodes are noted, measuring up to 0.5 cm in the short axis, with 1 of these lymph nodes containing a localizer clip.     ABDOMEN     LIVER/BILIARY TREE: No focal liver lesions.     GALLBLADDER: No calcified gallstones. No pericholecystic inflammatory change.     SPLEEN: Splenomegaly with the spleen measuring 16.1 cm craniocaudally.     PANCREAS: Unremarkable.     ADRENAL GLANDS: Unremarkable.     KIDNEYS/URETERS: No obstructing calculi or collecting system  dilatation. Areas of cortical scarring bilaterally greater on the left. Few subcentimeter low-attenuation lesions in both kidneys too small to characterize but statistically represent cysts.     STOMACH AND BOWEL: Large amount of formed stool throughout the colon. No evidence of bowel obstruction. Scattered colonic diverticula.     0.7 cm rounded enhancing focus in the gastric fundus (series 2 image 113) and similar-appearing 5 mm focus slightly more cephalad in the fundus (series 2 image 110)     Small hiatal hernia and gastroesophageal reflux as described above.     APPENDIX: No findings to suggest appendicitis.     ABDOMINOPELVIC CAVITY: No ascites. No pneumoperitoneum. No lymphadenopathy.     VESSELS: Atherosclerotic     PELVIS     REPRODUCTIVE ORGANS: Unremarkable for patient's age.     URINARY BLADDER: Unremarkable.     ABDOMINAL WALL/INGUINAL REGIONS: Unremarkable.     BONES: No acute fracture or suspicious osseous lesion.     IMPRESSION:     Enhancing soft tissue nodules in the right breast corresponding to biopsy-proven cancer as described above. Benign nodule on the left also shown.     Mildly enlarged right infrahilar lymph node and few right internal mammary lymph nodes are indeterminate but raise concern for involvement with tumor.     Few right axillary and subpectoral lymph nodes measuring up to 0.5 cm in the short axis are indeterminate. Consider further evaluation with PET/CT.     2 enhancing soft tissue nodules in the gastric fundus as described above. These are of uncertain etiology and may represent GI stromal tumors, with metastatic disease from breast cancer considered less likely. Consider further evaluation with endoscopy.      PET/CT SCAN     INDICATION: C50.911: Malignant neoplasm of unspecified site of right female breast  C77.3: Secondary and unspecified malignant neoplasm of axilla and upper limb lymph nodes, initial staging     MODIFIER: PI     COMPARISON: Bone scan 4/23/2024 and  [facility]     CELL TYPE:  invasive mammary ca or no special type (ductal NST / invasive ductal ca. w/apocrine features (bx R breast 4/4/24)     TECHNIQUE:   8.2 mCi F-18-FDG administered IV. Multiplanar attenuation corrected and non attenuation corrected PET images are available for interpretation, and contiguous, low dose, axial CT sections were obtained from the vertex through the femurs.   Intravenous contrast material was not utilized. This examination, like all CT scans performed in the [facility], was performed utilizing techniques to minimize radiation dose exposure, including the use of iterative reconstruction and   automated exposure control.     Fasting serum glucose: 97 mg/dl     FINDINGS:     VISUALIZED BRAIN:  No acute abnormalities are seen.     HEAD/NECK:  Image 3/68, there is a left upper cervical node above the hyoid and deep to the anterior margin of the left sternocleidomastoid muscle, with increased FDG activity, SUV 3.5. This is concerning for metastasis.     No hypermetabolic right cervical adenopathy.     CT images: Complete opacification of the left maxillary sinus.     CHEST:  Right lateral breast nodule with biopsy clip image 4/146 measures 2.3 x 1.8 cm, SUV 8.6, compatible with known malignancy.     Image 3/151, more medial right breast nodule with probable clip as seen on prior diagnostic CT measures 1 cm, but does not demonstrate significant FDG uptake compared to background, SUV 1.3.     Image 3/143, left breast nodule with clip also does not demonstrate significant FDG uptake, SUV 1.     Image 3/110, hypermetabolic right axillary node measuring 1.2 cm, SUV 4.5, is most compatible with a metastasis. Additional probable metastatic right axillary node image 3/119 measures 6 mm, SUV 2.5.     There is mild nonfocal bilateral hilar and perihilar activity, SUV measuring 2.3 on the right and 2.7 on the left.     No hypermetabolic mediastinal adenopathy.  No hypermetabolic  internal mammary nodes visualized.     CT images: Coronary atherosclerosis. Right lower medial calcified pleural plaques and pleural thickening. Biapical pleural-parenchymal scarring. Distended patulous esophagus with retained debris and fluid.     ABDOMEN:  No FDG avid soft tissue lesions are seen.  CT images: Stable splenomegaly measuring 15 cm CC dimension.     PELVIS:  No FDG avid soft tissue lesions are seen.  CT images: Unremarkable.     OSSEOUS STRUCTURES:  No FDG avid lesions are seen.  CT images: No significant findings.     IMPRESSION:  1. Right lateral breast hypermetabolic nodule compatible with known malignancy. There is right axillary metastatic adenopathy.  2. Hypermetabolic left upper cervical node is also concerning for metastasis.           I did review films CT chest abdomen pelvis bone scan and PET/CT scans.  PET/CT scan demonstrated perihilar and hilar lymphadenopathy SUV 2.3 and 2.7.  Left cervical lymph node with PET avid SUV of 3.5 lymph node.  These findings were discussed with Dr. Josue Quiroz medical oncologist and this is a very equivocal nodes.  However prior to surgery it is best interest to have a biopsy of the left cervical lymph node to rule out any distant disease.  She also has seen plastic surgeon and she decided against immediate reconstruction.  She probably consider delayed reconstruction subsequently.  We need to have the lymph node biopsy to rule out malignancy prior to making any surgical decision at this point.  I did spend more than 60 minutes reviewing all these films talking to medical oncologist, patient examination and consultation about all the study findings and further workup and management.  We will also obtain left neck ultrasound to delineate lymphadenopathy.  I did discussed in detail nature of breast cancer multifocal with metastasis to axillary lymphadenodes. she understands and  agrees . All patient questions were answered.       Advance Care Planning/Advance  Directives:  I Jevon Valdez MD discussed the disease status with Juve Duarte  today 05/06/24  treatment plans and follow-up with the patient.

## 2024-05-07 ENCOUNTER — TELEPHONE (OUTPATIENT)
Age: 74
End: 2024-05-07

## 2024-05-07 NOTE — TELEPHONE ENCOUNTER
Patient called because she wants to notify Dr Obrien that she does not want to proceed with the reconstruction that was discussed.    Thank you

## 2024-05-08 ENCOUNTER — TELEPHONE (OUTPATIENT)
Dept: HEMATOLOGY ONCOLOGY | Facility: CLINIC | Age: 74
End: 2024-05-08

## 2024-05-08 DIAGNOSIS — C77.3 BREAST CANCER METASTASIZED TO AXILLARY LYMPH NODE, RIGHT (HCC): Primary | ICD-10-CM

## 2024-05-08 DIAGNOSIS — C50.911 BREAST CANCER METASTASIZED TO AXILLARY LYMPH NODE, RIGHT (HCC): Primary | ICD-10-CM

## 2024-05-08 NOTE — TELEPHONE ENCOUNTER
Patient Call    Who are you speaking with?  [facility]      If it is not the patient, are they listed on an active communication consent form? N/A   What is the reason for this call? Cesilia from ultrasound department is calling in regards to the biopsy order from Dr Valdez. They need Dr Valdez to either sign the order in epic or place a new order. Please call Cesilia back with any questions.    Does this require a call back? Yes   If a call back is required, please list best call back number [phone]   If a call back is required, advise that a message will be forwarded to their care team and someone will return their call as soon as possible.   Did you relay this information to the patient? Yes

## 2024-05-08 NOTE — TELEPHONE ENCOUNTER
Called lawrence back. Order replaced. Lawrence will fix everything. All questions answered at this time

## 2024-05-08 NOTE — NURSING NOTE
Call placed to patient to discuss upcoming appointment at [facility] radiology department and complete consultation with patient. Patient is having a lymph node biopsy utilizing  US guidance. Reviewed patient's allergies, no current anticoagulant medication present per patient, also discussed the pre and post procedure expectations. Reminded patient of location and time expected for procedure, Patient expressed understanding by verbalizing and repeating instructions.

## 2024-05-10 ENCOUNTER — PATIENT OUTREACH (OUTPATIENT)
Dept: HEMATOLOGY ONCOLOGY | Facility: CLINIC | Age: 74
End: 2024-05-10

## 2024-05-10 NOTE — PROGRESS NOTES
Breast Oncology Nurse Navigator    Called patient to check in and see if she has any questions since we last spoke.  Patient has undergone staging studies.  She met with plastic surgery and medical oncology.  She goes for an ultrasound and cervical LN biopsy on 5/15/24.  She will then go back to meet with Dr Valdez the following week.  Patient aware of all upcoming appointments.  Denies sara questions at this time.  Patient has my contact information and knows tor each out with further questions.

## 2024-05-11 DIAGNOSIS — E78.2 MIXED HYPERLIPIDEMIA: ICD-10-CM

## 2024-05-12 DIAGNOSIS — Z12.31 BREAST CANCER SCREENING BY MAMMOGRAM: ICD-10-CM

## 2024-05-13 RX ORDER — HYDROCHLOROTHIAZIDE 25 MG/1
25 TABLET ORAL DAILY
Qty: 90 TABLET | Refills: 1 | Status: SHIPPED | OUTPATIENT
Start: 2024-05-13

## 2024-05-13 RX ORDER — PRAVASTATIN SODIUM 20 MG
20 TABLET ORAL
Qty: 90 TABLET | Refills: 1 | Status: SHIPPED | OUTPATIENT
Start: 2024-05-13

## 2024-05-14 ENCOUNTER — CLINICAL SUPPORT (OUTPATIENT)
Age: 74
End: 2024-05-14
Payer: MEDICARE

## 2024-05-14 DIAGNOSIS — E53.8 VITAMIN B12 DEFICIENCY: Primary | ICD-10-CM

## 2024-05-14 PROCEDURE — 96372 THER/PROPH/DIAG INJ SC/IM: CPT

## 2024-05-14 RX ADMIN — CYANOCOBALAMIN 1000 MCG: 1000 INJECTION, SOLUTION INTRAMUSCULAR; SUBCUTANEOUS at 09:39

## 2024-05-15 ENCOUNTER — HOSPITAL ENCOUNTER (OUTPATIENT)
Dept: RADIOLOGY | Facility: HOSPITAL | Age: 74
Discharge: HOME | End: 2024-05-15
Attending: SURGERY
Payer: MEDICARE

## 2024-05-15 DIAGNOSIS — C77.3 BREAST CANCER METASTASIZED TO AXILLARY LYMPH NODE, RIGHT (HCC): ICD-10-CM

## 2024-05-15 DIAGNOSIS — Z17.0 MALIGNANT NEOPLASM OF OVERLAPPING SITES OF RIGHT BREAST IN FEMALE, ESTROGEN RECEPTOR POSITIVE (HCC): ICD-10-CM

## 2024-05-15 DIAGNOSIS — C50.811 MALIGNANT NEOPLASM OF OVERLAPPING SITES OF RIGHT BREAST IN FEMALE, ESTROGEN RECEPTOR POSITIVE (HCC): ICD-10-CM

## 2024-05-15 DIAGNOSIS — C50.911 BREAST CANCER METASTASIZED TO AXILLARY LYMPH NODE, RIGHT (HCC): ICD-10-CM

## 2024-05-15 DIAGNOSIS — Z15.89 CHEK2 GENE MUTATION POSITIVE: ICD-10-CM

## 2024-05-15 PROCEDURE — 88184 FLOWCYTOMETRY/ TC 1 MARKER: CPT | Performed by: SURGERY

## 2024-05-15 PROCEDURE — 38505 NEEDLE BIOPSY LYMPH NODES: CPT

## 2024-05-15 PROCEDURE — 88185 FLOWCYTOMETRY/TC ADD-ON: CPT | Performed by: SURGERY

## 2024-05-15 PROCEDURE — 88305 TISSUE EXAM BY PATHOLOGIST: CPT | Performed by: PATHOLOGY

## 2024-05-15 PROCEDURE — 88173 CYTOPATH EVAL FNA REPORT: CPT | Performed by: PATHOLOGY

## 2024-05-15 PROCEDURE — 88342 IMHCHEM/IMCYTCHM 1ST ANTB: CPT | Performed by: PATHOLOGY

## 2024-05-15 PROCEDURE — 88341 IMHCHEM/IMCYTCHM EA ADD ANTB: CPT | Performed by: PATHOLOGY

## 2024-05-15 RX ORDER — LIDOCAINE HYDROCHLORIDE 10 MG/ML
2 INJECTION, SOLUTION EPIDURAL; INFILTRATION; INTRACAUDAL; PERINEURAL ONCE
Status: COMPLETED | OUTPATIENT
Start: 2024-05-15 | End: 2024-05-15

## 2024-05-15 RX ADMIN — LIDOCAINE HYDROCHLORIDE 2 ML: 10 INJECTION, SOLUTION EPIDURAL; INFILTRATION; INTRACAUDAL; PERINEURAL at 11:20

## 2024-05-17 LAB — SCAN RESULT: NORMAL

## 2024-05-20 ENCOUNTER — OFFICE VISIT (OUTPATIENT)
Age: 74
End: 2024-05-20
Payer: MEDICARE

## 2024-05-20 ENCOUNTER — APPOINTMENT (OUTPATIENT)
Age: 74
End: 2024-05-20
Payer: MEDICARE

## 2024-05-20 VITALS
SYSTOLIC BLOOD PRESSURE: 128 MMHG | BODY MASS INDEX: 22.6 KG/M2 | DIASTOLIC BLOOD PRESSURE: 60 MMHG | OXYGEN SATURATION: 94 % | WEIGHT: 144 LBS | RESPIRATION RATE: 18 BRPM | TEMPERATURE: 97.8 F | HEIGHT: 67 IN | HEART RATE: 77 BPM

## 2024-05-20 DIAGNOSIS — I10 ESSENTIAL HYPERTENSION: ICD-10-CM

## 2024-05-20 DIAGNOSIS — C50.811 MALIGNANT NEOPLASM OF OVERLAPPING SITES OF RIGHT BREAST IN FEMALE, ESTROGEN RECEPTOR POSITIVE (HCC): ICD-10-CM

## 2024-05-20 DIAGNOSIS — I10 ESSENTIAL HYPERTENSION: Primary | ICD-10-CM

## 2024-05-20 DIAGNOSIS — D51.0 PERNICIOUS ANEMIA: ICD-10-CM

## 2024-05-20 DIAGNOSIS — Z17.0 MALIGNANT NEOPLASM OF OVERLAPPING SITES OF RIGHT BREAST IN FEMALE, ESTROGEN RECEPTOR POSITIVE (HCC): ICD-10-CM

## 2024-05-20 DIAGNOSIS — K29.40 CHRONIC ATROPHIC GASTRITIS: ICD-10-CM

## 2024-05-20 LAB
ALBUMIN SERPL BCP-MCNC: 3.8 G/DL (ref 3.5–5)
ALP SERPL-CCNC: 150 U/L (ref 34–104)
ALT SERPL W P-5'-P-CCNC: 15 U/L (ref 7–52)
ANION GAP SERPL CALCULATED.3IONS-SCNC: 10 MMOL/L (ref 4–13)
AST SERPL W P-5'-P-CCNC: 19 U/L (ref 13–39)
BILIRUB SERPL-MCNC: 0.68 MG/DL (ref 0.2–1)
BUN SERPL-MCNC: 14 MG/DL (ref 5–25)
CALCIUM SERPL-MCNC: 9.1 MG/DL (ref 8.4–10.2)
CHLORIDE SERPL-SCNC: 100 MMOL/L (ref 96–108)
CHOLEST SERPL-MCNC: 186 MG/DL
CO2 SERPL-SCNC: 30 MMOL/L (ref 21–32)
CREAT SERPL-MCNC: 0.73 MG/DL (ref 0.6–1.3)
ERYTHROCYTE [DISTWIDTH] IN BLOOD BY AUTOMATED COUNT: 13.8 % (ref 11.6–15.1)
GFR SERPL CREATININE-BSD FRML MDRD: 81 ML/MIN/1.73SQ M
GLUCOSE SERPL-MCNC: 102 MG/DL (ref 65–140)
HCT VFR BLD AUTO: 36.8 % (ref 34.8–46.1)
HDLC SERPL-MCNC: 32 MG/DL
HGB BLD-MCNC: 11 G/DL (ref 11.5–15.4)
LDLC SERPL CALC-MCNC: 109 MG/DL (ref 0–100)
MCH RBC QN AUTO: 26.1 PG (ref 26.8–34.3)
MCHC RBC AUTO-ENTMCNC: 29.9 G/DL (ref 31.4–37.4)
MCV RBC AUTO: 87 FL (ref 82–98)
PLATELET # BLD AUTO: 263 THOUSANDS/UL (ref 149–390)
PMV BLD AUTO: 11 FL (ref 8.9–12.7)
POTASSIUM SERPL-SCNC: 4.4 MMOL/L (ref 3.5–5.3)
PROT SERPL-MCNC: 6.6 G/DL (ref 6.4–8.4)
RBC # BLD AUTO: 4.21 MILLION/UL (ref 3.81–5.12)
SODIUM SERPL-SCNC: 140 MMOL/L (ref 135–147)
TRIGL SERPL-MCNC: 225 MG/DL
VIT B12 SERPL-MCNC: 591 PG/ML (ref 180–914)
WBC # BLD AUTO: 5.57 THOUSAND/UL (ref 4.31–10.16)

## 2024-05-20 PROCEDURE — 80053 COMPREHEN METABOLIC PANEL: CPT

## 2024-05-20 PROCEDURE — G2211 COMPLEX E/M VISIT ADD ON: HCPCS | Performed by: FAMILY MEDICINE

## 2024-05-20 PROCEDURE — 85027 COMPLETE CBC AUTOMATED: CPT

## 2024-05-20 PROCEDURE — 36415 COLL VENOUS BLD VENIPUNCTURE: CPT

## 2024-05-20 PROCEDURE — 82607 VITAMIN B-12: CPT

## 2024-05-20 PROCEDURE — 99214 OFFICE O/P EST MOD 30 MIN: CPT | Performed by: FAMILY MEDICINE

## 2024-05-20 PROCEDURE — 80061 LIPID PANEL: CPT

## 2024-05-20 NOTE — PROGRESS NOTES
"Ambulatory Visit  Name: Juve Duarte      : 1950      MRN: [id number]  Encounter Provider: Lorraine Dennis DO  Encounter Date: 2024   Encounter department: [facility] PRIMARY CARE [place]    Assessment & Plan   1. Essential hypertension-controlled on hydrochlorothiazide 25 mg daily  -     Comprehensive metabolic panel; Future; Expected date: 2024  -     Lipid Panel with Direct LDL reflex; Future; Expected date: 2024  2. Pernicious anemia-longstanding issue.  On B12 IM therapy q. monthly.  Will continue to monitor serum levels  -     Vitamin B12; Future  -     CBC and Platelet; Future  3. Chronic atrophic gastritis-distant history.  No recent EGD.  Not on PPI therapy.  Asymptomatic.    4. Malignant neoplasm of overlapping sites of right breast in female, estrogen receptor positive (HCC)-recent diagnosis of invasive ductal carcinoma with positive lymph nodes per biopsy.  Following with heme-onc and surgery onc.  No definitive therapy has been initiated yet for pain       History of Present Illness     Patient presents for 6-month follow-up.  Discussed recent visits with hematology oncology.  Discussed appetite which has been been a little poor.  Has not skipped meals.  Tends not to finish her meals.  Denies abdominal pain, bloating, nausea or vomiting with eating.        Review of Systems   Constitutional:  Positive for appetite change (Decreased appetite).   Respiratory:  Negative for shortness of breath.    Cardiovascular:  Negative for chest pain.   Gastrointestinal:  Positive for constipation (Using Metamucil). Negative for diarrhea.   Neurological:  Negative for headaches.   Psychiatric/Behavioral:  Negative for dysphoric mood.        Objective     /60 (BP Location: Left arm, Patient Position: Sitting, Cuff Size: Standard)   Pulse 77   Temp 97.8 °F (36.6 °C) (Tympanic)   Resp 18   Ht 5' 6.5\" (1.689 m)   Wt 65.3 kg (144 lb)   SpO2 94%   BMI 22.89 kg/m² "     Physical Exam  HENT:      Head: Normocephalic and atraumatic.   Eyes:      Conjunctiva/sclera: Conjunctivae normal.      Pupils: Pupils are equal, round, and reactive to light.   Cardiovascular:      Rate and Rhythm: Normal rate and regular rhythm.      Heart sounds: No murmur heard.  Pulmonary:      Effort: Pulmonary effort is normal.      Breath sounds: Normal breath sounds.   Abdominal:      General: Bowel sounds are normal.      Palpations: Abdomen is soft.   Musculoskeletal:      Right lower leg: No edema.      Left lower leg: No edema.   Neurological:      Mental Status: She is alert and oriented to person, place, and time.   Psychiatric:         Mood and Affect: Mood normal.         Behavior: Behavior normal.

## 2024-05-22 ENCOUNTER — OFFICE VISIT (OUTPATIENT)
Dept: SURGICAL ONCOLOGY | Facility: CLINIC | Age: 74
End: 2024-05-22
Payer: MEDICARE

## 2024-05-22 VITALS
BODY MASS INDEX: 22.91 KG/M2 | TEMPERATURE: 98 F | HEIGHT: 67 IN | RESPIRATION RATE: 16 BRPM | HEART RATE: 80 BPM | OXYGEN SATURATION: 96 % | DIASTOLIC BLOOD PRESSURE: 80 MMHG | SYSTOLIC BLOOD PRESSURE: 130 MMHG | WEIGHT: 146 LBS

## 2024-05-22 DIAGNOSIS — C50.911 BREAST CANCER METASTASIZED TO AXILLARY LYMPH NODE, RIGHT (HCC): ICD-10-CM

## 2024-05-22 DIAGNOSIS — C50.811 MALIGNANT NEOPLASM OF OVERLAPPING SITES OF RIGHT BREAST IN FEMALE, ESTROGEN RECEPTOR POSITIVE (HCC): Primary | ICD-10-CM

## 2024-05-22 DIAGNOSIS — Z17.0 MALIGNANT NEOPLASM OF OVERLAPPING SITES OF RIGHT BREAST IN FEMALE, ESTROGEN RECEPTOR POSITIVE (HCC): Primary | ICD-10-CM

## 2024-05-22 DIAGNOSIS — Z01.818 PRE-OP EXAMINATION: ICD-10-CM

## 2024-05-22 DIAGNOSIS — C77.3 BREAST CANCER METASTASIZED TO AXILLARY LYMPH NODE, RIGHT (HCC): ICD-10-CM

## 2024-05-22 PROCEDURE — 99215 OFFICE O/P EST HI 40 MIN: CPT | Performed by: SURGERY

## 2024-05-22 PROCEDURE — 99205 OFFICE O/P NEW HI 60 MIN: CPT | Performed by: SURGERY

## 2024-05-22 NOTE — PROGRESS NOTES
Surgical Oncology Follow Up  [facility]  [facility] [place]  [address] [id number]    Juve Duarte  1950  [id number]      No chief complaint on file.       Assessment & Plan:   74-year-old female with multifocal right breast cancer with axillary lymph node metastasis concerning PET CT scan for left cervical lymph node with SUV of 3.5.  This was biopsied by radiology and its atypical cells.  Pathology was discussed in detail.  I also discussed the case with medical oncologist at this point Dr. Quiroz is thinking to start her on endocrine therapy and reevaluate with repeat scans and I agree with the plan.  These findings were discussed with her and her  they agree with the plan..  I will see her in 3 months time    Cancer History:     Oncology History   Breast cancer metastasized to axillary lymph node, right (HCC)   4/4/2024 Initial Diagnosis    Malignant neoplasm of overlapping sites of right breast in female, estrogen receptor positive (HCC)     4/4/2024 Initial Diagnosis    Breast cancer metastasized to axillary lymph node, right (HCC)     4/4/2024 Biopsy    Breast, Right, US BX RT BREAST 700 7CMFN 3 PASSES 12G MARQUEE:      - Invasive mammary carcinoma of no special type (ductal NST/invasive ductal carcinoma) with apocrine features, see note      - [place] grade 2 of 3 (total score: 6 of 9)          - Tubule formation: <10%, score 3          - Nuclear grade 2 of 3, score 2          - Mitoses < 3/mm2, (</= 7 mitoses/10HPF), score 1      - Invasive carcinoma involves 3 of 3 submitted core biopsies, max. dimension = 5 millimeters      - Ductal carcinoma in situ (DCIS): Not identified      - Microcalcifications: Absent      - Lymphovascular invasion: Present     Breast, Right, US BX RT BREAST 900 7CMFN 3 PASSES 12G [place]:      - Invasive mammary carcinoma of no special type (ductal NST/invasive ductal carcinoma) with apocrine features,  see note      - [place] grade 2 of 3 (total score: 6 of 9)          - Tubule formation: <10%, score 3          - Nuclear grade 2 of 3, score 2          - Mitoses < 3/mm2, (</= 7 mitoses/10HPF), score 1      - Invasive carcinoma involves 3 of 3 submitted core biopsies, max. dimension = 14 millimeters      - Ductal carcinoma in situ (DCIS): Not identified      - Microcalcifications: Absent      - Lymphovascular invasion: Not identified    Axillary lymph node, US BX RT AXILLARY TAIL LN 10:00 12CMFN 3 PASSES 12G [place]:      - Metastatic carcinoma consistent with mammary primary,    BREAST TUMOR PROGNOSTIC PROFILE     Performed on invasive carcinoma block E1:  Test Description Result Prognostic Interpretation   Estrogen Receptor/ER  Primary Antibody: SP-1  Internal control: Positive   External control: Positive 90%  Staining Intensity: Strong  Isabela Score*: 8 Positive   Progesterone Receptor/PgR  Primary Antibody:1E2  Internal control: Positive  External control: Positive 5-7%  Staining Intensity: Strong  Isabela Score*: 4 Positive   HER2 by IHC   Primary Antibody: 4B5 2+ Equivocal *      Performed on invasive carcinoma block F2:  Test Description Result Prognostic Interpretation   Estrogen Receptor/ER  Primary Antibody: SP-1  Internal control: Positive   External control: Positive 95%  Staining Intensity: Strong  Isabela Score*: 8 Positive   Progesterone Receptor/PgR  Primary Antibody:1E2  Internal control: Positive  External control: Positive 30%  Staining Intensity: Moderate  Isabela Score*: 5 Positive   HER2 by IHC   Primary Antibody: 4B5 2+ Equivocal *       Fluorescence in situ hybridization (FISH) analysis of HER2 overexpression by invasive breast carcinoma cells, block E1 performed at [facility], [place], [place], yields the following results:  Test Description                        Interpretation  HER2 by FISH analysis:              Negative / Not Amplified.  Results  HER2: CEP-17 ratio  :                  1.5: 1  Average HER2 Signal:                5.4  Average CEP-17 Signal:                          3.5  Number of selected invasive cells scanned           100     Fluorescence in situ hybridization (FISH) analysis of HER2 overexpression by invasive breast carcinoma cells, block F2 performed at [facility], [place], [place], yields the following results:  Test Description                        Interpretation  HER2 by FISH analysis:              Negative / Not Amplified.  Results  HER2: CEP-17 ratio :                  1.3: 1  Average HER2 Signal:                3.5  Average CEP-17 Signal:                          2.6  Number of selected invasive cells scanned           50        4/18/2024 Genetic Testing    [facility]  A total of 36 genes were evaluated, including: APC, MIKE, BARD 1, BMPR1A, BRCA1, BRCA2, BRIP1, CDH1, CDK4, CKDN2A, CHEK2, DICER1, MLH1, MSH2, MSH6, MUTYH, NBN, NF1, NTHL1, PALB2, PMS2, PTEN, RAD51C, RECQL, SMAD4, SMARCA4, STK11, TP53, AXIN2, HOXB13, MSH3, POLD1, AND POLE, EPCAM, AND GREM1     LIKELY PATHOGENIC VARIANT CHEK2  VUS SDHA     Malignant neoplasm of overlapping sites of right breast in female, estrogen receptor positive (HCC)   4/4/2024 Initial Diagnosis    Malignant neoplasm of overlapping sites of right breast in female, estrogen receptor positive (HCC)     4/4/2024 Initial Diagnosis    Breast cancer metastasized to axillary lymph node, right (HCC)     4/4/2024 Biopsy    Breast, Right, US BX RT BREAST 700 7CMFN 3 PASSES 12G [place]:      - Invasive mammary carcinoma of no special type (ductal NST/invasive ductal carcinoma) with apocrine features, see note      - Elsy grade 2 of 3 (total score: 6 of 9)          - Tubule formation: <10%, score 3          - Nuclear grade 2 of 3, score 2          - Mitoses < 3/mm2, (</= 7 mitoses/10HPF), score 1      - Invasive carcinoma involves 3 of 3 submitted core biopsies, max. dimension = 5 millimeters      - Ductal  carcinoma in situ (DCIS): Not identified      - Microcalcifications: Absent      - Lymphovascular invasion: Present     Breast, Right, US BX RT BREAST 900 7CMFN 3 PASSES 12G [place]:      - Invasive mammary carcinoma of no special type (ductal NST/invasive ductal carcinoma) with apocrine features, see note      - [place] grade 2 of 3 (total score: 6 of 9)          - Tubule formation: <10%, score 3          - Nuclear grade 2 of 3, score 2          - Mitoses < 3/mm2, (</= 7 mitoses/10HPF), score 1      - Invasive carcinoma involves 3 of 3 submitted core biopsies, max. dimension = 14 millimeters      - Ductal carcinoma in situ (DCIS): Not identified      - Microcalcifications: Absent      - Lymphovascular invasion: Not identified    Axillary lymph node, US BX RT AXILLARY TAIL LN 10:00 12CMFN 3 PASSES 12G [place]:      - Metastatic carcinoma consistent with mammary primary,    BREAST TUMOR PROGNOSTIC PROFILE     Performed on invasive carcinoma block E1:  Test Description Result Prognostic Interpretation   Estrogen Receptor/ER  Primary Antibody: SP-1  Internal control: Positive   External control: Positive 90%  Staining Intensity: Strong  Isabela Score*: 8 Positive   Progesterone Receptor/PgR  Primary Antibody:1E2  Internal control: Positive  External control: Positive 5-7%  Staining Intensity: Strong  Isabela Score*: 4 Positive   HER2 by IHC   Primary Antibody: 4B5 2+ Equivocal *      Performed on invasive carcinoma block F2:  Test Description Result Prognostic Interpretation   Estrogen Receptor/ER  Primary Antibody: SP-1  Internal control: Positive   External control: Positive 95%  Staining Intensity: Strong  Isabela Score*: 8 Positive   Progesterone Receptor/PgR  Primary Antibody:1E2  Internal control: Positive  External control: Positive 30%  Staining Intensity: Moderate  Isabela Score*: 5 Positive   HER2 by IHC   Primary Antibody: 4B5 2+ Equivocal *       Fluorescence in situ hybridization (FISH) analysis of HER2  overexpression by invasive breast carcinoma cells, block E1 performed at [facility], [place], [place], yields the following results:  Test Description                        Interpretation  HER2 by FISH analysis:              Negative / Not Amplified.  Results  HER2: CEP-17 ratio :                  1.5: 1  Average HER2 Signal:                5.4  Average CEP-17 Signal:                          3.5  Number of selected invasive cells scanned           100     Fluorescence in situ hybridization (FISH) analysis of HER2 overexpression by invasive breast carcinoma cells, block F2 performed at [facility], [place], [place], yields the following results:  Test Description                        Interpretation  HER2 by FISH analysis:              Negative / Not Amplified.  Results  HER2: CEP-17 ratio :                  1.3: 1  Average HER2 Signal:                3.5  Average CEP-17 Signal:                          2.6  Number of selected invasive cells scanned           50        4/18/2024 Genetic Testing    [facility]Y  A total of 36 genes were evaluated, including: APC, MIKE, BARD 1, BMPR1A, BRCA1, BRCA2, BRIP1, CDH1, CDK4, CKDN2A, CHEK2, DICER1, MLH1, MSH2, MSH6, MUTYH, NBN, NF1, NTHL1, PALB2, PMS2, PTEN, RAD51C, RECQL, SMAD4, SMARCA4, STK11, TP53, AXIN2, HOXB13, MSH3, POLD1, AND POLE, EPCAM, AND GREM1     LIKELY PATHOGENIC VARIANT CHEK2  VUS SDHA           Interval History:   Follow-up after left neck lymph node biopsy    Review of Systems:   Review of Systems   Constitutional:  Negative for chills and fever.   HENT:  Negative for ear pain and sore throat.    Eyes:  Negative for pain and visual disturbance.   Respiratory:  Negative for cough and shortness of breath.    Cardiovascular:  Negative for chest pain and palpitations.   Gastrointestinal:  Negative for abdominal pain and vomiting.   Genitourinary:  Negative for dysuria and hematuria.   Musculoskeletal:  Negative for arthralgias  and back pain.   Skin:  Negative for color change and rash.   Neurological:  Negative for seizures and syncope.   All other systems reviewed and are negative.    Past Medical History     Patient Active Problem List   Diagnosis   • Chronic atrophic gastritis   • Dysphagia, pharyngoesophageal   • Urinary incontinence   • Gastroparesis   • Hemorrhoids, external   • Asymptomatic postmenopausal status   • Essential hypertension   • Pernicious anemia   • Breast cancer metastasized to axillary lymph node, right (HCC)   • CHEK2 gene mutation positive   • Malignant neoplasm of overlapping sites of right breast in female, estrogen receptor positive (HCC)     Past Medical History:   Diagnosis Date   • Colon polyp    • Esophagitis    • History of benign breast tumor    • Hyperlipidemia    • Urinary incontinence    • Venous ulcer of ankle (HCC) 01/10/2022     Past Surgical History:   Procedure Laterality Date   • ANKLE SURGERY Right    • BREAST EXCISIONAL BIOPSY Left 1998    benign   • BUNIONECTOMY Bilateral    • COLONOSCOPY     • CORRECTION HAMMER TOE Bilateral    • MAMMO STEREOTACTIC BREAST BIOPSY LEFT (ALL INC) Left 4/4/2024   • MAMMO STEREOTACTIC BREAST BIOPSY RIGHT (ALL INC) EACH ADD Right 4/4/2024   • US GUIDANCE BREAST BIOPSY RIGHT EACH ADDITIONAL Right 4/4/2024   • US GUIDED BREAST BIOPSY RIGHT COMPLETE Right 4/4/2024   • US GUIDED BREAST LYMPH NODE BIOPSY RIGHT Right 4/4/2024   • US GUIDED LYMPH NODE BIOPSY LEFT  5/15/2024   • VEIN LIGATION Right 06/06/2022    Procedure: Venaseal therapy and stab phlebectomy;  Surgeon: Gosia Shepherd MD;  Location: [facility];  Service: Vascular     Family History   Problem Relation Age of Onset   • No Known Problems Mother    • Heart disease Father    • No Known Problems Daughter    • No Known Problems Maternal Grandmother    • No Known Problems Maternal Grandfather    • No Known Problems Paternal Grandmother    • No Known Problems Paternal Grandfather    • Throat cancer Brother     • No Known Problems Maternal Aunt    • No Known Problems Maternal Aunt    • No Known Problems Maternal Aunt    • No Known Problems Maternal Aunt    • No Known Problems Paternal Aunt    • No Known Problems Paternal Aunt    • No Known Problems Paternal Aunt    • No Known Problems Paternal Aunt    • BRCA2 Negative Neg Hx    • BRCA2 Positive Neg Hx    • BRCA1 Positive Neg Hx    • BRCA1 Negative Neg Hx    • BRCA 1/2 Neg Hx    • Ovarian cancer Neg Hx    • Endometrial cancer Neg Hx    • Colon cancer Neg Hx    • Breast cancer additional onset Neg Hx    • Breast cancer Neg Hx      Social History     Socioeconomic History   • Marital status: /Civil Union     Spouse name: Not on file   • Number of children: Not on file   • Years of education: Not on file   • Highest education level: Not on file   Occupational History   • Not on file   Tobacco Use   • Smoking status: Former     Current packs/day: 0.00     Types: Cigarettes     Start date:      Quit date:      Years since quittin.4   • Smokeless tobacco: Never   Vaping Use   • Vaping status: Never Used   Substance and Sexual Activity   • Alcohol use: Yes     Alcohol/week: 1.0 standard drink of alcohol     Types: 1 Standard drinks or equivalent per week     Comment: Socially   • Drug use: Never   • Sexual activity: Yes     Partners: Male     Birth control/protection: None   Other Topics Concern   • Not on file   Social History Narrative   • Not on file     Social Determinants of Health     Financial Resource Strain: Low Risk  (2023)    Overall Financial Resource Strain (CARDIA)    • Difficulty of Paying Living Expenses: Not hard at all   Food Insecurity: Not on file   Transportation Needs: No Transportation Needs (2023)    PRAPARE - Transportation    • Lack of Transportation (Medical): No    • Lack of Transportation (Non-Medical): No   Physical Activity: Inactive (2020)    Exercise Vital Sign    • Days of Exercise per Week: 0 days    •  Minutes of Exercise per Session: 0 min   Stress: No Stress Concern Present (9/9/2020)    [employer] - Occupational Stress Questionnaire    • Feeling of Stress : Not at all   Social Connections: Not on file   Intimate Partner Violence: Not on file   Housing Stability: Not on file       Current Outpatient Medications:   •  acetaminophen (TYLENOL) 325 mg tablet, Take 650 mg by mouth every 6 (six) hours as needed for mild pain, Disp: , Rfl:   •  Ascorbic Acid (vitamin C) 100 MG tablet, Take 100 mg by mouth daily, Disp: , Rfl:   •  hydroCHLOROthiazide 25 mg tablet, TAKE 1 TABLET (25 MG TOTAL) BY MOUTH DAILY., Disp: 90 tablet, Rfl: 1  •  Multiple Vitamins-Minerals (multivitamin with minerals) tablet, Take 1 tablet by mouth daily, Disp: , Rfl:   •  naproxen (NAPROSYN) 500 mg tablet, TAKE 1 TABLET BY MOUTH TWICE A DAY WITH FOOD, Disp: 30 tablet, Rfl: 0  •  pravastatin (PRAVACHOL) 20 mg tablet, TAKE 1 TABLET BY MOUTH DAILY AT BEDTIME, Disp: 90 tablet, Rfl: 1  •  Zinc 100 MG TABS, Take by mouth, Disp: , Rfl:     Current Facility-Administered Medications:   •  cyanocobalamin injection 1,000 mcg, 1,000 mcg, Intramuscular, Q30 Days, Lorraine Dennis DO, 1,000 mcg at 05/14/24 0939  Allergies   Allergen Reactions   • Penicillins Other (See Comments)     Unknown reaction, positive on allergy testing       Physical Exam:     Vitals:    05/22/24 0904   BP: 130/80   Pulse: 80   Resp: 16   Temp: 98 °F (36.7 °C)   SpO2: 96%     Physical Exam  Constitutional:       Appearance: Normal appearance.   HENT:      Head: Normocephalic and atraumatic.      Nose: Nose normal.      Mouth/Throat:      Mouth: Mucous membranes are moist.   Eyes:      Pupils: Pupils are equal, round, and reactive to light.   Cardiovascular:      Rate and Rhythm: Normal rate.      Pulses: Normal pulses.      Heart sounds: Normal heart sounds.   Pulmonary:      Effort: Pulmonary effort is normal.      Breath sounds: Normal breath  sounds.   Chest:      Comments: Palpable right breast mass with palpable right axillary lymphadenopathy.  Left breast no palpable mass masses nipple discharge nipple retraction or skin changes left axillary and supraclavicular examination no palpable adenopathy.  Abdominal:      General: Bowel sounds are normal.      Palpations: Abdomen is soft.   Musculoskeletal:         General: Normal range of motion.      Cervical back: Normal range of motion and neck supple.   Skin:     General: Skin is warm.   Neurological:      General: No focal deficit present.      Mental Status: She is alert and oriented to person, place, and time.   Psychiatric:         Mood and Affect: Mood normal.         Behavior: Behavior normal.         Thought Content: Thought content normal.         Judgment: Judgment normal.       Results & Discussion:   .B. Lymph Node, Left Upper Cervical, FNA (ThinPrep and smear preparations ):  Atypical cellular changes.  Rare atypical epithelioid cells present.  Mixed lymphocytes and scattered histiocytes present, compatible with lymph node sampling.  No flow immunophenotypic evidence of a lymphoproliferative disorder based on limited antibody panel ([employer]#[id number] / [id number], evaluated by Vinny Zamora M.D.).  Acellularity on cell block precludes further evaluation.     Satisfactory for evaluation.I did review the cytology and and discussed in detail with Dr. Quiroz.  At this point we will hold on for surgery until we definitely know there is no distant disease.  These were discussed with the patient and her  they agree with the plan.  I will see her in 3 months.  she understands and  agrees . All patient questions were answered.       Advance Care Planning/Advance Directives:  I Jevon Valdez MD discussed the disease status with Juve Duarte  today 05/22/24  treatment plans and follow-up with the patient.

## 2024-05-23 ENCOUNTER — PATIENT OUTREACH (OUTPATIENT)
Dept: HEMATOLOGY ONCOLOGY | Facility: CLINIC | Age: 74
End: 2024-05-23

## 2024-05-23 ENCOUNTER — OFFICE VISIT (OUTPATIENT)
Age: 74
End: 2024-05-23
Payer: MEDICARE

## 2024-05-23 ENCOUNTER — PREP FOR PROCEDURE (OUTPATIENT)
Age: 74
End: 2024-05-23

## 2024-05-23 VITALS
DIASTOLIC BLOOD PRESSURE: 60 MMHG | BODY MASS INDEX: 23.46 KG/M2 | SYSTOLIC BLOOD PRESSURE: 140 MMHG | HEIGHT: 66 IN | HEART RATE: 76 BPM | WEIGHT: 146 LBS | OXYGEN SATURATION: 98 %

## 2024-05-23 DIAGNOSIS — R93.3 ABNORMAL CT SCAN, STOMACH: Primary | ICD-10-CM

## 2024-05-23 DIAGNOSIS — K31.89 GASTRIC NODULE: ICD-10-CM

## 2024-05-23 PROCEDURE — 99204 OFFICE O/P NEW MOD 45 MIN: CPT | Performed by: INTERNAL MEDICINE

## 2024-05-23 NOTE — PATIENT INSTRUCTIONS
Scheduled date of EGD(as of today): 5/28/24  Physician performing EGD: Cameron  Location of EGD: [place]  Instructions reviewed with patient by: Lore LOMELI  Clearances:

## 2024-05-23 NOTE — H&P (VIEW-ONLY)
[facility]    Dear Dr. Quiroz,    I had the pleasure of seeing your patient Juve Duarte in the office today and I thank you for this kind referral.       Chief Complaint: Abnormal x-ray      HPI:  Juve Duarte is a 74 y.o. female who presents with abnormal CT scan of the stomach indicating gastric nodules.  The CT scan was performed on April 18, 2024 and is outlined below.  Patient had a PET CT scan because of malignant neoplasm of the right breast on May 2, 2024 which did not show any enhancing nodularity in the stomach.  That is also outlined below.  Patient has no history of any upper GI symptomatology.  She denies any abdominal pain nausea or vomiting.  No GERD dysphagia odynophagia or any other issues.  She had recent weight loss with a cold but this has stabilized.  She denies any melena or hematochezia.  She has no other complaints.  She has no shortness of breath or chest pain..      Review of Systems:   Constitutional: No fever or chills, feels well, no tiredness, no recent weight gain or weight loss.   HENT: No complaints of earache, no hearing loss, no nosebleeds, no nasal discharge, no sore throat, no hoarseness.    Eyes: No complaints of eye pain, no red eyes, no discharge from eyes, no itchy eyes.  Cardiovascular: No complaints of slow heart rate, no fast heart rate, no chest pain, no palpitations, no leg claudication, no lower extremity edema.   Respiratory: No complaints of shortness of breath, no wheezing, no cough, no SOB on exertion, no orthopnea.   Gastrointestinal: As noted in HPI  Genitourinary: No complaints of dysuria, no incontinence, no hesitancy, no nocturia.   Musculoskeletal: No complaints of arthralgia, no myalgias, no joint swelling or stiffness, no limb pain or swelling.   Neurological: No complaints of headache, no confusion, no convulsions, no numbness or tingling, no dizziness or fainting, no limb weakness, no difficulty walking.    Skin: No complaints  of skin rash or skin lesions, no itching, no skin wound, no dry skin.    Hematological/Lymphatic: No complaints of swollen glands, does not bleed easy.   Allergic/Immunologic: No immunocompromised state.  Endocrine:  No complaints of polyuria, no polydipsia.   Psychiatric/Behavioral: is not suicidal, no sleep disturbances, no anxiety or depression, no change in personality, no emotional problems.       Historical Information   Past Medical History:   Diagnosis Date    Colon polyp     Esophagitis     History of benign breast tumor     Hyperlipidemia     Urinary incontinence     Venous ulcer of ankle (HCC) 01/10/2022     Past Surgical History:   Procedure Laterality Date    ANKLE SURGERY Right     BREAST EXCISIONAL BIOPSY Left     benign    BUNIONECTOMY Bilateral     COLONOSCOPY      CORRECTION HAMMER TOE Bilateral     MAMMO STEREOTACTIC BREAST BIOPSY LEFT (ALL INC) Left 2024    MAMMO STEREOTACTIC BREAST BIOPSY RIGHT (ALL INC) EACH ADD Right 2024    US GUIDANCE BREAST BIOPSY RIGHT EACH ADDITIONAL Right 2024    US GUIDED BREAST BIOPSY RIGHT COMPLETE Right 2024    US GUIDED BREAST LYMPH NODE BIOPSY RIGHT Right 2024    US GUIDED LYMPH NODE BIOPSY LEFT  5/15/2024    VEIN LIGATION Right 2022    Procedure: Venaseal therapy and stab phlebectomy;  Surgeon: Gosia Shepherd MD;  Location: [facility];  Service: Vascular     Social History   Social History     Substance and Sexual Activity   Alcohol Use Yes    Alcohol/week: 1.0 standard drink of alcohol    Types: 1 Standard drinks or equivalent per week    Comment: Socially     Social History     Substance and Sexual Activity   Drug Use Never     Social History     Tobacco Use   Smoking Status Former    Current packs/day: 0.00    Types: Cigarettes    Start date:     Quit date:     Years since quittin.4   Smokeless Tobacco Never     Family History   Problem Relation Age of Onset    No Known Problems Mother     Heart disease Father   "   No Known Problems Daughter     No Known Problems Maternal Grandmother     No Known Problems Maternal Grandfather     No Known Problems Paternal Grandmother     No Known Problems Paternal Grandfather     Throat cancer Brother     No Known Problems Maternal Aunt     No Known Problems Maternal Aunt     No Known Problems Maternal Aunt     No Known Problems Maternal Aunt     No Known Problems Paternal Aunt     No Known Problems Paternal Aunt     No Known Problems Paternal Aunt     No Known Problems Paternal Aunt     BRCA2 Negative Neg Hx     BRCA2 Positive Neg Hx     BRCA1 Positive Neg Hx     BRCA1 Negative Neg Hx     BRCA 1/2 Neg Hx     Ovarian cancer Neg Hx     Endometrial cancer Neg Hx     Colon cancer Neg Hx     Breast cancer additional onset Neg Hx     Breast cancer Neg Hx          Current Medications: has a current medication list which includes the following prescription(s): acetaminophen, vitamin c, hydrochlorothiazide, multivitamin with minerals, naproxen, pravastatin, and zinc, and the following Facility-Administered Medications: cyanocobalamin.       Vital Signs: /60   Pulse 76   Ht 5' 6\" (1.676 m)   Wt 66.2 kg (146 lb)   SpO2 98%   BMI 23.57 kg/m²     Physical Exam:   Constitutional  General Appearance: No acute distress, well appearing and well nourished  Head  Normocephalic  Eyes  Conjunctivae and lids: No swelling, erythema, or discharge.    Pupils and irises: Equal, round and reactive to light.   Ears, Nose, Mouth, and Throat  External inspection of ears and nose: Normal  Nasal mucosa, septum and turbinates: Normal without edema or erythema/   Oropharynx: Normal with no erythema, edema, exudate or lesions.   Neck  Normal range of motion. Neck supple.   Cardiovascular  Auscultation of the heart: Normal rate and rhythm, normal S1 and S2 without murmurs.  Examination of the extremities for edema and/or varicosities: Normal  Pulmonary/Chest  Respiratory effort: No increased work of breathing or " signs of respiratory distress.   Auscultation of lungs: Clear to auscultation, equal breath sounds bilaterally, no wheezes, rales, no rhonchi.   Abdomen  Abdomen: Non-tender, no masses.   Liver and spleen: No hepatomegaly or splenomegaly.   Musculoskeletal  Gait and station: normal.  Digits and Nails: normal without clubbing or cyanosis.  Inspection/palpation of joints, bones, and muscles: Normal  Neurological  No nystagmus or asterixis.   Skin  Skin and subcutaneous tissue: Normal without rashes or lesions.   Lymphatic  Palpation of the lymph nodes in neck: No lymphadenopathy.   Psychiatric  Orientation to person, place and time: Normal.  Mood and affect: Normal.         Labs:   Lab Results   Component Value Date    ALT 15 05/20/2024    AST 19 05/20/2024    BUN 14 05/20/2024    CALCIUM 9.1 05/20/2024     05/20/2024    CO2 30 05/20/2024    CREATININE 0.73 05/20/2024    HDL 32 (L) 05/20/2024    HCT 36.8 05/20/2024    HGB 11.0 (L) 05/20/2024    MG 2.1 09/16/2021     05/20/2024    K 4.4 05/20/2024     08/25/2015    TRIG 225 (H) 05/20/2024    WBC 5.57 05/20/2024         X-Rays & Procedures:   No orders to display     CT chest abdomen pelvis w contrast  Status: Final result     PACS Images     Show images for CT chest abdomen pelvis w contrast  Study Result    Narrative & Impression   CT CHEST, ABDOMEN AND PELVIS WITH IV CONTRAST     INDICATION: C50.911: Malignant neoplasm of unspecified site of right female breast  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 Patient recently underwent biopsy of 2 masses in the right breast at the 9:00 and 7:00 positions, both positive for invasive ductal carcinoma ER/AZ positive, HER2 negative.   Right sided axillary lymph node biopsy was consistent with metastatic disease. Patient also underwent biopsy of a left breast nodule with benign  results.     COMPARISON: Most recent prior CT scan for comparison is an unenhanced CT scan of the abdomen and pelvis dated September 13, 2016.     TECHNIQUE: CT examination of the chest, abdomen and pelvis was performed. Multiplanar 2D reformatted images were created from the source data.     This examination, like all CT scans performed in the [facility], was performed utilizing techniques to minimize radiation dose exposure, including the use of iterative reconstruction and automated exposure control. Radiation dose length   product (DLP) for this visit: 643 mGy-cm     IV Contrast: 100 mL of iohexol (OMNIPAQUE)  Enteric Contrast: Not administered.     FINDINGS:     CHEST     LUNGS: Symmetric biapical nodular pleural parenchymal thickening. No suspicious pulmonary nodules. Central airways are patent.     PLEURA: Focal pleural thickening involving the right inferior medial pleura adjacent to the T11 vertebral body, not significantly changed when compared to a prior CT scan of the chest dated September 25, 2015. There are associated pleural calcifications.     HEART/GREAT VESSELS: Coronary artery calcifications, which are an indicator of coronary artery disease.. No thoracic aortic aneurysm.     MEDIASTINUM AND MELODIE: Mildly enlarged right infrahilar lymph node measuring 9 mm in the short axis (2, 68). Few right internal mammary lymph nodes which measure up to 4 mm in the short axis (series 2 images 37, 47, and 50).     The esophagus is distended with air and fluid compatible with gastroesophageal reflux. Small hiatal hernia.     CHEST WALL AND LOWER NECK: At approximately the 9 o'clock position in the right breast, there is a 2.9 x 1.8 cm lobular enhancing mass containing a central localizer clip. At the 7 o'clock position, there is a 1.3 x 0.8 cm nodule, also containing a   central localizer clip. Within the left breast, there is a 1.5 cm nodule containing a central localizer clip corresponding to  the benign nodule which was biopsied.     Few right axillary and subpectoral lymph nodes are noted, measuring up to 0.5 cm in the short axis, with 1 of these lymph nodes containing a localizer clip.     ABDOMEN     LIVER/BILIARY TREE: No focal liver lesions.     GALLBLADDER: No calcified gallstones. No pericholecystic inflammatory change.     SPLEEN: Splenomegaly with the spleen measuring 16.1 cm craniocaudally.     PANCREAS: Unremarkable.     ADRENAL GLANDS: Unremarkable.     KIDNEYS/URETERS: No obstructing calculi or collecting system dilatation. Areas of cortical scarring bilaterally greater on the left. Few subcentimeter low-attenuation lesions in both kidneys too small to characterize but statistically represent cysts.     STOMACH AND BOWEL: Large amount of formed stool throughout the colon. No evidence of bowel obstruction. Scattered colonic diverticula.     0.7 cm rounded enhancing focus in the gastric fundus (series 2 image 113) and similar-appearing 5 mm focus slightly more cephalad in the fundus (series 2 image 110)     Small hiatal hernia and gastroesophageal reflux as described above.     APPENDIX: No findings to suggest appendicitis.     ABDOMINOPELVIC CAVITY: No ascites. No pneumoperitoneum. No lymphadenopathy.     VESSELS: Atherosclerotic     PELVIS     REPRODUCTIVE ORGANS: Unremarkable for patient's age.     URINARY BLADDER: Unremarkable.     ABDOMINAL WALL/INGUINAL REGIONS: Unremarkable.     BONES: No acute fracture or suspicious osseous lesion.     IMPRESSION:     Enhancing soft tissue nodules in the right breast corresponding to biopsy-proven cancer as described above. Benign nodule on the left also shown.     Mildly enlarged right infrahilar lymph node and few right internal mammary lymph nodes are indeterminate but raise concern for involvement with tumor.     Few right axillary and subpectoral lymph nodes measuring up to 0.5 cm in the short axis are indeterminate. Consider further evaluation  with PET/CT.     2 enhancing soft tissue nodules in the gastric fundus as described above. These are of uncertain etiology and may represent GI stromal tumors, with metastatic disease from breast cancer considered less likely. Consider further evaluation with endoscopy.     The study was marked in EPIC for immediate notification.      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 w/apocrine features (bx R breast 4/4/24)     TECHNIQUE:   8.2 mCi F-18-FDG administered IV. Multiplanar attenuation corrected and non attenuation corrected PET images are available for interpretation, and contiguous, low dose, axial CT sections were obtained from the vertex through the femurs.   Intravenous contrast material was not utilized. This examination, like all CT scans performed in the [facility], was performed utilizing techniques to minimize radiation dose exposure, including the use of iterative reconstruction and   automated exposure control.     Fasting serum glucose: 97 mg/dl     FINDINGS:     VISUALIZED BRAIN:  No acute abnormalities are seen.     HEAD/NECK:  Image 3/68, there is a left upper cervical node above the hyoid and deep to the anterior margin of the left sternocleidomastoid muscle, with increased FDG activity, SUV 3.5. This is concerning for metastasis.     No hypermetabolic right cervical adenopathy.     CT images: Complete opacification of the left maxillary sinus.     CHEST:  Right lateral breast nodule with biopsy clip image 4/146  measures 2.3 x 1.8 cm, SUV 8.6, compatible with known malignancy.     Image 3/151, more medial right breast nodule with probable clip as seen on prior diagnostic CT measures 1 cm, but does not demonstrate significant FDG uptake compared to background, SUV 1.3.     Image 3/143, left breast nodule with clip also does not demonstrate significant FDG uptake, SUV 1.     Image 3/110, hypermetabolic right axillary node measuring 1.2 cm, SUV 4.5, is most compatible with a metastasis. Additional probable metastatic right axillary node image 3/119 measures 6 mm, SUV 2.5.     There is mild nonfocal bilateral hilar and perihilar activity, SUV measuring 2.3 on the right and 2.7 on the left.     No hypermetabolic mediastinal adenopathy.  No hypermetabolic internal mammary nodes visualized.     CT images: Coronary atherosclerosis. Right lower medial calcified pleural plaques and pleural thickening. Biapical pleural-parenchymal scarring. Distended patulous esophagus with retained debris and fluid.     ABDOMEN:  No FDG avid soft tissue lesions are seen.  CT images: Stable splenomegaly measuring 15 cm CC dimension.     PELVIS:  No FDG avid soft tissue lesions are seen.  CT images: Unremarkable.     OSSEOUS STRUCTURES:  No FDG avid lesions are seen.  CT images: No significant findings.     IMPRESSION:  1. Right lateral breast hypermetabolic nodule compatible with known malignancy. There is right axillary metastatic adenopathy.  2. Hypermetabolic left upper cervical node is also concerning for metastasis.             ______________________________________________________________________      Assessment & Plan:      Diagnoses and all orders for this visit:    Abnormal CT scan, stomach  -     EGD; Future    Gastric nodule  -     Ambulatory Referral to Gastroenterology  -     EGD; Future        Have taken the liberty of scheduling the patient for an EGD.  I will be happy to inform you of her results and further recommendations.  I would  like to thank you for allowing me to participate in her care.    I obtained informed consent from the patient. The risks/benefits/alternatives of the procedure were discussed with the patient. Risks included, but not limited to, infection, bleeding, perforation, injury to organs in the abdomen, missed lesion and incomplete procedure were discussed. Patient was agreeable and electronic signature was obtained.              With warmest regards,    Leonardo Barnes MD, FACG

## 2024-05-23 NOTE — LETTER
May 23, 2024     Earnest Quiroz, DO  [address] [facility] [address]    Patient: Juve Duarte   YOB: 1950   Date of Visit: 5/23/2024       Dear Dr. Quiroz:    Thank you for referring Juve Duarte to me for evaluation. Below are my notes for this consultation.    If you have questions, please do not hesitate to call me. I look forward to following your patient along with you.         Sincerely,        Leonardo Barnes MD        CC: No Recipients    Leonardo Barnes MD  5/23/2024  1:35 PM  Sign when Signing Visit  [facility]    Dear Dr. Quiroz,    I had the pleasure of seeing your patient Juve Duarte in the office today and I thank you for this kind referral.       Chief Complaint: Abnormal x-ray      HPI:  Juve Duarte is a 74 y.o. female who presents with abnormal CT scan of the stomach indicating gastric nodules.  The CT scan was performed on April 18, 2024 and is outlined below.  Patient had a PET CT scan because of malignant neoplasm of the right breast on May 2, 2024 which did not show any enhancing nodularity in the stomach.  That is also outlined below.  Patient has no history of any upper GI symptomatology.  She denies any abdominal pain nausea or vomiting.  No GERD dysphagia odynophagia or any other issues.  She had recent weight loss with a cold but this has stabilized.  She denies any melena or hematochezia.  She has no other complaints.  She has no shortness of breath or chest pain..      Review of Systems:   Constitutional: No fever or chills, feels well, no tiredness, no recent weight gain or weight loss.   HENT: No complaints of earache, no hearing loss, no nosebleeds, no nasal discharge, no sore throat, no hoarseness.    Eyes: No complaints of eye pain, no red eyes, no discharge from eyes, no itchy eyes.  Cardiovascular: No complaints of slow heart rate, no fast heart rate, no chest pain, no palpitations, no leg claudication, no lower extremity edema.    Respiratory: No complaints of shortness of breath, no wheezing, no cough, no SOB on exertion, no orthopnea.   Gastrointestinal: As noted in HPI  Genitourinary: No complaints of dysuria, no incontinence, no hesitancy, no nocturia.   Musculoskeletal: No complaints of arthralgia, no myalgias, no joint swelling or stiffness, no limb pain or swelling.   Neurological: No complaints of headache, no confusion, no convulsions, no numbness or tingling, no dizziness or fainting, no limb weakness, no difficulty walking.    Skin: No complaints of skin rash or skin lesions, no itching, no skin wound, no dry skin.    Hematological/Lymphatic: No complaints of swollen glands, does not bleed easy.   Allergic/Immunologic: No immunocompromised state.  Endocrine:  No complaints of polyuria, no polydipsia.   Psychiatric/Behavioral: is not suicidal, no sleep disturbances, no anxiety or depression, no change in personality, no emotional problems.       Historical Information  Past Medical History:   Diagnosis Date   • Colon polyp    • Esophagitis    • History of benign breast tumor    • Hyperlipidemia    • Urinary incontinence    • Venous ulcer of ankle (HCC) 01/10/2022     Past Surgical History:   Procedure Laterality Date   • ANKLE SURGERY Right    • BREAST EXCISIONAL BIOPSY Left 1998    benign   • BUNIONECTOMY Bilateral    • COLONOSCOPY     • CORRECTION HAMMER TOE Bilateral    • MAMMO STEREOTACTIC BREAST BIOPSY LEFT (ALL INC) Left 4/4/2024   • MAMMO STEREOTACTIC BREAST BIOPSY RIGHT (ALL INC) EACH ADD Right 4/4/2024   • US GUIDANCE BREAST BIOPSY RIGHT EACH ADDITIONAL Right 4/4/2024   • US GUIDED BREAST BIOPSY RIGHT COMPLETE Right 4/4/2024   • US GUIDED BREAST LYMPH NODE BIOPSY RIGHT Right 4/4/2024   • US GUIDED LYMPH NODE BIOPSY LEFT  5/15/2024   • VEIN LIGATION Right 06/06/2022    Procedure: Venaseal therapy and stab phlebectomy;  Surgeon: Gosia Shepherd MD;  Location: [facility];  Service: Vascular     Social History  Social  "History     Substance and Sexual Activity   Alcohol Use Yes   • Alcohol/week: 1.0 standard drink of alcohol   • Types: 1 Standard drinks or equivalent per week    Comment: Socially     Social History     Substance and Sexual Activity   Drug Use Never     Social History     Tobacco Use   Smoking Status Former   • Current packs/day: 0.00   • Types: Cigarettes   • Start date:    • Quit date:    • Years since quittin.4   Smokeless Tobacco Never     Family History   Problem Relation Age of Onset   • No Known Problems Mother    • Heart disease Father    • No Known Problems Daughter    • No Known Problems Maternal Grandmother    • No Known Problems Maternal Grandfather    • No Known Problems Paternal Grandmother    • No Known Problems Paternal Grandfather    • Throat cancer Brother    • No Known Problems Maternal Aunt    • No Known Problems Maternal Aunt    • No Known Problems Maternal Aunt    • No Known Problems Maternal Aunt    • No Known Problems Paternal Aunt    • No Known Problems Paternal Aunt    • No Known Problems Paternal Aunt    • No Known Problems Paternal Aunt    • BRCA2 Negative Neg Hx    • BRCA2 Positive Neg Hx    • BRCA1 Positive Neg Hx    • BRCA1 Negative Neg Hx    • BRCA 1/2 Neg Hx    • Ovarian cancer Neg Hx    • Endometrial cancer Neg Hx    • Colon cancer Neg Hx    • Breast cancer additional onset Neg Hx    • Breast cancer Neg Hx          Current Medications: has a current medication list which includes the following prescription(s): acetaminophen, vitamin c, hydrochlorothiazide, multivitamin with minerals, naproxen, pravastatin, and zinc, and the following Facility-Administered Medications: cyanocobalamin.       Vital Signs: /60   Pulse 76   Ht 5' 6\" (1.676 m)   Wt 66.2 kg (146 lb)   SpO2 98%   BMI 23.57 kg/m²     Physical Exam:   Constitutional  General Appearance: No acute distress, well appearing and well nourished  Head  Normocephalic  Eyes  Conjunctivae and lids: No swelling, " erythema, or discharge.    Pupils and irises: Equal, round and reactive to light.   Ears, Nose, Mouth, and Throat  External inspection of ears and nose: Normal  Nasal mucosa, septum and turbinates: Normal without edema or erythema/   Oropharynx: Normal with no erythema, edema, exudate or lesions.   Neck  Normal range of motion. Neck supple.   Cardiovascular  Auscultation of the heart: Normal rate and rhythm, normal S1 and S2 without murmurs.  Examination of the extremities for edema and/or varicosities: Normal  Pulmonary/Chest  Respiratory effort: No increased work of breathing or signs of respiratory distress.   Auscultation of lungs: Clear to auscultation, equal breath sounds bilaterally, no wheezes, rales, no rhonchi.   Abdomen  Abdomen: Non-tender, no masses.   Liver and spleen: No hepatomegaly or splenomegaly.   Musculoskeletal  Gait and station: normal.  Digits and Nails: normal without clubbing or cyanosis.  Inspection/palpation of joints, bones, and muscles: Normal  Neurological  No nystagmus or asterixis.   Skin  Skin and subcutaneous tissue: Normal without rashes or lesions.   Lymphatic  Palpation of the lymph nodes in neck: No lymphadenopathy.   Psychiatric  Orientation to person, place and time: Normal.  Mood and affect: Normal.         Labs:   Lab Results   Component Value Date    ALT 15 05/20/2024    AST 19 05/20/2024    BUN 14 05/20/2024    CALCIUM 9.1 05/20/2024     05/20/2024    CO2 30 05/20/2024    CREATININE 0.73 05/20/2024    HDL 32 (L) 05/20/2024    HCT 36.8 05/20/2024    HGB 11.0 (L) 05/20/2024    MG 2.1 09/16/2021     05/20/2024    K 4.4 05/20/2024     08/25/2015    TRIG 225 (H) 05/20/2024    WBC 5.57 05/20/2024         X-Rays & Procedures:   No orders to display     CT chest abdomen pelvis w contrast  Status: Final result     PACS Images     Show images for CT chest abdomen pelvis w contrast  Study Result    Narrative & Impression   CT CHEST, ABDOMEN AND PELVIS WITH IV  CONTRAST     INDICATION: C50.911: Malignant neoplasm of unspecified site of right female breast  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 Patient recently underwent biopsy of 2 masses in the right breast at the 9:00 and 7:00 positions, both positive for invasive ductal carcinoma ER/[place] positive, HER2 negative.   Right sided axillary lymph node biopsy was consistent with metastatic disease. Patient also underwent biopsy of a left breast nodule with benign results.     COMPARISON: Most recent prior CT scan for comparison is an unenhanced CT scan of the abdomen and pelvis dated September 13, 2016.     TECHNIQUE: CT examination of the chest, abdomen and pelvis was performed. Multiplanar 2D reformatted images were created from the source data.     This examination, like all CT scans performed in the [facility], was performed utilizing techniques to minimize radiation dose exposure, including the use of iterative reconstruction and automated exposure control. Radiation dose length   product (DLP) for this visit: 643 mGy-cm     IV Contrast: 100 mL of iohexol (OMNIPAQUE)  Enteric Contrast: Not administered.     FINDINGS:     CHEST     LUNGS: Symmetric biapical nodular pleural parenchymal thickening. No suspicious pulmonary nodules. Central airways are patent.     PLEURA: Focal pleural thickening involving the right inferior medial pleura adjacent to the T11 vertebral body, not significantly changed when compared to a prior CT scan of the chest dated September 25, 2015. There are associated pleural calcifications.     HEART/GREAT VESSELS: Coronary artery calcifications, which are an indicator of coronary artery disease.. No thoracic aortic aneurysm.     MEDIASTINUM AND MELODIE: Mildly enlarged right infrahilar lymph node measuring 9 mm in the short axis (2, 68). Few right internal  mammary lymph nodes which measure up to 4 mm in the short axis (series 2 images 37, 47, and 50).     The esophagus is distended with air and fluid compatible with gastroesophageal reflux. Small hiatal hernia.     CHEST WALL AND LOWER NECK: At approximately the 9 o'clock position in the right breast, there is a 2.9 x 1.8 cm lobular enhancing mass containing a central localizer clip. At the 7 o'clock position, there is a 1.3 x 0.8 cm nodule, also containing a   central localizer clip. Within the left breast, there is a 1.5 cm nodule containing a central localizer clip corresponding to the benign nodule which was biopsied.     Few right axillary and subpectoral lymph nodes are noted, measuring up to 0.5 cm in the short axis, with 1 of these lymph nodes containing a localizer clip.     ABDOMEN     LIVER/BILIARY TREE: No focal liver lesions.     GALLBLADDER: No calcified gallstones. No pericholecystic inflammatory change.     SPLEEN: Splenomegaly with the spleen measuring 16.1 cm craniocaudally.     PANCREAS: Unremarkable.     ADRENAL GLANDS: Unremarkable.     KIDNEYS/URETERS: No obstructing calculi or collecting system dilatation. Areas of cortical scarring bilaterally greater on the left. Few subcentimeter low-attenuation lesions in both kidneys too small to characterize but statistically represent cysts.     STOMACH AND BOWEL: Large amount of formed stool throughout the colon. No evidence of bowel obstruction. Scattered colonic diverticula.     0.7 cm rounded enhancing focus in the gastric fundus (series 2 image 113) and similar-appearing 5 mm focus slightly more cephalad in the fundus (series 2 image 110)     Small hiatal hernia and gastroesophageal reflux as described above.     APPENDIX: No findings to suggest appendicitis.     ABDOMINOPELVIC CAVITY: No ascites. No pneumoperitoneum. No lymphadenopathy.     VESSELS: Atherosclerotic     PELVIS     REPRODUCTIVE ORGANS: Unremarkable for patient's age.     URINARY  BLADDER: Unremarkable.     ABDOMINAL WALL/INGUINAL REGIONS: Unremarkable.     BONES: No acute fracture or suspicious osseous lesion.     IMPRESSION:     Enhancing soft tissue nodules in the right breast corresponding to biopsy-proven cancer as described above. Benign nodule on the left also shown.     Mildly enlarged right infrahilar lymph node and few right internal mammary lymph nodes are indeterminate but raise concern for involvement with tumor.     Few right axillary and subpectoral lymph nodes measuring up to 0.5 cm in the short axis are indeterminate. Consider further evaluation with PET/CT.     2 enhancing soft tissue nodules in the gastric fundus as described above. These are of uncertain etiology and may represent GI stromal tumors, with metastatic disease from breast cancer considered less likely. Consider further evaluation with endoscopy.     The study was marked in EPIC for immediate notification.      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 w/apocrine features (bx R breast 4/4/24)     TECHNIQUE:   8.2 mCi F-18-FDG administered IV. Multiplanar attenuation corrected and non attenuation corrected PET images are available for interpretation, and contiguous, low dose, axial CT sections were obtained from the vertex through the femurs.   Intravenous contrast material was not utilized. This examination, like all CT scans performed in the [facility], was performed utilizing techniques to minimize radiation dose exposure,  including the use of iterative reconstruction and   automated exposure control.     Fasting serum glucose: 97 mg/dl     FINDINGS:     VISUALIZED BRAIN:  No acute abnormalities are seen.     HEAD/NECK:  Image 3/68, there is a left upper cervical node above the hyoid and deep to the anterior margin of the left sternocleidomastoid muscle, with increased FDG activity, SUV 3.5. This is concerning for metastasis.     No hypermetabolic right cervical adenopathy.     CT images: Complete opacification of the left maxillary sinus.     CHEST:  Right lateral breast nodule with biopsy clip image 4/146 measures 2.3 x 1.8 cm, SUV 8.6, compatible with known malignancy.     Image 3/151, more medial right breast nodule with probable clip as seen on prior diagnostic CT measures 1 cm, but does not demonstrate significant FDG uptake compared to background, SUV 1.3.     Image 3/143, left breast nodule with clip also does not demonstrate significant FDG uptake, SUV 1.     Image 3/110, hypermetabolic right axillary node measuring 1.2 cm, SUV 4.5, is most compatible with a metastasis. Additional probable metastatic right axillary node image 3/119 measures 6 mm, SUV 2.5.     There is mild nonfocal bilateral hilar and perihilar activity, SUV measuring 2.3 on the right and 2.7 on the left.     No hypermetabolic mediastinal adenopathy.  No hypermetabolic internal mammary nodes visualized.     CT images: Coronary atherosclerosis. Right lower medial calcified pleural plaques and pleural thickening. Biapical pleural-parenchymal scarring. Distended patulous esophagus with retained debris and fluid.     ABDOMEN:  No FDG avid soft tissue lesions are seen.  CT images: Stable splenomegaly measuring 15 cm CC dimension.     PELVIS:  No FDG avid soft tissue lesions are seen.  CT images: Unremarkable.     OSSEOUS STRUCTURES:  No FDG avid lesions are seen.  CT images: No significant findings.     IMPRESSION:  1. Right lateral breast hypermetabolic nodule  compatible with known malignancy. There is right axillary metastatic adenopathy.  2. Hypermetabolic left upper cervical node is also concerning for metastasis.             ______________________________________________________________________      Assessment & Plan:      Diagnoses and all orders for this visit:    Abnormal CT scan, stomach  -     EGD; Future    Gastric nodule  -     Ambulatory Referral to Gastroenterology  -     EGD; Future        Have taken the liberty of scheduling the patient for an EGD.  I will be happy to inform you of her results and further recommendations.  I would like to thank you for allowing me to participate in her care.    I obtained informed consent from the patient. The risks/benefits/alternatives of the procedure were discussed with the patient. Risks included, but not limited to, infection, bleeding, perforation, injury to organs in the abdomen, missed lesion and incomplete procedure were discussed. Patient was agreeable and electronic signature was obtained.              With warmest regards,    Leonardo Barnes MD, FACG

## 2024-05-23 NOTE — PROGRESS NOTES
[facility]    Dear Dr. Quiroz,    I had the pleasure of seeing your patient Juve Duarte in the office today and I thank you for this kind referral.       Chief Complaint: Abnormal x-ray      HPI:  Juve Duarte is a 74 y.o. female who presents with abnormal CT scan of the stomach indicating gastric nodules.  The CT scan was performed on April 18, 2024 and is outlined below.  Patient had a PET CT scan because of malignant neoplasm of the right breast on May 2, 2024 which did not show any enhancing nodularity in the stomach.  That is also outlined below.  Patient has no history of any upper GI symptomatology.  She denies any abdominal pain nausea or vomiting.  No GERD dysphagia odynophagia or any other issues.  She had recent weight loss with a cold but this has stabilized.  She denies any melena or hematochezia.  She has no other complaints.  She has no shortness of breath or chest pain..      Review of Systems:   Constitutional: No fever or chills, feels well, no tiredness, no recent weight gain or weight loss.   HENT: No complaints of earache, no hearing loss, no nosebleeds, no nasal discharge, no sore throat, no hoarseness.    Eyes: No complaints of eye pain, no red eyes, no discharge from eyes, no itchy eyes.  Cardiovascular: No complaints of slow heart rate, no fast heart rate, no chest pain, no palpitations, no leg claudication, no lower extremity edema.   Respiratory: No complaints of shortness of breath, no wheezing, no cough, no SOB on exertion, no orthopnea.   Gastrointestinal: As noted in HPI  Genitourinary: No complaints of dysuria, no incontinence, no hesitancy, no nocturia.   Musculoskeletal: No complaints of arthralgia, no myalgias, no joint swelling or stiffness, no limb pain or swelling.   Neurological: No complaints of headache, no confusion, no convulsions, no numbness or tingling, no dizziness or fainting, no limb weakness, no difficulty walking.    Skin: No complaints  of skin rash or skin lesions, no itching, no skin wound, no dry skin.    Hematological/Lymphatic: No complaints of swollen glands, does not bleed easy.   Allergic/Immunologic: No immunocompromised state.  Endocrine:  No complaints of polyuria, no polydipsia.   Psychiatric/Behavioral: is not suicidal, no sleep disturbances, no anxiety or depression, no change in personality, no emotional problems.       Historical Information   Past Medical History:   Diagnosis Date    Colon polyp     Esophagitis     History of benign breast tumor     Hyperlipidemia     Urinary incontinence     Venous ulcer of ankle (HCC) 01/10/2022     Past Surgical History:   Procedure Laterality Date    ANKLE SURGERY Right     BREAST EXCISIONAL BIOPSY Left     benign    BUNIONECTOMY Bilateral     COLONOSCOPY      CORRECTION HAMMER TOE Bilateral     MAMMO STEREOTACTIC BREAST BIOPSY LEFT (ALL INC) Left 2024    MAMMO STEREOTACTIC BREAST BIOPSY RIGHT (ALL INC) EACH ADD Right 2024    US GUIDANCE BREAST BIOPSY RIGHT EACH ADDITIONAL Right 2024    US GUIDED BREAST BIOPSY RIGHT COMPLETE Right 2024    US GUIDED BREAST LYMPH NODE BIOPSY RIGHT Right 2024    US GUIDED LYMPH NODE BIOPSY LEFT  5/15/2024    VEIN LIGATION Right 2022    Procedure: Venaseal therapy and stab phlebectomy;  Surgeon: Gosia Shepherd MD;  Location: [facility];  Service: Vascular     Social History   Social History     Substance and Sexual Activity   Alcohol Use Yes    Alcohol/week: 1.0 standard drink of alcohol    Types: 1 Standard drinks or equivalent per week    Comment: Socially     Social History     Substance and Sexual Activity   Drug Use Never     Social History     Tobacco Use   Smoking Status Former    Current packs/day: 0.00    Types: Cigarettes    Start date:     Quit date:     Years since quittin.4   Smokeless Tobacco Never     Family History   Problem Relation Age of Onset    No Known Problems Mother     Heart disease Father   "   No Known Problems Daughter     No Known Problems Maternal Grandmother     No Known Problems Maternal Grandfather     No Known Problems Paternal Grandmother     No Known Problems Paternal Grandfather     Throat cancer Brother     No Known Problems Maternal Aunt     No Known Problems Maternal Aunt     No Known Problems Maternal Aunt     No Known Problems Maternal Aunt     No Known Problems Paternal Aunt     No Known Problems Paternal Aunt     No Known Problems Paternal Aunt     No Known Problems Paternal Aunt     BRCA2 Negative Neg Hx     BRCA2 Positive Neg Hx     BRCA1 Positive Neg Hx     BRCA1 Negative Neg Hx     BRCA 1/2 Neg Hx     Ovarian cancer Neg Hx     Endometrial cancer Neg Hx     Colon cancer Neg Hx     Breast cancer additional onset Neg Hx     Breast cancer Neg Hx          Current Medications: has a current medication list which includes the following prescription(s): acetaminophen, vitamin c, hydrochlorothiazide, multivitamin with minerals, naproxen, pravastatin, and zinc, and the following Facility-Administered Medications: cyanocobalamin.       Vital Signs: /60   Pulse 76   Ht 5' 6\" (1.676 m)   Wt 66.2 kg (146 lb)   SpO2 98%   BMI 23.57 kg/m²     Physical Exam:   Constitutional  General Appearance: No acute distress, well appearing and well nourished  Head  Normocephalic  Eyes  Conjunctivae and lids: No swelling, erythema, or discharge.    Pupils and irises: Equal, round and reactive to light.   Ears, Nose, Mouth, and Throat  External inspection of ears and nose: Normal  Nasal mucosa, septum and turbinates: Normal without edema or erythema/   Oropharynx: Normal with no erythema, edema, exudate or lesions.   Neck  Normal range of motion. Neck supple.   Cardiovascular  Auscultation of the heart: Normal rate and rhythm, normal S1 and S2 without murmurs.  Examination of the extremities for edema and/or varicosities: Normal  Pulmonary/Chest  Respiratory effort: No increased work of breathing or " signs of respiratory distress.   Auscultation of lungs: Clear to auscultation, equal breath sounds bilaterally, no wheezes, rales, no rhonchi.   Abdomen  Abdomen: Non-tender, no masses.   Liver and spleen: No hepatomegaly or splenomegaly.   Musculoskeletal  Gait and station: normal.  Digits and Nails: normal without clubbing or cyanosis.  Inspection/palpation of joints, bones, and muscles: Normal  Neurological  No nystagmus or asterixis.   Skin  Skin and subcutaneous tissue: Normal without rashes or lesions.   Lymphatic  Palpation of the lymph nodes in neck: No lymphadenopathy.   Psychiatric  Orientation to person, place and time: Normal.  Mood and affect: Normal.         Labs:   Lab Results   Component Value Date    ALT 15 05/20/2024    AST 19 05/20/2024    BUN 14 05/20/2024    CALCIUM 9.1 05/20/2024     05/20/2024    CO2 30 05/20/2024    CREATININE 0.73 05/20/2024    HDL 32 (L) 05/20/2024    HCT 36.8 05/20/2024    HGB 11.0 (L) 05/20/2024    MG 2.1 09/16/2021     05/20/2024    K 4.4 05/20/2024     08/25/2015    TRIG 225 (H) 05/20/2024    WBC 5.57 05/20/2024         X-Rays & Procedures:   No orders to display     CT chest abdomen pelvis w contrast  Status: Final result     PACS Images     Show images for CT chest abdomen pelvis w contrast  Study Result    Narrative & Impression   CT CHEST, ABDOMEN AND PELVIS WITH IV CONTRAST     INDICATION: C50.911: Malignant neoplasm of unspecified site of right female breast  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 Patient recently underwent biopsy of 2 masses in the right breast at the 9:00 and 7:00 positions, both positive for invasive ductal carcinoma ER/NV positive, HER2 negative.   Right sided axillary lymph node biopsy was consistent with metastatic disease. Patient also underwent biopsy of a left breast nodule with benign  results.     COMPARISON: Most recent prior CT scan for comparison is an unenhanced CT scan of the abdomen and pelvis dated September 13, 2016.     TECHNIQUE: CT examination of the chest, abdomen and pelvis was performed. Multiplanar 2D reformatted images were created from the source data.     This examination, like all CT scans performed in the [facility], was performed utilizing techniques to minimize radiation dose exposure, including the use of iterative reconstruction and automated exposure control. Radiation dose length   product (DLP) for this visit: 643 mGy-cm     IV Contrast: 100 mL of iohexol (OMNIPAQUE)  Enteric Contrast: Not administered.     FINDINGS:     CHEST     LUNGS: Symmetric biapical nodular pleural parenchymal thickening. No suspicious pulmonary nodules. Central airways are patent.     PLEURA: Focal pleural thickening involving the right inferior medial pleura adjacent to the T11 vertebral body, not significantly changed when compared to a prior CT scan of the chest dated September 25, 2015. There are associated pleural calcifications.     HEART/GREAT VESSELS: Coronary artery calcifications, which are an indicator of coronary artery disease.. No thoracic aortic aneurysm.     MEDIASTINUM AND MELODIE: Mildly enlarged right infrahilar lymph node measuring 9 mm in the short axis (2, 68). Few right internal mammary lymph nodes which measure up to 4 mm in the short axis (series 2 images 37, 47, and 50).     The esophagus is distended with air and fluid compatible with gastroesophageal reflux. Small hiatal hernia.     CHEST WALL AND LOWER NECK: At approximately the 9 o'clock position in the right breast, there is a 2.9 x 1.8 cm lobular enhancing mass containing a central localizer clip. At the 7 o'clock position, there is a 1.3 x 0.8 cm nodule, also containing a   central localizer clip. Within the left breast, there is a 1.5 cm nodule containing a central localizer clip corresponding to  the benign nodule which was biopsied.     Few right axillary and subpectoral lymph nodes are noted, measuring up to 0.5 cm in the short axis, with 1 of these lymph nodes containing a localizer clip.     ABDOMEN     LIVER/BILIARY TREE: No focal liver lesions.     GALLBLADDER: No calcified gallstones. No pericholecystic inflammatory change.     SPLEEN: Splenomegaly with the spleen measuring 16.1 cm craniocaudally.     PANCREAS: Unremarkable.     ADRENAL GLANDS: Unremarkable.     KIDNEYS/URETERS: No obstructing calculi or collecting system dilatation. Areas of cortical scarring bilaterally greater on the left. Few subcentimeter low-attenuation lesions in both kidneys too small to characterize but statistically represent cysts.     STOMACH AND BOWEL: Large amount of formed stool throughout the colon. No evidence of bowel obstruction. Scattered colonic diverticula.     0.7 cm rounded enhancing focus in the gastric fundus (series 2 image 113) and similar-appearing 5 mm focus slightly more cephalad in the fundus (series 2 image 110)     Small hiatal hernia and gastroesophageal reflux as described above.     APPENDIX: No findings to suggest appendicitis.     ABDOMINOPELVIC CAVITY: No ascites. No pneumoperitoneum. No lymphadenopathy.     VESSELS: Atherosclerotic     PELVIS     REPRODUCTIVE ORGANS: Unremarkable for patient's age.     URINARY BLADDER: Unremarkable.     ABDOMINAL WALL/INGUINAL REGIONS: Unremarkable.     BONES: No acute fracture or suspicious osseous lesion.     IMPRESSION:     Enhancing soft tissue nodules in the right breast corresponding to biopsy-proven cancer as described above. Benign nodule on the left also shown.     Mildly enlarged right infrahilar lymph node and few right internal mammary lymph nodes are indeterminate but raise concern for involvement with tumor.     Few right axillary and subpectoral lymph nodes measuring up to 0.5 cm in the short axis are indeterminate. Consider further evaluation  with PET/CT.     2 enhancing soft tissue nodules in the gastric fundus as described above. These are of uncertain etiology and may represent GI stromal tumors, with metastatic disease from breast cancer considered less likely. Consider further evaluation with endoscopy.     The study was marked in EPIC for immediate notification.      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 w/apocrine features (bx R breast 4/4/24)     TECHNIQUE:   8.2 mCi F-18-FDG administered IV. Multiplanar attenuation corrected and non attenuation corrected PET images are available for interpretation, and contiguous, low dose, axial CT sections were obtained from the vertex through the femurs.   Intravenous contrast material was not utilized. This examination, like all CT scans performed in the [facility], was performed utilizing techniques to minimize radiation dose exposure, including the use of iterative reconstruction and   automated exposure control.     Fasting serum glucose: 97 mg/dl     FINDINGS:     VISUALIZED BRAIN:  No acute abnormalities are seen.     HEAD/NECK:  Image 3/68, there is a left upper cervical node above the hyoid and deep to the anterior margin of the left sternocleidomastoid muscle, with increased FDG activity, SUV 3.5. This is concerning for metastasis.     No hypermetabolic right cervical adenopathy.     CT images: Complete opacification of the left maxillary sinus.     CHEST:  Right lateral breast nodule with biopsy clip image 4/146  measures 2.3 x 1.8 cm, SUV 8.6, compatible with known malignancy.     Image 3/151, more medial right breast nodule with probable clip as seen on prior diagnostic CT measures 1 cm, but does not demonstrate significant FDG uptake compared to background, SUV 1.3.     Image 3/143, left breast nodule with clip also does not demonstrate significant FDG uptake, SUV 1.     Image 3/110, hypermetabolic right axillary node measuring 1.2 cm, SUV 4.5, is most compatible with a metastasis. Additional probable metastatic right axillary node image 3/119 measures 6 mm, SUV 2.5.     There is mild nonfocal bilateral hilar and perihilar activity, SUV measuring 2.3 on the right and 2.7 on the left.     No hypermetabolic mediastinal adenopathy.  No hypermetabolic internal mammary nodes visualized.     CT images: Coronary atherosclerosis. Right lower medial calcified pleural plaques and pleural thickening. Biapical pleural-parenchymal scarring. Distended patulous esophagus with retained debris and fluid.     ABDOMEN:  No FDG avid soft tissue lesions are seen.  CT images: Stable splenomegaly measuring 15 cm CC dimension.     PELVIS:  No FDG avid soft tissue lesions are seen.  CT images: Unremarkable.     OSSEOUS STRUCTURES:  No FDG avid lesions are seen.  CT images: No significant findings.     IMPRESSION:  1. Right lateral breast hypermetabolic nodule compatible with known malignancy. There is right axillary metastatic adenopathy.  2. Hypermetabolic left upper cervical node is also concerning for metastasis.             ______________________________________________________________________      Assessment & Plan:      Diagnoses and all orders for this visit:    Abnormal CT scan, stomach  -     EGD; Future    Gastric nodule  -     Ambulatory Referral to Gastroenterology  -     EGD; Future        Have taken the liberty of scheduling the patient for an EGD.  I will be happy to inform you of her results and further recommendations.  I would  like to thank you for allowing me to participate in her care.    I obtained informed consent from the patient. The risks/benefits/alternatives of the procedure were discussed with the patient. Risks included, but not limited to, infection, bleeding, perforation, injury to organs in the abdomen, missed lesion and incomplete procedure were discussed. Patient was agreeable and electronic signature was obtained.              With warmest regards,    Leonardo Barnes MD, FACG

## 2024-05-28 ENCOUNTER — OFFICE VISIT (OUTPATIENT)
Age: 74
End: 2024-05-28
Payer: MEDICARE

## 2024-05-28 ENCOUNTER — TELEPHONE (OUTPATIENT)
Age: 74
End: 2024-05-28

## 2024-05-28 ENCOUNTER — ANESTHESIA EVENT (OUTPATIENT)
Dept: GASTROENTEROLOGY | Facility: HOSPITAL | Age: 74
End: 2024-05-28

## 2024-05-28 ENCOUNTER — ANESTHESIA (OUTPATIENT)
Dept: GASTROENTEROLOGY | Facility: HOSPITAL | Age: 74
End: 2024-05-28

## 2024-05-28 ENCOUNTER — PATIENT OUTREACH (OUTPATIENT)
Dept: HEMATOLOGY ONCOLOGY | Facility: CLINIC | Age: 74
End: 2024-05-28

## 2024-05-28 ENCOUNTER — HOSPITAL ENCOUNTER (OUTPATIENT)
Dept: GASTROENTEROLOGY | Facility: HOSPITAL | Age: 74
Setting detail: OUTPATIENT SURGERY
Discharge: HOME | End: 2024-05-28
Attending: INTERNAL MEDICINE
Payer: MEDICARE

## 2024-05-28 VITALS
BODY MASS INDEX: 22.46 KG/M2 | OXYGEN SATURATION: 100 % | SYSTOLIC BLOOD PRESSURE: 135 MMHG | RESPIRATION RATE: 24 BRPM | HEIGHT: 66 IN | HEART RATE: 71 BPM | WEIGHT: 139.77 LBS | TEMPERATURE: 99.2 F | DIASTOLIC BLOOD PRESSURE: 60 MMHG

## 2024-05-28 VITALS
OXYGEN SATURATION: 98 % | DIASTOLIC BLOOD PRESSURE: 60 MMHG | BODY MASS INDEX: 23.3 KG/M2 | HEART RATE: 81 BPM | HEIGHT: 66 IN | TEMPERATURE: 93.3 F | WEIGHT: 145 LBS | SYSTOLIC BLOOD PRESSURE: 120 MMHG

## 2024-05-28 DIAGNOSIS — K31.89 GASTRIC NODULE: ICD-10-CM

## 2024-05-28 DIAGNOSIS — C77.3 BREAST CANCER METASTASIZED TO AXILLARY LYMPH NODE, RIGHT (HCC): ICD-10-CM

## 2024-05-28 DIAGNOSIS — R19.8 ABNORMAL FINDINGS ON ESOPHAGOGASTRODUODENOSCOPY (EGD): Primary | ICD-10-CM

## 2024-05-28 DIAGNOSIS — C50.911 BREAST CANCER METASTASIZED TO AXILLARY LYMPH NODE, RIGHT (HCC): ICD-10-CM

## 2024-05-28 DIAGNOSIS — I10 ESSENTIAL HYPERTENSION: ICD-10-CM

## 2024-05-28 DIAGNOSIS — J01.00 ACUTE NON-RECURRENT MAXILLARY SINUSITIS: Primary | ICD-10-CM

## 2024-05-28 DIAGNOSIS — R93.3 ABNORMAL CT SCAN, STOMACH: ICD-10-CM

## 2024-05-28 PROCEDURE — 88342 IMHCHEM/IMCYTCHM 1ST ANTB: CPT | Performed by: PATHOLOGY

## 2024-05-28 PROCEDURE — 88305 TISSUE EXAM BY PATHOLOGIST: CPT | Performed by: PATHOLOGY

## 2024-05-28 PROCEDURE — 88341 IMHCHEM/IMCYTCHM EA ADD ANTB: CPT | Performed by: PATHOLOGY

## 2024-05-28 PROCEDURE — G2211 COMPLEX E/M VISIT ADD ON: HCPCS

## 2024-05-28 PROCEDURE — 43270 EGD LESION ABLATION: CPT | Performed by: INTERNAL MEDICINE

## 2024-05-28 PROCEDURE — 99214 OFFICE O/P EST MOD 30 MIN: CPT

## 2024-05-28 PROCEDURE — 88360 TUMOR IMMUNOHISTOCHEM/MANUAL: CPT | Performed by: PATHOLOGY

## 2024-05-28 PROCEDURE — 43251 EGD REMOVE LESION SNARE: CPT | Performed by: INTERNAL MEDICINE

## 2024-05-28 RX ORDER — LIDOCAINE HYDROCHLORIDE 20 MG/ML
INJECTION, SOLUTION EPIDURAL; INFILTRATION; INTRACAUDAL; PERINEURAL AS NEEDED
Status: DISCONTINUED | OUTPATIENT
Start: 2024-05-28 | End: 2024-05-28

## 2024-05-28 RX ORDER — SODIUM CHLORIDE, SODIUM LACTATE, POTASSIUM CHLORIDE, CALCIUM CHLORIDE 600; 310; 30; 20 MG/100ML; MG/100ML; MG/100ML; MG/100ML
75 INJECTION, SOLUTION INTRAVENOUS CONTINUOUS
Status: DISCONTINUED | OUTPATIENT
Start: 2024-05-28 | End: 2024-05-28

## 2024-05-28 RX ORDER — AZITHROMYCIN 250 MG/1
TABLET, FILM COATED ORAL
Qty: 6 TABLET | Refills: 0 | Status: SHIPPED | OUTPATIENT
Start: 2024-05-28 | End: 2024-06-02

## 2024-05-28 RX ORDER — PROPOFOL 10 MG/ML
INJECTION, EMULSION INTRAVENOUS AS NEEDED
Status: DISCONTINUED | OUTPATIENT
Start: 2024-05-28 | End: 2024-05-28

## 2024-05-28 RX ADMIN — PROPOFOL 60 MG: 10 INJECTION, EMULSION INTRAVENOUS at 07:23

## 2024-05-28 RX ADMIN — SODIUM CHLORIDE, SODIUM LACTATE, POTASSIUM CHLORIDE, AND CALCIUM CHLORIDE 75 ML/HR: .6; .31; .03; .02 INJECTION, SOLUTION INTRAVENOUS at 07:02

## 2024-05-28 RX ADMIN — LIDOCAINE HYDROCHLORIDE 100 MG: 20 INJECTION, SOLUTION EPIDURAL; INFILTRATION; INTRACAUDAL; PERINEURAL at 07:19

## 2024-05-28 RX ADMIN — PROPOFOL 40 MG: 10 INJECTION, EMULSION INTRAVENOUS at 07:27

## 2024-05-28 RX ADMIN — PROPOFOL 40 MG: 10 INJECTION, EMULSION INTRAVENOUS at 07:30

## 2024-05-28 RX ADMIN — PROPOFOL 20 MG: 10 INJECTION, EMULSION INTRAVENOUS at 07:32

## 2024-05-28 RX ADMIN — PROPOFOL 120 MG: 10 INJECTION, EMULSION INTRAVENOUS at 07:19

## 2024-05-28 NOTE — ANESTHESIA PREPROCEDURE EVALUATION
Procedure:  EGD    Relevant Problems   ANESTHESIA (within normal limits)      CARDIO   (+) Essential hypertension   (-) MI (myocardial infarction) (HCC)      GI/HEPATIC  NPO confirmed  BMI 22.6   (+) Dysphagia, pharyngoesophageal      GYN   (+) Malignant neoplasm of overlapping sites of right breast in female, estrogen receptor positive (HCC)      HEMATOLOGY   (+) Pernicious anemia      PULMONARY (within normal limits)  L neck / cervical lymph node biopsy 5/15, reports some pain and numbness in area, no appreciable swelling   (-) URI (upper respiratory infection) (2 weeks prior, no residual symptoms)      Other   (+) Breast cancer metastasized to axillary lymph node, right (HCC)   -white coat HTN     Allergies   Allergen Reactions    Penicillins Other (See Comments)     Unknown reaction, positive on allergy testing     Social History     Tobacco Use    Smoking status: Former     Current packs/day: 0.00     Types: Cigarettes     Start date:      Quit date:      Years since quittin.4    Smokeless tobacco: Never   Vaping Use    Vaping status: Never Used   Substance Use Topics    Alcohol use: Yes     Alcohol/week: 1.0 standard drink of alcohol     Types: 1 Standard drinks or equivalent per week     Comment: socially    Drug use: Never     Current Outpatient Medications   Medication Instructions    acetaminophen (TYLENOL) 650 mg, Oral, Every 6 hours PRN    hydroCHLOROthiazide 25 mg, Oral, Daily    Multiple Vitamins-Minerals (multivitamin with minerals) tablet 1 tablet, Oral, Daily    naproxen (NAPROSYN) 500 mg, Oral, 2 times daily with meals    pravastatin (PRAVACHOL) 20 mg, Oral, Daily at bedtime    vitamin C 100 mg, Oral, Daily    Zinc 100 MG TABS Oral     Lab Results   Component Value Date    WBC 5.57 2024    HGB 11.0 (L) 2024    HCT 36.8 2024     2024    SODIUM 140 2024    K 4.4 2024     2024    CO2 30 2024    BUN 14 2024    CREATININE  0.73 05/20/2024    GLUC 102 05/20/2024    AST 19 05/20/2024    ALT 15 05/20/2024    ALKPHOS 150 (H) 05/20/2024    TBILI 0.68 05/20/2024    ALB 3.8 05/20/2024     Vitals:    05/28/24 0639   BP: (!) 193/80   Pulse: 80   Resp: 16   Temp: 98.5 °F (36.9 °C)   SpO2: 94%     EKG 5/10/22  Sinus bradycardia  Possible Left atrial enlargement  ST & T wave abnormality, consider lateral ischemia  No previous ECGs available  Confirmed by Gianni Morton ([id number]) on 5/10/2022 2:03:35 PM    Physical Exam    Airway    Mallampati score: III  TM Distance: >3 FB  Neck ROM: full     Dental   Comment: edentulous upper dentures and lower dentures    Cardiovascular  Rhythm: regular, Rate: normal, Cardiovascular exam normal    Pulmonary   Breath sounds clear to auscultation    Other Findings  post-pubertal.      Anesthesia Plan  ASA Score- 2     Anesthesia Type- IV sedation with anesthesia with ASA Monitors.         Additional Monitors:     Airway Plan:     Comment: O2 mask, natural airway, EtCO2 monitor. Risks discussed including awareness, aspiration, drug reactions and conversion to GA..       Plan Factors-Exercise tolerance (METS): >4 METS.    Chart reviewed. EKG reviewed.  Existing labs reviewed. Patient summary reviewed.    Patient is not a current smoker.              Induction- intravenous.    Postoperative Plan-         Informed Consent- Anesthetic plan and risks discussed with patient.  I personally reviewed this patient with the CRNA. Discussed and agreed on the Anesthesia Plan with the CRNA..

## 2024-05-28 NOTE — PROGRESS NOTES
"Breast Oncology Nurse Navigator    Patient called Saturday morning and left the following voicemail:    \"Hi, Agueda. This is Juve Duarte. I'm sorry I missed your call yesterday. OK. It did say that you would be here today at 8:00 to 12:00. So that's why I'm calling today. OK. I'll speak to you next week probably. OK, Thank you. Bye.\"    Returned her call at this time.  Patient was woken from a nap.  Patient states she had an infection from a recent biopsy.  She also had an EGD today.    Confirmed upcoming appointment with Dr Quiroz for 5/30/24.    Patient drowsy after EGD.  Support offered and kept conversation brief.  Patient has my contact information and knows to reach out with further questions.  "

## 2024-05-28 NOTE — INTERVAL H&P NOTE
H&P reviewed. After examining the patient I find no changes in the patients condition since the H&P had been written.    Vitals:    05/28/24 0639   BP: (!) 193/80   Pulse: 80   Resp: 16   Temp: 98.5 °F (36.9 °C)   SpO2: 94%

## 2024-05-28 NOTE — TELEPHONE ENCOUNTER
----- Message from Leonardo Barnes MD sent at 5/28/2024  7:40 AM EDT -----  Please set up for gastric emptying study for abnormal EGD with residual food

## 2024-05-28 NOTE — ANESTHESIA POSTPROCEDURE EVALUATION
Post-Op Assessment Note    CV Status:  Stable  Pain Score: 0    Pain management: adequate       Mental Status:  Alert and awake   Hydration Status:  Stable   PONV Controlled:  None   Airway Patency:  Adequate and patent     Post Op Vitals Reviewed: Yes    No anethesia notable event occurred.    Staff: Anesthesiologist, CRNA               BP 98/55 (05/28/24 0738)    Temp 99.2 °F (37.3 °C) (05/28/24 0738)    Pulse 75 (05/28/24 0738)   Resp 21 (05/28/24 0738)    SpO2 100 % (05/28/24 0738)

## 2024-05-28 NOTE — PROGRESS NOTES
"Ambulatory Visit  Name: Juve Duarte      : 1950      MRN: [id number]  Encounter Provider: Bret Chiu PA-C  Encounter Date: 2024   Encounter department: [facility] [place]    Assessment & Plan   1. Acute non-recurrent maxillary sinusitis  -     azithromycin (Zithromax) 250 mg tablet; Take 2 tablets (500 mg total) by mouth daily for 1 day, THEN 1 tablet (250 mg total) daily for 4 days.  2. Breast cancer metastasized to axillary lymph node, right (HCC)  Comments:  Recently had biopsy left neck. Continue with oncology.  3. Essential hypertension  Comments:  Conmtrolled today 120/60 continue current medications       History of Present Illness     Juve is here for \"facial swelling\" since Friday. About 4 days ago, it is slightly improving. Has minimal pain from the left jaw up to the maxillary sinus.   Has a cough and congestion a week ago.   Had an EGD done today and referred to PCP after procedure for eval.   Lymph node was biopsied on the left neck on the .     No color change, vision/hearing/speech changes. No weakness or numbness. Coordination is ok.        Review of Systems   Constitutional:  Negative for activity change, diaphoresis, fatigue and fever.   HENT:  Positive for facial swelling, rhinorrhea, sinus pressure and sinus pain. Negative for congestion, dental problem, drooling, ear discharge, ear pain, hearing loss, mouth sores, nosebleeds, postnasal drip, sneezing, sore throat, tinnitus, trouble swallowing and voice change.    Eyes: Negative.    Respiratory:  Negative for cough, chest tightness and shortness of breath.    Cardiovascular: Negative.    Gastrointestinal: Negative.    Endocrine: Negative for polydipsia, polyphagia and polyuria.   Genitourinary:  Negative for dysuria, flank pain, frequency, hematuria and urgency.   Musculoskeletal:  Negative for back pain, joint swelling, neck pain and neck stiffness.   Skin: Negative.    Neurological:  Negative " for dizziness, weakness, light-headedness and headaches.   Hematological:  Negative for adenopathy.   Psychiatric/Behavioral:  Negative for confusion and sleep disturbance. The patient is not nervous/anxious.      Pertinent Medical History           Medical History Reviewed by provider this encounter:  Tobacco  Allergies  Meds  Problems  Med Hx  Surg Hx  Fam Hx       Past Medical History   Past Medical History:   Diagnosis Date    Colon polyp     Esophagitis     History of benign breast tumor     Hyperlipidemia     Urinary incontinence     Venous ulcer of ankle (HCC) 01/10/2022     Past Surgical History:   Procedure Laterality Date    ANKLE SURGERY Right     BREAST EXCISIONAL BIOPSY Left 1998    benign    BUNIONECTOMY Bilateral     COLONOSCOPY      CORRECTION HAMMER TOE Bilateral     MAMMO STEREOTACTIC BREAST BIOPSY LEFT (ALL INC) Left 4/4/2024    MAMMO STEREOTACTIC BREAST BIOPSY RIGHT (ALL INC) EACH ADD Right 4/4/2024    US GUIDANCE BREAST BIOPSY RIGHT EACH ADDITIONAL Right 4/4/2024    US GUIDED BREAST BIOPSY RIGHT COMPLETE Right 4/4/2024    US GUIDED BREAST LYMPH NODE BIOPSY RIGHT Right 4/4/2024    US GUIDED LYMPH NODE BIOPSY LEFT  5/15/2024    VEIN LIGATION Right 06/06/2022    Procedure: Venaseal therapy and stab phlebectomy;  Surgeon: Gosia Shepherd MD;  Location: [facility];  Service: Vascular     Family History   Problem Relation Age of Onset    No Known Problems Mother     Heart disease Father     No Known Problems Daughter     No Known Problems Maternal Grandmother     No Known Problems Maternal Grandfather     No Known Problems Paternal Grandmother     No Known Problems Paternal Grandfather     Throat cancer Brother     No Known Problems Maternal Aunt     No Known Problems Maternal Aunt     No Known Problems Maternal Aunt     No Known Problems Maternal Aunt     No Known Problems Paternal Aunt     No Known Problems Paternal Aunt     No Known Problems Paternal Aunt     No Known Problems Paternal  Aunt     BRCA2 Negative Neg Hx     BRCA2 Positive Neg Hx     BRCA1 Positive Neg Hx     BRCA1 Negative Neg Hx     BRCA 1/2 Neg Hx     Ovarian cancer Neg Hx     Endometrial cancer Neg Hx     Colon cancer Neg Hx     Breast cancer additional onset Neg Hx     Breast cancer Neg Hx      Current Outpatient Medications on File Prior to Visit   Medication Sig Dispense Refill    acetaminophen (TYLENOL) 325 mg tablet Take 650 mg by mouth every 6 (six) hours as needed for mild pain      Ascorbic Acid (vitamin C) 100 MG tablet Take 100 mg by mouth daily      hydroCHLOROthiazide 25 mg tablet TAKE 1 TABLET (25 MG TOTAL) BY MOUTH DAILY. 90 tablet 1    Multiple Vitamins-Minerals (multivitamin with minerals) tablet Take 1 tablet by mouth daily      naproxen (NAPROSYN) 500 mg tablet TAKE 1 TABLET BY MOUTH TWICE A DAY WITH FOOD 30 tablet 0    pravastatin (PRAVACHOL) 20 mg tablet TAKE 1 TABLET BY MOUTH DAILY AT BEDTIME 90 tablet 1    Zinc 100 MG TABS Take by mouth       Current Facility-Administered Medications on File Prior to Visit   Medication Dose Route Frequency Provider Last Rate Last Admin    cyanocobalamin injection 1,000 mcg  1,000 mcg Intramuscular Q30 Days Lorraine Dennis DO   1,000 mcg at 05/14/24 0939    [DISCONTINUED] lactated ringers infusion  75 mL/hr Intravenous Continuous Baltazar Syed MD 75 mL/hr at 05/28/24 0715 Continue from Pre-op at 05/28/24 0715    [DISCONTINUED] lidocaine (PF) (XYLOCAINE-MPF) 2 % injection   Intravenous PRN Leonardo Karolina, CRNA   100 mg at 05/28/24 0719    [DISCONTINUED] propofol (DIPRIVAN) 200 MG/20ML bolus injection   Intravenous PRN Leonardo Karolina, CRNA   20 mg at 05/28/24 0732     Allergies   Allergen Reactions    Penicillins Other (See Comments)     Unknown reaction, positive on allergy testing      Current Outpatient Medications on File Prior to Visit   Medication Sig Dispense Refill    acetaminophen (TYLENOL) 325 mg tablet Take 650 mg by mouth every 6 (six) hours as needed for  "mild pain      Ascorbic Acid (vitamin C) 100 MG tablet Take 100 mg by mouth daily      hydroCHLOROthiazide 25 mg tablet TAKE 1 TABLET (25 MG TOTAL) BY MOUTH DAILY. 90 tablet 1    Multiple Vitamins-Minerals (multivitamin with minerals) tablet Take 1 tablet by mouth daily      naproxen (NAPROSYN) 500 mg tablet TAKE 1 TABLET BY MOUTH TWICE A DAY WITH FOOD 30 tablet 0    pravastatin (PRAVACHOL) 20 mg tablet TAKE 1 TABLET BY MOUTH DAILY AT BEDTIME 90 tablet 1    Zinc 100 MG TABS Take by mouth       Current Facility-Administered Medications on File Prior to Visit   Medication Dose Route Frequency Provider Last Rate Last Admin    cyanocobalamin injection 1,000 mcg  1,000 mcg Intramuscular Q30 Days Lorraine Dennis DO   1,000 mcg at 24 0939    [DISCONTINUED] lactated ringers infusion  75 mL/hr Intravenous Continuous Baltazar Syed MD 75 mL/hr at 24 0715 Continue from Pre-op at 24 0715    [DISCONTINUED] lidocaine (PF) (XYLOCAINE-MPF) 2 % injection   Intravenous PRN Leonardo Turcios CRNA   100 mg at 24 0719    [DISCONTINUED] propofol (DIPRIVAN) 200 MG/20ML bolus injection   Intravenous PRN Leonardo Turcios CRNA   20 mg at 24 0732      Social History     Tobacco Use    Smoking status: Former     Current packs/day: 0.00     Types: Cigarettes     Start date:      Quit date:      Years since quittin.4    Smokeless tobacco: Never   Vaping Use    Vaping status: Never Used   Substance and Sexual Activity    Alcohol use: Yes     Alcohol/week: 1.0 standard drink of alcohol     Types: 1 Standard drinks or equivalent per week     Comment: socially    Drug use: Never    Sexual activity: Yes     Partners: Male     Birth control/protection: None     Objective     /60   Pulse 81   Temp (!) 93.3 °F (34.1 °C)   Ht 5' 6\" (1.676 m)   Wt 65.8 kg (145 lb)   SpO2 98%   BMI 23.40 kg/m²     Physical Exam  Vitals and nursing note reviewed.   Constitutional:       General: She is not in " acute distress.     Appearance: Normal appearance. She is normal weight. She is not ill-appearing, toxic-appearing or diaphoretic.   HENT:      Head: Normocephalic and atraumatic.      Comments: Pain to palpation left maxillary sinus. Minimal discomfort left cheek and neck.   No warmth or redness.   Swelling not noticeable to me but patient says it is there at left maxillary region.     Right Ear: External ear normal.      Left Ear: External ear normal.      Nose: Congestion present. No rhinorrhea.      Mouth/Throat:      Mouth: Mucous membranes are moist.      Pharynx: Oropharynx is clear. No oropharyngeal exudate.   Eyes:      General: No scleral icterus.        Right eye: No discharge.         Left eye: No discharge.      Extraocular Movements: Extraocular movements intact.      Conjunctiva/sclera: Conjunctivae normal.   Neck:      Vascular: No carotid bruit.   Cardiovascular:      Rate and Rhythm: Normal rate and regular rhythm.      Heart sounds: No murmur heard.  Pulmonary:      Effort: Pulmonary effort is normal.      Breath sounds: Normal breath sounds.   Abdominal:      General: Abdomen is flat. Bowel sounds are normal.      Palpations: There is no mass.      Tenderness: There is no abdominal tenderness. There is no rebound.   Musculoskeletal:      Cervical back: Normal range of motion and neck supple. No tenderness.   Lymphadenopathy:      Cervical: No cervical adenopathy.   Skin:     General: Skin is warm and dry.      Findings: No rash.   Neurological:      Mental Status: She is alert. Mental status is at baseline.      GCS: GCS eye subscore is 4. GCS verbal subscore is 5. GCS motor subscore is 6.      Cranial Nerves: Cranial nerves 2-12 are intact.      Sensory: Sensation is intact. No sensory deficit.      Motor: Motor function is intact. No weakness.      Coordination: Coordination is intact. Coordination normal.      Gait: Gait is intact.   Psychiatric:         Mood and Affect: Mood normal.          Behavior: Behavior normal.         Thought Content: Thought content normal.         Judgment: Judgment normal.       Administrative Statements   I have spent a total time of 35 minutes on 05/28/24 In caring for this patient including Diagnostic results, Prognosis, Risks and benefits of tx options, Instructions for management, Patient and family education, Importance of tx compliance, Counseling / Coordination of care, Documenting in the medical record, Reviewing / ordering tests, medicine, procedures  , and Obtaining or reviewing history  .

## 2024-05-29 ENCOUNTER — PATIENT OUTREACH (OUTPATIENT)
Dept: HEMATOLOGY ONCOLOGY | Facility: CLINIC | Age: 74
End: 2024-05-29

## 2024-05-29 NOTE — PROGRESS NOTES
Oncology Care Coordinator attempted to outreach patient to assess any questions or concerns regarding Medical Oncology consult. A voicemail was left stating a reason for my call and my contact information was provided . I requested a return call at patient's earliest convenience.

## 2024-05-30 ENCOUNTER — OFFICE VISIT (OUTPATIENT)
Dept: HEMATOLOGY ONCOLOGY | Facility: CLINIC | Age: 74
End: 2024-05-30
Payer: MEDICARE

## 2024-05-30 ENCOUNTER — TELEPHONE (OUTPATIENT)
Dept: HEMATOLOGY ONCOLOGY | Facility: CLINIC | Age: 74
End: 2024-05-30

## 2024-05-30 VITALS
TEMPERATURE: 97.8 F | WEIGHT: 139.8 LBS | DIASTOLIC BLOOD PRESSURE: 66 MMHG | OXYGEN SATURATION: 98 % | HEART RATE: 76 BPM | HEIGHT: 66 IN | BODY MASS INDEX: 22.47 KG/M2 | RESPIRATION RATE: 17 BRPM | SYSTOLIC BLOOD PRESSURE: 122 MMHG

## 2024-05-30 DIAGNOSIS — C77.3 BREAST CANCER METASTASIZED TO AXILLARY LYMPH NODE, RIGHT (HCC): ICD-10-CM

## 2024-05-30 DIAGNOSIS — C50.811 MALIGNANT NEOPLASM OF OVERLAPPING SITES OF RIGHT BREAST IN FEMALE, ESTROGEN RECEPTOR POSITIVE (HCC): Primary | ICD-10-CM

## 2024-05-30 DIAGNOSIS — M85.80 OSTEOPENIA, UNSPECIFIED LOCATION: ICD-10-CM

## 2024-05-30 DIAGNOSIS — Z17.0 MALIGNANT NEOPLASM OF OVERLAPPING SITES OF RIGHT BREAST IN FEMALE, ESTROGEN RECEPTOR POSITIVE (HCC): Primary | ICD-10-CM

## 2024-05-30 DIAGNOSIS — C50.911 BREAST CANCER METASTASIZED TO AXILLARY LYMPH NODE, RIGHT (HCC): ICD-10-CM

## 2024-05-30 PROCEDURE — G2211 COMPLEX E/M VISIT ADD ON: HCPCS | Performed by: INTERNAL MEDICINE

## 2024-05-30 PROCEDURE — 99215 OFFICE O/P EST HI 40 MIN: CPT | Performed by: INTERNAL MEDICINE

## 2024-05-30 NOTE — LETTER
May 30, 2024     Lorraine Dennis, DO  [address] [facility] [address]    Patient: Juve Duarte   YOB: 1950   Date of Visit: 5/30/2024       Dear Dr. Dennis:    Thank you for referring Juve Duarte to me for evaluation. Below are my notes for this consultation.    If you have questions, please do not hesitate to call me. I look forward to following your patient along with you.         Sincerely,        Earnest Quiroz, DO        CC: MD Earnest Aguilar,   5/30/2024 10:46 AM  Sign when Signing Visit  [facility] [place]  [address] [address]  [phone]  [phone]    Juve Duarte,1950, [id number]  05/30/24    Discussion:   In summary, this is a 74-year-old female history of right breast infiltrating ductal carcinoma, T1, N1, MX.  PET/CT showed hypermetabolism in the right lateral breast and right axilla.  Hypermetabolic left upper cervical node, SUV 3.5 noted.  Gastric nodules noted on CT showed no hypermetabolism.  Needle biopsy of left upper cervical node  Showed some atypical epithelioid cells.  Flow cytometry proved negative.  I would describe the cervical node aspirate as inconclusive.  We reviewed that this could represent metastatic breast cancer or other malignant or nonmalignant processes.  For the moment, a simple path might include aromatase inhibitor and reimaging.  We reviewed that if the cervical node is involved, breast surgery is not applicable and continuous suppressive therapy would be appropriate.  I would favor Arimidex for this purpose.  We reviewed potential toxicities including but not limited to hot flashes, joint stiffness, increased risk for osteoporosis.  DEXA scan in February 2024 showed osteopenia.  She already takes calcium and vitamin D supplementation.  I asked her to continue this.  Recheck [place] PET and vitamin D level just prior to her next  visit in 3 months.  I discussed the above with the patient.  The patient and her wife and daughter.  Voiced understanding and agreement.  ______________________________________________________________________    Chief Complaint   Patient presents with   • Follow-up       HPI:  Oncology History   Breast cancer metastasized to axillary lymph node, right (HCC)   4/4/2024 Initial Diagnosis    April 4, 2024 patient had biopsy of right breast mass showing invasive ductal carcinoma.  Right axillary node showed metastatic carcinoma consistent with breast primary.  ER 90%, MD 5-7%, HER2 +2.  FISH negative.  Similar findings in the axillary node although MD was 30%.  HER2 +2, FISH negative.  April 18, 2024 patient had CT chest ab pelvis showing soft tissue nodules right breast.  Right infrahilar and internal mammary nodes indeterminate.  Right axillary nodes up to 0.5 cm.  2 enhancing soft tissue nodules in the gastric fundus.  Bone scan showed focus of activity at anterior right seventh rib.     4/4/2024 Biopsy    Breast, Right, US BX RT BREAST 700 7CMFN 3 PASSES 12G [place]QU[place]:      - Invasive mammary carcinoma of no special type (ductal NST/invasive ductal carcinoma) with apocrine features, see note      - [place] grade 2 of 3 (total score: 6 of 9)          - Tubule formation: <10%, score 3          - Nuclear grade 2 of 3, score 2          - Mitoses < 3/mm2, (</= 7 mitoses/10HPF), score 1      - Invasive carcinoma involves 3 of 3 submitted core biopsies, max. dimension = 5 millimeters      - Ductal carcinoma in situ (DCIS): Not identified      - Microcalcifications: Absent      - Lymphovascular invasion: Present     Breast, Right, US BX RT BREAST 900 7CMFN 3 PASSES 12G [place]:      - Invasive mammary carcinoma of no special type (ductal NST/invasive ductal carcinoma) with apocrine features, see note      - [place] grade 2 of 3 (total score: 6 of 9)          - Tubule formation: <10%, score 3          - Nuclear grade 2  of 3, score 2          - Mitoses < 3/mm2, (</= 7 mitoses/10HPF), score 1      - Invasive carcinoma involves 3 of 3 submitted core biopsies, max. dimension = 14 millimeters      - Ductal carcinoma in situ (DCIS): Not identified      - Microcalcifications: Absent      - Lymphovascular invasion: Not identified    Axillary lymph node, US BX RT AXILLARY TAIL LN 10:00 12CMFN 3 PASSES 12G [place]:      - Metastatic carcinoma consistent with mammary primary,    BREAST TUMOR PROGNOSTIC PROFILE     Performed on invasive carcinoma block E1:  Test Description Result Prognostic Interpretation   Estrogen Receptor/ER  Primary Antibody: SP-1  Internal control: Positive   External control: Positive 90%  Staining Intensity: Strong  Isabela Score*: 8 Positive   Progesterone Receptor/PgR  Primary Antibody:1E2  Internal control: Positive  External control: Positive 5-7%  Staining Intensity: Strong  Isabela Score*: 4 Positive   HER2 by IHC   Primary Antibody: 4B5 2+ Equivocal *      Performed on invasive carcinoma block F2:  Test Description Result Prognostic Interpretation   Estrogen Receptor/ER  Primary Antibody: SP-1  Internal control: Positive   External control: Positive 95%  Staining Intensity: Strong  Isabela Score*: 8 Positive   Progesterone Receptor/PgR  Primary Antibody:1E2  Internal control: Positive  External control: Positive 30%  Staining Intensity: Moderate  Isabela Score*: 5 Positive   HER2 by IHC   Primary Antibody: 4B5 2+ Equivocal *       Fluorescence in situ hybridization (FISH) analysis of HER2 overexpression by invasive breast carcinoma cells, block E1 performed at [facility], [place], [place], yields the following results:  Test Description                        Interpretation  HER2 by FISH analysis:              Negative / Not Amplified.  Results  HER2: CEP-17 ratio :                  1.5: 1  Average HER2 Signal:                5.4  Average CEP-17 Signal:                          3.5  Number of  selected invasive cells scanned           100     Fluorescence in situ hybridization (FISH) analysis of HER2 overexpression by invasive breast carcinoma cells, block F2 performed at [facility], [place], [place], yields the following results:  Test Description                        Interpretation  HER2 by FISH analysis:              Negative / Not Amplified.  Results  HER2: CEP-17 ratio :                  1.3: 1  Average HER2 Signal:                3.5  Average CEP-17 Signal:                          2.6  Number of selected invasive cells scanned           50        4/18/2024 Genetic Testing    [facility]  A total of 36 genes were evaluated, including: APC, MIKE, BARD 1, BMPR1A, BRCA1, BRCA2, BRIP1, CDH1, CDK4, CKDN2A, CHEK2, DICER1, MLH1, MSH2, MSH6, MUTYH, NBN, NF1, NTHL1, PALB2, PMS2, PTEN, RAD51C, RECQL, SMAD4, SMARCA4, STK11, TP53, AXIN2, HOXB13, MSH3, POLD1, AND POLE, EPCAM, AND GREM1     LIKELY PATHOGENIC VARIANT CHEK2  VUS SDHA     Malignant neoplasm of overlapping sites of right breast in female, estrogen receptor positive (HCC)   4/4/2024 Initial Diagnosis    April 4, 2024 patient had biopsy of right breast mass showing invasive ductal carcinoma.  Right axillary node showed metastatic carcinoma consistent with breast primary.  ER 90%, ID 5-7%, HER2 +2.  FISH negative.  Similar findings in the axillary node although ID was 30%.  HER2 +2, FISH negative.  April 18, 2024 patient had CT chest ab pelvis showing soft tissue nodules right breast.  Right infrahilar and internal mammary nodes indeterminate.  Right axillary nodes up to 0.5 cm.  2 enhancing soft tissue nodules in the gastric fundus.  Bone scan showed focus of activity at anterior right seventh rib.     4/4/2024 Biopsy    Breast, Right, US BX RT BREAST 700 7CMFN 3 PASSES 12G [place]:      - Invasive mammary carcinoma of no special type (ductal NST/invasive ductal carcinoma) with apocrine features, see note      - [place] grade 2 of 3  (total score: 6 of 9)          - Tubule formation: <10%, score 3          - Nuclear grade 2 of 3, score 2          - Mitoses < 3/mm2, (</= 7 mitoses/10HPF), score 1      - Invasive carcinoma involves 3 of 3 submitted core biopsies, max. dimension = 5 millimeters      - Ductal carcinoma in situ (DCIS): Not identified      - Microcalcifications: Absent      - Lymphovascular invasion: Present     Breast, Right, US BX RT BREAST 900 7CMFN 3 PASSES 12G [place]:      - Invasive mammary carcinoma of no special type (ductal NST/invasive ductal carcinoma) with apocrine features, see note      - [place] grade 2 of 3 (total score: 6 of 9)          - Tubule formation: <10%, score 3          - Nuclear grade 2 of 3, score 2          - Mitoses < 3/mm2, (</= 7 mitoses/10HPF), score 1      - Invasive carcinoma involves 3 of 3 submitted core biopsies, max. dimension = 14 millimeters      - Ductal carcinoma in situ (DCIS): Not identified      - Microcalcifications: Absent      - Lymphovascular invasion: Not identified    Axillary lymph node, US BX RT AXILLARY TAIL LN 10:00 12CMFN 3 PASSES 12G [place]:      - Metastatic carcinoma consistent with mammary primary,    BREAST TUMOR PROGNOSTIC PROFILE     Performed on invasive carcinoma block E1:  Test Description Result Prognostic Interpretation   Estrogen Receptor/ER  Primary Antibody: SP-1  Internal control: Positive   External control: Positive 90%  Staining Intensity: Strong  Isabela Score*: 8 Positive   Progesterone Receptor/PgR  Primary Antibody:1E2  Internal control: Positive  External control: Positive 5-7%  Staining Intensity: Strong  Isabela Score*: 4 Positive   HER2 by IHC   Primary Antibody: 4B5 2+ Equivocal *      Performed on invasive carcinoma block F2:  Test Description Result Prognostic Interpretation   Estrogen Receptor/ER  Primary Antibody: SP-1  Internal control: Positive   External control: Positive 95%  Staining Intensity: Strong  Isabela Score*: 8 Positive    Progesterone Receptor/PgR  Primary Antibody:1E2  Internal control: Positive  External control: Positive 30%  Staining Intensity: Moderate  Isabela Score*: 5 Positive   HER2 by IHC   Primary Antibody: 4B5 2+ Equivocal *       Fluorescence in situ hybridization (FISH) analysis of HER2 overexpression by invasive breast carcinoma cells, block E1 performed at [facility], [place], [place], yields the following results:  Test Description                        Interpretation  HER2 by FISH analysis:              Negative / Not Amplified.  Results  HER2: CEP-17 ratio :                  1.5: 1  Average HER2 Signal:                5.4  Average CEP-17 Signal:                          3.5  Number of selected invasive cells scanned           100     Fluorescence in situ hybridization (FISH) analysis of HER2 overexpression by invasive breast carcinoma cells, block F2 performed at [facility], [place], [place], yields the following results:  Test Description                        Interpretation  HER2 by FISH analysis:              Negative / Not Amplified.  Results  HER2: CEP-17 ratio :                  1.3: 1  Average HER2 Signal:                3.5  Average CEP-17 Signal:                          2.6  Number of selected invasive cells scanned           50        4/18/2024 Genetic Testing    [facility]  A total of 36 genes were evaluated, including: APC, MIKE, BARD 1, BMPR1A, BRCA1, BRCA2, BRIP1, CDH1, CDK4, CKDN2A, CHEK2, DICER1, MLH1, MSH2, MSH6, MUTYH, NBN, NF1, NTHL1, PALB2, PMS2, PTEN, RAD51C, RECQL, SMAD4, SMARCA4, STK11, TP53, AXIN2, HOXB13, MSH3, POLD1, AND POLE, EPCAM, AND GREM1     LIKELY PATHOGENIC VARIANT CHEK2  VUS SDHA         Interval History: Clinically stable.  ECOG-  1 - Symptomatic but completely ambulatory    Review of Systems   Constitutional:  Negative for appetite change, diaphoresis, fatigue and fever.   HENT:  Negative for sinus pain.    Eyes:  Negative for discharge.    Respiratory:  Negative for cough and shortness of breath.    Cardiovascular:  Negative for chest pain.   Gastrointestinal:  Negative for abdominal pain, constipation and diarrhea.   Endocrine: Negative for cold intolerance.   Genitourinary:  Negative for difficulty urinating and hematuria.   Musculoskeletal:  Negative for joint swelling.   Skin:  Negative for rash.   Allergic/Immunologic: Negative for environmental allergies.   Neurological:  Negative for dizziness and headaches.   Hematological:  Negative for adenopathy.   Psychiatric/Behavioral:  Negative for agitation.        Past Medical History:   Diagnosis Date   • Colon polyp    • Esophagitis    • History of benign breast tumor    • Hyperlipidemia    • Urinary incontinence    • Venous ulcer of ankle (HCC) 01/10/2022     Patient Active Problem List   Diagnosis   • Chronic atrophic gastritis   • Dysphagia, pharyngoesophageal   • Urinary incontinence   • Gastroparesis   • Hemorrhoids, external   • Asymptomatic postmenopausal status   • Essential hypertension   • Pernicious anemia   • Breast cancer metastasized to axillary lymph node, right (HCC)   • CHEK2 gene mutation positive   • Malignant neoplasm of overlapping sites of right breast in female, estrogen receptor positive (HCC)       Current Outpatient Medications:   •  acetaminophen (TYLENOL) 325 mg tablet, Take 650 mg by mouth every 6 (six) hours as needed for mild pain, Disp: , Rfl:   •  Ascorbic Acid (vitamin C) 100 MG tablet, Take 100 mg by mouth daily, Disp: , Rfl:   •  azithromycin (Zithromax) 250 mg tablet, Take 2 tablets (500 mg total) by mouth daily for 1 day, THEN 1 tablet (250 mg total) daily for 4 days., Disp: 6 tablet, Rfl: 0  •  hydroCHLOROthiazide 25 mg tablet, TAKE 1 TABLET (25 MG TOTAL) BY MOUTH DAILY., Disp: 90 tablet, Rfl: 1  •  Multiple Vitamins-Minerals (multivitamin with minerals) tablet, Take 1 tablet by mouth daily, Disp: , Rfl:   •  naproxen (NAPROSYN) 500 mg tablet, TAKE 1 TABLET  "BY MOUTH TWICE A DAY WITH FOOD, Disp: 30 tablet, Rfl: 0  •  pravastatin (PRAVACHOL) 20 mg tablet, TAKE 1 TABLET BY MOUTH DAILY AT BEDTIME, Disp: 90 tablet, Rfl: 1  •  Zinc 100 MG TABS, Take by mouth, Disp: , Rfl:     Current Facility-Administered Medications:   •  cyanocobalamin injection 1,000 mcg, 1,000 mcg, Intramuscular, Q30 Days, Lorraine Mirlande Aponteer, , 1,000 mcg at 05/14/24 0939  Allergies   Allergen Reactions   • Penicillins Other (See Comments)     Unknown reaction, positive on allergy testing     Past Surgical History:   Procedure Laterality Date   • ANKLE SURGERY Right    • BREAST EXCISIONAL BIOPSY Left 1998    benign   • BUNIONECTOMY Bilateral    • COLONOSCOPY     • CORRECTION HAMMER TOE Bilateral    • MAMMO STEREOTACTIC BREAST BIOPSY LEFT (ALL INC) Left 4/4/2024   • MAMMO STEREOTACTIC BREAST BIOPSY RIGHT (ALL INC) EACH ADD Right 4/4/2024   • US GUIDANCE BREAST BIOPSY RIGHT EACH ADDITIONAL Right 4/4/2024   • US GUIDED BREAST BIOPSY RIGHT COMPLETE Right 4/4/2024   • US GUIDED BREAST LYMPH NODE BIOPSY RIGHT Right 4/4/2024   • US GUIDED LYMPH NODE BIOPSY LEFT  5/15/2024   • VEIN LIGATION Right 06/06/2022    Procedure: Venaseal therapy and stab phlebectomy;  Surgeon: Gosia Shepherd MD;  Location: [facility] OR;  Service: Vascular     Social History    Objective:  Vitals:    05/30/24 0957   BP: 122/66   BP Location: Left arm   Patient Position: Sitting   Cuff Size: Adult   Pulse: 76   Resp: 17   Temp: 97.8 °F (36.6 °C)   SpO2: 98%   Weight: 63.4 kg (139 lb 12.8 oz)   Height: 5' 6\" (1.676 m)     Physical Exam  Constitutional:       Appearance: She is well-developed.   HENT:      Head: Normocephalic and atraumatic.   Eyes:      Pupils: Pupils are equal, round, and reactive to light.   Cardiovascular:      Rate and Rhythm: Normal rate.      Heart sounds: No murmur heard.  Pulmonary:      Effort: No respiratory distress.      Breath sounds: No wheezing or rales.   Abdominal:      General: There is no " distension.      Palpations: Abdomen is soft.      Tenderness: There is no abdominal tenderness. There is no rebound.   Musculoskeletal:         General: No tenderness.      Cervical back: Neck supple.   Lymphadenopathy:      Cervical: No cervical adenopathy.   Skin:     General: Skin is warm.      Findings: No rash.   Neurological:      Mental Status: She is alert and oriented to person, place, and time.      Deep Tendon Reflexes: Reflexes normal.   Psychiatric:         Thought Content: Thought content normal.           Labs:  I personally reviewed the labs and imaging pertinent to this patient care.

## 2024-05-30 NOTE — PROGRESS NOTES
[facility] [place]  [address] [address]  [phone]    Juve GONZALEZ Duarte,1950, [id number]  05/30/24    Discussion:   In summary, this is a 74-year-old female history of right breast infiltrating ductal carcinoma, T1, N1, MX.  PET/CT showed hypermetabolism in the right lateral breast and right axilla.  Hypermetabolic left upper cervical node, SUV 3.5 noted.  Gastric nodules noted on CT showed no hypermetabolism.  Needle biopsy of left upper cervical node  Showed some atypical epithelioid cells.  Flow cytometry proved negative.  I would describe the cervical node aspirate as inconclusive.  We reviewed that this could represent metastatic breast cancer or other malignant or nonmalignant processes.  For the moment, a simple path might include aromatase inhibitor and reimaging.  We reviewed that if the cervical node is involved, breast surgery is not applicable and continuous suppressive therapy would be appropriate.  I would favor Arimidex for this purpose.  We reviewed potential toxicities including but not limited to hot flashes, joint stiffness, increased risk for osteoporosis.  DEXA scan in February 2024 showed osteopenia.  She already takes calcium and vitamin D supplementation.  I asked her to continue this.  Recheck [place] PET and vitamin D level just prior to her next visit in 3 months.  I discussed the above with the patient.  The patient and her wife and daughter.  Voiced understanding and agreement.  ______________________________________________________________________    Chief Complaint   Patient presents with    Follow-up       HPI:  Oncology History   Breast cancer metastasized to axillary lymph node, right (HCC)   4/4/2024 Initial Diagnosis    April 4, 2024 patient had biopsy of right breast mass showing invasive ductal carcinoma.  Right axillary node showed metastatic carcinoma consistent with breast primary.  ER 90%, WA 5-7%,  HER2 +2.  FISH negative.  Similar findings in the axillary node although IA was 30%.  HER2 +2, FISH negative.  April 18, 2024 patient had CT chest ab pelvis showing soft tissue nodules right breast.  Right infrahilar and internal mammary nodes indeterminate.  Right axillary nodes up to 0.5 cm.  2 enhancing soft tissue nodules in the gastric fundus.  Bone scan showed focus of activity at anterior right seventh rib.     4/4/2024 Biopsy    Breast, Right, US BX RT BREAST 700 7CMFN 3 PASSES 12G MARQU[place]:      - Invasive mammary carcinoma of no special type (ductal NST/invasive ductal carcinoma) with apocrine features, see note      - [place] grade 2 of 3 (total score: 6 of 9)          - Tubule formation: <10%, score 3          - Nuclear grade 2 of 3, score 2          - Mitoses < 3/mm2, (</= 7 mitoses/10HPF), score 1      - Invasive carcinoma involves 3 of 3 submitted core biopsies, max. dimension = 5 millimeters      - Ductal carcinoma in situ (DCIS): Not identified      - Microcalcifications: Absent      - Lymphovascular invasion: Present     Breast, Right, US BX RT BREAST 900 7CMFN 3 PASSES 12G MAR[place]:      - Invasive mammary carcinoma of no special type (ductal NST/invasive ductal carcinoma) with apocrine features, see note      - [place] grade 2 of 3 (total score: 6 of 9)          - Tubule formation: <10%, score 3          - Nuclear grade 2 of 3, score 2          - Mitoses < 3/mm2, (</= 7 mitoses/10HPF), score 1      - Invasive carcinoma involves 3 of 3 submitted core biopsies, max. dimension = 14 millimeters      - Ductal carcinoma in situ (DCIS): Not identified      - Microcalcifications: Absent      - Lymphovascular invasion: Not identified    Axillary lymph node, US BX RT AXILLARY TAIL LN 10:00 12CMFN 3 PASSES 12G [place]:      - Metastatic carcinoma consistent with mammary primary,    BREAST TUMOR PROGNOSTIC PROFILE     Performed on invasive carcinoma block E1:  Test Description Result Prognostic  Interpretation   Estrogen Receptor/ER  Primary Antibody: SP-1  Internal control: Positive   External control: Positive 90%  Staining Intensity: Strong  Isabela Score*: 8 Positive   Progesterone Receptor/PgR  Primary Antibody:1E2  Internal control: Positive  External control: Positive 5-7%  Staining Intensity: Strong  Isabela Score*: 4 Positive   HER2 by IHC   Primary Antibody: 4B5 2+ Equivocal *      Performed on invasive carcinoma block F2:  Test Description Result Prognostic Interpretation   Estrogen Receptor/ER  Primary Antibody: SP-1  Internal control: Positive   External control: Positive 95%  Staining Intensity: Strong  Isabela Score*: 8 Positive   Progesterone Receptor/PgR  Primary Antibody:1E2  Internal control: Positive  External control: Positive 30%  Staining Intensity: Moderate  Isabela Score*: 5 Positive   HER2 by IHC   Primary Antibody: 4B5 2+ Equivocal *       Fluorescence in situ hybridization (FISH) analysis of HER2 overexpression by invasive breast carcinoma cells, block E1 performed at [facility] [facility], [place], [place], yields the following results:  Test Description                        Interpretation  HER2 by FISH analysis:              Negative / Not Amplified.  Results  HER2: CEP-17 ratio :                  1.5: 1  Average HER2 Signal:                5.4  Average CEP-17 Signal:                          3.5  Number of selected invasive cells scanned           100     Fluorescence in situ hybridization (FISH) analysis of HER2 overexpression by invasive breast carcinoma cells, block F2 performed at [facility] [facility], [place], [place], yields the following results:  Test Description                        Interpretation  HER2 by FISH analysis:              Negative / Not Amplified.  Results  HER2: CEP-17 ratio :                  1.3: 1  Average HER2 Signal:                3.5  Average CEP-17 Signal:                          2.6  Number of selected invasive cells scanned            50        4/18/2024 Genetic Testing    AMBRY  A total of 36 genes were evaluated, including: APC, MIKE, BARD 1, BMPR1A, BRCA1, BRCA2, BRIP1, CDH1, CDK4, CKDN2A, CHEK2, DICER1, MLH1, MSH2, MSH6, MUTYH, NBN, NF1, NTHL1, PALB2, PMS2, PTEN, RAD51C, RECQL, SMAD4, SMARCA4, STK11, TP53, AXIN2, HOXB13, MSH3, POLD1, AND POLE, EPCAM, AND GREM1     LIKELY PATHOGENIC VARIANT CHEK2  VUS SDHA     Malignant neoplasm of overlapping sites of right breast in female, estrogen receptor positive (HCC)   4/4/2024 Initial Diagnosis    April 4, 2024 patient had biopsy of right breast mass showing invasive ductal carcinoma.  Right axillary node showed metastatic carcinoma consistent with breast primary.  ER 90%, RI 5-7%, HER2 +2.  FISH negative.  Similar findings in the axillary node although RI was 30%.  HER2 +2, FISH negative.  April 18, 2024 patient had CT chest ab pelvis showing soft tissue nodules right breast.  Right infrahilar and internal mammary nodes indeterminate.  Right axillary nodes up to 0.5 cm.  2 enhancing soft tissue nodules in the gastric fundus.  Bone scan showed focus of activity at anterior right seventh rib.     4/4/2024 Biopsy    Breast, Right, US BX RT BREAST 700 7CMFN 3 PASSES 12G [place]:      - Invasive mammary carcinoma of no special type (ductal NST/invasive ductal carcinoma) with apocrine features, see note      - [place] grade 2 of 3 (total score: 6 of 9)          - Tubule formation: <10%, score 3          - Nuclear grade 2 of 3, score 2          - Mitoses < 3/mm2, (</= 7 mitoses/10HPF), score 1      - Invasive carcinoma involves 3 of 3 submitted core biopsies, max. dimension = 5 millimeters      - Ductal carcinoma in situ (DCIS): Not identified      - Microcalcifications: Absent      - Lymphovascular invasion: Present     Breast, Right, US BX RT BREAST 900 7CMFN 3 PASSES 12G [place]:      - Invasive mammary carcinoma of no special type (ductal NST/invasive ductal carcinoma) with apocrine  features, see note      - [place] grade 2 of 3 (total score: 6 of 9)          - Tubule formation: <10%, score 3          - Nuclear grade 2 of 3, score 2          - Mitoses < 3/mm2, (</= 7 mitoses/10HPF), score 1      - Invasive carcinoma involves 3 of 3 submitted core biopsies, max. dimension = 14 millimeters      - Ductal carcinoma in situ (DCIS): Not identified      - Microcalcifications: Absent      - Lymphovascular invasion: Not identified    Axillary lymph node, US BX RT AXILLARY TAIL LN 10:00 12CMFN 3 PASSES 12G [place]:      - Metastatic carcinoma consistent with mammary primary,    BREAST TUMOR PROGNOSTIC PROFILE     Performed on invasive carcinoma block E1:  Test Description Result Prognostic Interpretation   Estrogen Receptor/ER  Primary Antibody: SP-1  Internal control: Positive   External control: Positive 90%  Staining Intensity: Strong  Isabela Score*: 8 Positive   Progesterone Receptor/PgR  Primary Antibody:1E2  Internal control: Positive  External control: Positive 5-7%  Staining Intensity: Strong  Isabela Score*: 4 Positive   HER2 by IHC   Primary Antibody: 4B5 2+ Equivocal *      Performed on invasive carcinoma block F2:  Test Description Result Prognostic Interpretation   Estrogen Receptor/ER  Primary Antibody: SP-1  Internal control: Positive   External control: Positive 95%  Staining Intensity: Strong  Isabela Score*: 8 Positive   Progesterone Receptor/PgR  Primary Antibody:1E2  Internal control: Positive  External control: Positive 30%  Staining Intensity: Moderate  Isabela Score*: 5 Positive   HER2 by IHC   Primary Antibody: 4B5 2+ Equivocal *       Fluorescence in situ hybridization (FISH) analysis of HER2 overexpression by invasive breast carcinoma cells, block E1 performed at [facility], [place], [place], yields the following results:  Test Description                        Interpretation  HER2 by FISH analysis:              Negative / Not Amplified.  Results  HER2:  CEP-17 ratio :                  1.5: 1  Average HER2 Signal:                5.4  Average CEP-17 Signal:                          3.5  Number of selected invasive cells scanned           100     Fluorescence in situ hybridization (FISH) analysis of HER2 overexpression by invasive breast carcinoma cells, block F2 performed at [facility], [place], [place], yields the following results:  Test Description                        Interpretation  HER2 by FISH analysis:              Negative / Not Amplified.  Results  HER2: CEP-17 ratio :                  1.3: 1  Average HER2 Signal:                3.5  Average CEP-17 Signal:                          2.6  Number of selected invasive cells scanned           50        4/18/2024 Genetic Testing    AMBRY  A total of 36 genes were evaluated, including: APC, MIKE, BARD 1, BMPR1A, BRCA1, BRCA2, BRIP1, CDH1, CDK4, CKDN2A, CHEK2, DICER1, MLH1, MSH2, MSH6, MUTYH, NBN, NF1, NTHL1, PALB2, PMS2, PTEN, RAD51C, RECQL, SMAD4, SMARCA4, STK11, TP53, AXIN2, HOXB13, MSH3, POLD1, AND POLE, EPCAM, AND GREM1     LIKELY PATHOGENIC VARIANT CHEK2  VUS SDHA         Interval History: Clinically stable.  ECOG-  1 - Symptomatic but completely ambulatory    Review of Systems   Constitutional:  Negative for appetite change, diaphoresis, fatigue and fever.   HENT:  Negative for sinus pain.    Eyes:  Negative for discharge.   Respiratory:  Negative for cough and shortness of breath.    Cardiovascular:  Negative for chest pain.   Gastrointestinal:  Negative for abdominal pain, constipation and diarrhea.   Endocrine: Negative for cold intolerance.   Genitourinary:  Negative for difficulty urinating and hematuria.   Musculoskeletal:  Negative for joint swelling.   Skin:  Negative for rash.   Allergic/Immunologic: Negative for environmental allergies.   Neurological:  Negative for dizziness and headaches.   Hematological:  Negative for adenopathy.   Psychiatric/Behavioral:  Negative for  agitation.        Past Medical History:   Diagnosis Date    Colon polyp     Esophagitis     History of benign breast tumor     Hyperlipidemia     Urinary incontinence     Venous ulcer of ankle (HCC) 01/10/2022     Patient Active Problem List   Diagnosis    Chronic atrophic gastritis    Dysphagia, pharyngoesophageal    Urinary incontinence    Gastroparesis    Hemorrhoids, external    Asymptomatic postmenopausal status    Essential hypertension    Pernicious anemia    Breast cancer metastasized to axillary lymph node, right (HCC)    CHEK2 gene mutation positive    Malignant neoplasm of overlapping sites of right breast in female, estrogen receptor positive (HCC)       Current Outpatient Medications:     acetaminophen (TYLENOL) 325 mg tablet, Take 650 mg by mouth every 6 (six) hours as needed for mild pain, Disp: , Rfl:     Ascorbic Acid (vitamin C) 100 MG tablet, Take 100 mg by mouth daily, Disp: , Rfl:     azithromycin (Zithromax) 250 mg tablet, Take 2 tablets (500 mg total) by mouth daily for 1 day, THEN 1 tablet (250 mg total) daily for 4 days., Disp: 6 tablet, Rfl: 0    hydroCHLOROthiazide 25 mg tablet, TAKE 1 TABLET (25 MG TOTAL) BY MOUTH DAILY., Disp: 90 tablet, Rfl: 1    Multiple Vitamins-Minerals (multivitamin with minerals) tablet, Take 1 tablet by mouth daily, Disp: , Rfl:     naproxen (NAPROSYN) 500 mg tablet, TAKE 1 TABLET BY MOUTH TWICE A DAY WITH FOOD, Disp: 30 tablet, Rfl: 0    pravastatin (PRAVACHOL) 20 mg tablet, TAKE 1 TABLET BY MOUTH DAILY AT BEDTIME, Disp: 90 tablet, Rfl: 1    Zinc 100 MG TABS, Take by mouth, Disp: , Rfl:     Current Facility-Administered Medications:     cyanocobalamin injection 1,000 mcg, 1,000 mcg, Intramuscular, Q30 Days, Lorraine Dennis DO, 1,000 mcg at 05/14/24 0939  Allergies   Allergen Reactions    Penicillins Other (See Comments)     Unknown reaction, positive on allergy testing     Past Surgical History:   Procedure Laterality Date    ANKLE SURGERY Right      "BREAST EXCISIONAL BIOPSY Left 1998    benign    BUNIONECTOMY Bilateral     COLONOSCOPY      CORRECTION HAMMER TOE Bilateral     MAMMO STEREOTACTIC BREAST BIOPSY LEFT (ALL INC) Left 4/4/2024    MAMMO STEREOTACTIC BREAST BIOPSY RIGHT (ALL INC) EACH ADD Right 4/4/2024    US GUIDANCE BREAST BIOPSY RIGHT EACH ADDITIONAL Right 4/4/2024    US GUIDED BREAST BIOPSY RIGHT COMPLETE Right 4/4/2024    US GUIDED BREAST LYMPH NODE BIOPSY RIGHT Right 4/4/2024    US GUIDED LYMPH NODE BIOPSY LEFT  5/15/2024    VEIN LIGATION Right 06/06/2022    Procedure: Venaseal therapy and stab phlebectomy;  Surgeon: Gosia Shepherd MD;  Location: [facility];  Service: Vascular     Social History     Objective:  Vitals:    05/30/24 0957   BP: 122/66   BP Location: Left arm   Patient Position: Sitting   Cuff Size: Adult   Pulse: 76   Resp: 17   Temp: 97.8 °F (36.6 °C)   SpO2: 98%   Weight: 63.4 kg (139 lb 12.8 oz)   Height: 5' 6\" (1.676 m)     Physical Exam  Constitutional:       Appearance: She is well-developed.   HENT:      Head: Normocephalic and atraumatic.   Eyes:      Pupils: Pupils are equal, round, and reactive to light.   Cardiovascular:      Rate and Rhythm: Normal rate.      Heart sounds: No murmur heard.  Pulmonary:      Effort: No respiratory distress.      Breath sounds: No wheezing or rales.   Abdominal:      General: There is no distension.      Palpations: Abdomen is soft.      Tenderness: There is no abdominal tenderness. There is no rebound.   Musculoskeletal:         General: No tenderness.      Cervical back: Neck supple.   Lymphadenopathy:      Cervical: No cervical adenopathy.   Skin:     General: Skin is warm.      Findings: No rash.   Neurological:      Mental Status: She is alert and oriented to person, place, and time.      Deep Tendon Reflexes: Reflexes normal.   Psychiatric:         Thought Content: Thought content normal.           Labs:  I personally reviewed the labs and imaging pertinent to this patient care.  "

## 2024-05-31 ENCOUNTER — TELEPHONE (OUTPATIENT)
Dept: HEMATOLOGY ONCOLOGY | Facility: CLINIC | Age: 74
End: 2024-05-31

## 2024-05-31 ENCOUNTER — PATIENT OUTREACH (OUTPATIENT)
Dept: CASE MANAGEMENT | Facility: HOSPITAL | Age: 74
End: 2024-05-31

## 2024-05-31 DIAGNOSIS — C50.811 MALIGNANT NEOPLASM OF OVERLAPPING SITES OF RIGHT BREAST IN FEMALE, ESTROGEN RECEPTOR POSITIVE (HCC): Primary | ICD-10-CM

## 2024-05-31 DIAGNOSIS — Z17.0 MALIGNANT NEOPLASM OF OVERLAPPING SITES OF RIGHT BREAST IN FEMALE, ESTROGEN RECEPTOR POSITIVE (HCC): Primary | ICD-10-CM

## 2024-05-31 RX ORDER — ANASTROZOLE 1 MG/1
1 TABLET ORAL DAILY
Qty: 90 TABLET | Refills: 3 | Status: SHIPPED | OUTPATIENT
Start: 2024-05-31

## 2024-05-31 NOTE — TELEPHONE ENCOUNTER
Patient Call    Who are you speaking with? Patient    If it is not the patient, are they listed on an active communication consent form? Yes   What is the reason for this call? Dr Quiroz was going to prescribe a new medication, pharmacy hasn't received the order.  Please advise   Does this require a call back? Yes   If a call back is required, please list best call back number [phone]    If a call back is required, advise that a message will be forwarded to their care team and someone will return their call as soon as possible.   Did you relay this information to the patient? Yes

## 2024-05-31 NOTE — PROGRESS NOTES
Biopsychosocial and Barriers Assessment    Cancer Diagnosis: breast  Home/Cell Phone: [phone]  Emergency Contact: Oscar  Marital Status:   Interpretation concerns, speaks another language, preferred language: speaks English  Cultural concerns: none noted  Ability to read or write:  independent    Caregiver/Support: , adult children  Children: 5 adult children, all live out of state  Child/Elder care: NA    Housing: lives local to [facility]  Home Setup:   Lives With:   Daily Living Activities: independent  Durable Medical Equipment: none  Ambulation: independent    Preferred Pharmacy: [facility]  High co-pays with insurance: no concerns  High co-pays with medication coverage: no concerns  No medication coverage: NA    Primary Care Provider: Dr. Dennis  Hx of Home Health Care: none  Hx of Short term rehab: none  Mental Health Hx: none  Substance Abuse Hx: none  Employment: retired  [place] Status/Location:   Ability to pay bills: no concerns noted  POA/LW/AD:   Transportation Plan/Concerns:  drives      What do you know about your Cancer Diagnosis    What has your doctor told you about your cancer diagnosis:    What has your doctor told you about your cancer treatment:    What specific concerns do you have about your diagnosis and treatment:    Have you been made aware of any hair loss associated with treatment:    Additional Comments:      MSW s/w pt by phone this morning to introduce myself as a resource to her and assess for any needs.  Pt has consulted with Surg Onc and Med Onc and is going to start taking Arimidex this weekend, she tells me that she's finishing up an antibiotic right now and will start her Arimidex afterward.  She tells me that she is doing well and is happy to have her daughter here visiting from [place] for a few days.  She has 5 adult children who all live out of state.  Pt tells me that she is well supported by her family and denies  any emotional or support needs.  She says that she and her  are independent and do well at home, she does not drive but he does. At this time she denies any practical or emotional needs, but is agreeable to reaching out as needed in the future.  I will remain available to her as needed moving forward.

## 2024-05-31 NOTE — TELEPHONE ENCOUNTER
Reviewed with Dr. Quiroz.  Patient to start anastrozole.    Attempted to phone patient with recommendations.  Left voice message that prescription would be sent to pharmacy today or Monday.  Provided call back number for questions.    Prescription routed to provider for signature

## 2024-06-03 ENCOUNTER — TELEPHONE (OUTPATIENT)
Age: 74
End: 2024-06-03

## 2024-06-03 PROCEDURE — 88342 IMHCHEM/IMCYTCHM 1ST ANTB: CPT | Performed by: PATHOLOGY

## 2024-06-03 PROCEDURE — 88341 IMHCHEM/IMCYTCHM EA ADD ANTB: CPT | Performed by: PATHOLOGY

## 2024-06-03 PROCEDURE — 88360 TUMOR IMMUNOHISTOCHEM/MANUAL: CPT | Performed by: PATHOLOGY

## 2024-06-03 PROCEDURE — 88305 TISSUE EXAM BY PATHOLOGIST: CPT | Performed by: PATHOLOGY

## 2024-06-03 NOTE — TELEPHONE ENCOUNTER
Gastric biopsy results reviewed in detail with patient and her daughter.  Brought to the attention of her oncologist.  Gastric emptying study scheduled for July.  Repeat EGD following that.  PET scan pending.

## 2024-06-06 ENCOUNTER — PATIENT OUTREACH (OUTPATIENT)
Dept: HEMATOLOGY ONCOLOGY | Facility: CLINIC | Age: 74
End: 2024-06-06

## 2024-06-06 NOTE — PROGRESS NOTES
Oncology Care Coordinator attempted to outreach patient for the second time to assess any questions or concerns regarding Medical Oncology consult. A voicemail was left stating a reason for my call and my contact information was provided . I requested a return call at patient's earliest convenience.

## 2024-06-13 ENCOUNTER — TELEPHONE (OUTPATIENT)
Age: 74
End: 2024-06-13

## 2024-06-13 DIAGNOSIS — D50.9 IRON DEFICIENCY ANEMIA, UNSPECIFIED IRON DEFICIENCY ANEMIA TYPE: Primary | ICD-10-CM

## 2024-06-13 DIAGNOSIS — E78.2 MIXED HYPERLIPIDEMIA: ICD-10-CM

## 2024-06-13 NOTE — TELEPHONE ENCOUNTER
----- Message from Lorraine Dennis DO sent at 6/13/2024  1:13 PM EDT -----  Overall her blood work was ok.   B12 is normal  Her cbc shows mild anemia. This may be temporary. I have ordered repeat cbc and iron level for her to completed.   The cholesterol level has not improvement. If she has been taking the pravastatin then the dose needs to be increased. If she hasn't been then she needs to start taking it.

## 2024-06-14 RX ORDER — PRAVASTATIN SODIUM 40 MG
40 TABLET ORAL
Qty: 90 TABLET | Refills: 1 | Status: SHIPPED | OUTPATIENT
Start: 2024-06-14

## 2024-06-19 ENCOUNTER — PATIENT OUTREACH (OUTPATIENT)
Dept: HEMATOLOGY ONCOLOGY | Facility: CLINIC | Age: 74
End: 2024-06-19

## 2024-06-19 NOTE — PROGRESS NOTES
I reached out and spoke with Juve now that consults have been completed with the oncology teams to review for any barriers to care and offer supportive services as needed. I reviewed and updated the members assigned to the care team in [facility].   She knows the members of the care team as well as how and when to contact them with any needs.   She verbalizes managing the schedules well.   She is currently driving themselves and deny any transportation needs.    She denies any uncontrolled symptoms. Discussed role of Palliative Care in symptom and side effect management. Declined referral at this time.  Patients states that they are eating and drinking as per usual with no unintentional weight loss.     Patient does not smoke.   Patient states she is well supported by family and friends.    Patient feels they have adequate insurance coverage and deny any financial concerns at this time.     Based on their individual needs I will follow up with them in about 4-6 weeks.   I have provided my direct contact information to the patient and welcome them to contact me if their needs as discussed above change. They were appreciative for the call.

## 2024-06-25 ENCOUNTER — CLINICAL SUPPORT (OUTPATIENT)
Age: 74
End: 2024-06-25
Payer: MEDICARE

## 2024-06-25 DIAGNOSIS — E53.8 VITAMIN B12 DEFICIENCY: Primary | ICD-10-CM

## 2024-06-25 PROCEDURE — 96372 THER/PROPH/DIAG INJ SC/IM: CPT

## 2024-06-25 RX ADMIN — CYANOCOBALAMIN 1000 MCG: 1000 INJECTION, SOLUTION INTRAMUSCULAR; SUBCUTANEOUS at 10:06

## 2024-07-08 ENCOUNTER — PREP FOR PROCEDURE (OUTPATIENT)
Age: 74
End: 2024-07-08

## 2024-07-08 ENCOUNTER — HOSPITAL ENCOUNTER (OUTPATIENT)
Dept: NUCLEAR MEDICINE | Facility: HOSPITAL | Age: 74
Discharge: HOME | End: 2024-07-08
Attending: INTERNAL MEDICINE
Payer: MEDICARE

## 2024-07-08 ENCOUNTER — TELEPHONE (OUTPATIENT)
Age: 74
End: 2024-07-08

## 2024-07-08 DIAGNOSIS — K31.84 GASTROPARESIS: Primary | ICD-10-CM

## 2024-07-08 DIAGNOSIS — R19.8 ABNORMAL FINDINGS ON ESOPHAGOGASTRODUODENOSCOPY (EGD): ICD-10-CM

## 2024-07-08 PROCEDURE — 78264 GASTRIC EMPTYING IMG STUDY: CPT

## 2024-07-08 PROCEDURE — A9541 TC99M SULFUR COLLOID: HCPCS

## 2024-07-08 NOTE — TELEPHONE ENCOUNTER
----- Message from Leonardo Barnes MD sent at 7/8/2024  3:39 PM EDT -----  Regarding: FW:  Please let the patient know that the test shows significantly delayed gastric emptying, and get her set up for the repeat EGD beginning of next month.  ----- Message -----  From: Interface, Radiology Results In  Sent: 7/8/2024   2:46 PM EDT  To: Leonardo Barnes MD

## 2024-07-08 NOTE — TELEPHONE ENCOUNTER
Scheduled date of EGD(as of today): 8/8/24  Physician performing EGD: Cameron  Location of EGD: [place]  Instructions reviewed with patient by: Lore LOMELI  Clearances:     Reviewed custom prep over the phone. Ptn knows to do a full day of clear liquids the day prior due to having a stomach full of food on last EGD with GES showing significant delay.

## 2024-07-23 ENCOUNTER — CLINICAL SUPPORT (OUTPATIENT)
Age: 74
End: 2024-07-23
Payer: MEDICARE

## 2024-07-23 DIAGNOSIS — E53.8 VITAMIN B12 DEFICIENCY: Primary | ICD-10-CM

## 2024-07-23 PROCEDURE — 96372 THER/PROPH/DIAG INJ SC/IM: CPT | Performed by: FAMILY MEDICINE

## 2024-07-23 RX ADMIN — CYANOCOBALAMIN 1000 MCG: 1000 INJECTION, SOLUTION INTRAMUSCULAR; SUBCUTANEOUS at 09:44

## 2024-08-08 ENCOUNTER — HOSPITAL ENCOUNTER (OUTPATIENT)
Dept: GASTROENTEROLOGY | Facility: HOSPITAL | Age: 74
Setting detail: OUTPATIENT SURGERY
End: 2024-08-08
Attending: INTERNAL MEDICINE
Payer: MEDICARE

## 2024-08-08 ENCOUNTER — ANESTHESIA (OUTPATIENT)
Dept: GASTROENTEROLOGY | Facility: HOSPITAL | Age: 74
End: 2024-08-08

## 2024-08-08 ENCOUNTER — ANESTHESIA EVENT (OUTPATIENT)
Dept: GASTROENTEROLOGY | Facility: HOSPITAL | Age: 74
End: 2024-08-08

## 2024-08-08 VITALS
RESPIRATION RATE: 20 BRPM | HEART RATE: 56 BPM | WEIGHT: 142.2 LBS | DIASTOLIC BLOOD PRESSURE: 61 MMHG | TEMPERATURE: 97.5 F | SYSTOLIC BLOOD PRESSURE: 133 MMHG | OXYGEN SATURATION: 98 % | HEIGHT: 66 IN | BODY MASS INDEX: 22.85 KG/M2

## 2024-08-08 DIAGNOSIS — K31.84 GASTROPARESIS: ICD-10-CM

## 2024-08-08 PROCEDURE — 88342 IMHCHEM/IMCYTCHM 1ST ANTB: CPT | Performed by: PATHOLOGY

## 2024-08-08 PROCEDURE — 43239 EGD BIOPSY SINGLE/MULTIPLE: CPT | Performed by: INTERNAL MEDICINE

## 2024-08-08 PROCEDURE — 88305 TISSUE EXAM BY PATHOLOGIST: CPT | Performed by: PATHOLOGY

## 2024-08-08 PROCEDURE — 88360 TUMOR IMMUNOHISTOCHEM/MANUAL: CPT | Performed by: PATHOLOGY

## 2024-08-08 PROCEDURE — 88341 IMHCHEM/IMCYTCHM EA ADD ANTB: CPT | Performed by: PATHOLOGY

## 2024-08-08 RX ORDER — LIDOCAINE HYDROCHLORIDE 20 MG/ML
INJECTION, SOLUTION EPIDURAL; INFILTRATION; INTRACAUDAL; PERINEURAL AS NEEDED
Status: DISCONTINUED | OUTPATIENT
Start: 2024-08-08 | End: 2024-08-08

## 2024-08-08 RX ORDER — PROPOFOL 10 MG/ML
INJECTION, EMULSION INTRAVENOUS AS NEEDED
Status: DISCONTINUED | OUTPATIENT
Start: 2024-08-08 | End: 2024-08-08

## 2024-08-08 RX ORDER — SODIUM CHLORIDE, SODIUM LACTATE, POTASSIUM CHLORIDE, CALCIUM CHLORIDE 600; 310; 30; 20 MG/100ML; MG/100ML; MG/100ML; MG/100ML
INJECTION, SOLUTION INTRAVENOUS CONTINUOUS PRN
Status: DISCONTINUED | OUTPATIENT
Start: 2024-08-08 | End: 2024-08-08

## 2024-08-08 RX ADMIN — PROPOFOL 30 MG: 10 INJECTION, EMULSION INTRAVENOUS at 07:22

## 2024-08-08 RX ADMIN — PROPOFOL 120 MG: 10 INJECTION, EMULSION INTRAVENOUS at 07:14

## 2024-08-08 RX ADMIN — SODIUM CHLORIDE, SODIUM LACTATE, POTASSIUM CHLORIDE, AND CALCIUM CHLORIDE: .6; .31; .03; .02 INJECTION, SOLUTION INTRAVENOUS at 07:12

## 2024-08-08 RX ADMIN — PROPOFOL 30 MG: 10 INJECTION, EMULSION INTRAVENOUS at 07:16

## 2024-08-08 RX ADMIN — LIDOCAINE HYDROCHLORIDE 100 MG: 20 INJECTION, SOLUTION EPIDURAL; INFILTRATION; INTRACAUDAL; PERINEURAL at 07:14

## 2024-08-08 RX ADMIN — PROPOFOL 50 MG: 10 INJECTION, EMULSION INTRAVENOUS at 07:18

## 2024-08-08 NOTE — ANESTHESIA POSTPROCEDURE EVALUATION
Post-Op Assessment Note    CV Status:  Stable  Pain Score: 0    Pain management: adequate       Mental Status:  Alert and awake   Hydration Status:  Euvolemic   PONV Controlled:  Controlled   Airway Patency:  Patent and adequate  Two or more mitigation strategies used for obstructive sleep apnea   Post Op Vitals Reviewed: Yes    No anethesia notable event occurred.    Staff: AMIRA               BP 91/51 (08/08/24 0725)    Temp 97.5 °F (36.4 °C) (08/08/24 0725)    Pulse (!) 51 (08/08/24 0725)   Resp 19 (08/08/24 0725)    SpO2 99 % (08/08/24 0725)

## 2024-08-08 NOTE — H&P
History and Physical -  [facility]  Juve Duarte 74 y.o. female MRN: [id number]                  HPI: Juve Duarte is a 74 y.o. year old female who presents for EGD for abnormal CT of the stomach with gastric nodules.  Residual gastric food on previous attempts.      REVIEW OF SYSTEMS: Per the HPI, and otherwise unremarkable.    Historical Information   Past Medical History:   Diagnosis Date    Colon polyp     Esophagitis     History of benign breast tumor     Hyperlipidemia     Urinary incontinence     Venous ulcer of ankle (HCC) 01/10/2022     Past Surgical History:   Procedure Laterality Date    ANKLE SURGERY Right     BREAST EXCISIONAL BIOPSY Left     benign    BUNIONECTOMY Bilateral     COLONOSCOPY      CORRECTION HAMMER TOE Bilateral     MAMMO STEREOTACTIC BREAST BIOPSY LEFT (ALL INC) Left 2024    MAMMO STEREOTACTIC BREAST BIOPSY RIGHT (ALL INC) EACH ADD Right 2024    US GUIDANCE BREAST BIOPSY RIGHT EACH ADDITIONAL Right 2024    US GUIDED BREAST BIOPSY RIGHT COMPLETE Right 2024    US GUIDED BREAST LYMPH NODE BIOPSY RIGHT Right 2024    US GUIDED LYMPH NODE BIOPSY LEFT  5/15/2024    VEIN LIGATION Right 2022    Procedure: Venaseal therapy and stab phlebectomy;  Surgeon: Gosia Shepherd MD;  Location: [facility];  Service: Vascular     Social History   Social History     Substance and Sexual Activity   Alcohol Use Not Currently    Alcohol/week: 1.0 standard drink of alcohol    Types: 1 Standard drinks or equivalent per week     Social History     Substance and Sexual Activity   Drug Use Never     Social History     Tobacco Use   Smoking Status Former    Current packs/day: 0.00    Types: Cigarettes    Start date:     Quit date:     Years since quittin.6   Smokeless Tobacco Never     Family History   Problem Relation Age of Onset    No Known Problems Mother     Heart disease Father     No Known Problems Daughter     No Known Problems Maternal  Grandmother     No Known Problems Maternal Grandfather     No Known Problems Paternal Grandmother     No Known Problems Paternal Grandfather     Throat cancer Brother     No Known Problems Maternal Aunt     No Known Problems Maternal Aunt     No Known Problems Maternal Aunt     No Known Problems Maternal Aunt     No Known Problems Paternal Aunt     No Known Problems Paternal Aunt     No Known Problems Paternal Aunt     No Known Problems Paternal Aunt     BRCA2 Negative Neg Hx     BRCA2 Positive Neg Hx     BRCA1 Positive Neg Hx     BRCA1 Negative Neg Hx     BRCA 1/2 Neg Hx     Ovarian cancer Neg Hx     Endometrial cancer Neg Hx     Colon cancer Neg Hx     Breast cancer additional onset Neg Hx     Breast cancer Neg Hx        Meds/Allergies     Not in a hospital admission.    Allergies   Allergen Reactions    Penicillins Other (See Comments)     Unknown reaction, positive on allergy testing       Objective     not currently breastfeeding.      PHYSICAL EXAM    Gen: NAD  CV: RRR  CHEST: Clear  ABD: soft, NT/ND  EXT: no edema  Neuro: AAO      ASSESSMENT/PLAN:  This is a 74 y.o. year old female here for abnormal CT with gastric nodules    PLAN:   Procedure: EGD

## 2024-08-08 NOTE — INTERVAL H&P NOTE
H&P reviewed. After examining the patient I find no changes in the patients condition since the H&P had been written.    Vitals:    08/08/24 0644   BP: 125/65   Pulse: 62   Resp: 16   Temp: 97.5 °F (36.4 °C)   SpO2: 94%

## 2024-08-08 NOTE — ANESTHESIA PREPROCEDURE EVALUATION
Procedure:  EGD    Relevant Problems   ANESTHESIA (within normal limits)      CARDIO   (+) Essential hypertension      GI/HEPATIC  Confirmed NPO appropriate   (+) Dysphagia, pharyngoesophageal      /RENAL (within normal limits)      GYN   (+) Malignant neoplasm of overlapping sites of right breast in female, estrogen receptor positive (HCC)      HEMATOLOGY   (+) Pernicious anemia      MUSCULOSKELETAL (within normal limits)      NEURO/PSYCH (within normal limits)      PULMONARY  FOrmer smoker   (-) Smoking   (-) URI (upper respiratory infection)      Other   (+) Breast cancer metastasized to axillary lymph node, right (HCC)        Physical Exam    Airway    Mallampati score: II  TM Distance: <3 FB  Neck ROM: full     Dental    upper dentures and lower dentures    Cardiovascular  Rhythm: regular    Pulmonary   Breath sounds clear to auscultation    Other Findings  post-pubertal.      Anesthesia Plan  ASA Score- 3     Anesthesia Type- IV sedation with anesthesia with ASA Monitors.         Additional Monitors:     Airway Plan:     Comment: I discussed the risks and benefits of IV sedation anesthesia including the possibility of the need to convert to general anesthesia and the potential risk of awareness.  The patient was given the opportunity to ask questions, which were answered..       Plan Factors-Exercise tolerance (METS): >4 METS.    Chart reviewed. EKG reviewed.       Patient is not a current smoker.              Induction- intravenous.    Postoperative Plan-         Informed Consent- Anesthetic plan and risks discussed with patient and spouse.  I personally reviewed this patient with the CRNA. Discussed and agreed on the Anesthesia Plan with the CRNA..      NM Gastric emptying 7/8/24:  IMPRESSION:  Significantly delayed rate of gastric emptying.

## 2024-08-14 PROCEDURE — 88342 IMHCHEM/IMCYTCHM 1ST ANTB: CPT | Performed by: PATHOLOGY

## 2024-08-14 PROCEDURE — 88360 TUMOR IMMUNOHISTOCHEM/MANUAL: CPT | Performed by: PATHOLOGY

## 2024-08-14 PROCEDURE — 88305 TISSUE EXAM BY PATHOLOGIST: CPT | Performed by: PATHOLOGY

## 2024-08-14 PROCEDURE — 88341 IMHCHEM/IMCYTCHM EA ADD ANTB: CPT | Performed by: PATHOLOGY

## 2024-08-20 ENCOUNTER — TELEPHONE (OUTPATIENT)
Age: 74
End: 2024-08-20

## 2024-08-20 DIAGNOSIS — D3A.8 NEUROENDOCRINE TUMOR: Primary | ICD-10-CM

## 2024-08-20 NOTE — TELEPHONE ENCOUNTER
----- Message from Leonardo Barnes MD sent at 8/20/2024  6:11 AM EDT -----  Please make sure this patient has an appointment to see me in the office to go over her results.  I ordered a blood test for her that she should get it as soon as is convenient for her.

## 2024-08-21 ENCOUNTER — HOSPITAL ENCOUNTER (OUTPATIENT)
Dept: RADIOLOGY | Age: 74
Discharge: HOME | End: 2024-08-21
Payer: MEDICARE

## 2024-08-21 DIAGNOSIS — C50.811 MALIGNANT NEOPLASM OF OVERLAPPING SITES OF RIGHT BREAST IN FEMALE, ESTROGEN RECEPTOR POSITIVE (HCC): ICD-10-CM

## 2024-08-21 DIAGNOSIS — C77.3 BREAST CANCER METASTASIZED TO AXILLARY LYMPH NODE, RIGHT (HCC): ICD-10-CM

## 2024-08-21 DIAGNOSIS — C50.911 BREAST CANCER METASTASIZED TO AXILLARY LYMPH NODE, RIGHT (HCC): ICD-10-CM

## 2024-08-21 DIAGNOSIS — Z17.0 MALIGNANT NEOPLASM OF OVERLAPPING SITES OF RIGHT BREAST IN FEMALE, ESTROGEN RECEPTOR POSITIVE (HCC): ICD-10-CM

## 2024-08-21 PROCEDURE — 78815 PET IMAGE W/CT SKULL-THIGH: CPT

## 2024-08-21 PROCEDURE — A9591 FLUOROESTRADIOL F 18: HCPCS

## 2024-08-22 ENCOUNTER — APPOINTMENT (OUTPATIENT)
Age: 74
End: 2024-08-22
Payer: MEDICARE

## 2024-08-22 DIAGNOSIS — D3A.8 NEUROENDOCRINE TUMOR: ICD-10-CM

## 2024-08-22 DIAGNOSIS — Z17.0 MALIGNANT NEOPLASM OF OVERLAPPING SITES OF RIGHT BREAST IN FEMALE, ESTROGEN RECEPTOR POSITIVE (HCC): ICD-10-CM

## 2024-08-22 DIAGNOSIS — M85.80 OSTEOPENIA, UNSPECIFIED LOCATION: ICD-10-CM

## 2024-08-22 DIAGNOSIS — C77.3 BREAST CANCER METASTASIZED TO AXILLARY LYMPH NODE, RIGHT (HCC): ICD-10-CM

## 2024-08-22 DIAGNOSIS — C50.911 BREAST CANCER METASTASIZED TO AXILLARY LYMPH NODE, RIGHT (HCC): ICD-10-CM

## 2024-08-22 DIAGNOSIS — C50.811 MALIGNANT NEOPLASM OF OVERLAPPING SITES OF RIGHT BREAST IN FEMALE, ESTROGEN RECEPTOR POSITIVE (HCC): ICD-10-CM

## 2024-08-22 LAB — 25(OH)D3 SERPL-MCNC: 43.7 NG/ML (ref 30–100)

## 2024-08-22 PROCEDURE — 86316 IMMUNOASSAY TUMOR OTHER: CPT

## 2024-08-22 PROCEDURE — 36415 COLL VENOUS BLD VENIPUNCTURE: CPT

## 2024-08-22 PROCEDURE — 82306 VITAMIN D 25 HYDROXY: CPT

## 2024-08-26 ENCOUNTER — OFFICE VISIT (OUTPATIENT)
Dept: SURGICAL ONCOLOGY | Facility: CLINIC | Age: 74
End: 2024-08-26
Payer: MEDICARE

## 2024-08-26 VITALS
DIASTOLIC BLOOD PRESSURE: 80 MMHG | BODY MASS INDEX: 23.14 KG/M2 | HEIGHT: 66 IN | HEART RATE: 60 BPM | SYSTOLIC BLOOD PRESSURE: 130 MMHG | WEIGHT: 144 LBS

## 2024-08-26 DIAGNOSIS — Z17.0 MALIGNANT NEOPLASM OF OVERLAPPING SITES OF RIGHT BREAST IN FEMALE, ESTROGEN RECEPTOR POSITIVE (HCC): ICD-10-CM

## 2024-08-26 DIAGNOSIS — C50.811 MALIGNANT NEOPLASM OF OVERLAPPING SITES OF RIGHT BREAST IN FEMALE, ESTROGEN RECEPTOR POSITIVE (HCC): ICD-10-CM

## 2024-08-26 DIAGNOSIS — C77.3 BREAST CANCER METASTASIZED TO AXILLARY LYMPH NODE, RIGHT (HCC): ICD-10-CM

## 2024-08-26 DIAGNOSIS — Z15.89 CHEK2 GENE MUTATION POSITIVE: ICD-10-CM

## 2024-08-26 DIAGNOSIS — C50.911 BREAST CANCER METASTASIZED TO AXILLARY LYMPH NODE, RIGHT (HCC): ICD-10-CM

## 2024-08-26 DIAGNOSIS — C7A.8 NEUROENDOCRINE CARCINOMA OF STOMACH (HCC): Primary | ICD-10-CM

## 2024-08-26 PROCEDURE — 99205 OFFICE O/P NEW HI 60 MIN: CPT | Performed by: SURGERY

## 2024-08-26 NOTE — PROGRESS NOTES
Surgical Oncology Follow Up  [facility]  [facility]  [address] [address] [facility] [address] [address]    Juve Duarte  1950  [id number]      Chief Complaint   Patient presents with    Follow-up     3 Month Follow Up        Assessment & Plan:   74-year-old female with multifocal ER 90% GA 5 to 7% HER2 2+ FISH negative right breast cancer with axillary ernie metastasis. She also underwent cervical lymph node biopsy it was atypical cells and no malignancy identified.  She is on anastrozole and repeat PET CT scan demonstrated slight improvement of the right breast as well as right axillary lymph node metastasis no significant changes.  In the interim she underwent upper EGD and had biopsy.  Biopsy is consistent with neuroendocrine tumor.   PET/CT findings are reviewed and discussed.  She has an upcoming appointment with Dr. Quiroz.  With regard to neuroendocrine tumor we will refer her to Dr. Up and will follow-up within a month.  She was asked continue her anastrozole as prescribed.    Cancer History:     Oncology History   Breast cancer metastasized to axillary lymph node, right (HCC)   4/4/2024 Initial Diagnosis    April 4, 2024 patient had biopsy of right breast mass showing invasive ductal carcinoma.  Right axillary node showed metastatic carcinoma consistent with breast primary.  ER 90%, GA 5-7%, HER2 +2.  FISH negative.  Similar findings in the axillary node although GA was 30%.  HER2 +2, FISH negative.  April 18, 2024 patient had CT chest ab pelvis showing soft tissue nodules right breast.  Right infrahilar and internal mammary nodes indeterminate.  Right axillary nodes up to 0.5 cm.  2 enhancing soft tissue nodules in the gastric fundus.  Bone scan showed focus of activity at anterior right seventh rib.     4/4/2024 Biopsy    Breast, Right, US BX RT BREAST 700 7CMFN 3 PASSES 12G MARQU[place]:      - Invasive mammary carcinoma of no special type (ductal  NST/invasive ductal carcinoma) with apocrine features, see note      - [place] grade 2 of 3 (total score: 6 of 9)          - Tubule formation: <10%, score 3          - Nuclear grade 2 of 3, score 2          - Mitoses < 3/mm2, (</= 7 mitoses/10HPF), score 1      - Invasive carcinoma involves 3 of 3 submitted core biopsies, max. dimension = 5 millimeters      - Ductal carcinoma in situ (DCIS): Not identified      - Microcalcifications: Absent      - Lymphovascular invasion: Present     Breast, Right, US BX RT BREAST 900 7CMFN 3 PASSES 12G MAR[place]:      - Invasive mammary carcinoma of no special type (ductal NST/invasive ductal carcinoma) with apocrine features, see note      - [place] grade 2 of 3 (total score: 6 of 9)          - Tubule formation: <10%, score 3          - Nuclear grade 2 of 3, score 2          - Mitoses < 3/mm2, (</= 7 mitoses/10HPF), score 1      - Invasive carcinoma involves 3 of 3 submitted core biopsies, max. dimension = 14 millimeters      - Ductal carcinoma in situ (DCIS): Not identified      - Microcalcifications: Absent      - Lymphovascular invasion: Not identified    Axillary lymph node, US BX RT AXILLARY TAIL LN 10:00 12CMFN 3 PASSES 12G [place]:      - Metastatic carcinoma consistent with mammary primary,    BREAST TUMOR PROGNOSTIC PROFILE     Performed on invasive carcinoma block E1:  Test Description Result Prognostic Interpretation   Estrogen Receptor/ER  Primary Antibody: SP-1  Internal control: Positive   External control: Positive 90%  Staining Intensity: Strong  Isabela Score*: 8 Positive   Progesterone Receptor/PgR  Primary Antibody:1E2  Internal control: Positive  External control: Positive 5-7%  Staining Intensity: Strong  Isabela Score*: 4 Positive   HER2 by IHC   Primary Antibody: 4B5 2+ Equivocal *      Performed on invasive carcinoma block F2:  Test Description Result Prognostic Interpretation   Estrogen Receptor/ER  Primary Antibody: SP-1  Internal control: Positive    External control: Positive 95%  Staining Intensity: Strong  Isabela Score*: 8 Positive   Progesterone Receptor/PgR  Primary Antibody:1E2  Internal control: Positive  External control: Positive 30%  Staining Intensity: Moderate  Isabela Score*: 5 Positive   HER2 by IHC   Primary Antibody: 4B5 2+ Equivocal *       Fluorescence in situ hybridization (FISH) analysis of HER2 overexpression by invasive breast carcinoma cells, block E1 performed at [facility], [place], [place], yields the following results:  Test Description                        Interpretation  HER2 by FISH analysis:              Negative / Not Amplified.  Results  HER2: CEP-17 ratio :                  1.5: 1  Average HER2 Signal:                5.4  Average CEP-17 Signal:                          3.5  Number of selected invasive cells scanned           100     Fluorescence in situ hybridization (FISH) analysis of HER2 overexpression by invasive breast carcinoma cells, block F2 performed at [facility], [place], [place], yields the following results:  Test Description                        Interpretation  HER2 by FISH analysis:              Negative / Not Amplified.  Results  HER2: CEP-17 ratio :                  1.3: 1  Average HER2 Signal:                3.5  Average CEP-17 Signal:                          2.6  Number of selected invasive cells scanned           50        4/18/2024 Genetic Testing    AMBRY  A total of 36 genes were evaluated, including: APC, MIKE, BARD 1, BMPR1A, BRCA1, BRCA2, BRIP1, CDH1, CDK4, CKDN2A, CHEK2, DICER1, MLH1, MSH2, MSH6, MUTYH, NBN, NF1, NTHL1, PALB2, PMS2, PTEN, RAD51C, RECQL, SMAD4, SMARCA4, STK11, TP53, AXIN2, HOXB13, MSH3, POLD1, AND POLE, EPCAM, AND GREM1     LIKELY PATHOGENIC VARIANT CHEK2  VUS SDHA     Malignant neoplasm of overlapping sites of right breast in female, estrogen receptor positive (HCC)   4/4/2024 Initial Diagnosis    April 4, 2024 patient had biopsy of right  breast mass showing invasive ductal carcinoma.  Right axillary node showed metastatic carcinoma consistent with breast primary.  ER 90%, WA 5-7%, HER2 +2.  FISH negative.  Similar findings in the axillary node although WA was 30%.  HER2 +2, FISH negative.  April 18, 2024 patient had CT chest ab pelvis showing soft tissue nodules right breast.  Right infrahilar and internal mammary nodes indeterminate.  Right axillary nodes up to 0.5 cm.  2 enhancing soft tissue nodules in the gastric fundus.  Bone scan showed focus of activity at anterior right seventh rib.     4/4/2024 Biopsy    Breast, Right, US BX RT BREAST 700 7CMFN 3 PASSES 12G [place]:      - Invasive mammary carcinoma of no special type (ductal NST/invasive ductal carcinoma) with apocrine features, see note      - [place] grade 2 of 3 (total score: 6 of 9)          - Tubule formation: <10%, score 3          - Nuclear grade 2 of 3, score 2          - Mitoses < 3/mm2, (</= 7 mitoses/10HPF), score 1      - Invasive carcinoma involves 3 of 3 submitted core biopsies, max. dimension = 5 millimeters      - Ductal carcinoma in situ (DCIS): Not identified      - Microcalcifications: Absent      - Lymphovascular invasion: Present     Breast, Right, US BX RT BREAST 900 7CMFN 3 PASSES 12G [place]:      - Invasive mammary carcinoma of no special type (ductal NST/invasive ductal carcinoma) with apocrine features, see note      - [place] grade 2 of 3 (total score: 6 of 9)          - Tubule formation: <10%, score 3          - Nuclear grade 2 of 3, score 2          - Mitoses < 3/mm2, (</= 7 mitoses/10HPF), score 1      - Invasive carcinoma involves 3 of 3 submitted core biopsies, max. dimension = 14 millimeters      - Ductal carcinoma in situ (DCIS): Not identified      - Microcalcifications: Absent      - Lymphovascular invasion: Not identified    Axillary lymph node, US BX RT AXILLARY TAIL LN 10:00 12CMFN 3 PASSES 12G [place]:      - Metastatic carcinoma consistent  with mammary primary,    BREAST TUMOR PROGNOSTIC PROFILE     Performed on invasive carcinoma block E1:  Test Description Result Prognostic Interpretation   Estrogen Receptor/ER  Primary Antibody: SP-1  Internal control: Positive   External control: Positive 90%  Staining Intensity: Strong  Isabela Score*: 8 Positive   Progesterone Receptor/PgR  Primary Antibody:1E2  Internal control: Positive  External control: Positive 5-7%  Staining Intensity: Strong  Isabela Score*: 4 Positive   HER2 by IHC   Primary Antibody: 4B5 2+ Equivocal *      Performed on invasive carcinoma block F2:  Test Description Result Prognostic Interpretation   Estrogen Receptor/ER  Primary Antibody: SP-1  Internal control: Positive   External control: Positive 95%  Staining Intensity: Strong  Isabela Score*: 8 Positive   Progesterone Receptor/PgR  Primary Antibody:1E2  Internal control: Positive  External control: Positive 30%  Staining Intensity: Moderate  Isabela Score*: 5 Positive   HER2 by IHC   Primary Antibody: 4B5 2+ Equivocal *       Fluorescence in situ hybridization (FISH) analysis of HER2 overexpression by invasive breast carcinoma cells, block E1 performed at [facility], [place], [place], yields the following results:  Test Description                        Interpretation  HER2 by FISH analysis:              Negative / Not Amplified.  Results  HER2: CEP-17 ratio :                  1.5: 1  Average HER2 Signal:                5.4  Average CEP-17 Signal:                          3.5  Number of selected invasive cells scanned           100     Fluorescence in situ hybridization (FISH) analysis of HER2 overexpression by invasive breast carcinoma cells, block F2 performed at [facility], [place], [place], yields the following results:  Test Description                        Interpretation  HER2 by FISH analysis:              Negative / Not Amplified.  Results  HER2: CEP-17 ratio :                  1.3:  1  Average HER2 Signal:                3.5  Average CEP-17 Signal:                          2.6  Number of selected invasive cells scanned           50        4/18/2024 Genetic Testing    AMBRY  A total of 36 genes were evaluated, including: APC, MIKE, BARD 1, BMPR1A, BRCA1, BRCA2, BRIP1, CDH1, CDK4, CKDN2A, CHEK2, DICER1, MLH1, MSH2, MSH6, MUTYH, NBN, NF1, NTHL1, PALB2, PMS2, PTEN, RAD51C, RECQL, SMAD4, SMARCA4, STK11, TP53, AXIN2, HOXB13, MSH3, POLD1, AND POLE, EPCAM, AND GREM1     LIKELY PATHOGENIC VARIANT CHEK2  VUS SDHA           Interval History:   Follow-up with right breast cancer multifocal with axillary lymph node metastasis possible internal enlarged lymph nodes.    Review of Systems:   Review of Systems    Past Medical History     Patient Active Problem List   Diagnosis    Chronic atrophic gastritis    Dysphagia, pharyngoesophageal    Urinary incontinence    Gastroparesis    Hemorrhoids, external    Asymptomatic postmenopausal status    Essential hypertension    Pernicious anemia    Breast cancer metastasized to axillary lymph node, right (HCC)    CHEK2 gene mutation positive    Malignant neoplasm of overlapping sites of right breast in female, estrogen receptor positive (HCC)     Past Medical History:   Diagnosis Date    Colon polyp     Esophagitis     History of benign breast tumor     Hyperlipidemia     Hypertension     Urinary incontinence     Venous ulcer of ankle (HCC) 01/10/2022     Past Surgical History:   Procedure Laterality Date    ANKLE SURGERY Right     BREAST EXCISIONAL BIOPSY Left 1998    benign    BUNIONECTOMY Bilateral     COLONOSCOPY      CORRECTION HAMMER TOE Bilateral     MAMMO STEREOTACTIC BREAST BIOPSY LEFT (ALL INC) Left 4/4/2024    MAMMO STEREOTACTIC BREAST BIOPSY RIGHT (ALL INC) EACH ADD Right 4/4/2024    US GUIDANCE BREAST BIOPSY RIGHT EACH ADDITIONAL Right 4/4/2024    US GUIDED BREAST BIOPSY RIGHT COMPLETE Right 4/4/2024    US GUIDED BREAST LYMPH NODE BIOPSY RIGHT Right 4/4/2024     US GUIDED LYMPH NODE BIOPSY LEFT  5/15/2024    VEIN LIGATION Right 2022    Procedure: Venaseal therapy and stab phlebectomy;  Surgeon: Gosia Shepherd MD;  Location: [facility];  Service: Vascular     Family History   Problem Relation Age of Onset    No Known Problems Mother     Heart disease Father     No Known Problems Daughter     No Known Problems Maternal Grandmother     No Known Problems Maternal Grandfather     No Known Problems Paternal Grandmother     No Known Problems Paternal Grandfather     Throat cancer Brother     No Known Problems Maternal Aunt     No Known Problems Maternal Aunt     No Known Problems Maternal Aunt     No Known Problems Maternal Aunt     No Known Problems Paternal Aunt     No Known Problems Paternal Aunt     No Known Problems Paternal Aunt     No Known Problems Paternal Aunt     BRCA2 Negative Neg Hx     BRCA2 Positive Neg Hx     BRCA1 Positive Neg Hx     BRCA1 Negative Neg Hx     BRCA 1/2 Neg Hx     Ovarian cancer Neg Hx     Endometrial cancer Neg Hx     Colon cancer Neg Hx     Breast cancer additional onset Neg Hx     Breast cancer Neg Hx      Social History     Socioeconomic History    Marital status: /Civil Union     Spouse name: Not on file    Number of children: Not on file    Years of education: Not on file    Highest education level: Not on file   Occupational History    Not on file   Tobacco Use    Smoking status: Former     Current packs/day: 0.00     Types: Cigarettes     Start date:      Quit date:      Years since quittin.6    Smokeless tobacco: Never   Vaping Use    Vaping status: Never Used   Substance and Sexual Activity    Alcohol use: Not Currently     Alcohol/week: 1.0 standard drink of alcohol     Types: 1 Standard drinks or equivalent per week    Drug use: Never    Sexual activity: Yes     Partners: Male     Birth control/protection: None   Other Topics Concern    Not on file   Social History Narrative    Not on file     Social  Determinants of Health     Financial Resource Strain: Low Risk  (11/16/2023)    Overall Financial Resource Strain (CARDIA)     Difficulty of Paying Living Expenses: Not hard at all   Food Insecurity: Not on file   Transportation Needs: No Transportation Needs (11/16/2023)    PRAPARE - Transportation     Lack of Transportation (Medical): No     Lack of Transportation (Non-Medical): No   Physical Activity: Inactive (9/9/2020)    Exercise Vital Sign     Days of Exercise per Week: 0 days     Minutes of Exercise per Session: 0 min   Stress: No Stress Concern Present (9/9/2020)    [employer] - Occupational Stress Questionnaire     Feeling of Stress : Not at all   Social Connections: Not on file   Intimate Partner Violence: Not on file   Housing Stability: Not on file       Current Outpatient Medications:     acetaminophen (TYLENOL) 325 mg tablet, Take 650 mg by mouth every 6 (six) hours as needed for mild pain, Disp: , Rfl:     anastrozole (ARIMIDEX) 1 mg tablet, Take 1 tablet (1 mg total) by mouth daily, Disp: 90 tablet, Rfl: 3    Ascorbic Acid (vitamin C) 100 MG tablet, Take 100 mg by mouth daily, Disp: , Rfl:     hydroCHLOROthiazide 25 mg tablet, TAKE 1 TABLET (25 MG TOTAL) BY MOUTH DAILY., Disp: 90 tablet, Rfl: 1    Multiple Vitamins-Minerals (multivitamin with minerals) tablet, Take 1 tablet by mouth daily, Disp: , Rfl:     naproxen (NAPROSYN) 500 mg tablet, TAKE 1 TABLET BY MOUTH TWICE A DAY WITH FOOD (Patient taking differently: Take 500 mg by mouth if needed), Disp: 30 tablet, Rfl: 0    pravastatin (PRAVACHOL) 40 mg tablet, Take 1 tablet (40 mg total) by mouth daily at bedtime, Disp: 90 tablet, Rfl: 1    Zinc 100 MG TABS, Take by mouth, Disp: , Rfl:     Current Facility-Administered Medications:     cyanocobalamin injection 1,000 mcg, 1,000 mcg, Intramuscular, Q30 Days, Lorraine Dennis DO, 1,000 mcg at 07/23/24 0944  Allergies   Allergen Reactions    Penicillins Other (See  Comments)     Unknown reaction, positive on allergy testing       Physical Exam:     Vitals:    08/26/24 0927   BP: 130/80   Pulse: 60     Physical Exam      Results & Discussion:   [facility] PET/CT SCAN     INDICATION: Reevaluate breast cancer. ER positive right breast cancer with known metastasis additional history well-differentiated NET of the stomach. Needle biopsy of the left upper cervical node demonstrating atypical epithelioid cells. C50.811:   Malignant neoplasm of overlapping sites of right female breast  Z17.0: Estrogen receptor positive status (ER+)  C50.911: Malignant neoplasm of unspecified site of right female breast  C77.3: Secondary and unspecified malignant neoplasm of axilla and upper limb lymph nodes     MODIFIER: PS     COMPARISON: FDG PET/CT 5/2/2024     CELL TYPE: Invasive mammary carcinoma, right breast     TECHNIQUE:   6.1 mCi [facility] fluoroestradiol administered IV. Multiplanar attenuation corrected and non attenuation corrected PET images were acquired 60 minutes post injection. Contiguous, low dose, axial CT sections were obtained from the vertex   through the femurs .   Intravenous or oral contrast was not utilized.  This examination, like all CT scans performed in the [facility], was performed utilizing techniques to minimize radiation dose exposure, including the use of   iterative reconstruction and automated exposure control.     FINDINGS:     VISUALIZED BRAIN:  No acute abnormalities are seen.     HEAD/NECK:  There is a physiologic distribution of the radiotracer. No FES avid cervical adenopathy is seen. Previously noted small left upper cervical chain lymph node does not demonstrate focal FES uptake. This also appears slightly smaller from the prior exam now   closer to 4 mm short axis, previously closer to 6 mm.     CT images: Stable opacification of the left maxillary sinus.     CHEST:  FES avid right lateral breast nodule, SUV max of 7.5. This also previously  demonstrated FDG uptake, SUV max of 8.6. There is now measures up to 1.6 cm in size image 141 series 3, slightly smaller previously 2.3 cm.     FES avid right axillary lymph node, SUV max of 4.3. This also previously demonstrated FDG uptake, SUV max of 4.5. This measures 6 mm short axis image 101 series 3, smaller, previously 12 mm.     Patient noted additional smaller right axillary lymph node with mild FDG uptake does not demonstrate FES activity.     No suspicious focal uptake in the left breast or axillary region.     There are no FES avid intrathoracic nodes.     CT images: Moderate coronary artery calcifications. Mild fluid distention of the esophagus, decreased from the prior exam. Scattered calcified pleural plaque formation on the right.     ABDOMEN:  No FES avid soft tissue lesions are seen.     Variable physiologic hepatobiliary, genitourinary and bowel activity.  CT images: Stable splenomegaly. Moderate fecal retention in the colon. Metallic clip at the proximal stomach.     PELVIS:  No FES avid soft tissue lesions are seen.     Minimal endometrial activity still likely physiologic in a postmenopausal female.  CT images: Small fat-containing left inguinal hernia suggested.     OSSEOUS STRUCTURES:  No FES avid lesions are seen.  CT images: No significant findings.     IMPRESSION:     1. FES avid right breast lesion compatible with the known malignancy. This appears slightly smaller compared to prior PET/CT.  2. Solitary FES-avid right axillary lymph node compatible with metastasis. This also appears smaller from prior PET/CT. Previously there was an additional smaller right axillary lymph node with mild FDG uptake which is not FES avid.  3. No suspicious FES avid lesions in the neck, abdomen or pelvis. Previously noted small left upper cervical chain lymph node does not demonstrate FES uptake and also appears slightly smaller on CT.  .        Biopsy of the stomach:  Stomach, biopsy:  - Well differentiated  neuroendocrine tumor, WHO grade 2  - Background gastric oxyntic mucosa with chronic active gastritis and intestinal metaplasia  - Helicobacter pylori  organisms not identified on H&E staining         I did review staging PET scan.  I also reviewed the gastric biopsy and discussed neuroendocrine tumor and further management.  With regard to GI neuroendocrine tumor we will refer her to to Dr. Up she will continue her endocrine therapy for now.  I did discussed in detail nature of breast cancer with axillary lymph node metastasis with synchronous gastric tumor and management.  Previously identified internal mammary lymph nodes were not demonstrated subsequent PET/CT scans.  Further management was discussed with Dr. Quiroz in detail and we will wait for Dr. Up consultation.  She will need right modified radical mastectomy .I did spend more than 60 minutes reviewing all the records communicating with other providers, counseling about newly diagnosed right breast cancer as well as gastric neuroendocrine tumor.  Neuroendocrine tumor is slow-growing and well-differentiated.  She will require at some point right modified radical mastectomy with palpable lymph  nodes.she understands and  agrees . All patient questions were answered.       Advance Care Planning/Advance Directives:  I Jevon Valdez MD discussed the disease status with Juve Duarte  today 08/26/24  treatment plans and follow-up with the patient.

## 2024-08-27 ENCOUNTER — CLINICAL SUPPORT (OUTPATIENT)
Age: 74
End: 2024-08-27
Payer: MEDICARE

## 2024-08-27 DIAGNOSIS — D51.0 PERNICIOUS ANEMIA: Primary | ICD-10-CM

## 2024-08-27 PROCEDURE — 96372 THER/PROPH/DIAG INJ SC/IM: CPT

## 2024-08-27 RX ADMIN — CYANOCOBALAMIN 1000 MCG: 1000 INJECTION, SOLUTION INTRAMUSCULAR; SUBCUTANEOUS at 09:58

## 2024-08-29 ENCOUNTER — OFFICE VISIT (OUTPATIENT)
Dept: HEMATOLOGY ONCOLOGY | Facility: CLINIC | Age: 74
End: 2024-08-29
Payer: MEDICARE

## 2024-08-29 VITALS
HEART RATE: 54 BPM | WEIGHT: 143.8 LBS | HEIGHT: 66 IN | RESPIRATION RATE: 17 BRPM | TEMPERATURE: 97.3 F | BODY MASS INDEX: 23.11 KG/M2 | DIASTOLIC BLOOD PRESSURE: 60 MMHG | OXYGEN SATURATION: 95 % | SYSTOLIC BLOOD PRESSURE: 128 MMHG

## 2024-08-29 DIAGNOSIS — C7A.8 NEUROENDOCRINE CARCINOMA OF STOMACH (HCC): ICD-10-CM

## 2024-08-29 DIAGNOSIS — C50.911 BREAST CANCER METASTASIZED TO AXILLARY LYMPH NODE, RIGHT (HCC): Primary | ICD-10-CM

## 2024-08-29 DIAGNOSIS — C77.3 BREAST CANCER METASTASIZED TO AXILLARY LYMPH NODE, RIGHT (HCC): Primary | ICD-10-CM

## 2024-08-29 LAB — CGA SERPL-MCNC: 858.2 NG/ML (ref 0–101.8)

## 2024-08-29 PROCEDURE — 99215 OFFICE O/P EST HI 40 MIN: CPT | Performed by: INTERNAL MEDICINE

## 2024-08-29 PROCEDURE — G2211 COMPLEX E/M VISIT ADD ON: HCPCS | Performed by: INTERNAL MEDICINE

## 2024-08-29 NOTE — PROGRESS NOTES
[facility] [place]  [address] [facility] [address]  [phone]  [phone]    Juve Duarte,1950, [id number]  08/29/24    Discussion:   In summary, this is a 74-year-old female history of right breast infiltrating ductal carcinoma, T1, N1, MX.  ER 90, AR 5, HER2 +2.  FISH negative.  FDG PET/CT showed hypermetabolism in the right lateral breast and right axilla.  Hypermetabolic left upper cervical node, SUV 3.5 noted.  Patient was started on Arimidex 1 mg p.o. daily.  Repeat Cerianna PET Showed right breast lesion 1.6 cm, SUV 7.5, previous 2.3 cm.  Right axillary node, previously 12 mm, currently 6 mm, SUV 4.3.  Additionally, May 28, 2024 she had EGD.  Some angioectasias were noted and cauterized.  Two 1 cm mucosal nodules were noted.  Biopsy showed well-differentiated neuroendocrine tumor.  Repeat EGD August 8, 2024 with biopsy of mucosal nodule showed similar finding.  Treatment of her neuroendocrine tumor would likely include surgery versus observation.  The latter is more reasonable to consider given that her tumors were subcentimeter.  Medical therapy plays no role at this time.  She has surgical oncology evaluation to evaluate timing of her different cancers.  My soft inclination would be to treat breast cancer first.  She has hemoglobin 11.0.  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  Asymptomatic.  Monitor.  I discussed the above with the patient.  The patient and her  voiced understanding and agreement.  ______________________________________________________________________    Chief Complaint   Patient presents with    Follow-up       HPI:  Oncology History   Breast cancer metastasized to axillary lymph node, right (HCC)   4/4/2024 Initial Diagnosis    April 4, 2024 patient had biopsy of right breast mass showing invasive ductal carcinoma.  Right axillary node showed metastatic carcinoma consistent with breast  primary.  ER 90%, TX 5-7%, HER2 +2.  FISH negative.  Similar findings in the axillary node although TX was 30%.  HER2 +2, FISH negative.  April 18, 2024 patient had CT chest ab pelvis showing soft tissue nodules right breast.  Right infrahilar and internal mammary nodes indeterminate.  Right axillary nodes up to 0.5 cm.  2 enhancing soft tissue nodules in the gastric fundus.  Bone scan showed focus of activity at anterior right seventh rib.     4/4/2024 Biopsy    Breast, Right, US BX RT BREAST 700 7CMFN 3 PASSES 12G MARQU[place]:      - Invasive mammary carcinoma of no special type (ductal NST/invasive ductal carcinoma) with apocrine features, see note      - Weems grade 2 of 3 (total score: 6 of 9)          - Tubule formation: <10%, score 3          - Nuclear grade 2 of 3, score 2          - Mitoses < 3/mm2, (</= 7 mitoses/10HPF), score 1      - Invasive carcinoma involves 3 of 3 submitted core biopsies, max. dimension = 5 millimeters      - Ductal carcinoma in situ (DCIS): Not identified      - Microcalcifications: Absent      - Lymphovascular invasion: Present     Breast, Right, US BX RT BREAST 900 7CMFN 3 PASSES 12G [place]:      - Invasive mammary carcinoma of no special type (ductal NST/invasive ductal carcinoma) with apocrine features, see note      - Weems grade 2 of 3 (total score: 6 of 9)          - Tubule formation: <10%, score 3          - Nuclear grade 2 of 3, score 2          - Mitoses < 3/mm2, (</= 7 mitoses/10HPF), score 1      - Invasive carcinoma involves 3 of 3 submitted core biopsies, max. dimension = 14 millimeters      - Ductal carcinoma in situ (DCIS): Not identified      - Microcalcifications: Absent      - Lymphovascular invasion: Not identified    Axillary lymph node, US BX RT AXILLARY TAIL LN 10:00 12CMFN 3 PASSES 12G [place]:      - Metastatic carcinoma consistent with mammary primary,    BREAST TUMOR PROGNOSTIC PROFILE     Performed on invasive carcinoma block E1:  Test Description  Result Prognostic Interpretation   Estrogen Receptor/ER  Primary Antibody: SP-1  Internal control: Positive   External control: Positive 90%  Staining Intensity: Strong  Isabela Score*: 8 Positive   Progesterone Receptor/PgR  Primary Antibody:1E2  Internal control: Positive  External control: Positive 5-7%  Staining Intensity: Strong  Isabela Score*: 4 Positive   HER2 by IHC   Primary Antibody: 4B5 2+ Equivocal *      Performed on invasive carcinoma block F2:  Test Description Result Prognostic Interpretation   Estrogen Receptor/ER  Primary Antibody: SP-1  Internal control: Positive   External control: Positive 95%  Staining Intensity: Strong  Isabela Score*: 8 Positive   Progesterone Receptor/PgR  Primary Antibody:1E2  Internal control: Positive  External control: Positive 30%  Staining Intensity: Moderate  Isabela Score*: 5 Positive   HER2 by IHC   Primary Antibody: 4B5 2+ Equivocal *       Fluorescence in situ hybridization (FISH) analysis of HER2 overexpression by invasive breast carcinoma cells, block E1 performed at [facility], [place], [place], yields the following results:  Test Description                        Interpretation  HER2 by FISH analysis:              Negative / Not Amplified.  Results  HER2: CEP-17 ratio :                  1.5: 1  Average HER2 Signal:                5.4  Average CEP-17 Signal:                          3.5  Number of selected invasive cells scanned           100     Fluorescence in situ hybridization (FISH) analysis of HER2 overexpression by invasive breast carcinoma cells, block F2 performed at [facility], [place], [place], yields the following results:  Test Description                        Interpretation  HER2 by FISH analysis:              Negative / Not Amplified.  Results  HER2: CEP-17 ratio :                  1.3: 1  Average HER2 Signal:                3.5  Average CEP-17 Signal:                          2.6  Number of selected  invasive cells scanned           50        4/18/2024 Genetic Testing    AMBRY  A total of 36 genes were evaluated, including: APC, MIKE, BARD 1, BMPR1A, BRCA1, BRCA2, BRIP1, CDH1, CDK4, CKDN2A, CHEK2, DICER1, MLH1, MSH2, MSH6, MUTYH, NBN, NF1, NTHL1, PALB2, PMS2, PTEN, RAD51C, RECQL, SMAD4, SMARCA4, STK11, TP53, AXIN2, HOXB13, MSH3, POLD1, AND POLE, EPCAM, AND GREM1     LIKELY PATHOGENIC VARIANT CHEK2  VUS SDHA     Malignant neoplasm of overlapping sites of right breast in female, estrogen receptor positive (HCC)   4/4/2024 Initial Diagnosis    April 4, 2024 patient had biopsy of right breast mass showing invasive ductal carcinoma.  Right axillary node showed metastatic carcinoma consistent with breast primary.  ER 90%, SD 5-7%, HER2 +2.  FISH negative.  Similar findings in the axillary node although SD was 30%.  HER2 +2, FISH negative.  April 18, 2024 patient had CT chest ab pelvis showing soft tissue nodules right breast.  Right infrahilar and internal mammary nodes indeterminate.  Right axillary nodes up to 0.5 cm.  2 enhancing soft tissue nodules in the gastric fundus.  Bone scan showed focus of activity at anterior right seventh rib.     4/4/2024 Biopsy    Breast, Right, US BX RT BREAST 700 7CMFN 3 PASSES 12G [place]:      - Invasive mammary carcinoma of no special type (ductal NST/invasive ductal carcinoma) with apocrine features, see note      - Elsy grade 2 of 3 (total score: 6 of 9)          - Tubule formation: <10%, score 3          - Nuclear grade 2 of 3, score 2          - Mitoses < 3/mm2, (</= 7 mitoses/10HPF), score 1      - Invasive carcinoma involves 3 of 3 submitted core biopsies, max. dimension = 5 millimeters      - Ductal carcinoma in situ (DCIS): Not identified      - Microcalcifications: Absent      - Lymphovascular invasion: Present     Breast, Right, US BX RT BREAST 900 7CMFN 3 PASSES 12G [place]:      - Invasive mammary carcinoma of no special type (ductal NST/invasive ductal carcinoma)  with apocrine features, see note      - Elsy grade 2 of 3 (total score: 6 of 9)          - Tubule formation: <10%, score 3          - Nuclear grade 2 of 3, score 2          - Mitoses < 3/mm2, (</= 7 mitoses/10HPF), score 1      - Invasive carcinoma involves 3 of 3 submitted core biopsies, max. dimension = 14 millimeters      - Ductal carcinoma in situ (DCIS): Not identified      - Microcalcifications: Absent      - Lymphovascular invasion: Not identified    Axillary lymph node, US BX RT AXILLARY TAIL LN 10:00 12CMFN 3 PASSES 12G [place]:      - Metastatic carcinoma consistent with mammary primary,    BREAST TUMOR PROGNOSTIC PROFILE     Performed on invasive carcinoma block E1:  Test Description Result Prognostic Interpretation   Estrogen Receptor/ER  Primary Antibody: SP-1  Internal control: Positive   External control: Positive 90%  Staining Intensity: Strong  Isabela Score*: 8 Positive   Progesterone Receptor/PgR  Primary Antibody:1E2  Internal control: Positive  External control: Positive 5-7%  Staining Intensity: Strong  Isabela Score*: 4 Positive   HER2 by IHC   Primary Antibody: 4B5 2+ Equivocal *      Performed on invasive carcinoma block F2:  Test Description Result Prognostic Interpretation   Estrogen Receptor/ER  Primary Antibody: SP-1  Internal control: Positive   External control: Positive 95%  Staining Intensity: Strong  Isabela Score*: 8 Positive   Progesterone Receptor/PgR  Primary Antibody:1E2  Internal control: Positive  External control: Positive 30%  Staining Intensity: Moderate  Isabela Score*: 5 Positive   HER2 by IHC   Primary Antibody: 4B5 2+ Equivocal *       Fluorescence in situ hybridization (FISH) analysis of HER2 overexpression by invasive breast carcinoma cells, block E1 performed at [facility], [place], [place], yields the following results:  Test Description                        Interpretation  HER2 by FISH analysis:              Negative / Not  Amplified.  Results  HER2: CEP-17 ratio :                  1.5: 1  Average HER2 Signal:                5.4  Average CEP-17 Signal:                          3.5  Number of selected invasive cells scanned           100     Fluorescence in situ hybridization (FISH) analysis of HER2 overexpression by invasive breast carcinoma cells, block F2 performed at [facility], [place], [place], yields the following results:  Test Description                        Interpretation  HER2 by FISH analysis:              Negative / Not Amplified.  Results  HER2: CEP-17 ratio :                  1.3: 1  Average HER2 Signal:                3.5  Average CEP-17 Signal:                          2.6  Number of selected invasive cells scanned           50        4/18/2024 Genetic Testing    AMBRY  A total of 36 genes were evaluated, including: APC, MIKE, BARD 1, BMPR1A, BRCA1, BRCA2, BRIP1, CDH1, CDK4, CKDN2A, CHEK2, DICER1, MLH1, MSH2, MSH6, MUTYH, NBN, NF1, NTHL1, PALB2, PMS2, PTEN, RAD51C, RECQL, SMAD4, SMARCA4, STK11, TP53, AXIN2, HOXB13, MSH3, POLD1, AND POLE, EPCAM, AND GREM1     LIKELY PATHOGENIC VARIANT CHEK2  VUS SDHA         Interval History: Clinically stable.  ECOG-  0 - Asymptomatic    Review of Systems   Constitutional:  Negative for appetite change, diaphoresis, fatigue and fever.   HENT:  Negative for sinus pain.    Eyes:  Negative for discharge.   Respiratory:  Negative for cough and shortness of breath.    Cardiovascular:  Negative for chest pain.   Gastrointestinal:  Negative for abdominal pain, constipation and diarrhea.   Endocrine: Negative for cold intolerance.   Genitourinary:  Negative for difficulty urinating and hematuria.   Musculoskeletal:  Negative for joint swelling.   Skin:  Negative for rash.   Allergic/Immunologic: Negative for environmental allergies.   Neurological:  Negative for dizziness and headaches.   Hematological:  Negative for adenopathy.   Psychiatric/Behavioral:  Negative for  agitation.        Past Medical History:   Diagnosis Date    Colon polyp     Esophagitis     History of benign breast tumor     Hyperlipidemia     Hypertension     Urinary incontinence     Venous ulcer of ankle (HCC) 01/10/2022     Patient Active Problem List   Diagnosis    Chronic atrophic gastritis    Dysphagia, pharyngoesophageal    Urinary incontinence    Gastroparesis    Hemorrhoids, external    Asymptomatic postmenopausal status    Essential hypertension    Pernicious anemia    Breast cancer metastasized to axillary lymph node, right (HCC)    CHEK2 gene mutation positive    Malignant neoplasm of overlapping sites of right breast in female, estrogen receptor positive (HCC)    Neuroendocrine carcinoma of stomach (HCC)       Current Outpatient Medications:     acetaminophen (TYLENOL) 325 mg tablet, Take 650 mg by mouth every 6 (six) hours as needed for mild pain, Disp: , Rfl:     anastrozole (ARIMIDEX) 1 mg tablet, Take 1 tablet (1 mg total) by mouth daily, Disp: 90 tablet, Rfl: 3    Ascorbic Acid (vitamin C) 100 MG tablet, Take 100 mg by mouth daily, Disp: , Rfl:     hydroCHLOROthiazide 25 mg tablet, TAKE 1 TABLET (25 MG TOTAL) BY MOUTH DAILY., Disp: 90 tablet, Rfl: 1    Multiple Vitamins-Minerals (multivitamin with minerals) tablet, Take 1 tablet by mouth daily, Disp: , Rfl:     naproxen (NAPROSYN) 500 mg tablet, TAKE 1 TABLET BY MOUTH TWICE A DAY WITH FOOD (Patient taking differently: Take 500 mg by mouth if needed), Disp: 30 tablet, Rfl: 0    pravastatin (PRAVACHOL) 40 mg tablet, Take 1 tablet (40 mg total) by mouth daily at bedtime, Disp: 90 tablet, Rfl: 1    Zinc 100 MG TABS, Take by mouth, Disp: , Rfl:     Current Facility-Administered Medications:     cyanocobalamin injection 1,000 mcg, 1,000 mcg, Intramuscular, Q30 Days, Lorraine Dennis DO, 1,000 mcg at 08/27/24 0958  Allergies   Allergen Reactions    Penicillins Other (See Comments)     Unknown reaction, positive on allergy testing     Past  "Surgical History:   Procedure Laterality Date    ANKLE SURGERY Right     BREAST EXCISIONAL BIOPSY Left 1998    benign    BUNIONECTOMY Bilateral     COLONOSCOPY      CORRECTION HAMMER TOE Bilateral     MAMMO STEREOTACTIC BREAST BIOPSY LEFT (ALL INC) Left 4/4/2024    MAMMO STEREOTACTIC BREAST BIOPSY RIGHT (ALL INC) EACH ADD Right 4/4/2024    US GUIDANCE BREAST BIOPSY RIGHT EACH ADDITIONAL Right 4/4/2024    US GUIDED BREAST BIOPSY RIGHT COMPLETE Right 4/4/2024    US GUIDED BREAST LYMPH NODE BIOPSY RIGHT Right 4/4/2024    US GUIDED LYMPH NODE BIOPSY LEFT  5/15/2024    VEIN LIGATION Right 06/06/2022    Procedure: Venaseal therapy and stab phlebectomy;  Surgeon: Gosia Shepherd MD;  Location: [facility];  Service: Vascular     Social History     Objective:  Vitals:    08/29/24 1026   BP: 128/60   BP Location: Left arm   Patient Position: Sitting   Cuff Size: Adult   Pulse: (!) 54   Resp: 17   Temp: (!) 97.3 °F (36.3 °C)   SpO2: 95%   Weight: 65.2 kg (143 lb 12.8 oz)   Height: 5' 6\" (1.676 m)     Physical Exam  Constitutional:       Appearance: She is well-developed.   HENT:      Head: Normocephalic and atraumatic.   Eyes:      Pupils: Pupils are equal, round, and reactive to light.   Cardiovascular:      Rate and Rhythm: Normal rate.      Heart sounds: No murmur heard.  Pulmonary:      Effort: No respiratory distress.      Breath sounds: No wheezing or rales.   Abdominal:      General: There is no distension.      Palpations: Abdomen is soft.      Tenderness: There is no abdominal tenderness. There is no rebound.   Musculoskeletal:         General: No tenderness.      Cervical back: Neck supple.   Lymphadenopathy:      Cervical: No cervical adenopathy.   Skin:     General: Skin is warm.      Findings: No rash.   Neurological:      Mental Status: She is alert and oriented to person, place, and time.      Deep Tendon Reflexes: Reflexes normal.   Psychiatric:         Thought Content: Thought content normal. "           Labs:  I personally reviewed the labs and imaging pertinent to this patient care.

## 2024-08-30 ENCOUNTER — OFFICE VISIT (OUTPATIENT)
Age: 74
End: 2024-08-30
Payer: MEDICARE

## 2024-08-30 VITALS
BODY MASS INDEX: 22.82 KG/M2 | SYSTOLIC BLOOD PRESSURE: 142 MMHG | DIASTOLIC BLOOD PRESSURE: 60 MMHG | HEIGHT: 66 IN | WEIGHT: 142 LBS

## 2024-08-30 DIAGNOSIS — C7A.092 MALIGNANT CARCINOID TUMOR OF STOMACH (HCC): Primary | ICD-10-CM

## 2024-08-30 DIAGNOSIS — C50.811 MALIGNANT NEOPLASM OF OVERLAPPING SITES OF RIGHT BREAST IN FEMALE, ESTROGEN RECEPTOR POSITIVE (HCC): ICD-10-CM

## 2024-08-30 DIAGNOSIS — Z12.31 BREAST CANCER SCREENING BY MAMMOGRAM: ICD-10-CM

## 2024-08-30 DIAGNOSIS — Z17.0 MALIGNANT NEOPLASM OF OVERLAPPING SITES OF RIGHT BREAST IN FEMALE, ESTROGEN RECEPTOR POSITIVE (HCC): ICD-10-CM

## 2024-08-30 PROCEDURE — 99213 OFFICE O/P EST LOW 20 MIN: CPT | Performed by: INTERNAL MEDICINE

## 2024-08-30 RX ORDER — ANASTROZOLE 1 MG/1
1 TABLET ORAL DAILY
Qty: 90 TABLET | Refills: 3 | Status: SHIPPED | OUTPATIENT
Start: 2024-08-30

## 2024-08-30 RX ORDER — HYDROCHLOROTHIAZIDE 25 MG/1
25 TABLET ORAL DAILY
Qty: 90 TABLET | Refills: 1 | Status: SHIPPED | OUTPATIENT
Start: 2024-08-30

## 2024-08-30 NOTE — PROGRESS NOTES
[facility]      Chief Complaint: Stomach tumor    HPI:  Juve Duarte is a 74 y.o.  female who presents with recent discovery of a well-differentiated gastric carcinoid.  Recent chromogranin level was 858.2.  Patient has been referred to Dr. Up.  From a physical point of view at this stage she is having absolutely no GI symptomatology.  She is holding her weight.  She has no other complaints at the present time.  She is also being considered for further surgical management of her breast tumor..  Patient and her  come in today to review all of their studies.      Review of Systems:   Constitutional: No fever or chills, feels well, no tiredness, no recent weight gain or weight loss.   HENT: No complaints of earache, no hearing loss, no nosebleeds, no nasal discharge, no sore throat, no hoarseness.    Eyes: No complaints of eye pain, no red eyes, no discharge from eyes, no itchy eyes.  Cardiovascular: No complaints of slow heart rate, no fast heart rate, no chest pain, no palpitations, no leg claudication, no lower extremity edema.   Respiratory: No complaints of shortness of breath, no wheezing, no cough, no SOB on exertion, no orthopnea.   Gastrointestinal: As noted in HPI  Genitourinary: No complaints of dysuria, no incontinence, no hesitancy, no nocturia.   Musculoskeletal: No complaints of arthralgia, no myalgias, no joint swelling or stiffness, no limb pain or swelling.   Neurological: No complaints of headache, no confusion, no convulsions, no numbness or tingling, no dizziness or fainting, no limb weakness, no difficulty walking.    Skin: No complaints of skin rash or skin lesions, no itching, no skin wound, no dry skin.    Hematological/Lymphatic: No complaints of swollen glands, does not bleed easy.   Allergic/Immunologic: No immunocompromised state.  Endocrine:  No complaints of polyuria, no polydipsia.   Psychiatric/Behavioral: is not suicidal, no sleep disturbances, no  anxiety or depression, no change in personality, no emotional problems.       Historical Information   Past Medical History:   Diagnosis Date    Colon polyp     Esophagitis     History of benign breast tumor     Hyperlipidemia     Hypertension     Urinary incontinence     Venous ulcer of ankle (HCC) 01/10/2022     Past Surgical History:   Procedure Laterality Date    ANKLE SURGERY Right     BREAST EXCISIONAL BIOPSY Left     benign    BUNIONECTOMY Bilateral     COLONOSCOPY      CORRECTION HAMMER TOE Bilateral     MAMMO STEREOTACTIC BREAST BIOPSY LEFT (ALL INC) Left 2024    MAMMO STEREOTACTIC BREAST BIOPSY RIGHT (ALL INC) EACH ADD Right 2024    US GUIDANCE BREAST BIOPSY RIGHT EACH ADDITIONAL Right 2024    US GUIDED BREAST BIOPSY RIGHT COMPLETE Right 2024    US GUIDED BREAST LYMPH NODE BIOPSY RIGHT Right 2024    US GUIDED LYMPH NODE BIOPSY LEFT  5/15/2024    VEIN LIGATION Right 2022    Procedure: Venaseal therapy and stab phlebectomy;  Surgeon: Gosia Shepherd MD;  Location: [facility] OR;  Service: Vascular     Social History   Social History     Substance and Sexual Activity   Alcohol Use Not Currently    Alcohol/week: 1.0 standard drink of alcohol    Types: 1 Standard drinks or equivalent per week     Social History     Substance and Sexual Activity   Drug Use Never     Social History     Tobacco Use   Smoking Status Former    Current packs/day: 0.00    Types: Cigarettes    Start date:     Quit date:     Years since quittin.6   Smokeless Tobacco Never     Family History   Problem Relation Age of Onset    No Known Problems Mother     Heart disease Father     No Known Problems Daughter     No Known Problems Maternal Grandmother     No Known Problems Maternal Grandfather     No Known Problems Paternal Grandmother     No Known Problems Paternal Grandfather     Throat cancer Brother     No Known Problems Maternal Aunt     No Known Problems Maternal Aunt     No Known Problems  "Maternal Aunt     No Known Problems Maternal Aunt     No Known Problems Paternal Aunt     No Known Problems Paternal Aunt     No Known Problems Paternal Aunt     No Known Problems Paternal Aunt     BRCA2 Negative Neg Hx     BRCA2 Positive Neg Hx     BRCA1 Positive Neg Hx     BRCA1 Negative Neg Hx     BRCA 1/2 Neg Hx     Ovarian cancer Neg Hx     Endometrial cancer Neg Hx     Colon cancer Neg Hx     Breast cancer additional onset Neg Hx     Breast cancer Neg Hx          Current Medications: has a current medication list which includes the following prescription(s): acetaminophen, anastrozole, vitamin c, hydrochlorothiazide, multivitamin with minerals, naproxen, pravastatin, and zinc, and the following Facility-Administered Medications: cyanocobalamin.        Vital Signs: /60   Ht 5' 6\" (1.676 m)   Wt 64.4 kg (142 lb)   BMI 22.92 kg/m²       Physical Exam:   Constitutional  General Appearance: No acute distress, well appearing and well nourished  Head  Normocephalic  Eyes  Conjunctivae and lids: No swelling, erythema, or discharge.    Pupils and irises: Equal, round and reactive to light.   Ears, Nose, Mouth, and Throat  External inspection of ears and nose: Normal  Nasal mucosa, septum and turbinates: Normal without edema or erythema/   Oropharynx: Normal with no erythema, edema, exudate or lesions.   Neck  Normal range of motion. Neck supple.   Cardiovascular  Auscultation of the heart: Normal rate and rhythm, normal S1 and S2 without murmurs.  Examination of the extremities for edema and/or varicosities: Normal  Pulmonary/Chest  Respiratory effort: No increased work of breathing or signs of respiratory distress.   Auscultation of lungs: Clear to auscultation, equal breath sounds bilaterally, no wheezes, rales, no rhonchi.   Abdomen  Abdomen: Non-tender, no masses.   Liver and spleen: No hepatomegaly or splenomegaly.   Musculoskeletal  Gait and station: normal.  Digits and Nails: normal without clubbing or " cyanosis.  Inspection/palpation of joints, bones, and muscles: Normal  Neurological  No nystagmus or asterixis.   Skin  Skin and subcutaneous tissue: Normal without rashes or lesions.   Lymphatic  Palpation of the lymph nodes in neck: No lymphadenopathy.   Psychiatric  Orientation to person, place and time: Normal.  Mood and affect: Normal.         Labs:  Lab Results   Component Value Date    ALT 15 05/20/2024    AST 19 05/20/2024    BUN 14 05/20/2024    CALCIUM 9.1 05/20/2024     05/20/2024    CO2 30 05/20/2024    CREATININE 0.73 05/20/2024    HDL 32 (L) 05/20/2024    HCT 36.8 05/20/2024    HGB 11.0 (L) 05/20/2024    MG 2.1 09/16/2021     05/20/2024    K 4.4 05/20/2024     08/25/2015    TRIG 225 (H) 05/20/2024    WBC 5.57 05/20/2024         X-Rays & Procedures:   No orders to display           ______________________________________________________________________      Assessment & Plan:     Diagnoses and all orders for this visit:    Malignant carcinoid tumor of stomach (HCC)      Patient is following up with Dr. Up.  Further recommendations will follow that visit.  She is tentatively scheduled for repeat EGD at the 3-month karina.  She will continue her other current medical management.

## 2024-08-30 NOTE — TELEPHONE ENCOUNTER
anastrozole (ARIMIDEX) 1 mg tablet     2024Pharmacy: [facility]/pharmacy #[id number] - [facility] [place], [place] - [address]       Patient is waiting in the pharmacy.  It was supposed to be sent yesterday

## 2024-09-12 DIAGNOSIS — E78.2 MIXED HYPERLIPIDEMIA: ICD-10-CM

## 2024-09-12 RX ORDER — PRAVASTATIN SODIUM 40 MG
40 TABLET ORAL
Qty: 90 TABLET | Refills: 1 | Status: SHIPPED | OUTPATIENT
Start: 2024-09-12

## 2024-09-15 NOTE — ASSESSMENT & PLAN NOTE
Underwent venaseal treatment of right  leg vein for venous wound over the ankle area  No issues postop  There is harness along the saphenous vein that is expected and should settle down over time  6 weeks with dr bobo

## 2024-09-19 ENCOUNTER — TELEPHONE (OUTPATIENT)
Dept: GASTROENTEROLOGY | Facility: CLINIC | Age: 74
End: 2024-09-19

## 2024-09-19 ENCOUNTER — CONSULT (OUTPATIENT)
Dept: SURGICAL ONCOLOGY | Facility: CLINIC | Age: 74
End: 2024-09-19
Payer: MEDICARE

## 2024-09-19 VITALS
OXYGEN SATURATION: 93 % | BODY MASS INDEX: 23.46 KG/M2 | TEMPERATURE: 98.1 F | RESPIRATION RATE: 16 BRPM | HEART RATE: 69 BPM | SYSTOLIC BLOOD PRESSURE: 118 MMHG | HEIGHT: 66 IN | DIASTOLIC BLOOD PRESSURE: 66 MMHG | WEIGHT: 146 LBS

## 2024-09-19 DIAGNOSIS — C50.811 MALIGNANT NEOPLASM OF OVERLAPPING SITES OF RIGHT BREAST IN FEMALE, ESTROGEN RECEPTOR POSITIVE (HCC): ICD-10-CM

## 2024-09-19 DIAGNOSIS — Z17.0 MALIGNANT NEOPLASM OF OVERLAPPING SITES OF RIGHT BREAST IN FEMALE, ESTROGEN RECEPTOR POSITIVE (HCC): ICD-10-CM

## 2024-09-19 DIAGNOSIS — C7A.8 NEUROENDOCRINE CARCINOMA OF STOMACH (HCC): Primary | ICD-10-CM

## 2024-09-19 PROCEDURE — 99215 OFFICE O/P EST HI 40 MIN: CPT | Performed by: SURGERY

## 2024-09-19 NOTE — TELEPHONE ENCOUNTER
Patient would like to wait until after the holidays. Scheduled in [place] with Dr. More Hagan and on wait list for Dr. Paz in [place].    ----- Message from Santana VEGAS sent at 9/19/2024  3:33 PM EDT -----    ----- Message -----  From: Ariel Francois MD  Sent: 9/19/2024   3:02 PM EDT  To: Curtis Up MD; Lizette Paz MD; #    Thanks.     GI team : please have her schedule in office to see one of us in next 2 - 3 months.   Thanks.  ----- Message -----  From: Curtis Up MD  Sent: 9/19/2024   2:56 PM EDT  To: Ariel Francois MD; Lizette Paz MD; #    Newly diagnosed small neuroendocrine tumor of the stomach.  She also has breast cancer.  I told her to have her breast cancer treated first and then maybe have 1 of you do any EMR in the stomach.  Your office can call her to get this scheduled.  I do not think this is urgent at all.  Thanks,  Curtis

## 2024-09-19 NOTE — ASSESSMENT & PLAN NOTE
74-year-old female with an incidentally discovered well-differentiated neuroendocrine tumor on upper endoscopy.  This appeared to be in a small gastric polyp.  The chromogranin level was elevated, but she was on Pepcid at that time.  She just finished this.  Her PET scan, while not a dotatate scan shows no evidence of metastatic disease.  I suspect this is a low-grade tumor with very low malignant potential.  I would recommend that she undergo EMR to evaluate the biopsy site.  If there is no evidence of disease, she was likely adequately treated just with the biopsy.  I also counseled her that she is likely at higher risk from breast cancer.  She should have her breast cancer treated, and once that she is recovered from that surgery, to have an EMR.  I have reached out to the advanced GI team to contact her to schedule.  The patient and her  are agreeable to this.  All their questions were answered.

## 2024-09-19 NOTE — PROGRESS NOTES
Surgical Oncology Consult       [facility] [place]  [address]  [address]  [place] [id number] [phone]    Juve Duarte  1950  [id number]  [facility] [place]  [facility] [place]  [address]  [address]  [place] [id number] [phone]    1. Neuroendocrine carcinoma of stomach (HCC)  Assessment & Plan:  74-year-old female with an incidentally discovered well-differentiated neuroendocrine tumor on upper endoscopy.  This appeared to be in a small gastric polyp.  The chromogranin level was elevated, but she was on Pepcid at that time.  She just finished this.  Her PET scan, while not a dotatate scan shows no evidence of metastatic disease.  I suspect this is a low-grade tumor with very low malignant potential.  I would recommend that she undergo EMR to evaluate the biopsy site.  If there is no evidence of disease, she was likely adequately treated just with the biopsy.  I also counseled her that she is likely at higher risk from breast cancer.  She should have her breast cancer treated, and once that she is recovered from that surgery, to have an EMR.  I have reached out to the advanced GI team to contact her to schedule.  The patient and her  are agreeable to this.  All their questions were answered.  Orders:  -     Ambulatory Referral to Surgical Oncology  2. Malignant neoplasm of overlapping sites of right breast in female, estrogen receptor positive (HCC)  -     Ambulatory Referral to Surgical Oncology      Chief Complaint   Patient presents with    Consult       No follow-ups on file.    Oncology History   Breast cancer metastasized to axillary lymph node, right (HCC)   4/4/2024 Initial Diagnosis    April 4, 2024 patient had biopsy of right breast mass showing invasive ductal carcinoma.  Right axillary node showed metastatic carcinoma  consistent with breast primary.  ER 90%, MD 5-7%, HER2 +2.  FISH negative.  Similar findings in the axillary node although MD was 30%.  HER2 +2, FISH negative.  April 18, 2024 patient had CT chest ab pelvis showing soft tissue nodules right breast.  Right infrahilar and internal mammary nodes indeterminate.  Right axillary nodes up to 0.5 cm.  2 enhancing soft tissue nodules in the gastric fundus.  Bone scan showed focus of activity at anterior right seventh rib.     4/4/2024 Biopsy    Breast, Right, US BX RT BREAST 700 7CMFN 3 PASSES 12G MARQUEE:      - Invasive mammary carcinoma of no special type (ductal NST/invasive ductal carcinoma) with apocrine features, see note      - [place] grade 2 of 3 (total score: 6 of 9)          - Tubule formation: <10%, score 3          - Nuclear grade 2 of 3, score 2          - Mitoses < 3/mm2, (</= 7 mitoses/10HPF), score 1      - Invasive carcinoma involves 3 of 3 submitted core biopsies, max. dimension = 5 millimeters      - Ductal carcinoma in situ (DCIS): Not identified      - Microcalcifications: Absent      - Lymphovascular invasion: Present     Breast, Right, US BX RT BREAST 900 7CMFN 3 PASSES 12G MARQUEE:      - Invasive mammary carcinoma of no special type (ductal NST/invasive ductal carcinoma) with apocrine features, see note      - [place] grade 2 of 3 (total score: 6 of 9)          - Tubule formation: <10%, score 3          - Nuclear grade 2 of 3, score 2          - Mitoses < 3/mm2, (</= 7 mitoses/10HPF), score 1      - Invasive carcinoma involves 3 of 3 submitted core biopsies, max. dimension = 14 millimeters      - Ductal carcinoma in situ (DCIS): Not identified      - Microcalcifications: Absent      - Lymphovascular invasion: Not identified    Axillary lymph node, US BX RT AXILLARY TAIL LN 10:00 12CMFN 3 PASSES 12G MAR[place]:      - Metastatic carcinoma consistent with mammary primary,    BREAST TUMOR PROGNOSTIC PROFILE     Performed on invasive carcinoma block  E1:  Test Description Result Prognostic Interpretation   Estrogen Receptor/ER  Primary Antibody: SP-1  Internal control: Positive   External control: Positive 90%  Staining Intensity: Strong  Isabela Score*: 8 Positive   Progesterone Receptor/PgR  Primary Antibody:1E2  Internal control: Positive  External control: Positive 5-7%  Staining Intensity: Strong  Isabela Score*: 4 Positive   HER2 by IHC   Primary Antibody: 4B5 2+ Equivocal *      Performed on invasive carcinoma block F2:  Test Description Result Prognostic Interpretation   Estrogen Receptor/ER  Primary Antibody: SP-1  Internal control: Positive   External control: Positive 95%  Staining Intensity: Strong  Isabela Score*: 8 Positive   Progesterone Receptor/PgR  Primary Antibody:1E2  Internal control: Positive  External control: Positive 30%  Staining Intensity: Moderate  Isabela Score*: 5 Positive   HER2 by IHC   Primary Antibody: 4B5 2+ Equivocal *       Fluorescence in situ hybridization (FISH) analysis of HER2 overexpression by invasive breast carcinoma cells, block E[facility] [place], [place], yields the following results:  Test Description                        Interpretation  HER2 by FISH analysis:              Negative / Not Amplified.  Results  HER2: CEP-17 ratio :                  1.5: 1  Average HER2 Signal:                5.4  Average CEP-17 Signal:                          3.5  Number of selected invasive cells scanned           100     Fluorescence in situ hybridization (FISH) analysis of HER2 overexpression by invasive breast carcinoma cells, block F2 performed [facility], [place], [place], yields the following results:  Test Description                        Interpretation  HER2 by FISH analysis:              Negative / Not Amplified.  Results  HER2: CEP-17 ratio :                  1.3: 1  Average HER2 Signal:                3.5  Average CEP-17 Signal:                           2.6  Number of selected invasive cells scanned           50        4/18/2024 Genetic Testing    [facility]  A total of 36 genes were evaluated, including: APC, MIKE, BARD 1, BMPR1A, BRCA1, BRCA2, BRIP1, CDH1, CDK4, CKDN2A, CHEK2, DICER1, MLH1, MSH2, MSH6, MUTYH, NBN, NF1, NTHL1, PALB2, PMS2, PTEN, RAD51C, RECQL, SMAD4, SMARCA4, STK11, TP53, AXIN2, HOXB13, MSH3, POLD1, AND POLE, EPCAM, AND GREM1     LIKELY PATHOGENIC VARIANT CHEK2  VUS SDHA         History of Present Illness: 74-year-old female who was recently found to have breast cancer.  She was also having chronic GI problems and was initially felt to be gastroparesis.  EGD on May 28, 2024 revealed undigested food in the stomach.  Follow-up EGD on August 8, 2024 revealed 2 small mucosal nodules in the fundus of the stomach.  The remainder of the exam appeared normal.  Pathology revealed a well-differentiated neuroendocrine tumor.  Chromogranin level was elevated at 858.2 ng/mL.  PET/CT on August 21, 2024 revealed breast nodule with axillary adenopathy.  No soft tissue lesions were seen.  There was no evidence of metastasis, but this PET was performed with fluoroestradiol rather than dotatate.  I personally reviewed the films.  She comes in now to discuss further therapy.  She denies any flushing, diarrhea, palpitations, headaches or sweats.    Review of Systems  Complete ROS Surg Onc:   Constitutional: The patient denies new or recent history of general fatigue, no recent weight loss, no change in appetite.   Eyes: No complaints of visual problems, no scleral icterus.   ENT: no complaints of ear pain, no hoarseness, no difficulty swallowing,  no tinnitus and no new masses in head, oral cavity, or neck.   Cardiovascular: No complaints of chest pain, no palpitations, no ankle edema.   Respiratory: No complaints of shortness of breath, no cough.   Gastrointestinal: No complaints of jaundice, no bloody stools, no pale stools.   Genitourinary: No complaints of dysuria, no  hematuria, no nocturia, no frequent urination, no urethral discharge.   Musculoskeletal: No complaints of weakness, paralysis, joint stiffness or arthralgias.  Integumentary: No complaints of rash, no new lesions.   Neurological: No complaints of convulsions, no seizures, no dizziness.   Hematologic/Lymphatic: No complaints of easy bruising.   Endocrine:  No hot or cold intolerance.  No polydipsia, polyphagia, or polyuria.  Allergy/immunology:  No environmental allergies.  No food allergies.  Not immunocompromised.  Skin:  No pallor or rash.  No wound.          Patient Active Problem List   Diagnosis    Chronic atrophic gastritis    Dysphagia, pharyngoesophageal    Urinary incontinence    Gastroparesis    Hemorrhoids, external    Asymptomatic postmenopausal status    Essential hypertension    Pernicious anemia    Breast cancer metastasized to axillary lymph node, right (HCC)    CHEK2 gene mutation positive    Neuroendocrine carcinoma of stomach (HCC)     Past Medical History:   Diagnosis Date    Colon polyp     Esophagitis     History of benign breast tumor     Hyperlipidemia     Hypertension     Urinary incontinence     Venous ulcer of ankle (HCC) 01/10/2022     Past Surgical History:   Procedure Laterality Date    ANKLE SURGERY Right     BREAST EXCISIONAL BIOPSY Left 1998    benign    BUNIONECTOMY Bilateral     COLONOSCOPY      CORRECTION HAMMER TOE Bilateral     MAMMO STEREOTACTIC BREAST BIOPSY LEFT (ALL INC) Left 4/4/2024    MAMMO STEREOTACTIC BREAST BIOPSY RIGHT (ALL INC) EACH ADD Right 4/4/2024    US GUIDANCE BREAST BIOPSY RIGHT EACH ADDITIONAL Right 4/4/2024    US GUIDED BREAST BIOPSY RIGHT COMPLETE Right 4/4/2024    US GUIDED BREAST LYMPH NODE BIOPSY RIGHT Right 4/4/2024    US GUIDED LYMPH NODE BIOPSY LEFT  5/15/2024    VEIN LIGATION Right 06/06/2022    Procedure: Venaseal therapy and stab phlebectomy;  Surgeon: Gosia Shepherd MD;  Location: [facility];  Service: Vascular     Family History   Problem  Relation Age of Onset    No Known Problems Mother     Heart disease Father     Throat cancer Brother         possibly throat cancer    Thyroid cancer Brother         possibly thyroid cancer    No Known Problems Daughter     No Known Problems Maternal Aunt     No Known Problems Maternal Aunt     No Known Problems Maternal Aunt     No Known Problems Maternal Aunt     No Known Problems Paternal Aunt     No Known Problems Paternal Aunt     No Known Problems Paternal Aunt     No Known Problems Paternal Aunt     No Known Problems Maternal Grandmother     No Known Problems Maternal Grandfather     No Known Problems Paternal Grandmother     No Known Problems Paternal Grandfather     BRCA2 Negative Neg Hx     BRCA2 Positive Neg Hx     BRCA1 Positive Neg Hx     BRCA1 Negative Neg Hx     BRCA 1/2 Neg Hx     Ovarian cancer Neg Hx     Endometrial cancer Neg Hx     Colon cancer Neg Hx     Breast cancer additional onset Neg Hx     Breast cancer Neg Hx      Social History     Socioeconomic History    Marital status: /Civil Union     Spouse name: Not on file    Number of children: Not on file    Years of education: Not on file    Highest education level: Not on file   Occupational History    Not on file   Tobacco Use    Smoking status: Former     Current packs/day: 0.00     Types: Cigarettes     Start date:      Quit date:      Years since quittin.7    Smokeless tobacco: Never   Vaping Use    Vaping status: Never Used   Substance and Sexual Activity    Alcohol use: Not Currently     Alcohol/week: 1.0 standard drink of alcohol     Types: 1 Standard drinks or equivalent per week    Drug use: Never    Sexual activity: Yes     Partners: Male     Birth control/protection: None   Other Topics Concern    Not on file   Social History Narrative    Not on file     Social Determinants of Health     Financial Resource Strain: Low Risk  (2023)    Overall Financial Resource Strain ([facility])     Difficulty of Paying Living  Expenses: Not hard at all   Food Insecurity: Not on file   Transportation Needs: No Transportation Needs (11/16/2023)    PRAPARE - Transportation     Lack of Transportation (Medical): No     Lack of Transportation (Non-Medical): No   Physical Activity: Inactive (9/9/2020)    Exercise Vital Sign     Days of Exercise per Week: 0 days     Minutes of Exercise per Session: 0 min   Stress: No Stress Concern Present (9/9/2020)    [employer] - Occupational Stress Questionnaire     Feeling of Stress : Not at all   Social Connections: Not on file   Intimate Partner Violence: Not on file   Housing Stability: Not on file       Current Outpatient Medications:     acetaminophen (TYLENOL) 325 mg tablet, Take 650 mg by mouth every 6 (six) hours as needed for mild pain, Disp: , Rfl:     anastrozole (ARIMIDEX) 1 mg tablet, Take 1 tablet (1 mg total) by mouth daily, Disp: 90 tablet, Rfl: 3    Ascorbic Acid (vitamin C) 100 MG tablet, Take 100 mg by mouth daily, Disp: , Rfl:     hydroCHLOROthiazide 25 mg tablet, TAKE 1 TABLET (25 MG TOTAL) BY MOUTH DAILY., Disp: 90 tablet, Rfl: 1    Multiple Vitamins-Minerals (multivitamin with minerals) tablet, Take 1 tablet by mouth daily, Disp: , Rfl:     naproxen (NAPROSYN) 500 mg tablet, TAKE 1 TABLET BY MOUTH TWICE A DAY WITH FOOD (Patient taking differently: Take 500 mg by mouth if needed), Disp: 30 tablet, Rfl: 0    pravastatin (PRAVACHOL) 40 mg tablet, TAKE 1 TABLET BY MOUTH DAILY AT BEDTIME, Disp: 90 tablet, Rfl: 1    Zinc 100 MG TABS, Take by mouth, Disp: , Rfl:     Current Facility-Administered Medications:     cyanocobalamin injection 1,000 mcg, 1,000 mcg, Intramuscular, Q30 Days, Lorraine Dennis DO, 1,000 mcg at 08/27/24 0958  Allergies   Allergen Reactions    Penicillins Other (See Comments)     Unknown reaction, positive on allergy testing     Vitals:    09/19/24 1356   BP: 118/66   Pulse: 69   Resp: 16   Temp: 98.1 °F (36.7 °C)   SpO2: 93%        Physical Exam   Constitutional: General appearance: The Patient is well-developed and well-nourished who appears the stated age in no acute distress. Patient is pleasant and talkative.     HEENT:  Normocephalic.  Sclerae are anicteric. Mucous membranes are moist. Neck is supple without adenopathy. No JVD.     Chest: The lungs are clear to auscultation.     Cardiac: Heart is regular rate.     Abdomen: Abdomen is soft, tender at incision, non-distended and without masses.     Extremities: There is no clubbing or cyanosis. There is no edema.  Symmetric.  Neuro: Grossly nonfocal. Gait is normal.     Lymphatic: No evidence of cervical adenopathy bilaterally.   No evidence of axillary adenopathy bilaterally.    Skin: Warm, anicteric.    Psych:  Patient is pleasant and talkative.  Breasts:      Pathology:  [unfilled]    Labs:      Imaging  NM PET CT skull base to mid thigh    Result Date: 8/23/2024  Narrative: [facility] PET/CT SCAN INDICATION: Reevaluate breast cancer. ER positive right breast cancer with known metastasis additional history well-differentiated NET of the stomach. Needle biopsy of the left upper cervical node demonstrating atypical epithelioid cells. C50.811: Malignant neoplasm of overlapping sites of right female breast Z17.0: Estrogen receptor positive status (ER+) C50.911: Malignant neoplasm of unspecified site of right female breast C77.3: Secondary and unspecified malignant neoplasm of axilla and upper limb lymph nodes MODIFIER: PS COMPARISON: FDG PET/CT 5/2/2024 CELL TYPE: Invasive mammary carcinoma, right breast TECHNIQUE:   6.1 mCi [facility] fluoroestradiol administered IV. Multiplanar attenuation corrected and non attenuation corrected PET images were acquired 60 minutes post injection. Contiguous, low dose, axial CT sections were obtained from the vertex through the femurs .   Intravenous or oral contrast was not utilized.  This examination, like all CT scans performed in the [facility], was performed utilizing techniques to minimize radiation dose exposure, including the use of iterative reconstruction and automated exposure control. FINDINGS: VISUALIZED BRAIN: No acute abnormalities are seen. HEAD/NECK: There is a physiologic distribution of the radiotracer. No FES avid cervical adenopathy is seen. Previously noted small left upper cervical chain lymph node does not demonstrate focal FES uptake. This also appears slightly smaller from the prior exam now  closer to 4 mm short axis, previously closer to 6 mm. CT images: Stable opacification of the left maxillary sinus. CHEST: FES avid right lateral breast nodule, SUV max of 7.5. This also previously demonstrated FDG uptake, SUV max of 8.6. There is now measures up to 1.6 cm in size image 141 series 3, slightly smaller previously 2.3 cm. FES avid right axillary lymph node, SUV max of 4.3. This also previously demonstrated FDG uptake, SUV max of 4.5. This measures 6 mm short axis image 101 series 3, smaller, previously 12 mm. Patient noted additional smaller right axillary lymph node with mild FDG uptake does not demonstrate FES activity. No suspicious focal uptake in the left breast or axillary region. There are no FES avid intrathoracic nodes. CT images: Moderate coronary artery calcifications. Mild fluid distention of the esophagus, decreased from the prior exam. Scattered calcified pleural plaque formation on the right. ABDOMEN: No FES avid soft tissue lesions are seen. Variable physiologic hepatobiliary, genitourinary and bowel activity. CT images: Stable splenomegaly. Moderate fecal retention in the colon. Metallic clip at the proximal stomach. PELVIS: No FES avid soft tissue lesions are seen. Minimal endometrial activity still likely physiologic in a postmenopausal female. CT images: Small fat-containing left inguinal hernia suggested. OSSEOUS STRUCTURES: No FES avid lesions are seen. CT images: No significant findings.      Impression: 1. FES avid right breast lesion compatible with the known malignancy. This appears slightly smaller compared to prior PET/CT. 2. Solitary FES-avid right axillary lymph node compatible with metastasis. This also appears smaller from prior PET/CT. Previously there was an additional smaller right axillary lymph node with mild FDG uptake which is not FES avid. 3. No suspicious FES avid lesions in the neck, abdomen or pelvis. Previously noted small left upper cervical chain lymph node does not demonstrate FES uptake and also appears slightly smaller on CT. Workstation performed: [employer]     I personally reviewed and interpreted the above laboratory and imaging data.

## 2024-09-24 ENCOUNTER — CLINICAL SUPPORT (OUTPATIENT)
Age: 74
End: 2024-09-24
Payer: MEDICARE

## 2024-09-24 DIAGNOSIS — E53.8 VITAMIN B12 DEFICIENCY: Primary | ICD-10-CM

## 2024-09-24 PROCEDURE — 96372 THER/PROPH/DIAG INJ SC/IM: CPT | Performed by: FAMILY MEDICINE

## 2024-09-24 RX ADMIN — CYANOCOBALAMIN 1000 MCG: 1000 INJECTION, SOLUTION INTRAMUSCULAR; SUBCUTANEOUS at 09:22

## 2024-10-02 ENCOUNTER — APPOINTMENT (OUTPATIENT)
Dept: LAB | Facility: HOSPITAL | Age: 74
End: 2024-10-02
Payer: MEDICARE

## 2024-10-02 ENCOUNTER — OFFICE VISIT (OUTPATIENT)
Dept: SURGICAL ONCOLOGY | Facility: CLINIC | Age: 74
End: 2024-10-02
Payer: MEDICARE

## 2024-10-02 ENCOUNTER — HOSPITAL ENCOUNTER (OUTPATIENT)
Dept: RADIOLOGY | Facility: HOSPITAL | Age: 74
Discharge: HOME | End: 2024-10-02
Payer: MEDICARE

## 2024-10-02 ENCOUNTER — OFFICE VISIT (OUTPATIENT)
Dept: LAB | Facility: HOSPITAL | Age: 74
End: 2024-10-02
Payer: MEDICARE

## 2024-10-02 VITALS
WEIGHT: 146 LBS | TEMPERATURE: 97.9 F | HEART RATE: 56 BPM | HEIGHT: 66 IN | SYSTOLIC BLOOD PRESSURE: 171 MMHG | BODY MASS INDEX: 23.46 KG/M2 | OXYGEN SATURATION: 98 % | DIASTOLIC BLOOD PRESSURE: 61 MMHG

## 2024-10-02 DIAGNOSIS — C50.911 BREAST CANCER METASTASIZED TO AXILLARY LYMPH NODE, RIGHT (HCC): ICD-10-CM

## 2024-10-02 DIAGNOSIS — Z01.818 PRE-OP EXAMINATION: ICD-10-CM

## 2024-10-02 DIAGNOSIS — Z15.89 CHEK2 GENE MUTATION POSITIVE: ICD-10-CM

## 2024-10-02 DIAGNOSIS — C77.3 BREAST CANCER METASTASIZED TO AXILLARY LYMPH NODE, RIGHT (HCC): ICD-10-CM

## 2024-10-02 DIAGNOSIS — C50.911 BREAST CANCER METASTASIZED TO AXILLARY LYMPH NODE, RIGHT (HCC): Primary | ICD-10-CM

## 2024-10-02 DIAGNOSIS — C77.3 BREAST CANCER METASTASIZED TO AXILLARY LYMPH NODE, RIGHT (HCC): Primary | ICD-10-CM

## 2024-10-02 LAB
ALBUMIN SERPL BCG-MCNC: 4.6 G/DL (ref 3.5–5)
ALP SERPL-CCNC: 65 U/L (ref 34–104)
ALT SERPL W P-5'-P-CCNC: 10 U/L (ref 7–52)
ANION GAP SERPL CALCULATED.3IONS-SCNC: 7 MMOL/L (ref 4–13)
AST SERPL W P-5'-P-CCNC: 19 U/L (ref 13–39)
ATRIAL RATE: 55 BPM
BASOPHILS # BLD AUTO: 0.03 THOUSANDS/ΜL (ref 0–0.1)
BASOPHILS NFR BLD AUTO: 1 % (ref 0–1)
BILIRUB SERPL-MCNC: 1.44 MG/DL (ref 0.2–1)
BUN SERPL-MCNC: 19 MG/DL (ref 5–25)
CALCIUM SERPL-MCNC: 9.1 MG/DL (ref 8.4–10.2)
CHLORIDE SERPL-SCNC: 101 MMOL/L (ref 96–108)
CO2 SERPL-SCNC: 31 MMOL/L (ref 21–32)
CREAT SERPL-MCNC: 0.82 MG/DL (ref 0.6–1.3)
DME PARACHUTE DELIVERY DATE REQUESTED: NORMAL
DME PARACHUTE ITEM DESCRIPTION: NORMAL
DME PARACHUTE ORDER STATUS: NORMAL
DME PARACHUTE SUPPLIER NAME: NORMAL
DME PARACHUTE SUPPLIER PHONE: NORMAL
EOSINOPHIL # BLD AUTO: 0.1 THOUSAND/ΜL (ref 0–0.61)
EOSINOPHIL NFR BLD AUTO: 3 % (ref 0–6)
ERYTHROCYTE [DISTWIDTH] IN BLOOD BY AUTOMATED COUNT: 14.4 % (ref 11.6–15.1)
GFR SERPL CREATININE-BSD FRML MDRD: 70 ML/MIN/1.73SQ M
GLUCOSE SERPL-MCNC: 111 MG/DL (ref 65–140)
HCT VFR BLD AUTO: 36.6 % (ref 34.8–46.1)
HGB BLD-MCNC: 11.5 G/DL (ref 11.5–15.4)
IMM GRANULOCYTES # BLD AUTO: 0.01 THOUSAND/UL (ref 0–0.2)
IMM GRANULOCYTES NFR BLD AUTO: 0 % (ref 0–2)
LYMPHOCYTES # BLD AUTO: 0.73 THOUSANDS/ΜL (ref 0.6–4.47)
LYMPHOCYTES NFR BLD AUTO: 18 % (ref 14–44)
MCH RBC QN AUTO: 26.1 PG (ref 26.8–34.3)
MCHC RBC AUTO-ENTMCNC: 31.4 G/DL (ref 31.4–37.4)
MCV RBC AUTO: 83 FL (ref 82–98)
MONOCYTES # BLD AUTO: 0.28 THOUSAND/ΜL (ref 0.17–1.22)
MONOCYTES NFR BLD AUTO: 7 % (ref 4–12)
NEUTROPHILS # BLD AUTO: 2.85 THOUSANDS/ΜL (ref 1.85–7.62)
NEUTS SEG NFR BLD AUTO: 71 % (ref 43–75)
NRBC BLD AUTO-RTO: 0 /100 WBCS
P AXIS: 44 DEGREES
PLATELET # BLD AUTO: 119 THOUSANDS/UL (ref 149–390)
PMV BLD AUTO: 11.8 FL (ref 8.9–12.7)
POTASSIUM SERPL-SCNC: 3.7 MMOL/L (ref 3.5–5.3)
PR INTERVAL: 164 MS
PROT SERPL-MCNC: 6.7 G/DL (ref 6.4–8.4)
QRS AXIS: -19 DEGREES
QRSD INTERVAL: 80 MS
QT INTERVAL: 470 MS
QTC INTERVAL: 449 MS
RBC # BLD AUTO: 4.4 MILLION/UL (ref 3.81–5.12)
SODIUM SERPL-SCNC: 139 MMOL/L (ref 135–147)
T WAVE AXIS: 112 DEGREES
VENTRICULAR RATE: 55 BPM
WBC # BLD AUTO: 4 THOUSAND/UL (ref 4.31–10.16)

## 2024-10-02 PROCEDURE — 80053 COMPREHEN METABOLIC PANEL: CPT

## 2024-10-02 PROCEDURE — 93005 ELECTROCARDIOGRAM TRACING: CPT

## 2024-10-02 PROCEDURE — 99215 OFFICE O/P EST HI 40 MIN: CPT | Performed by: SURGERY

## 2024-10-02 PROCEDURE — 85025 COMPLETE CBC W/AUTO DIFF WBC: CPT

## 2024-10-02 PROCEDURE — 71046 X-RAY EXAM CHEST 2 VIEWS: CPT

## 2024-10-02 PROCEDURE — 93010 ELECTROCARDIOGRAM REPORT: CPT | Performed by: INTERNAL MEDICINE

## 2024-10-02 PROCEDURE — 36415 COLL VENOUS BLD VENIPUNCTURE: CPT

## 2024-10-02 RX ORDER — ACETAMINOPHEN AND CODEINE PHOSPHATE 300; 30 MG/1; MG/1
1 TABLET ORAL EVERY 6 HOURS PRN
Qty: 30 TABLET | Refills: 0 | Status: SHIPPED | OUTPATIENT
Start: 2024-10-02

## 2024-10-02 NOTE — PROGRESS NOTES
Surgical Oncology Follow Up  [facility]  [facility] [place]  [address] [address]    Juve Duarte  1950  [id number]      Chief Complaint   Patient presents with   • Follow-up     2 Week Follow Up        Assessment & Plan:   This 74-year-old female with multifocal right breast cancer with axillary lymph node met metastatic disease.  With incidental finding of low-grade gastric neuroendocrine tumor.  She has been seen by Dr. Up and plan to proceed with mastectomy at this point followed by EMR for gastric polyp.  Surgical consent was obtained for right modified radical mastectomy and left total mastectomy after explaining the benefit alternative and possible complications.  Will arrange surgery.    Cancer History:     Oncology History   Breast cancer metastasized to axillary lymph node, right (HCC)   4/4/2024 Initial Diagnosis    April 4, 2024 patient had biopsy of right breast mass showing invasive ductal carcinoma.  Right axillary node showed metastatic carcinoma consistent with breast primary.  ER 90%, UT 5-7%, HER2 +2.  FISH negative.  Similar findings in the axillary node although UT was 30%.  HER2 +2, FISH negative.  April 18, 2024 patient had CT chest ab pelvis showing soft tissue nodules right breast.  Right infrahilar and internal mammary nodes indeterminate.  Right axillary nodes up to 0.5 cm.  2 enhancing soft tissue nodules in the gastric fundus.  Bone scan showed focus of activity at anterior right seventh rib.     4/4/2024 Biopsy    Breast, Right, US BX RT BREAST 700 7CMFN 3 PASSES 12G MARQU[place]:      - Invasive mammary carcinoma of no special type (ductal NST/invasive ductal carcinoma) with apocrine features, see note      - [place] grade 2 of 3 (total score: 6 of 9)          - Tubule formation: <10%, score 3          - Nuclear grade 2 of 3, score 2          - Mitoses < 3/mm2, (</= 7 mitoses/10HPF), score 1      - Invasive  carcinoma involves 3 of 3 submitted core biopsies, max. dimension = 5 millimeters      - Ductal carcinoma in situ (DCIS): Not identified      - Microcalcifications: Absent      - Lymphovascular invasion: Present     Breast, Right, US BX RT BREAST 900 7CMFN 3 PASSES 12G MARQU[place]:      - Invasive mammary carcinoma of no special type (ductal NST/invasive ductal carcinoma) with apocrine features, see note      - [place] grade 2 of 3 (total score: 6 of 9)          - Tubule formation: <10%, score 3          - Nuclear grade 2 of 3, score 2          - Mitoses < 3/mm2, (</= 7 mitoses/10HPF), score 1      - Invasive carcinoma involves 3 of 3 submitted core biopsies, max. dimension = 14 millimeters      - Ductal carcinoma in situ (DCIS): Not identified      - Microcalcifications: Absent      - Lymphovascular invasion: Not identified    Axillary lymph node, US BX RT AXILLARY TAIL LN 10:00 12CMFN 3 PASSES 12G [place]:      - Metastatic carcinoma consistent with mammary primary,    BREAST TUMOR PROGNOSTIC PROFILE     Performed on invasive carcinoma block E1:  Test Description Result Prognostic Interpretation   Estrogen Receptor/ER  Primary Antibody: SP-1  Internal control: Positive   External control: Positive 90%  Staining Intensity: Strong  Isabela Score*: 8 Positive   Progesterone Receptor/PgR  Primary Antibody:1E2  Internal control: Positive  External control: Positive 5-7%  Staining Intensity: Strong  Isabela Score*: 4 Positive   HER2 by IHC   Primary Antibody: 4B5 2+ Equivocal *      Performed on invasive carcinoma block F2:  Test Description Result Prognostic Interpretation   Estrogen Receptor/ER  Primary Antibody: SP-1  Internal control: Positive   External control: Positive 95%  Staining Intensity: Strong  Isabela Score*: 8 Positive   Progesterone Receptor/PgR  Primary Antibody:1E2  Internal control: Positive  External control: Positive 30%  Staining Intensity: Moderate  Isabela Score*: 5 Positive   HER2 by IHC   Primary  Antibody: 4B5 2+ Equivocal *       Fluorescence in situ hybridization (FISH) analysis of HER2 overexpression by invasive breast carcinoma cells, block E1 performed at [facility], [place], [place], yields the following results:  Test Description                        Interpretation  HER2 by FISH analysis:              Negative / Not Amplified.  Results  HER2: CEP-17 ratio :                  1.5: 1  Average HER2 Signal:                5.4  Average CEP-17 Signal:                          3.5  Number of selected invasive cells scanned           100     Fluorescence in situ hybridization (FISH) analysis of HER2 overexpression by invasive breast carcinoma cells, block F2 performed at [facility], [place], [place], yields the following results:  Test Description                        Interpretation  HER2 by FISH analysis:              Negative / Not Amplified.  Results  HER2: CEP-17 ratio :                  1.3: 1  Average HER2 Signal:                3.5  Average CEP-17 Signal:                          2.6  Number of selected invasive cells scanned           50        4/18/2024 Genetic Testing    AMBRY  A total of 36 genes were evaluated, including: APC, MIKE, BARD 1, BMPR1A, BRCA1, BRCA2, BRIP1, CDH1, CDK4, CKDN2A, CHEK2, DICER1, MLH1, MSH2, MSH6, MUTYH, NBN, NF1, NTHL1, PALB2, PMS2, PTEN, RAD51C, RECQL, SMAD4, SMARCA4, STK11, TP53, AXIN2, HOXB13, MSH3, POLD1, AND POLE, EPCAM, AND GREM1     LIKELY PATHOGENIC VARIANT CHEK2  VUS SDHA           Interval History:   Follow-up with right breast cancer    Review of Systems:   Review of Systems   Constitutional:  Negative for chills and fever.   HENT:  Negative for ear pain and sore throat.    Eyes:  Negative for pain and visual disturbance.   Respiratory:  Negative for cough and shortness of breath.    Cardiovascular:  Negative for chest pain and palpitations.   Gastrointestinal:  Negative for abdominal pain and vomiting.    Genitourinary:  Negative for dysuria and hematuria.   Musculoskeletal:  Negative for arthralgias and back pain.   Skin:  Negative for color change and rash.   Neurological:  Negative for seizures and syncope.   All other systems reviewed and are negative.    Past Medical History     Patient Active Problem List   Diagnosis   • Chronic atrophic gastritis   • Dysphagia, pharyngoesophageal   • Urinary incontinence   • Gastroparesis   • Hemorrhoids, external   • Asymptomatic postmenopausal status   • Essential hypertension   • Pernicious anemia   • Breast cancer metastasized to axillary lymph node, right (HCC)   • CHEK2 gene mutation positive   • Neuroendocrine carcinoma of stomach (HCC)     Past Medical History:   Diagnosis Date   • Colon polyp    • Esophagitis    • History of benign breast tumor    • Hyperlipidemia    • Hypertension    • Urinary incontinence    • Venous ulcer of ankle (HCC) 01/10/2022     Past Surgical History:   Procedure Laterality Date   • ANKLE SURGERY Right    • BREAST EXCISIONAL BIOPSY Left 1998    benign   • BUNIONECTOMY Bilateral    • COLONOSCOPY     • CORRECTION HAMMER TOE Bilateral    • MAMMO STEREOTACTIC BREAST BIOPSY LEFT (ALL INC) Left 4/4/2024   • MAMMO STEREOTACTIC BREAST BIOPSY RIGHT (ALL INC) EACH ADD Right 4/4/2024   • US GUIDANCE BREAST BIOPSY RIGHT EACH ADDITIONAL Right 4/4/2024   • US GUIDED BREAST BIOPSY RIGHT COMPLETE Right 4/4/2024   • US GUIDED BREAST LYMPH NODE BIOPSY RIGHT Right 4/4/2024   • US GUIDED LYMPH NODE BIOPSY LEFT  5/15/2024   • VEIN LIGATION Right 06/06/2022    Procedure: Venaseal therapy and stab phlebectomy;  Surgeon: Gosia Shepherd MD;  Location: [facility] OR;  Service: Vascular     Family History   Problem Relation Age of Onset   • No Known Problems Mother    • Heart disease Father    • Throat cancer Brother         possibly throat cancer   • Thyroid cancer Brother         possibly thyroid cancer   • No Known Problems Daughter    • No Known Problems  Maternal Aunt    • No Known Problems Maternal Aunt    • No Known Problems Maternal Aunt    • No Known Problems Maternal Aunt    • No Known Problems Paternal Aunt    • No Known Problems Paternal Aunt    • No Known Problems Paternal Aunt    • No Known Problems Paternal Aunt    • No Known Problems Maternal Grandmother    • No Known Problems Maternal Grandfather    • No Known Problems Paternal Grandmother    • No Known Problems Paternal Grandfather    • BRCA2 Negative Neg Hx    • BRCA2 Positive Neg Hx    • BRCA1 Positive Neg Hx    • BRCA1 Negative Neg Hx    • BRCA 1/2 Neg Hx    • Ovarian cancer Neg Hx    • Endometrial cancer Neg Hx    • Colon cancer Neg Hx    • Breast cancer additional onset Neg Hx    • Breast cancer Neg Hx      Social History     Socioeconomic History   • Marital status: /Civil Union     Spouse name: Not on file   • Number of children: Not on file   • Years of education: Not on file   • Highest education level: Not on file   Occupational History   • Not on file   Tobacco Use   • Smoking status: Former     Current packs/day: 0.00     Types: Cigarettes     Start date:      Quit date:      Years since quittin.7   • Smokeless tobacco: Never   Vaping Use   • Vaping status: Never Used   Substance and Sexual Activity   • Alcohol use: Not Currently     Alcohol/week: 1.0 standard drink of alcohol     Types: 1 Standard drinks or equivalent per week   • Drug use: Never   • Sexual activity: Yes     Partners: Male     Birth control/protection: None   Other Topics Concern   • Not on file   Social History Narrative   • Not on file     Social Determinants of Health     Financial Resource Strain: Low Risk  (2023)    Overall Financial Resource Strain (CARDIA)    • Difficulty of Paying Living Expenses: Not hard at all   Food Insecurity: Not on file   Transportation Needs: No Transportation Needs (2023)    PRAPARE - Transportation    • Lack of Transportation (Medical): No    • Lack of  Transportation (Non-Medical): No   Physical Activity: Inactive (9/9/2020)    Exercise Vital Sign    • Days of Exercise per Week: 0 days    • Minutes of Exercise per Session: 0 min   Stress: No Stress Concern Present (9/9/2020)    [employer] - Occupational Stress Questionnaire    • Feeling of Stress : Not at all   Social Connections: Not on file   Intimate Partner Violence: Not on file   Housing Stability: Not on file       Current Outpatient Medications:   •  acetaminophen (TYLENOL) 325 mg tablet, Take 650 mg by mouth every 6 (six) hours as needed for mild pain, Disp: , Rfl:   •  anastrozole (ARIMIDEX) 1 mg tablet, Take 1 tablet (1 mg total) by mouth daily, Disp: 90 tablet, Rfl: 3  •  Ascorbic Acid (vitamin C) 100 MG tablet, Take 100 mg by mouth daily, Disp: , Rfl:   •  hydroCHLOROthiazide 25 mg tablet, TAKE 1 TABLET (25 MG TOTAL) BY MOUTH DAILY., Disp: 90 tablet, Rfl: 1  •  Multiple Vitamins-Minerals (multivitamin with minerals) tablet, Take 1 tablet by mouth daily, Disp: , Rfl:   •  naproxen (NAPROSYN) 500 mg tablet, TAKE 1 TABLET BY MOUTH TWICE A DAY WITH FOOD (Patient taking differently: Take 500 mg by mouth if needed), Disp: 30 tablet, Rfl: 0  •  pravastatin (PRAVACHOL) 40 mg tablet, TAKE 1 TABLET BY MOUTH DAILY AT BEDTIME, Disp: 90 tablet, Rfl: 1  •  Zinc 100 MG TABS, Take by mouth, Disp: , Rfl:     Current Facility-Administered Medications:   •  cyanocobalamin injection 1,000 mcg, 1,000 mcg, Intramuscular, Q30 Days, Lorraine Dennis DO, 1,000 mcg at 09/24/24 0922  Allergies   Allergen Reactions   • Penicillins Other (See Comments)     Unknown reaction, positive on allergy testing       Physical Exam:     Vitals:    10/02/24 1029   BP: (!) 171/61   Pulse: 56   Temp: 97.9 °F (36.6 °C)   SpO2: 98%     Physical Exam  Constitutional:       Appearance: Normal appearance.   HENT:      Head: Normocephalic and atraumatic.      Nose: Nose normal.      Mouth/Throat:      Mouth:  Mucous membranes are moist.   Eyes:      Pupils: Pupils are equal, round, and reactive to light.   Cardiovascular:      Rate and Rhythm: Normal rate.      Pulses: Normal pulses.      Heart sounds: Normal heart sounds.   Pulmonary:      Effort: Pulmonary effort is normal.      Breath sounds: Normal breath sounds.   Chest:          Comments: Palpable masses in the right breast palpable axillary adenopathy in the right side.  With mild skin changes.    Left breast no palpable mass or masses nipple discharge nipple retraction or skin changes.  Left axillary and supraclavicular examination no palpable adenopathy.    Patient was examined seated as well as supine position  Abdominal:      General: Bowel sounds are normal.      Palpations: Abdomen is soft.   Musculoskeletal:         General: Normal range of motion.      Cervical back: Normal range of motion and neck supple.   Skin:     General: Skin is warm.   Neurological:      General: No focal deficit present.      Mental Status: She is alert and oriented to person, place, and time.   Psychiatric:         Mood and Affect: Mood normal.         Behavior: Behavior normal.         Thought Content: Thought content normal.         Judgment: Judgment normal.       Results & Discussion:   CERIANNA PET/CT SCAN     INDICATION: Reevaluate breast cancer. ER positive right breast cancer with known metastasis additional history well-differentiated NET of the stomach. Needle biopsy of the left upper cervical node demonstrating atypical epithelioid cells. C50.811:   Malignant neoplasm of overlapping sites of right female breast  Z17.0: Estrogen receptor positive status (ER+)  C50.911: Malignant neoplasm of unspecified site of right female breast  C77.3: Secondary and unspecified malignant neoplasm of axilla and upper limb lymph nodes     MODIFIER: PS     COMPARISON: FDG PET/CT 5/2/2024     CELL TYPE: Invasive mammary carcinoma, right breast     TECHNIQUE:   6.1 mCi Cerianna  fluoroestradiol administered IV. Multiplanar attenuation corrected and non attenuation corrected PET images were acquired 60 minutes post injection. Contiguous, low dose, axial CT sections were obtained from the vertex   through the femurs .   Intravenous or oral contrast was not utilized.  This examination, like all CT scans performed in the [facility], was performed utilizing techniques to minimize radiation dose exposure, including the use of   iterative reconstruction and automated exposure control.     FINDINGS:     VISUALIZED BRAIN:  No acute abnormalities are seen.     HEAD/NECK:  There is a physiologic distribution of the radiotracer. No FES avid cervical adenopathy is seen. Previously noted small left upper cervical chain lymph node does not demonstrate focal FES uptake. This also appears slightly smaller from the prior exam now   closer to 4 mm short axis, previously closer to 6 mm.     CT images: Stable opacification of the left maxillary sinus.     CHEST:  FES avid right lateral breast nodule, SUV max of 7.5. This also previously demonstrated FDG uptake, SUV max of 8.6. There is now measures up to 1.6 cm in size image 141 series 3, slightly smaller previously 2.3 cm.     FES avid right axillary lymph node, SUV max of 4.3. This also previously demonstrated FDG uptake, SUV max of 4.5. This measures 6 mm short axis image 101 series 3, smaller, previously 12 mm.     Patient noted additional smaller right axillary lymph node with mild FDG uptake does not demonstrate FES activity.     No suspicious focal uptake in the left breast or axillary region.     There are no FES avid intrathoracic nodes.     CT images: Moderate coronary artery calcifications. Mild fluid distention of the esophagus, decreased from the prior exam. Scattered calcified pleural plaque formation on the right.     ABDOMEN:  No FES avid soft tissue lesions are seen.     Variable physiologic hepatobiliary, genitourinary and  bowel activity.  CT images: Stable splenomegaly. Moderate fecal retention in the colon. Metallic clip at the proximal stomach.     PELVIS:  No FES avid soft tissue lesions are seen.     Minimal endometrial activity still likely physiologic in a postmenopausal female.  CT images: Small fat-containing left inguinal hernia suggested.     OSSEOUS STRUCTURES:  No FES avid lesions are seen.  CT images: No significant findings.     IMPRESSION:     1. FES avid right breast lesion compatible with the known malignancy. This appears slightly smaller compared to prior PET/CT.  2. Solitary FES-avid right axillary lymph node compatible with metastasis. This also appears smaller from prior PET/CT. Previously there was an additional smaller right axillary lymph node with mild FDG uptake which is not FES avid.  3. No suspicious FES avid lesions in the neck, abdomen or pelvis. Previously noted small left upper cervical chain lymph node does not demonstrate FES uptake and also appears slightly smaller on CT.  I did review cerianna  PET CT scan and discussed in detail.  Surgical consent was obtained after explaining benefits, alternative, procedure and possible complications with regards above mentioned procedure. All her questions regarding the visit  as well as the  surgery were answered to  the patient's satisfaction. she understands and  agrees . All patient questions were answered.       Advance Care Planning/Advance Directives:  I Jevon Valdez MD discussed the disease status with Juve Duarte  today 10/02/24  treatment plans and follow-up with the patient.

## 2024-10-02 NOTE — H&P (VIEW-ONLY)
Surgical Oncology Follow Up  [facility]  [facility] [place]  [address] [facility] [address]    Juve Duarte  1950  [id number]      Chief Complaint   Patient presents with   • Follow-up     2 Week Follow Up        Assessment & Plan:   This 74-year-old female with multifocal right breast cancer with axillary lymph node met metastatic disease.  With incidental finding of low-grade gastric neuroendocrine tumor.  She has been seen by Dr. Up and plan to proceed with mastectomy at this point followed by EMR for gastric polyp.  Surgical consent was obtained for right modified radical mastectomy and left total mastectomy after explaining the benefit alternative and possible complications.  Will arrange surgery.    Cancer History:     Oncology History   Breast cancer metastasized to axillary lymph node, right (HCC)   4/4/2024 Initial Diagnosis    April 4, 2024 patient had biopsy of right breast mass showing invasive ductal carcinoma.  Right axillary node showed metastatic carcinoma consistent with breast primary.  ER 90%, KY 5-7%, HER2 +2.  FISH negative.  Similar findings in the axillary node although KY was 30%.  HER2 +2, FISH negative.  April 18, 2024 patient had CT chest ab pelvis showing soft tissue nodules right breast.  Right infrahilar and internal mammary nodes indeterminate.  Right axillary nodes up to 0.5 cm.  2 enhancing soft tissue nodules in the gastric fundus.  Bone scan showed focus of activity at anterior right seventh rib.     4/4/2024 Biopsy    Breast, Right, US BX RT BREAST 700 7CMFN 3 PASSES 12G MARQUEE:      - Invasive mammary carcinoma of no special type (ductal NST/invasive ductal carcinoma) with apocrine features, see note      - [place] grade 2 of 3 (total score: 6 of 9)          - Tubule formation: <10%, score 3          - Nuclear grade 2 of 3, score 2          - Mitoses < 3/mm2, (</= 7 mitoses/10HPF), score 1      - Invasive  carcinoma involves 3 of 3 submitted core biopsies, max. dimension = 5 millimeters      - Ductal carcinoma in situ (DCIS): Not identified      - Microcalcifications: Absent      - Lymphovascular invasion: Present     Breast, Right, US BX RT BREAST 900 7CMFN 3 PASSES 12G MARQUEE:      - Invasive mammary carcinoma of no special type (ductal NST/invasive ductal carcinoma) with apocrine features, see note      - [place] grade 2 of 3 (total score: 6 of 9)          - Tubule formation: <10%, score 3          - Nuclear grade 2 of 3, score 2          - Mitoses < 3/mm2, (</= 7 mitoses/10HPF), score 1      - Invasive carcinoma involves 3 of 3 submitted core biopsies, max. dimension = 14 millimeters      - Ductal carcinoma in situ (DCIS): Not identified      - Microcalcifications: Absent      - Lymphovascular invasion: Not identified    Axillary lymph node, US BX RT AXILLARY TAIL LN 10:00 12CMFN 3 PASSES 12G MARQU[place]:      - Metastatic carcinoma consistent with mammary primary,    BREAST TUMOR PROGNOSTIC PROFILE     Performed on invasive carcinoma block E1:  Test Description Result Prognostic Interpretation   Estrogen Receptor/ER  Primary Antibody: SP-1  Internal control: Positive   External control: Positive 90%  Staining Intensity: Strong  Isabela Score*: 8 Positive   Progesterone Receptor/PgR  Primary Antibody:1E2  Internal control: Positive  External control: Positive 5-7%  Staining Intensity: Strong  Isabela Score*: 4 Positive   HER2 by IHC   Primary Antibody: 4B5 2+ Equivocal *      Performed on invasive carcinoma block F2:  Test Description Result Prognostic Interpretation   Estrogen Receptor/ER  Primary Antibody: SP-1  Internal control: Positive   External control: Positive 95%  Staining Intensity: Strong  Isabela Score*: 8 Positive   Progesterone Receptor/PgR  Primary Antibody:1E2  Internal control: Positive  External control: Positive 30%  Staining Intensity: Moderate  Isabela Score*: 5 Positive   HER2 by IHC   Primary  Antibody: 4B5 2+ Equivocal *       Fluorescence in situ hybridization (FISH) analysis of HER2 overexpression by invasive breast carcinoma cells, block E1 performed at [facility], [place], [place], yields the following results:  Test Description                        Interpretation  HER2 by FISH analysis:              Negative / Not Amplified.  Results  HER2: CEP-17 ratio :                  1.5: 1  Average HER2 Signal:                5.4  Average CEP-17 Signal:                          3.5  Number of selected invasive cells scanned           100     Fluorescence in situ hybridization (FISH) analysis of HER2 overexpression by invasive breast carcinoma cells, block F2 performed at [facility], [place], [place], yields the following results:  Test Description                        Interpretation  HER2 by FISH analysis:              Negative / Not Amplified.  Results  HER2: CEP-17 ratio :                  1.3: 1  Average HER2 Signal:                3.5  Average CEP-17 Signal:                          2.6  Number of selected invasive cells scanned           50        4/18/2024 Genetic Testing    AMBRY  A total of 36 genes were evaluated, including: APC, MIKE, BARD 1, BMPR1A, BRCA1, BRCA2, BRIP1, CDH1, CDK4, CKDN2A, CHEK2, DICER1, MLH1, MSH2, MSH6, MUTYH, NBN, NF1, NTHL1, PALB2, PMS2, PTEN, RAD51C, RECQL, SMAD4, SMARCA4, STK11, TP53, AXIN2, HOXB13, MSH3, POLD1, AND POLE, EPCAM, AND GREM1     LIKELY PATHOGENIC VARIANT CHEK2  VUS SDHA           Interval History:   Follow-up with right breast cancer    Review of Systems:   Review of Systems   Constitutional:  Negative for chills and fever.   HENT:  Negative for ear pain and sore throat.    Eyes:  Negative for pain and visual disturbance.   Respiratory:  Negative for cough and shortness of breath.    Cardiovascular:  Negative for chest pain and palpitations.   Gastrointestinal:  Negative for abdominal pain and vomiting.    Genitourinary:  Negative for dysuria and hematuria.   Musculoskeletal:  Negative for arthralgias and back pain.   Skin:  Negative for color change and rash.   Neurological:  Negative for seizures and syncope.   All other systems reviewed and are negative.    Past Medical History     Patient Active Problem List   Diagnosis   • Chronic atrophic gastritis   • Dysphagia, pharyngoesophageal   • Urinary incontinence   • Gastroparesis   • Hemorrhoids, external   • Asymptomatic postmenopausal status   • Essential hypertension   • Pernicious anemia   • Breast cancer metastasized to axillary lymph node, right (HCC)   • CHEK2 gene mutation positive   • Neuroendocrine carcinoma of stomach (HCC)     Past Medical History:   Diagnosis Date   • Colon polyp    • Esophagitis    • History of benign breast tumor    • Hyperlipidemia    • Hypertension    • Urinary incontinence    • Venous ulcer of ankle (HCC) 01/10/2022     Past Surgical History:   Procedure Laterality Date   • ANKLE SURGERY Right    • BREAST EXCISIONAL BIOPSY Left 1998    benign   • BUNIONECTOMY Bilateral    • COLONOSCOPY     • CORRECTION HAMMER TOE Bilateral    • MAMMO STEREOTACTIC BREAST BIOPSY LEFT (ALL INC) Left 4/4/2024   • MAMMO STEREOTACTIC BREAST BIOPSY RIGHT (ALL INC) EACH ADD Right 4/4/2024   • US GUIDANCE BREAST BIOPSY RIGHT EACH ADDITIONAL Right 4/4/2024   • US GUIDED BREAST BIOPSY RIGHT COMPLETE Right 4/4/2024   • US GUIDED BREAST LYMPH NODE BIOPSY RIGHT Right 4/4/2024   • US GUIDED LYMPH NODE BIOPSY LEFT  5/15/2024   • VEIN LIGATION Right 06/06/2022    Procedure: Venaseal therapy and stab phlebectomy;  Surgeon: Gosia Shepherd MD;  Location: [facility] OR;  Service: Vascular     Family History   Problem Relation Age of Onset   • No Known Problems Mother    • Heart disease Father    • Throat cancer Brother         possibly throat cancer   • Thyroid cancer Brother         possibly thyroid cancer   • No Known Problems Daughter    • No Known Problems  Maternal Aunt    • No Known Problems Maternal Aunt    • No Known Problems Maternal Aunt    • No Known Problems Maternal Aunt    • No Known Problems Paternal Aunt    • No Known Problems Paternal Aunt    • No Known Problems Paternal Aunt    • No Known Problems Paternal Aunt    • No Known Problems Maternal Grandmother    • No Known Problems Maternal Grandfather    • No Known Problems Paternal Grandmother    • No Known Problems Paternal Grandfather    • BRCA2 Negative Neg Hx    • BRCA2 Positive Neg Hx    • BRCA1 Positive Neg Hx    • BRCA1 Negative Neg Hx    • BRCA 1/2 Neg Hx    • Ovarian cancer Neg Hx    • Endometrial cancer Neg Hx    • Colon cancer Neg Hx    • Breast cancer additional onset Neg Hx    • Breast cancer Neg Hx      Social History     Socioeconomic History   • Marital status: /Civil Union     Spouse name: Not on file   • Number of children: Not on file   • Years of education: Not on file   • Highest education level: Not on file   Occupational History   • Not on file   Tobacco Use   • Smoking status: Former     Current packs/day: 0.00     Types: Cigarettes     Start date:      Quit date:      Years since quittin.7   • Smokeless tobacco: Never   Vaping Use   • Vaping status: Never Used   Substance and Sexual Activity   • Alcohol use: Not Currently     Alcohol/week: 1.0 standard drink of alcohol     Types: 1 Standard drinks or equivalent per week   • Drug use: Never   • Sexual activity: Yes     Partners: Male     Birth control/protection: None   Other Topics Concern   • Not on file   Social History Narrative   • Not on file     Social Determinants of Health     Financial Resource Strain: Low Risk  (2023)    Overall Financial Resource Strain (CARDIA)    • Difficulty of Paying Living Expenses: Not hard at all   Food Insecurity: Not on file   Transportation Needs: No Transportation Needs (2023)    PRAPARE - Transportation    • Lack of Transportation (Medical): No    • Lack of  Transportation (Non-Medical): No   Physical Activity: Inactive (9/9/2020)    Exercise Vital Sign    • Days of Exercise per Week: 0 days    • Minutes of Exercise per Session: 0 min   Stress: No Stress Concern Present (9/9/2020)    [employer] [place] [employer] - Occupational Stress Questionnaire    • Feeling of Stress : Not at all   Social Connections: Not on file   Intimate Partner Violence: Not on file   Housing Stability: Not on file       Current Outpatient Medications:   •  acetaminophen (TYLENOL) 325 mg tablet, Take 650 mg by mouth every 6 (six) hours as needed for mild pain, Disp: , Rfl:   •  anastrozole (ARIMIDEX) 1 mg tablet, Take 1 tablet (1 mg total) by mouth daily, Disp: 90 tablet, Rfl: 3  •  Ascorbic Acid (vitamin C) 100 MG tablet, Take 100 mg by mouth daily, Disp: , Rfl:   •  hydroCHLOROthiazide 25 mg tablet, TAKE 1 TABLET (25 MG TOTAL) BY MOUTH DAILY., Disp: 90 tablet, Rfl: 1  •  Multiple Vitamins-Minerals (multivitamin with minerals) tablet, Take 1 tablet by mouth daily, Disp: , Rfl:   •  naproxen (NAPROSYN) 500 mg tablet, TAKE 1 TABLET BY MOUTH TWICE A DAY WITH FOOD (Patient taking differently: Take 500 mg by mouth if needed), Disp: 30 tablet, Rfl: 0  •  pravastatin (PRAVACHOL) 40 mg tablet, TAKE 1 TABLET BY MOUTH DAILY AT BEDTIME, Disp: 90 tablet, Rfl: 1  •  Zinc 100 MG TABS, Take by mouth, Disp: , Rfl:     Current Facility-Administered Medications:   •  cyanocobalamin injection 1,000 mcg, 1,000 mcg, Intramuscular, Q30 Days, Lorraine Dennis DO, 1,000 mcg at 09/24/24 0922  Allergies   Allergen Reactions   • Penicillins Other (See Comments)     Unknown reaction, positive on allergy testing       Physical Exam:     Vitals:    10/02/24 1029   BP: (!) 171/61   Pulse: 56   Temp: 97.9 °F (36.6 °C)   SpO2: 98%     Physical Exam  Constitutional:       Appearance: Normal appearance.   HENT:      Head: Normocephalic and atraumatic.      Nose: Nose normal.      Mouth/Throat:      Mouth:  Mucous membranes are moist.   Eyes:      Pupils: Pupils are equal, round, and reactive to light.   Cardiovascular:      Rate and Rhythm: Normal rate.      Pulses: Normal pulses.      Heart sounds: Normal heart sounds.   Pulmonary:      Effort: Pulmonary effort is normal.      Breath sounds: Normal breath sounds.   Chest:          Comments: Palpable masses in the right breast palpable axillary adenopathy in the right side.  With mild skin changes.    Left breast no palpable mass or masses nipple discharge nipple retraction or skin changes.  Left axillary and supraclavicular examination no palpable adenopathy.    Patient was examined seated as well as supine position  Abdominal:      General: Bowel sounds are normal.      Palpations: Abdomen is soft.   Musculoskeletal:         General: Normal range of motion.      Cervical back: Normal range of motion and neck supple.   Skin:     General: Skin is warm.   Neurological:      General: No focal deficit present.      Mental Status: She is alert and oriented to person, place, and time.   Psychiatric:         Mood and Affect: Mood normal.         Behavior: Behavior normal.         Thought Content: Thought content normal.         Judgment: Judgment normal.       Results & Discussion:   CERIANNA PET/CT SCAN     INDICATION: Reevaluate breast cancer. ER positive right breast cancer with known metastasis additional history well-differentiated NET of the stomach. Needle biopsy of the left upper cervical node demonstrating atypical epithelioid cells. C50.811:   Malignant neoplasm of overlapping sites of right female breast  Z17.0: Estrogen receptor positive status (ER+)  C50.911: Malignant neoplasm of unspecified site of right female breast  C77.3: Secondary and unspecified malignant neoplasm of axilla and upper limb lymph nodes     MODIFIER: PS     COMPARISON: FDG PET/CT 5/2/2024     CELL TYPE: Invasive mammary carcinoma, right breast     TECHNIQUE:   6.1 mCi Cerianna  fluoroestradiol administered IV. Multiplanar attenuation corrected and non attenuation corrected PET images were acquired 60 minutes post injection. Contiguous, low dose, axial CT sections were obtained from the vertex   through the femurs .   Intravenous or oral contrast was not utilized.  This examination, like all CT scans performed in the [facility], was performed utilizing techniques to minimize radiation dose exposure, including the use of   iterative reconstruction and automated exposure control.     FINDINGS:     VISUALIZED BRAIN:  No acute abnormalities are seen.     HEAD/NECK:  There is a physiologic distribution of the radiotracer. No FES avid cervical adenopathy is seen. Previously noted small left upper cervical chain lymph node does not demonstrate focal FES uptake. This also appears slightly smaller from the prior exam now   closer to 4 mm short axis, previously closer to 6 mm.     CT images: Stable opacification of the left maxillary sinus.     CHEST:  FES avid right lateral breast nodule, SUV max of 7.5. This also previously demonstrated FDG uptake, SUV max of 8.6. There is now measures up to 1.6 cm in size image 141 series 3, slightly smaller previously 2.3 cm.     FES avid right axillary lymph node, SUV max of 4.3. This also previously demonstrated FDG uptake, SUV max of 4.5. This measures 6 mm short axis image 101 series 3, smaller, previously 12 mm.     Patient noted additional smaller right axillary lymph node with mild FDG uptake does not demonstrate FES activity.     No suspicious focal uptake in the left breast or axillary region.     There are no FES avid intrathoracic nodes.     CT images: Moderate coronary artery calcifications. Mild fluid distention of the esophagus, decreased from the prior exam. Scattered calcified pleural plaque formation on the right.     ABDOMEN:  No FES avid soft tissue lesions are seen.     Variable physiologic hepatobiliary, genitourinary and  bowel activity.  CT images: Stable splenomegaly. Moderate fecal retention in the colon. Metallic clip at the proximal stomach.     PELVIS:  No FES avid soft tissue lesions are seen.     Minimal endometrial activity still likely physiologic in a postmenopausal female.  CT images: Small fat-containing left inguinal hernia suggested.     OSSEOUS STRUCTURES:  No FES avid lesions are seen.  CT images: No significant findings.     IMPRESSION:     1. FES avid right breast lesion compatible with the known malignancy. This appears slightly smaller compared to prior PET/CT.  2. Solitary FES-avid right axillary lymph node compatible with metastasis. This also appears smaller from prior PET/CT. Previously there was an additional smaller right axillary lymph node with mild FDG uptake which is not FES avid.  3. No suspicious FES avid lesions in the neck, abdomen or pelvis. Previously noted small left upper cervical chain lymph node does not demonstrate FES uptake and also appears slightly smaller on CT.  I did review cerianna  PET CT scan and discussed in detail.  Surgical consent was obtained after explaining benefits, alternative, procedure and possible complications with regards above mentioned procedure. All her questions regarding the visit  as well as the  surgery were answered to  the patient's satisfaction. she understands and  agrees . All patient questions were answered.       Advance Care Planning/Advance Directives:  I Jevon Valdez MD discussed the disease status with Juve Duarte  today 10/02/24  treatment plans and follow-up with the patient.

## 2024-10-03 ENCOUNTER — TELEPHONE (OUTPATIENT)
Dept: OBGYN CLINIC | Facility: OTHER | Age: 74
End: 2024-10-03

## 2024-10-07 ENCOUNTER — TELEPHONE (OUTPATIENT)
Dept: OBGYN CLINIC | Facility: OTHER | Age: 74
End: 2024-10-07

## 2024-10-10 ENCOUNTER — TELEPHONE (OUTPATIENT)
Dept: SURGICAL ONCOLOGY | Facility: CLINIC | Age: 74
End: 2024-10-10

## 2024-10-10 NOTE — TELEPHONE ENCOUNTER
I called and spoke with patient in regards to her anastrozole and surgery. I let her know that per Dr Quiroz's office, she should stop it 3 days before surgery and restart a week after surgery. Patient was appreciative of the call and has no questions at this time.

## 2024-10-16 ENCOUNTER — TELEPHONE (OUTPATIENT)
Dept: OBGYN CLINIC | Facility: OTHER | Age: 74
End: 2024-10-16

## 2024-10-16 NOTE — TELEPHONE ENCOUNTER
SPOKE TO [facility] AND REVIEWED APPOINTMENT TIME, LOCATION AND INSURANCE FOR MASTECTOMY ISIDORO GODDARD. CALL BACK NUMBER [phone]

## 2024-10-17 ENCOUNTER — CLINICAL SUPPORT (OUTPATIENT)
Facility: OTHER | Age: 74
End: 2024-10-17

## 2024-10-17 NOTE — PROGRESS NOTES
Mastectomy Bra Fitting Order Details    Juve Duarte  1950  [id number]    Reason For Visit  Pre-op visit for post op garments.     Precautions   R BCA    Subjective  Client presents today for pre-op fitting for post op mastectomy garments    Surgery Type: Mastectomy    Lymph node removal yes    Date of surgery 10/29/2024    Surgical side bilateral    Objective  Client typically wears a size XL top    Assessment  Trial of Vee camisole size L and XL  Trial of Vee bra XL and L      Plan  Ship to home      Order Details   Vee Camisole Kit XL x1  Vee Bra x 2 size L

## 2024-10-18 ENCOUNTER — ANESTHESIA EVENT (OUTPATIENT)
Dept: PERIOP | Facility: HOSPITAL | Age: 74
End: 2024-10-18
Payer: MEDICARE

## 2024-10-18 NOTE — PRE-PROCEDURE INSTRUCTIONS
Pre-Surgery Instructions:   Medication Instructions    acetaminophen (TYLENOL) 325 mg tablet Uses PRN- OK to take day of surgery    anastrozole (ARIMIDEX) 1 mg tablet Instructions provided by MD- was advised by surgeon's office to hold for 3 days prior to surgery    Ascorbic Acid (vitamin C) 100 MG tablet Stop taking 7 days prior to surgery.    hydroCHLOROthiazide 25 mg tablet Hold day of surgery.    Multiple Vitamins-Minerals (multivitamin with minerals) tablet Stop taking 7 days prior to surgery.    naproxen (NAPROSYN) 500 mg tablet Stop taking 7 days prior to surgery.    pravastatin (PRAVACHOL) 40 mg tablet Take night before surgery    Zinc 100 MG TABS Stop taking 7 days prior to surgery.   Medication instructions for day surgery reviewed. Please use only a sip of water to take your instructed medications. Avoid all over the counter vitamins, supplements and NSAIDS for one week prior to surgery per anesthesia guidelines. Tylenol is ok to take as needed.     You will receive a call one business day prior to surgery with an arrival time and hospital directions. If your surgery is scheduled on a Monday, the hospital will be calling you on the Friday prior to your surgery. If you have not heard from anyone by 8pm, please call the hospital supervisor through the hospital  at [phone]. ([place] [phone] or [place] [phone]).    Do not eat or drink anything after midnight the night before your surgery, including candy, mints, lifesavers, or chewing gum. Do not drink alcohol 24hrs before your surgery. Try not to smoke at least 24hrs before your surgery.       Follow the pre surgery showering instructions as listed in the “My Surgical Experience Booklet” or otherwise provided by your surgeon's office. Do not use a blade to shave the surgical area 1 week before surgery. It is okay to use a clean electric clippers up to 24 hours before surgery. Do not apply any lotions, creams, including makeup,  cologne, deodorant, or perfumes after showering on the day of your surgery. Do not use dry shampoo, hair spray, hair gel, or any type of hair products.     No contact lenses, eye make-up, or artificial eyelashes. Remove nail polish, including gel polish, and any artificial, gel, or acrylic nails if possible. Remove all jewelry including rings and body piercing jewelry.     Wear causal clothing that is easy to take on and off. Consider your type of surgery.    Keep any valuables, jewelry, piercings at home. Please bring any specially ordered equipment (sling, braces) if indicated.    Arrange for a responsible person to drive you to and from the hospital on the day of your surgery. Please confirm the visitor policy for the day of your procedure when you receive your phone call with an arrival time.     Call the surgeon's office with any new illnesses, exposures, or additional questions prior to surgery.    Please reference your “My Surgical Experience Booklet” for additional information to prepare for your upcoming surgery.

## 2024-10-22 ENCOUNTER — CLINICAL SUPPORT (OUTPATIENT)
Age: 74
End: 2024-10-22
Payer: MEDICARE

## 2024-10-22 DIAGNOSIS — D51.0 PERNICIOUS ANEMIA: Primary | ICD-10-CM

## 2024-10-22 PROCEDURE — 96372 THER/PROPH/DIAG INJ SC/IM: CPT

## 2024-10-22 RX ADMIN — CYANOCOBALAMIN 1000 MCG: 1000 INJECTION, SOLUTION INTRAMUSCULAR; SUBCUTANEOUS at 10:01

## 2024-10-24 ENCOUNTER — PATIENT OUTREACH (OUTPATIENT)
Dept: CASE MANAGEMENT | Facility: HOSPITAL | Age: 74
End: 2024-10-24

## 2024-10-24 NOTE — PROGRESS NOTES
MSW will place post op home health orders via [facility] and update staff when I have an accepting provider.      Pt is accepted by [facility] on 10/31.  I will make the office RN and inpt CM supervisor aware via email.      [facility]   [address] [place] [id number]  Phone: [phone]  Fax: [phone]

## 2024-10-29 ENCOUNTER — APPOINTMENT (OUTPATIENT)
Dept: RADIOLOGY | Facility: HOSPITAL | Age: 74
End: 2024-10-29
Payer: MEDICARE

## 2024-10-29 ENCOUNTER — HOSPITAL ENCOUNTER (OUTPATIENT)
Facility: HOSPITAL | Age: 74
Setting detail: OUTPATIENT SURGERY
Discharge: HOME | End: 2024-10-30
Attending: SURGERY | Admitting: SURGERY
Payer: MEDICARE

## 2024-10-29 ENCOUNTER — ANESTHESIA (OUTPATIENT)
Dept: PERIOP | Facility: HOSPITAL | Age: 74
End: 2024-10-29
Payer: MEDICARE

## 2024-10-29 DIAGNOSIS — C50.911 BREAST CANCER METASTASIZED TO AXILLARY LYMPH NODE, RIGHT (HCC): ICD-10-CM

## 2024-10-29 DIAGNOSIS — I10 ESSENTIAL HYPERTENSION: Primary | ICD-10-CM

## 2024-10-29 DIAGNOSIS — Z15.89 CHEK2 GENE MUTATION POSITIVE: ICD-10-CM

## 2024-10-29 DIAGNOSIS — C77.3 BREAST CANCER METASTASIZED TO AXILLARY LYMPH NODE, RIGHT (HCC): ICD-10-CM

## 2024-10-29 PROCEDURE — 88341 IMHCHEM/IMCYTCHM EA ADD ANTB: CPT | Performed by: PATHOLOGY

## 2024-10-29 PROCEDURE — 19307 MAST MOD RAD: CPT | Performed by: SURGERY

## 2024-10-29 PROCEDURE — 88307 TISSUE EXAM BY PATHOLOGIST: CPT | Performed by: PATHOLOGY

## 2024-10-29 PROCEDURE — 88360 TUMOR IMMUNOHISTOCHEM/MANUAL: CPT | Performed by: PATHOLOGY

## 2024-10-29 PROCEDURE — 88344 IMHCHEM/IMCYTCHM EA MLT ANTB: CPT | Performed by: PATHOLOGY

## 2024-10-29 PROCEDURE — 19307 MAST MOD RAD: CPT

## 2024-10-29 PROCEDURE — 19303 MAST SIMPLE COMPLETE: CPT

## 2024-10-29 PROCEDURE — 88342 IMHCHEM/IMCYTCHM 1ST ANTB: CPT | Performed by: PATHOLOGY

## 2024-10-29 PROCEDURE — 19303 MAST SIMPLE COMPLETE: CPT | Performed by: SURGERY

## 2024-10-29 PROCEDURE — 88309 TISSUE EXAM BY PATHOLOGIST: CPT | Performed by: PATHOLOGY

## 2024-10-29 PROCEDURE — 88374 M/PHMTRC ALYS ISHQUANT/SEMIQ: CPT | Performed by: PATHOLOGY

## 2024-10-29 RX ORDER — CLINDAMYCIN PHOSPHATE 900 MG/50ML
900 INJECTION, SOLUTION INTRAVENOUS ONCE
Status: COMPLETED | OUTPATIENT
Start: 2024-10-29 | End: 2024-10-29

## 2024-10-29 RX ORDER — HYDROMORPHONE HCL IN WATER/PF 6 MG/30 ML
0.2 PATIENT CONTROLLED ANALGESIA SYRINGE INTRAVENOUS EVERY 4 HOURS PRN
Status: DISCONTINUED | OUTPATIENT
Start: 2024-10-29 | End: 2024-10-30 | Stop reason: HOSPADM

## 2024-10-29 RX ORDER — SODIUM CHLORIDE, SODIUM LACTATE, POTASSIUM CHLORIDE, CALCIUM CHLORIDE 600; 310; 30; 20 MG/100ML; MG/100ML; MG/100ML; MG/100ML
100 INJECTION, SOLUTION INTRAVENOUS CONTINUOUS
Status: DISCONTINUED | OUTPATIENT
Start: 2024-10-29 | End: 2024-10-30 | Stop reason: HOSPADM

## 2024-10-29 RX ORDER — SUCCINYLCHOLINE/SOD CL,ISO/PF 100 MG/5ML
SYRINGE (ML) INTRAVENOUS AS NEEDED
Status: DISCONTINUED | OUTPATIENT
Start: 2024-10-29 | End: 2024-10-29

## 2024-10-29 RX ORDER — ACETAMINOPHEN 325 MG/1
650 TABLET ORAL EVERY 6 HOURS PRN
Status: DISCONTINUED | OUTPATIENT
Start: 2024-10-29 | End: 2024-10-30 | Stop reason: HOSPADM

## 2024-10-29 RX ORDER — ONDANSETRON 2 MG/ML
4 INJECTION INTRAMUSCULAR; INTRAVENOUS ONCE AS NEEDED
Status: COMPLETED | OUTPATIENT
Start: 2024-10-29 | End: 2024-10-29

## 2024-10-29 RX ORDER — EPHEDRINE SULFATE 50 MG/ML
INJECTION INTRAVENOUS AS NEEDED
Status: DISCONTINUED | OUTPATIENT
Start: 2024-10-29 | End: 2024-10-29

## 2024-10-29 RX ORDER — ROCURONIUM BROMIDE 10 MG/ML
INJECTION, SOLUTION INTRAVENOUS AS NEEDED
Status: DISCONTINUED | OUTPATIENT
Start: 2024-10-29 | End: 2024-10-29

## 2024-10-29 RX ORDER — PROPOFOL 10 MG/ML
INJECTION, EMULSION INTRAVENOUS AS NEEDED
Status: DISCONTINUED | OUTPATIENT
Start: 2024-10-29 | End: 2024-10-29

## 2024-10-29 RX ORDER — OXYCODONE HYDROCHLORIDE 5 MG/1
5 TABLET ORAL EVERY 4 HOURS PRN
Status: DISCONTINUED | OUTPATIENT
Start: 2024-10-29 | End: 2024-10-30 | Stop reason: HOSPADM

## 2024-10-29 RX ORDER — LIDOCAINE HYDROCHLORIDE 10 MG/ML
INJECTION, SOLUTION EPIDURAL; INFILTRATION; INTRACAUDAL; PERINEURAL AS NEEDED
Status: DISCONTINUED | OUTPATIENT
Start: 2024-10-29 | End: 2024-10-29

## 2024-10-29 RX ORDER — HYDROMORPHONE HCL IN WATER/PF 6 MG/30 ML
0.2 PATIENT CONTROLLED ANALGESIA SYRINGE INTRAVENOUS
Status: DISCONTINUED | OUTPATIENT
Start: 2024-10-29 | End: 2024-10-29 | Stop reason: HOSPADM

## 2024-10-29 RX ORDER — HYDROCHLOROTHIAZIDE 25 MG/1
25 TABLET ORAL DAILY
Status: DISCONTINUED | OUTPATIENT
Start: 2024-10-30 | End: 2024-10-30 | Stop reason: HOSPADM

## 2024-10-29 RX ORDER — OXYCODONE HYDROCHLORIDE 10 MG/1
10 TABLET ORAL EVERY 4 HOURS PRN
Status: DISCONTINUED | OUTPATIENT
Start: 2024-10-29 | End: 2024-10-30 | Stop reason: HOSPADM

## 2024-10-29 RX ORDER — HYDROMORPHONE HCL/PF 1 MG/ML
SYRINGE (ML) INJECTION AS NEEDED
Status: DISCONTINUED | OUTPATIENT
Start: 2024-10-29 | End: 2024-10-29

## 2024-10-29 RX ORDER — HYDRALAZINE HYDROCHLORIDE 20 MG/ML
5 INJECTION INTRAMUSCULAR; INTRAVENOUS EVERY 6 HOURS PRN
Status: DISCONTINUED | OUTPATIENT
Start: 2024-10-29 | End: 2024-10-30

## 2024-10-29 RX ORDER — ONDANSETRON 2 MG/ML
INJECTION INTRAMUSCULAR; INTRAVENOUS AS NEEDED
Status: DISCONTINUED | OUTPATIENT
Start: 2024-10-29 | End: 2024-10-29

## 2024-10-29 RX ORDER — FENTANYL CITRATE/PF 50 MCG/ML
25 SYRINGE (ML) INJECTION
Status: DISCONTINUED | OUTPATIENT
Start: 2024-10-29 | End: 2024-10-29 | Stop reason: HOSPADM

## 2024-10-29 RX ORDER — SODIUM CHLORIDE, SODIUM LACTATE, POTASSIUM CHLORIDE, CALCIUM CHLORIDE 600; 310; 30; 20 MG/100ML; MG/100ML; MG/100ML; MG/100ML
INJECTION, SOLUTION INTRAVENOUS CONTINUOUS PRN
Status: DISCONTINUED | OUTPATIENT
Start: 2024-10-29 | End: 2024-10-29

## 2024-10-29 RX ORDER — PROPOFOL 10 MG/ML
INJECTION, EMULSION INTRAVENOUS CONTINUOUS PRN
Status: DISCONTINUED | OUTPATIENT
Start: 2024-10-29 | End: 2024-10-29

## 2024-10-29 RX ORDER — PROMETHAZINE HYDROCHLORIDE 25 MG/ML
25 INJECTION, SOLUTION INTRAMUSCULAR; INTRAVENOUS EVERY 6 HOURS PRN
Status: DISCONTINUED | OUTPATIENT
Start: 2024-10-29 | End: 2024-10-30 | Stop reason: HOSPADM

## 2024-10-29 RX ORDER — DEXAMETHASONE SODIUM PHOSPHATE 10 MG/ML
INJECTION, SOLUTION INTRAMUSCULAR; INTRAVENOUS AS NEEDED
Status: DISCONTINUED | OUTPATIENT
Start: 2024-10-29 | End: 2024-10-29

## 2024-10-29 RX ORDER — PRAVASTATIN SODIUM 40 MG
40 TABLET ORAL
Status: DISCONTINUED | OUTPATIENT
Start: 2024-10-29 | End: 2024-10-30 | Stop reason: HOSPADM

## 2024-10-29 RX ORDER — ONDANSETRON 2 MG/ML
4 INJECTION INTRAMUSCULAR; INTRAVENOUS EVERY 6 HOURS PRN
Status: DISCONTINUED | OUTPATIENT
Start: 2024-10-29 | End: 2024-10-30 | Stop reason: HOSPADM

## 2024-10-29 RX ORDER — PROCHLORPERAZINE MALEATE 10 MG
5 TABLET ORAL EVERY 6 HOURS PRN
Status: DISCONTINUED | OUTPATIENT
Start: 2024-10-29 | End: 2024-10-30 | Stop reason: HOSPADM

## 2024-10-29 RX ORDER — FENTANYL CITRATE 50 UG/ML
INJECTION, SOLUTION INTRAMUSCULAR; INTRAVENOUS AS NEEDED
Status: DISCONTINUED | OUTPATIENT
Start: 2024-10-29 | End: 2024-10-29

## 2024-10-29 RX ADMIN — DEXAMETHASONE SODIUM PHOSPHATE 4 MG: 10 INJECTION, SOLUTION INTRAMUSCULAR; INTRAVENOUS at 11:18

## 2024-10-29 RX ADMIN — HYDROMORPHONE HYDROCHLORIDE 0.25 MG: 1 INJECTION, SOLUTION INTRAMUSCULAR; INTRAVENOUS; SUBCUTANEOUS at 13:34

## 2024-10-29 RX ADMIN — HYDROMORPHONE HYDROCHLORIDE 0.25 MG: 1 INJECTION, SOLUTION INTRAMUSCULAR; INTRAVENOUS; SUBCUTANEOUS at 13:14

## 2024-10-29 RX ADMIN — HYDROMORPHONE HYDROCHLORIDE 0.25 MG: 1 INJECTION, SOLUTION INTRAMUSCULAR; INTRAVENOUS; SUBCUTANEOUS at 13:49

## 2024-10-29 RX ADMIN — CLINDAMYCIN PHOSPHATE 900 MG: 900 INJECTION, SOLUTION INTRAVENOUS at 11:20

## 2024-10-29 RX ADMIN — PROPOFOL 50 MG: 10 INJECTION, EMULSION INTRAVENOUS at 11:14

## 2024-10-29 RX ADMIN — REMIFENTANIL HYDROCHLORIDE 0.2 MCG/KG/MIN: 1 INJECTION, POWDER, LYOPHILIZED, FOR SOLUTION INTRAVENOUS at 11:17

## 2024-10-29 RX ADMIN — SODIUM CHLORIDE, SODIUM LACTATE, POTASSIUM CHLORIDE, AND CALCIUM CHLORIDE: .6; .31; .03; .02 INJECTION, SOLUTION INTRAVENOUS at 13:31

## 2024-10-29 RX ADMIN — ONDANSETRON 4 MG: 2 INJECTION INTRAMUSCULAR; INTRAVENOUS at 21:22

## 2024-10-29 RX ADMIN — SODIUM CHLORIDE, SODIUM LACTATE, POTASSIUM CHLORIDE, AND CALCIUM CHLORIDE: .6; .31; .03; .02 INJECTION, SOLUTION INTRAVENOUS at 11:02

## 2024-10-29 RX ADMIN — HYDROMORPHONE HYDROCHLORIDE 0.25 MG: 1 INJECTION, SOLUTION INTRAMUSCULAR; INTRAVENOUS; SUBCUTANEOUS at 13:48

## 2024-10-29 RX ADMIN — FENTANYL CITRATE 50 MCG: 50 INJECTION INTRAMUSCULAR; INTRAVENOUS at 12:29

## 2024-10-29 RX ADMIN — Medication 80 MG: at 11:15

## 2024-10-29 RX ADMIN — PROPOFOL 50 MG: 10 INJECTION, EMULSION INTRAVENOUS at 11:13

## 2024-10-29 RX ADMIN — LIDOCAINE HYDROCHLORIDE 50 MG: 10 INJECTION, SOLUTION EPIDURAL; INFILTRATION; INTRACAUDAL at 11:13

## 2024-10-29 RX ADMIN — ONDANSETRON 4 MG: 2 INJECTION INTRAMUSCULAR; INTRAVENOUS at 13:34

## 2024-10-29 RX ADMIN — SODIUM CHLORIDE, SODIUM LACTATE, POTASSIUM CHLORIDE, AND CALCIUM CHLORIDE: .6; .31; .03; .02 INJECTION, SOLUTION INTRAVENOUS at 12:03

## 2024-10-29 RX ADMIN — PROPOFOL 120 MCG/KG/MIN: 10 INJECTION, EMULSION INTRAVENOUS at 11:17

## 2024-10-29 RX ADMIN — ONDANSETRON 4 MG: 2 INJECTION INTRAMUSCULAR; INTRAVENOUS at 14:42

## 2024-10-29 RX ADMIN — ROCURONIUM BROMIDE 5 MG: 10 INJECTION, SOLUTION INTRAVENOUS at 11:15

## 2024-10-29 RX ADMIN — FENTANYL CITRATE 50 MCG: 50 INJECTION INTRAMUSCULAR; INTRAVENOUS at 11:12

## 2024-10-29 RX ADMIN — SODIUM CHLORIDE, SODIUM LACTATE, POTASSIUM CHLORIDE, AND CALCIUM CHLORIDE 100 ML/HR: .6; .31; .03; .02 INJECTION, SOLUTION INTRAVENOUS at 16:23

## 2024-10-29 RX ADMIN — EPHEDRINE SULFATE 10 MG: 50 INJECTION, SOLUTION INTRAVENOUS at 11:25

## 2024-10-29 RX ADMIN — HYDRALAZINE HYDROCHLORIDE 5 MG: 20 INJECTION INTRAMUSCULAR; INTRAVENOUS at 18:53

## 2024-10-29 RX ADMIN — EPHEDRINE SULFATE 10 MG: 50 INJECTION, SOLUTION INTRAVENOUS at 11:51

## 2024-10-29 RX ADMIN — PROCHLORPERAZINE MALEATE 5 MG: 10 TABLET ORAL at 16:11

## 2024-10-29 RX ADMIN — FENTANYL CITRATE 25 MCG: 50 INJECTION INTRAMUSCULAR; INTRAVENOUS at 14:31

## 2024-10-29 RX ADMIN — EPHEDRINE SULFATE 10 MG: 50 INJECTION, SOLUTION INTRAVENOUS at 11:39

## 2024-10-29 RX ADMIN — FENTANYL CITRATE 25 MCG: 50 INJECTION INTRAMUSCULAR; INTRAVENOUS at 14:22

## 2024-10-29 NOTE — OP NOTE
OPERATIVE REPORT  PATIENT NAME: Juve Duarte    :  1950  MRN: [id number]  Pt Location: [facility] OR ROOM 02    SURGERY DATE: 10/29/2024    Surgeons and Role:     * Jevon Valdez MD - Primary     * Brady Lloyd PA-C - Assisting    Preop Diagnosis:  Breast cancer metastasized to axillary lymph node, right (HCC) [C50.911, C77.3]  CHEK2 gene mutation positive [Z15.89]    Post-Op Diagnosis Codes:     * Breast cancer metastasized to axillary lymph node, right (HCC) [C50.911, C77.3]     * CHEK2 gene mutation positive [Z15.89]    Procedure(s):  Right - RIGHT MODIFIED RADICAL MASTECTOMY LEVEL I AND LEVEL II LYMPH NODE DISSECTION. GIOVANA CLIPS IN THE RIGHT BREAST X2. AXILLARY LN  Left - LEFT MASTECTOMY    Specimen(s):  ID Type Source Tests Collected by Time Destination   1 : RIGHT Breast mastectomy, suture marks Axillary tail , GIOVANA clipped lymph node in Axillary Tail with additional nodes Tissue Breast, Right TISSUE EXAM Jevon Valdez MD 10/29/2024 1223    2 : Right Axillary level I and level II lymph nodes Tissue Axillary lymph node TISSUE EXAM Jevon Valdez MD 10/29/2024 1236    3 : Left Breast mastectomy suture calzada axillary tail Tissue Breast, Left TISSUE EXAM Jevon Valdez MD 10/29/2024 1317        Estimated Blood Loss:   25 mL    Drains:  Closed/Suction Drain Inferior;Right Breast Bulb 19 Fr. (Active)   Number of days: 0       Closed/Suction Drain Inferior;Right Breast Bulb 19 Fr. (Active)   Number of days: 0       Closed/Suction Drain Inferior;Lateral;Left Breast Bulb 19 Fr. (Active)   Number of days: 0       Closed/Suction Drain Inferior;Left Breast Bulb 19 Fr. (Active)   Number of days: 0       Anesthesia Type:   General    Operative Indications:  Breast cancer metastasized to axillary lymph node, right (HCC) [C50.911, C77.3]  CHEK2 gene mutation positive [Z15.89]      Operative Findings:  All 3 Giovana clip signed right breast specimen with additional biopsy  clips.      Complications:   None    Procedure and Technique:  Juve Duarte was brought to the operation room and placed under general anesthesia.  Attention was paid to ensure appropriate padding and correct positioning        The bilateral breast was prepped and draped in a sterile fashion.  I initiated a time-out, identifying the patient, the correct side and the above procedure.  All parties agreed with the time out.     Right Mastectomy:  The planned incision was sharply incised.  Thin flaps were then elevated using electrocautery starting superiorly and then continuing medially, inferiorly and finally laterally.  The tail of Leigh was then dissected away from the axilla proper leaving the breast tethered to the underlying musculature.  The medial and lateral flaps were extended into the lower axilla.The breast was then removed from the muscles in a sub-facial plane.  The breast was oriented with suture axillary tail. the breast was sent to pathology in formalin for permanent pathologic evaluation.     Right Axillary Dissection:  The medial and lateral flaps were then extended into the upper axilla.  The pectoralis minor was then retracted medially and level 2 nodes were brought into field.  Just underneath they estimated position of the subclavian vein the deltopectoral fascia was released with electrocautery.  Blunt dissection was then taken down to identify intercostal nerve which was divided.  Along the medial wall the long thoracic nerve was identified and with meticulous dissection thoracodorsal neurovascular bundle was identified in the nerve carefully preserved along its course.  During this dissection small veins and arteries were either ligated cauterized or clipped to maintain meticulous hemostasis.  The axillary contents (levels 1 and 2) within withdrawn and  from the remaining fascial attachments with electrocautery.  There then handed off the field for permanent pathologic  analysis.    Two hundred nineteen [place] slotted FARHAT catheter was then brought in through 2 separate lateral stab incisions and secured with nylon sutures.  The wound was extensively irrigated.  Superficial bleeding was controlled with electrocautery.  The wound was then closed with interrupted 2 0 Vicryl sutures followed by for Monocryl.  Exofin were applied.    The needle count lap counts sponge counts were correct x2.  Left Mastectomy:  The planned incision was sharply incised.  Thin flaps were then elevated using electrocautery starting superiorly and then continuing medially, inferiorly and finally laterally.  The tail of Leigh was then dissected away from the axilla proper leaving the breast tethered to the underlying musculature.  The medial and lateral flaps were extended into the lower axilla.The breast was then removed from the muscles in a sub-facial plane.  The breast was oriented with suture axillary tail. the breast was sent to pathology in formalin for permanent pathologic evaluation.Two hundred nineteen [place] slotted FARHAT catheter was then brought in through 2 separate lateral stab incisions and secured with nylon sutures.  The wound was extensively irrigated.  Superficial bleeding was controlled with electrocautery.  The wound was then closed with interrupted 2 0 Vicryl sutures followed by for Monocryl.  Exofin were applied.    The needle count lap counts sponge counts were correct x2.     I was present for the entire procedure. and A physician assistant was required during the procedure for retraction, tissue handling, dissection and suturing.    Patient Disposition:  PACU     Axillary Lymph Node Dissection for Breast Cancer - Right  Operation performed with curative intent. Yes   Resection was performed within the boundaries of the axillary vein, chest wall (serratus anterior), and latissimus dorsi. Yes   Nerves identified and preserved during dissection (select all that apply). Long thoracic nerve  and Thoracodorsal nerve   Level III nodes were removed. No                 SIGNATURE: Jevon Valdez MD  DATE: October 29, 2024  TIME: 1:30 PM

## 2024-10-29 NOTE — INTERVAL H&P NOTE
H&P reviewed. After examining the patient I find no changes in the patients condition since the H&P had been written.    Vitals:    10/29/24 1025   BP: (!) 196/81   Pulse: 69   Resp: 18   Temp: 97.8 °F (36.6 °C)   SpO2: 100%

## 2024-10-29 NOTE — PLAN OF CARE
Problem: PAIN - ADULT  Goal: Verbalizes/displays adequate comfort level or baseline comfort level  Description: Interventions:  - Encourage patient to monitor pain and request assistance  - Assess pain using appropriate pain scale  - Administer analgesics based on type and severity of pain and evaluate response  - Implement non-pharmacological measures as appropriate and evaluate response  - Consider cultural and social influences on pain and pain management  - Notify physician/advanced practitioner if interventions unsuccessful or patient reports new pain  Outcome: Progressing     Problem: INFECTION - ADULT  Goal: Absence or prevention of progression during hospitalization  Description: INTERVENTIONS:  - Assess and monitor for signs and symptoms of infection  - Monitor lab/diagnostic results  - Monitor all insertion sites, i.e. indwelling lines, tubes, and drains  - Monitor endotracheal if appropriate and nasal secretions for changes in amount and color  - [place] appropriate cooling/warming therapies per order  - Administer medications as ordered  - Instruct and encourage patient and family to use good hand hygiene technique  - Identify and instruct in appropriate isolation precautions for identified infection/condition  Outcome: Progressing  Goal: Absence of fever/infection during neutropenic period  Description: INTERVENTIONS:  - Monitor WBC    Outcome: Progressing     Problem: SAFETY ADULT  Goal: Patient will remain free of falls  Description: INTERVENTIONS:  - Educate patient/family on patient safety including physical limitations  - Instruct patient to call for assistance with activity   - Consult OT/PT to assist with strengthening/mobility   - Keep Call bell within reach  - Keep bed low and locked with side rails adjusted as appropriate  - Keep care items and personal belongings within reach  - Initiate and maintain comfort rounds  - Make Fall Risk Sign visible to staff  - Offer Toileting every 2 Hours,  in advance of need  - Initiate/Maintain bed alarm  - Obtain necessary fall risk management equipment: call bell within reach   - Apply yellow socks and bracelet for high fall risk patients  - Consider moving patient to room near nurses station  Outcome: Progressing  Goal: Maintain or return to baseline ADL function  Description: INTERVENTIONS:  -  Assess patient's ability to carry out ADLs; assess patient's baseline for ADL function and identify physical deficits which impact ability to perform ADLs (bathing, care of mouth/teeth, toileting, grooming, dressing, etc.)  - Assess/evaluate cause of self-care deficits   - Assess range of motion  - Assess patient's mobility; develop plan if impaired  - Assess patient's need for assistive devices and provide as appropriate  - Encourage maximum independence but intervene and supervise when necessary  - Involve family in performance of ADLs  - Assess for home care needs following discharge   - Consider OT consult to assist with ADL evaluation and planning for discharge  - Provide patient education as appropriate  Outcome: Progressing  Goal: Maintains/Returns to pre admission functional level  Description: INTERVENTIONS:  - Perform AM-PAC 6 Click Basic Mobility/ Daily Activity assessment daily.  - Set and communicate daily mobility goal to care team and patient/family/caregiver.   - Collaborate with rehabilitation services on mobility goals if consulted  - Perform Range of Motion 3 times a day.  - Reposition patient every 2 hours.  - Dangle patient 3 times a day  - Stand patient 3 times a day  - Ambulate patient 3 times a day  - Out of bed to chair 3 times a day   - Out of bed for meals 3 times a day  - Out of bed for toileting  - Record patient progress and toleration of activity level   Outcome: Progressing

## 2024-10-29 NOTE — ANESTHESIA POSTPROCEDURE EVALUATION
Post-Op Assessment Note    CV Status:  Stable  Pain Score: 0    Pain management: adequate       Mental Status:  Sleepy and arousable   Hydration Status:  Euvolemic   PONV Controlled:  None   Airway Patency:  Patent     Post Op Vitals Reviewed: Yes    No anethesia notable event occurred.    Staff: CRNA           Last Filed PACU Vitals:  Vitals Value Taken Time   Temp 96.4 °F (35.8 °C) 10/29/24 1356   Pulse 69 10/29/24 1359   /65 10/29/24 1356   Resp 14 10/29/24 1359   SpO2 98 % 10/29/24 1359   Vitals shown include unfiled device data.    Modified Xavi:  No data recorded

## 2024-10-30 ENCOUNTER — ANCILLARY PROCEDURE (OUTPATIENT)
Dept: LAB | Facility: HOSPITAL | Age: 74
End: 2024-10-30
Attending: SURGERY
Payer: MEDICARE

## 2024-10-30 VITALS
HEIGHT: 66 IN | HEART RATE: 81 BPM | OXYGEN SATURATION: 94 % | SYSTOLIC BLOOD PRESSURE: 140 MMHG | RESPIRATION RATE: 15 BRPM | WEIGHT: 145.72 LBS | TEMPERATURE: 98.3 F | BODY MASS INDEX: 23.42 KG/M2 | DIASTOLIC BLOOD PRESSURE: 50 MMHG

## 2024-10-30 PROBLEM — E78.5 HYPERLIPIDEMIA: Status: ACTIVE | Noted: 2024-10-30

## 2024-10-30 PROCEDURE — 99222 1ST HOSP IP/OBS MODERATE 55: CPT | Performed by: STUDENT IN AN ORGANIZED HEALTH CARE EDUCATION/TRAINING PROGRAM

## 2024-10-30 RX ADMIN — ONDANSETRON 4 MG: 2 INJECTION INTRAMUSCULAR; INTRAVENOUS at 06:00

## 2024-10-30 RX ADMIN — HYDROMORPHONE HYDROCHLORIDE 0.2 MG: 0.2 INJECTION, SOLUTION INTRAMUSCULAR; INTRAVENOUS; SUBCUTANEOUS at 05:59

## 2024-10-30 NOTE — ASSESSMENT & PLAN NOTE
Patient with known history of hypertension with noted urgency during admission  Patient resumed on hydrochlorothiazide 25 mg daily  Further added hydralazine 5 mg every 6 hours as needed for systolic blood pressure greater than 160  Patient with noted urgency has improved with addition of above medications also pain could be a contributing factor.  Continue routine monitoring per unit

## 2024-10-30 NOTE — ANESTHESIA POSTPROCEDURE EVALUATION
Post-Op Assessment Note    CV Status:  Stable    Pain management: adequate       Mental Status:  Alert and awake   Hydration Status:  Euvolemic   PONV Controlled:  Controlled   Airway Patency:  Patent     Post Op Vitals Reviewed: Yes    No anethesia notable event occurred.            Last Filed PACU Vitals:  Vitals Value Taken Time   Temp 97.6 °F (36.4 °C) 10/29/24 1445   Pulse 71 10/29/24 1510   /83 10/29/24 1507   Resp 18 10/29/24 1510   SpO2 92 % 10/29/24 1510   Vitals shown include unfiled device data.    Modified Xavi:  Activity: 2 (10/29/2024  2:15 PM)  Respiration: 2 (10/29/2024  2:15 PM)  Circulation: 2 (10/29/2024  2:15 PM)  Consciousness: 1 (10/29/2024  2:15 PM)  Oxygen Saturation: 1 (10/29/2024  2:15 PM)  Modified Xavi Score: 8 (10/29/2024  2:15 PM)

## 2024-10-30 NOTE — ASSESSMENT & PLAN NOTE
Present on admission history of hypertension.  Home medication includes hydrochlorothiazide 25 mg daily.  No labs from today to review.  Blood pressure currently well-controlled.  Continue home dose of hydrochlorothiazide 25 mg daily.  DC prn IV hydralazine since BP well controlled now.

## 2024-10-30 NOTE — PLAN OF CARE
Problem: PAIN - ADULT  Goal: Verbalizes/displays adequate comfort level or baseline comfort level  Description: Interventions:  - Encourage patient to monitor pain and request assistance  - Assess pain using appropriate pain scale  - Administer analgesics based on type and severity of pain and evaluate response  - Implement non-pharmacological measures as appropriate and evaluate response  - Consider cultural and social influences on pain and pain management  - Notify physician/advanced practitioner if interventions unsuccessful or patient reports new pain  10/30/2024 1250 by Radha Quintero RN  Outcome: Adequate for Discharge  10/30/2024 1007 by Radha Quintreo RN  Outcome: Progressing     Problem: INFECTION - ADULT  Goal: Absence or prevention of progression during hospitalization  Description: INTERVENTIONS:  - Assess and monitor for signs and symptoms of infection  - Monitor lab/diagnostic results  - Monitor all insertion sites, i.e. indwelling lines, tubes, and drains  - Monitor endotracheal if appropriate and nasal secretions for changes in amount and color  - [place] appropriate cooling/warming therapies per order  - Administer medications as ordered  - Instruct and encourage patient and family to use good hand hygiene technique  - Identify and instruct in appropriate isolation precautions for identified infection/condition  10/30/2024 1250 by Radha Quintero RN  Outcome: Adequate for Discharge  10/30/2024 1007 by Radha Quintero RN  Outcome: Progressing  Goal: Absence of fever/infection during neutropenic period  Description: INTERVENTIONS:  - Monitor WBC    10/30/2024 1250 by Radha Quintero RN  Outcome: Adequate for Discharge  10/30/2024 1007 by Radha Quintero RN  Outcome: Progressing     Problem: SAFETY ADULT  Goal: Patient will remain free of falls  Description: INTERVENTIONS:  - Educate patient/family on patient safety including physical limitations  - Instruct patient to call for assistance with activity   -  Consult OT/PT to assist with strengthening/mobility   - Keep Call bell within reach  - Keep bed low and locked with side rails adjusted as appropriate  - Keep care items and personal belongings within reach  - Initiate and maintain comfort rounds  - Make Fall Risk Sign visible to staff  - Offer Toileting every 2 Hours, in advance of need  - Initiate/Maintain bed alarm  - Obtain necessary fall risk management equipment: call bell within reach  - Apply yellow socks and bracelet for high fall risk patients  - Consider moving patient to room near nurses station  10/30/2024 1250 by Radha Quintero RN  Outcome: Adequate for Discharge  10/30/2024 1007 by Radha Quintero RN  Outcome: Progressing  Goal: Maintain or return to baseline ADL function  Description: INTERVENTIONS:  -  Assess patient's ability to carry out ADLs; assess patient's baseline for ADL function and identify physical deficits which impact ability to perform ADLs (bathing, care of mouth/teeth, toileting, grooming, dressing, etc.)  - Assess/evaluate cause of self-care deficits   - Assess range of motion  - Assess patient's mobility; develop plan if impaired  - Assess patient's need for assistive devices and provide as appropriate  - Encourage maximum independence but intervene and supervise when necessary  - Involve family in performance of ADLs  - Assess for home care needs following discharge   - Consider OT consult to assist with ADL evaluation and planning for discharge  - Provide patient education as appropriate  10/30/2024 1250 by Radha Quintero RN  Outcome: Adequate for Discharge  10/30/2024 1007 by Radha Quintero RN  Outcome: Progressing  Goal: Maintains/Returns to pre admission functional level  Description: INTERVENTIONS:  - Perform AM-PAC 6 Click Basic Mobility/ Daily Activity assessment daily.  - Set and communicate daily mobility goal to care team and patient/family/caregiver.   - Collaborate with rehabilitation services on mobility goals if consulted  -  Perform Range of Motion 3 times a day.  - Reposition patient every 2 hours.  - Dangle patient 3 times a day  - Stand patient 3 times a day  - Ambulate patient 3 times a day  - Out of bed to chair 3 times a day   - Out of bed for meals 3 times a day  - Out of bed for toileting  - Record patient progress and toleration of activity level   10/30/2024 1250 by Radha Quintero RN  Outcome: Adequate for Discharge  10/30/2024 1007 by Radha Quintero RN  Outcome: Progressing

## 2024-10-30 NOTE — CONSULTS
Consultation - Hospitalist   Name: Juve Duarte 74 y.o. female I MRN: [id number]  Unit/Bed#: -01 I Date of Admission: 10/29/2024   Date of Service: 10/30/2024 I Hospital Day: 0   Inpatient consult to Internal Medicine  Consult performed by: Corbin SERVIN MD  Consult ordered by: Jevon Valdez MD        Physician Requesting Evaluation: Jevon Valdez*   Reason for Evaluation / Principal Problem: HTN    Assessment & Plan  Essential hypertension  Present on admission history of hypertension.  Home medication includes hydrochlorothiazide 25 mg daily.  No labs from today to review.  Blood pressure currently well-controlled.  Continue home dose of hydrochlorothiazide 25 mg daily.  DC prn IV hydralazine since BP well controlled now.   Breast cancer metastasized to axillary lymph node, right (HCC)  Present on admission history of breast cancer with metastatic lymph nodes.  POD #1 status post right modified radical mastectomy with lymph node dissection, left mastectomy.    Currently patient does report feeling slightly nauseous however able to tolerate diet well without vomiting.  Denies abdominal pain, fever, chills, diarrhea, any other new complaints.  Further care per primary/surgical oncology team.    Hyperlipidemia  Continue home dose of Pravachol with outpatient follow-up with PCP.      Discussion with family: Updated  () at bedside.      History of Present Illness   Chief Complaint: Patient is hospitalized under surgical oncology service for mastectomy.    Juve Duarte is a 74 y.o. female with a PMH of multifocal right breast cancer with axillary lymph node metastasis, history of low-grade gastric neuroendocrine tumor, hypertension, hyperlipidemia, who has been hospitalized under oncology surgical team and now POD#1 status post right modified radical mastectomy with lymph node dissection, left mastectomy.SLIM consulted for HTN management.  Seen during a.m. rounds.   Patient appears comfortable not in distress.  Currently she does feel slightly nauseous however able to tolerate diet well without vomiting.  Denies fever, chills, chest pain, cough, abdominal pain, dysuria, diarrhea, any other new complaints.  No other events reported.  Reports overall feeling better and eager to go home.    Review of Systems   Constitutional:  Negative for chills, diaphoresis, fatigue and fever.   HENT:  Negative for congestion.    Eyes:  Negative for visual disturbance.   Respiratory:  Negative for cough and shortness of breath.    Cardiovascular:  Negative for chest pain, palpitations and leg swelling.   Gastrointestinal:  Positive for nausea. Negative for abdominal pain, constipation and vomiting.   Endocrine: Negative for polyuria.   Genitourinary:  Negative for dysuria.   Neurological:  Negative for weakness.   Psychiatric/Behavioral:  Negative for agitation.        Historical Information   Past Medical History:   Diagnosis Date    Cancer (HCC)     Breast, right    Colon polyp     Esophagitis     History of benign breast tumor     Hyperlipidemia     Hypertension     Urinary incontinence     Venous ulcer of ankle (HCC) 01/10/2022     Past Surgical History:   Procedure Laterality Date    ANKLE SURGERY Right     BREAST EXCISIONAL BIOPSY Left 1998    benign    BUNIONECTOMY Bilateral     COLONOSCOPY      CORRECTION HAMMER TOE Bilateral     MAMMO STEREOTACTIC BREAST BIOPSY LEFT (ALL INC) Left 4/4/2024    MAMMO STEREOTACTIC BREAST BIOPSY RIGHT (ALL INC) EACH ADD Right 4/4/2024    MODIFIED RADICAL MASTECTOMY W/ AXILLARY LYMPH NODE DISSECTION Right 10/29/2024    Procedure: RIGHT MODIFIED RADICAL MASTECTOMY LEVEL I AND LEVEL II LYMPH NODE DISSECTION, GIOVANA CLIPS IN THE RIGHT BREAST X2, AXILLARY LN;  Surgeon: Jevon Valdez MD;  Location: [facility];  Service: Surgical Oncology    SIMPLE MASTECTOMY Left 10/29/2024    Procedure: LEFT MASTECTOMY;  Surgeon: Jevon Valdez MD;   Location: [facility];  Service: Surgical Oncology    US GUIDANCE BREAST BIOPSY RIGHT EACH ADDITIONAL Right 2024    US GUIDED BREAST BIOPSY RIGHT COMPLETE Right 2024    US GUIDED BREAST LYMPH NODE BIOPSY RIGHT Right 2024    US GUIDED LYMPH NODE BIOPSY LEFT  5/15/2024    VEIN LIGATION Right 2022    Procedure: Venaseal therapy and stab phlebectomy;  Surgeon: Gosia Shepherd MD;  Location: [facility];  Service: Vascular     Social History     Tobacco Use    Smoking status: Former     Current packs/day: 0.00     Types: Cigarettes     Start date:      Quit date:      Years since quittin.8    Smokeless tobacco: Never   Vaping Use    Vaping status: Never Used   Substance and Sexual Activity    Alcohol use: Not Currently     Alcohol/week: 1.0 standard drink of alcohol     Types: 1 Standard drinks or equivalent per week    Drug use: Never    Sexual activity: Yes     Partners: Male     Birth control/protection: None     E-Cigarette/Vaping    E-Cigarette Use Never User      E-Cigarette/Vaping Substances    Nicotine No     THC No     CBD No     Flavoring No     Other No     Unknown No      Family history non-contributory  Social History:  Marital Status: /Civil Union       Meds/Allergies   I have reviewed home medications with a medical source (PCP, Pharmacy, other).  Prior to Admission medications    Medication Sig Start Date End Date Taking? Authorizing Provider   acetaminophen (TYLENOL) 325 mg tablet Take 650 mg by mouth every 6 (six) hours as needed for mild pain   Yes Historical Provider, MD   anastrozole (ARIMIDEX) 1 mg tablet Take 1 tablet (1 mg total) by mouth daily 24  Yes Earnest Quiroz,    Ascorbic Acid (vitamin C) 100 MG tablet Take 100 mg by mouth daily   Yes Historical Provider, MD   Multiple Vitamins-Minerals (multivitamin with minerals) tablet Take 1 tablet by mouth daily   Yes Historical Provider, MD   naproxen (NAPROSYN) 500 mg tablet TAKE 1 TABLET BY MOUTH  TWICE A DAY WITH FOOD  Patient taking differently: Take 500 mg by mouth if needed 3/4/24  Yes Lorraine Dennis DO   pravastatin (PRAVACHOL) 40 mg tablet TAKE 1 TABLET BY MOUTH DAILY AT BEDTIME 9/12/24  Yes Lorraine Dennis DO   acetaminophen-codeine (TYLENOL with CODEINE #3) 300-30 MG per tablet Take 1 tablet by mouth every 6 (six) hours as needed for severe pain  Patient not taking: Reported on 10/29/2024 10/2/24   Jevon Valdez MD   hydroCHLOROthiazide 25 mg tablet TAKE 1 TABLET (25 MG TOTAL) BY MOUTH DAILY. 8/30/24   Lorraine Dennis DO   Zinc 100 MG TABS Take by mouth    Historical Provider, MD     Allergies   Allergen Reactions    Penicillins Other (See Comments)     Unknown reaction, positive on allergy testing       Objective :  Temp:  [96.4 °F (35.8 °C)-98.3 °F (36.8 °C)] 98.3 °F (36.8 °C)  HR:  [63-84] 81  BP: (107-216)/() 140/50  Resp:  [15-18] 15  SpO2:  [90 %-99 %] 94 %  O2 Device: None (Room air)    Physical Exam  Constitutional:       General: She is not in acute distress.     Appearance: Normal appearance. She is not toxic-appearing.   HENT:      Head: Normocephalic and atraumatic.   Cardiovascular:      Rate and Rhythm: Normal rate.      Pulses: Normal pulses.   Pulmonary:      Effort: Pulmonary effort is normal. No respiratory distress.      Breath sounds: Normal breath sounds. No wheezing.   Abdominal:      General: Bowel sounds are normal. There is no distension.      Palpations: Abdomen is soft.      Tenderness: There is no abdominal tenderness.   Musculoskeletal:      Right lower leg: No edema.      Left lower leg: No edema.   Neurological:      Mental Status: She is alert and oriented to person, place, and time. Mental status is at baseline.   Psychiatric:         Mood and Affect: Mood normal.         Behavior: Behavior normal.          Lines/Drains:  Lines/Drains/Airways       Active Status       Name Placement date Placement time Site Days     "Closed/Suction Drain Inferior;Right Breast Bulb 19 Fr. 10/29/24  1232  Breast  less than 1    Closed/Suction Drain Inferior;Right Breast Bulb 19 Fr. 10/29/24  1235  Breast  less than 1    Closed/Suction Drain Inferior;Lateral;Left Breast Bulb 19 Fr. 10/29/24  1320  Breast  less than 1    Closed/Suction Drain Inferior;Left Breast Bulb 19 Fr. 10/29/24  1321  Breast  less than 1                          Lab Results: I have reviewed the following results:                  No results found for: \"HGBA1C\"            Administrative Statements   I have spent a total time of 45 minutes in caring for this patient on the day of the visit/encounter including Patient and family education, Documenting in the medical record, Reviewing / ordering tests, medicine, procedures  , and Obtaining or reviewing history  .    ** Please Note: This note has been constructed using a voice recognition system. **    "

## 2024-10-30 NOTE — ANESTHESIA PREPROCEDURE EVALUATION
Procedure:  RIGHT MODIFIED RADICAL MASTECTOMY LEVEL I AND LEVEL II LYMPH NODE DISSECTION, GIOVANA CLIPS IN THE RIGHT BREAST X2, AXILLARY LN (Right: Breast)  LEFT MASTECTOMY (Left: Breast)    Pt with SBP in the 180s in preop. Denies HA, change in vision, CP, abdominal pain, n/v. Spouse thinks she may be anxious. Normally BP is in the 120s/80s range. Did not take her HCTZ this am.  Given urgency of procedure (for cancer) and patient is asx, will proceed cautiously under GA + LMA. Patient and family understand that with possibly uncontrolled HTN patient is at increased risk of MI, stroke and possibly bleeding complications. All questions answered.   Relevant Problems   CARDIO   (+) Essential hypertension   (+) Hyperlipidemia      GI/HEPATIC   (+) Dysphagia, pharyngoesophageal   (+) Neuroendocrine carcinoma of stomach (HCC)      HEMATOLOGY   (+) Pernicious anemia      Other   (+) Breast cancer metastasized to axillary lymph node, right (HCC)        Physical Exam    Airway    Mallampati score: II         Dental       Cardiovascular  Cardiovascular exam normal    Pulmonary  Pulmonary exam normal     Other Findings        Anesthesia Plan  ASA Score- 2     Anesthesia Type- general with ASA Monitors.         Additional Monitors:     Airway Plan: LMA.    Comment: Risks/benefits and alternatives discussed with patient including possible PONV, sore throat, damage to teeth/lips/gums/esophagus, and possibility of rare anesthetic and surgical emergencies including but not limited to heart attack, stroke, and/or death. All questions were answered.  .       Plan Factors-Exercise tolerance (METS): >4 METS.    Chart reviewed.   Existing labs reviewed. Patient summary reviewed.    Patient is not a current smoker.              Induction- intravenous.    Postoperative Plan- Plan for postoperative opioid use.         Informed Consent- Anesthetic plan and risks discussed with patient, spouse and daughter.  I personally reviewed this patient  with the CRNA. Discussed and agreed on the Anesthesia Plan with the CRNA..

## 2024-10-30 NOTE — ASSESSMENT & PLAN NOTE
Present on admission history of breast cancer with metastatic lymph nodes.  POD #1 status post right modified radical mastectomy with lymph node dissection, left mastectomy.    Currently patient does report feeling slightly nauseous however able to tolerate diet well without vomiting.  Denies abdominal pain, fever, chills, diarrhea, any other new complaints.  Further care per primary/surgical oncology team.

## 2024-10-30 NOTE — ASSESSMENT & PLAN NOTE
Patient's status post right mastectomy with lymph nodes also left mastectomy  Continue pain management per primary team  Monitor closely for signs of infection

## 2024-10-30 NOTE — PLAN OF CARE
Problem: PAIN - ADULT  Goal: Verbalizes/displays adequate comfort level or baseline comfort level  Description: Interventions:  - Encourage patient to monitor pain and request assistance  - Assess pain using appropriate pain scale  - Administer analgesics based on type and severity of pain and evaluate response  - Implement non-pharmacological measures as appropriate and evaluate response  - Consider cultural and social influences on pain and pain management  - Notify physician/advanced practitioner if interventions unsuccessful or patient reports new pain  Outcome: Progressing     Problem: INFECTION - ADULT  Goal: Absence or prevention of progression during hospitalization  Description: INTERVENTIONS:  - Assess and monitor for signs and symptoms of infection  - Monitor lab/diagnostic results  - Monitor all insertion sites, i.e. indwelling lines, tubes, and drains  - Monitor endotracheal if appropriate and nasal secretions for changes in amount and color  - [place] appropriate cooling/warming therapies per order  - Administer medications as ordered  - Instruct and encourage patient and family to use good hand hygiene technique  - Identify and instruct in appropriate isolation precautions for identified infection/condition  Outcome: Progressing  Goal: Absence of fever/infection during neutropenic period  Description: INTERVENTIONS:  - Monitor WBC    Outcome: Progressing     Problem: SAFETY ADULT  Goal: Patient will remain free of falls  Description: INTERVENTIONS:  - Educate patient/family on patient safety including physical limitations  - Instruct patient to call for assistance with activity   - Consult OT/PT to assist with strengthening/mobility   - Keep Call bell within reach  - Keep bed low and locked with side rails adjusted as appropriate  - Keep care items and personal belongings within reach  - Initiate and maintain comfort rounds  - Make Fall Risk Sign visible to staff  - Offer Toileting every 2 Hours,  in advance of need  - Initiate/Maintain bed alarm  - Obtain necessary fall risk management equipment: yellow socks  - Apply yellow socks and bracelet for high fall risk patients  - Consider moving patient to room near nurses station  Outcome: Progressing  Goal: Maintain or return to baseline ADL function  Description: INTERVENTIONS:  -  Assess patient's ability to carry out ADLs; assess patient's baseline for ADL function and identify physical deficits which impact ability to perform ADLs (bathing, care of mouth/teeth, toileting, grooming, dressing, etc.)  - Assess/evaluate cause of self-care deficits   - Assess range of motion  - Assess patient's mobility; develop plan if impaired  - Assess patient's need for assistive devices and provide as appropriate  - Encourage maximum independence but intervene and supervise when necessary  - Involve family in performance of ADLs  - Assess for home care needs following discharge   - Consider OT consult to assist with ADL evaluation and planning for discharge  - Provide patient education as appropriate  Outcome: Progressing  Goal: Maintains/Returns to pre admission functional level  Description: INTERVENTIONS:  - Perform AM-PAC 6 Click Basic Mobility/ Daily Activity assessment daily.  - Set and communicate daily mobility goal to care team and patient/family/caregiver.   - Collaborate with rehabilitation services on mobility goals if consulted  - Perform Range of Motion 2 times a day.  - Reposition patient every 2 hours.  - Dangle patient 2 times a day  - Stand patient 2 times a day  - Ambulate patient 2 times a day  - Out of bed to chair 2 times a day   - Out of bed for meals 2 times a day  - Out of bed for toileting  - Record patient progress and toleration of activity level   Outcome: Progressing

## 2024-10-30 NOTE — PLAN OF CARE
Problem: PAIN - ADULT  Goal: Verbalizes/displays adequate comfort level or baseline comfort level  Description: Interventions:  - Encourage patient to monitor pain and request assistance  - Assess pain using appropriate pain scale  - Administer analgesics based on type and severity of pain and evaluate response  - Implement non-pharmacological measures as appropriate and evaluate response  - Consider cultural and social influences on pain and pain management  - Notify physician/advanced practitioner if interventions unsuccessful or patient reports new pain  Outcome: Progressing     Problem: INFECTION - ADULT  Goal: Absence or prevention of progression during hospitalization  Description: INTERVENTIONS:  - Assess and monitor for signs and symptoms of infection  - Monitor lab/diagnostic results  - Monitor all insertion sites, i.e. indwelling lines, tubes, and drains  - Monitor endotracheal if appropriate and nasal secretions for changes in amount and color  - [place] appropriate cooling/warming therapies per order  - Administer medications as ordered  - Instruct and encourage patient and family to use good hand hygiene technique  - Identify and instruct in appropriate isolation precautions for identified infection/condition  Outcome: Progressing  Goal: Absence of fever/infection during neutropenic period  Description: INTERVENTIONS:  - Monitor WBC    Outcome: Progressing     Problem: SAFETY ADULT  Goal: Patient will remain free of falls  Description: INTERVENTIONS:  - Educate patient/family on patient safety including physical limitations  - Instruct patient to call for assistance with activity   - Consult OT/PT to assist with strengthening/mobility   - Keep Call bell within reach  - Keep bed low and locked with side rails adjusted as appropriate  - Keep care items and personal belongings within reach  - Initiate and maintain comfort rounds  - Make Fall Risk Sign visible to staff  - Offer Toileting every 2 Hours,  in advance of need  - Initiate/Maintain bed alarm  - Obtain necessary fall risk management equipment: call bell within reach  - Apply yellow socks and bracelet for high fall risk patients  - Consider moving patient to room near nurses station  Outcome: Progressing  Goal: Maintain or return to baseline ADL function  Description: INTERVENTIONS:  -  Assess patient's ability to carry out ADLs; assess patient's baseline for ADL function and identify physical deficits which impact ability to perform ADLs (bathing, care of mouth/teeth, toileting, grooming, dressing, etc.)  - Assess/evaluate cause of self-care deficits   - Assess range of motion  - Assess patient's mobility; develop plan if impaired  - Assess patient's need for assistive devices and provide as appropriate  - Encourage maximum independence but intervene and supervise when necessary  - Involve family in performance of ADLs  - Assess for home care needs following discharge   - Consider OT consult to assist with ADL evaluation and planning for discharge  - Provide patient education as appropriate  Outcome: Progressing  Goal: Maintains/Returns to pre admission functional level  Description: INTERVENTIONS:  - Perform AM-PAC 6 Click Basic Mobility/ Daily Activity assessment daily.  - Set and communicate daily mobility goal to care team and patient/family/caregiver.   - Collaborate with rehabilitation services on mobility goals if consulted  - Perform Range of Motion 3 times a day.  - Reposition patient every 2 hours.  - Dangle patient 3 times a day  - Stand patient 3 times a day  - Ambulate patient 3 times a day  - Out of bed to chair 3 times a day   - Out of bed for meals 3 times a day  - Out of bed for toileting  - Record patient progress and toleration of activity level   Outcome: Progressing

## 2024-11-01 ENCOUNTER — TELEPHONE (OUTPATIENT)
Dept: SURGICAL ONCOLOGY | Facility: CLINIC | Age: 74
End: 2024-11-01

## 2024-11-01 LAB
DME PARACHUTE DELIVERY DATE ACTUAL: NORMAL
DME PARACHUTE DELIVERY DATE REQUESTED: NORMAL
DME PARACHUTE ITEM DESCRIPTION: NORMAL
DME PARACHUTE ORDER STATUS: NORMAL
DME PARACHUTE SUPPLIER NAME: NORMAL
DME PARACHUTE SUPPLIER PHONE: NORMAL

## 2024-11-01 NOTE — TELEPHONE ENCOUNTER
How does the incision look? WNL    Do you have fever or chills? No    Are you having any pain? No    Do you have a drain(s)? Yes     Are there any concerns with your drain? Daughter and  has been caring for the drains. Emptying and keeping track of drainage. No complaints or issues.    Verify post-op appointment date and time  [x]    Do you have any other questions or concerns? No questions or concerns.     **NOTE TO TRIAGER: If patient requires further triage, based upon the answers above, move to appropriate triage protocol.

## 2024-11-08 PROCEDURE — 88360 TUMOR IMMUNOHISTOCHEM/MANUAL: CPT | Performed by: PATHOLOGY

## 2024-11-08 PROCEDURE — 88341 IMHCHEM/IMCYTCHM EA ADD ANTB: CPT | Performed by: PATHOLOGY

## 2024-11-08 PROCEDURE — 88344 IMHCHEM/IMCYTCHM EA MLT ANTB: CPT | Performed by: PATHOLOGY

## 2024-11-08 PROCEDURE — 88307 TISSUE EXAM BY PATHOLOGIST: CPT | Performed by: PATHOLOGY

## 2024-11-08 PROCEDURE — 88309 TISSUE EXAM BY PATHOLOGIST: CPT | Performed by: PATHOLOGY

## 2024-11-08 PROCEDURE — 88342 IMHCHEM/IMCYTCHM 1ST ANTB: CPT | Performed by: PATHOLOGY

## 2024-11-14 PROBLEM — C50.511 MALIGNANT NEOPLASM OF LOWER-OUTER QUADRANT OF RIGHT BREAST OF FEMALE, ESTROGEN RECEPTOR POSITIVE (HCC): Status: RESOLVED | Noted: 2024-11-14 | Resolved: 2024-11-14

## 2024-11-14 PROBLEM — Z17.0 MALIGNANT NEOPLASM OF OVERLAPPING SITES OF RIGHT BREAST IN FEMALE, ESTROGEN RECEPTOR POSITIVE (HCC): Status: ACTIVE | Noted: 2024-11-14

## 2024-11-14 PROBLEM — Z17.0 MALIGNANT NEOPLASM OF LOWER-OUTER QUADRANT OF RIGHT BREAST OF FEMALE, ESTROGEN RECEPTOR POSITIVE (HCC): Status: RESOLVED | Noted: 2024-11-14 | Resolved: 2024-11-14

## 2024-11-14 PROBLEM — Z17.0 MALIGNANT NEOPLASM OF LOWER-OUTER QUADRANT OF RIGHT BREAST OF FEMALE, ESTROGEN RECEPTOR POSITIVE (HCC): Status: ACTIVE | Noted: 2024-11-14

## 2024-11-14 PROBLEM — C50.511 MALIGNANT NEOPLASM OF LOWER-OUTER QUADRANT OF RIGHT BREAST OF FEMALE, ESTROGEN RECEPTOR POSITIVE (HCC): Status: ACTIVE | Noted: 2024-11-14

## 2024-11-14 PROBLEM — C50.811 MALIGNANT NEOPLASM OF OVERLAPPING SITES OF RIGHT BREAST IN FEMALE, ESTROGEN RECEPTOR POSITIVE (HCC): Status: ACTIVE | Noted: 2024-11-14

## 2024-11-21 ENCOUNTER — OFFICE VISIT (OUTPATIENT)
Dept: SURGICAL ONCOLOGY | Facility: CLINIC | Age: 74
End: 2024-11-21

## 2024-11-21 VITALS
OXYGEN SATURATION: 99 % | WEIGHT: 145 LBS | TEMPERATURE: 98 F | HEART RATE: 65 BPM | HEIGHT: 66 IN | BODY MASS INDEX: 23.3 KG/M2 | DIASTOLIC BLOOD PRESSURE: 50 MMHG | SYSTOLIC BLOOD PRESSURE: 152 MMHG

## 2024-11-21 DIAGNOSIS — C50.911 BREAST CANCER METASTASIZED TO AXILLARY LYMPH NODE, RIGHT (HCC): ICD-10-CM

## 2024-11-21 DIAGNOSIS — Z90.11 STATUS POST RIGHT MASTECTOMY: ICD-10-CM

## 2024-11-21 DIAGNOSIS — C77.3 BREAST CANCER METASTASIZED TO AXILLARY LYMPH NODE, RIGHT (HCC): ICD-10-CM

## 2024-11-21 DIAGNOSIS — C50.811 MALIGNANT NEOPLASM OF OVERLAPPING SITES OF RIGHT BREAST IN FEMALE, ESTROGEN RECEPTOR POSITIVE (HCC): Primary | ICD-10-CM

## 2024-11-21 DIAGNOSIS — Z17.0 MALIGNANT NEOPLASM OF OVERLAPPING SITES OF RIGHT BREAST IN FEMALE, ESTROGEN RECEPTOR POSITIVE (HCC): Primary | ICD-10-CM

## 2024-11-21 DIAGNOSIS — Z48.03 CHANGE OR REMOVAL OF DRAINS: ICD-10-CM

## 2024-11-21 DIAGNOSIS — Z15.89 CHEK2 GENE MUTATION POSITIVE: ICD-10-CM

## 2024-11-21 PROCEDURE — 99024 POSTOP FOLLOW-UP VISIT: CPT | Performed by: SURGERY

## 2024-11-21 NOTE — PROGRESS NOTES
Assessment/Plan  Diagnoses and all orders for this visit:    Malignant neoplasm of overlapping sites of right breast in female, estrogen receptor positive (HCC)    Breast cancer metastasized to axillary lymph node, right (HCC)    Status post right mastectomy    Change or removal of drains    CHEK2 gene mutation positive     A. Breast, Right, RIGHT Breast modified radical mastectomy, suture marks Axillary tail, GIOVANA clipped lymph node in Axillary Tail with additional nodes:  - Multifocal residual invasive mammary carcinoma of no special type (ductal) s/p neoadjuvant     chemotherapy:  Largest residual focus of invasive carcinoma, 21 mm in greatest dimension, present in    lower outer quadrant/LOQ, 7-8 o'clock:     -- Elsy histologic grade 3 of 3 (total score: 8 of 9)        * Glandular (acinar) Tubular Differentiation 10-75%, score 2.        * Nuclear Pleomorphism 2-3 of 3, score 3.        * Mitotic Rate >/= 8/mm2, (>/= 15 mitoses/10HPF; 25 mitoses/10 HPF), score 3.    -- Confirmed by tumor cell immunophenotype:        * Positive: Pankeratin (CKAE1/3), E-cadherin, P120 (membranous), EMA (non-peripheral).        * Negative: p63, Calponin-B.    -- Associated prior biopsy site changes with GIOVANA  marker.    Second residual focus of invasive carcinoma with apocrine features, 8 mm in     greatest dimension, present 2.5 cm from largest invasive carcinoma between lower     outer and inner quadrants, 6 o'clock:    -- Elsy histologic grade 2 of 3 (total score: 7 of 9)        * Glandular (acinar) Tubular Differentiation < 10%, score 3.        * Nuclear Pleomorphism 3 of 3, score 3.        * Mitotic Rate < 3/mm2, (</= 7 mitoses/10HPF; 0 mitoses/10 HPF), score 1.    -- Associated prior biopsy site changes with GIOVANA  marker.    Third focus of invasive carcinoma with apocrine features, 2 mm, present in random    fibrous tissue of LOQ.   - Ductal carcinoma in-situ (DCIS): Present; not an extensive intraductal  component     (~ 5% of total residual tumor).     -- Size and Extent: 36 mm in greatest approximate extent; focally present in 9 sequential         and 1 non-sequential sections/blocks; 10 of 29 blocks; associated with and adjacent to both         invasive carcinomas.     -- Architectural Patterns: Solid, cribriform, comedo/clinging.    -- Nuclear grade: II-III (intermediate to high).    -- Necrosis: Present, focal and central necrosis identified.   - Residual Cancer [place] (RCB)/Treatment effect (largest residual invasive carcinoma):    -- Probable/definite response to presurgical therapy in invasive carcinoma (both foci of        invasive carcinoma).    -- Greatest and secondary dimensions of Primary Tumor Bed: 22 x 20 mm.    -- Percentage of overall Cancer Cellularity: ~ 65%.    -- Percentage of Cancer that is In-situ disease: 5%.    -- Number of Positive lymph nodes: 4.    -- Diameter of Largest Fabienne Metastasis: 25 mm.    -- Residual Cancer [place]: 4.215    -- Residual Cancer [place] Class: RCB-III.  - Margins uninvolved by invasive and in-situ carcinoma:    -- Invasive carcinoma < 1 mm from nearest posterior margin; 5 mm from LOQ anterior margin.    -- DCIS 8 mm from nearest LOQ anterior margin; 16 mm from nearest posterior margin.   - Benign nipple and skin:    -- DCIS does not involve the nipple epidermis.     -- No dermal lymphatic and/or vascular invasion.   - Lymphatic and/or vascular invasion: Present.    -- D2-40 and Pankeratin/CKC/CD31 immunostains performed.   - Two axillary lymph nodes positive for metastatic carcinoma consistent with breast primary (2/2).      -- Largest fabienne metastasis: 8mm; 5 mm (smaller lymph node).     -- Extranodal extension: Present, dispersed over 3 mm area of prior biopsy site fibrosis.      -- Confirmed by positive Pankeratin (CKAE1/3), E-cadherin, p120 (membranous),         GATA3 immunostains.     -- Associated fibrosis probable minimal response to presurgical therapy in          metastatic carcinoma.      -- Largest lymph node with prior biopsy site changes and GIOVANA  marker.   - Microcalcifications: Present, associated with invasive carcinoma, DCIS and     non-neoplastic breast tissue.   - Benign fibroadenoma, 0.8 cm.   - Benign fibrocystic changes without atypia (usual ductal hyperplasia, columnar cell     change and hyperplasia, apocrine adenosis, fibroadenomatoid changes, cystic and     papillary apocrine metaplasia).    -- Associated prior biopsy site changes with Jackman tie-shaped biopsy clip.       ** Traumatic neuroma present.   - Mammary duct ectasia.      B. Axillary lymph nodes, Right Axillary level I and level II lymph nodes, regional resection:  - Two lymph nodes positive for metastatic carcinoma consistent with breast primary (2/2).     -- Confirmed by positive Pankeratin (CKAE1/3), E-cadherin, p120 (membranous),         GATA3 immunostains.    -- 25 mm (near complete ernie replacement of largest lymph node) and 2.5 mm         in greatest dimensions of metastases.     -- Extranodal extension present, < 2 mm.    -- Associated fibrosis probable minimal response to presurgical therapy in        metastatic carcinoma.      C. Breast, Left, Left Breast mastectomy suture marks axillary tail:  - Negative for malignancy, in-situ carcinoma and atypical ductal hyperplasia.   - Benign nipple and skin.  - Benign fibroadenoma (0.8 cm).   - Benign fibrocystic changes with atypia (atypical lobular hyperplasia/ALH with     pagetoid ductal spread, usual ductal hyperplasia, columnar cell lesions/columnar     cell change and hyperplasia, sclerosing and apocrine adenosis, fibroadenomatoid     changes, cystic and papillary apocrine metaplasia).    -- Confirmed by E-cadherin, p120, CK5/6, Calponin-B immunostains    - Microcalcifications: Present, associated with ALH and non-neoplastic breast tissue.   - Prior biopsy site changes with U-shaped biopsy clip, upper outer quadrant/UOQ, 2 o'clock.  "  - Mammary duct ectasia.    Incisions are healing well.  Pathology was reviewed discussed.  Will refer her back to medical oncologist.  Will also refer her to PT OT for lymphedema prevention.  We removed 3 drains. we left a right axillary drain until  next Monday given still draining more than 20 cc/day.  No problem-specific Assessment & Plan notes found for this encounter.        Subjective:  @Patient ID: Juve Duarte is a 74 y.o. female.    Objective:    /50 (BP Location: Left arm, Patient Position: Sitting)   Pulse 65   Temp 98 °F (36.7 °C) (Temporal)   Ht 5' 6\" (1.676 m)   Wt 65.8 kg (145 lb)   SpO2 99%   BMI 23.40 kg/m²       "

## 2024-11-25 ENCOUNTER — OFFICE VISIT (OUTPATIENT)
Dept: SURGICAL ONCOLOGY | Facility: CLINIC | Age: 74
End: 2024-11-25

## 2024-11-25 VITALS
SYSTOLIC BLOOD PRESSURE: 110 MMHG | OXYGEN SATURATION: 98 % | RESPIRATION RATE: 16 BRPM | WEIGHT: 145 LBS | BODY MASS INDEX: 23.3 KG/M2 | TEMPERATURE: 98.7 F | DIASTOLIC BLOOD PRESSURE: 62 MMHG | HEIGHT: 66 IN | HEART RATE: 66 BPM

## 2024-11-25 DIAGNOSIS — Z90.13 STATUS POST BILATERAL MASTECTOMY: ICD-10-CM

## 2024-11-25 DIAGNOSIS — C50.911 BREAST CANCER METASTASIZED TO AXILLARY LYMPH NODE, RIGHT (HCC): ICD-10-CM

## 2024-11-25 DIAGNOSIS — Z15.89 CHEK2 GENE MUTATION POSITIVE: ICD-10-CM

## 2024-11-25 DIAGNOSIS — Z17.0 MALIGNANT NEOPLASM OF OVERLAPPING SITES OF RIGHT BREAST IN FEMALE, ESTROGEN RECEPTOR POSITIVE (HCC): Primary | ICD-10-CM

## 2024-11-25 DIAGNOSIS — C50.811 MALIGNANT NEOPLASM OF OVERLAPPING SITES OF RIGHT BREAST IN FEMALE, ESTROGEN RECEPTOR POSITIVE (HCC): Primary | ICD-10-CM

## 2024-11-25 DIAGNOSIS — C77.3 BREAST CANCER METASTASIZED TO AXILLARY LYMPH NODE, RIGHT (HCC): ICD-10-CM

## 2024-11-25 PROCEDURE — 99024 POSTOP FOLLOW-UP VISIT: CPT | Performed by: SURGERY

## 2024-11-25 NOTE — PROGRESS NOTES
"Assessment/Plan  Diagnoses and all orders for this visit:    Malignant neoplasm of overlapping sites of right breast in female, estrogen receptor positive (HCC)  -     Ambulatory referral to PT/OT lymphedema therapy; Future  -     Ambulatory referral to Radiation Oncology; Future    Breast cancer metastasized to axillary lymph node, right (HCC)    CHEK2 gene mutation positive    Status post bilateral mastectomy    Patient is s/p bilateral mastectomy she is healing well.  3 drains have been removed.  Right side single drain is still continued report of more than 40 cc clear yellow fluid.  We will keep the drain for 1 more week.  Will ask one of my colleagues to reevaluate for possible drain removal on Friday.      No problem-specific Assessment & Plan notes found for this encounter.        Subjective:  @Patient ID: Juve Duarte is a 74 y.o. female.    Objective:    /62 (BP Location: Left arm, Patient Position: Sitting, Cuff Size: Adult)   Pulse 66   Temp 98.7 °F (37.1 °C) (Temporal)   Resp 16   Ht 5' 6\" (1.676 m)   Wt 65.8 kg (145 lb)   SpO2 98%   BMI 23.40 kg/m²       "

## 2024-11-26 ENCOUNTER — CLINICAL SUPPORT (OUTPATIENT)
Age: 74
End: 2024-11-26
Payer: MEDICARE

## 2024-11-26 DIAGNOSIS — E53.8 B12 DEFICIENCY: Primary | ICD-10-CM

## 2024-11-26 PROCEDURE — 96372 THER/PROPH/DIAG INJ SC/IM: CPT

## 2024-11-26 RX ADMIN — CYANOCOBALAMIN 1000 MCG: 1000 INJECTION, SOLUTION INTRAMUSCULAR; SUBCUTANEOUS at 10:33

## 2024-11-27 ENCOUNTER — DOCUMENTATION (OUTPATIENT)
Dept: HEMATOLOGY ONCOLOGY | Facility: CLINIC | Age: 74
End: 2024-11-27

## 2024-11-27 NOTE — PROGRESS NOTES
Referral to radiation oncology received.  Chart reviewed by oncology nurse navigator at this time.      Diagnosis: breast cancer.  Post-op mastectomy,  After review of chart, instructions for scheduling added to referral and sent to be scheduled as advised.

## 2024-11-29 ENCOUNTER — OFFICE VISIT (OUTPATIENT)
Dept: SURGICAL ONCOLOGY | Facility: CLINIC | Age: 74
End: 2024-11-29

## 2024-11-29 VITALS
RESPIRATION RATE: 16 BRPM | HEART RATE: 64 BPM | WEIGHT: 148 LBS | HEIGHT: 66 IN | SYSTOLIC BLOOD PRESSURE: 136 MMHG | BODY MASS INDEX: 23.78 KG/M2 | DIASTOLIC BLOOD PRESSURE: 68 MMHG | TEMPERATURE: 98 F

## 2024-11-29 DIAGNOSIS — C77.3 BREAST CANCER METASTASIZED TO AXILLARY LYMPH NODE, RIGHT (HCC): Primary | ICD-10-CM

## 2024-11-29 DIAGNOSIS — C50.911 BREAST CANCER METASTASIZED TO AXILLARY LYMPH NODE, RIGHT (HCC): Primary | ICD-10-CM

## 2024-11-29 PROCEDURE — 99024 POSTOP FOLLOW-UP VISIT: CPT

## 2024-11-29 NOTE — LETTER
November 29, 2024     Jevon Valdez MD  [address] [facility]  [address]  [facility] [address]    Patient: Juve Duarte   YOB: 1950   Date of Visit: 11/29/2024       Dear Dr. Valdez:    Thank you for referring Juve Duarte to me for evaluation. Below are my notes for this consultation.    If you have questions, please do not hesitate to call me. I look forward to following your patient along with you.         Sincerely,        JAQUELIN Ascencio        CC: No Recipients    JAQUELIN Ascencio  11/29/2024 10:33 AM  Sign when Signing Visit               Surgical Oncology Follow Up       [address] [facility] SURGICAL ONCOLOGY [address] [address]    Juve Duarte  1950  [id number]  [address] [facility] SURGICAL ONCOLOGY [address] [address]    1. Breast cancer metastasized to axillary lymph node, right (HCC)  Assessment & Plan:  Final FARHAT drain was removed today without incident.  She will follow-up with Dr Valdez in 6 months for another clinical exam.         Chief Complaint   Patient presents with   • office visit       Return in about 6 months (around 5/29/2025) for Office Visit with Dr Valdez.      Oncology History Overview Note    with multifocal right breast cancer with axillary lymph node metastatic disease.  With incidental finding of low-grade gastric neuroendocrine tumor. She is s/p bilateral mastectomy. Stage IIIA (cT2, cN2, cM0, G3, ER+, NC+, HER2-)       4/4/2024Breast, Left, Stereo bx left breast 2 o'clock, 2 cores with calcs, 8G 12cm:      - Benign breast parenchyma with usual ductal hyperplasia       - Microcalcification's present in benign epithelium      - Negative for atypia or carcinoma  B. Breast, Left, Stereo bx left breast 2 o'clock, 2 cores without calcs, 8G 12cm:      - Benign breast parenchyma with usual ductal hyperplasia and fibroadenomatoid  change      - Microcalcification's present in benign epithelium      - Negative for atypia or carcinoma   C. Breast, Right, Stereo bx right breast 8 o'clock, 4 cores with calcs, 8g 12cm:      - Benign breast parenchyma with usual ductal hyperplasia and fibroadenomatoid change      - Microcalcification's present in benign epithelium, within stroma, and involving ducts      - Negative for atypia or carcinoma   D. Breast, Right, US BX RT BREAST 700 7CMFN 3 PASSES 12G MARQUEE:      - Invasive mammary carcinoma of no special type (ductal NST/invasive ductal carcinoma) with apocrine features, see note      - Elsy grade 2 of 3 (total score: 6 of 9)          - Tubule formation: <10%, score 3          - Nuclear grade 2 of 3, score 2          - Mitoses < 3/mm2, (</= 7 mitoses/10HPF), score 1      - Invasive carcinoma involves 3 of 3 submitted core biopsies, max. dimension = 5 millimeters      - Ductal carcinoma in situ (DCIS): Not identified      - Microcalcifications: Absent      - Lymphovascular invasion: Present       - See synoptic report for part D  E. Breast, Right, US BX RT BREAST 900 7CMFN 3 PASSES 12G MARQUEE:      - Invasive mammary carcinoma of no special type (ductal NST/invasive ductal carcinoma) with apocrine features, see note      - [place] grade 2 of 3 (total score: 6 of 9)          - Tubule formation: <10%, score 3          - Nuclear grade 2 of 3, score 2          - Mitoses < 3/mm2, (</= 7 mitoses/10HPF), score 1      - Invasive carcinoma involves 3 of 3 submitted core biopsies, max. dimension = 14 millimeters      - Ductal carcinoma in situ (DCIS): Not identified      - Microcalcifications: Absent      - Lymphovascular invasion: Not identified      - See synoptic report for part E  F. Axillary lymph node, US BX RT AXILLARY TAIL LN 10:00 12CMFN 3 PASSES 12G MARQUEE:      - Metastatic carcinoma consistent with mammary primary, see note      4/18/2024 CT cap: Enhancing soft tissue nodules in the right  breast corresponding to biopsy-proven cancer as described above. Benign nodule on the left also shown.  Mildly enlarged right infrahilar lymph node and few right internal mammary lymph nodes are indeterminate but raise concern for involvement with tumor.  Few right axillary and subpectoral lymph nodes measuring up to 0.5 cm in the short axis are indeterminate. Consider further evaluation with PET/CT.  2 enhancing soft tissue nodules in the gastric fundus as described above. These are of uncertain etiology and may represent GI stromal tumors, with metastatic disease from breast cancer considered less likely. Consider further evaluation with endoscopy.      4/23/2024 Bone scan: Focus of activity in a right anterior rib at the costochondral junction, likely the seventh rib. This is nonspecific and may represent either a metastasis or benign etiology such as prior trauma. Attention on follow-up.       5/2/2024 PET :. Right lateral breast hypermetabolic nodule compatible with known malignancy. There is right axillary metastatic adenopathy.  2. Hypermetabolic left upper cervical node is also concerning for metastasis.      5/6/2024 US head neck soft tissue: 1.8 x 0.5 x 0.7 cm left level 2 lymph node corresponding to the abnormality on the PET scan with with some indeterminate vascularity and no additional ultrasound suspicious features  Correlate clinically      5/15/2024 Lymph node biopsy: . Lymph Node, Left Upper Cervical, FNA (ThinPrep and smear preparations ):  Atypical cellular changes.  Rare atypical epithelioid cells present.  Mixed lymphocytes and scattered histiocytes present, compatible with lymph node sampling.  No flow immunophenotypic evidence of a lymphoproliferative disorder based on limited antibody panel (betNOW#[id number] / [id number], evaluated by Vinny Zamora M.D.).  Acellularity on cell block precludes further evaluation.      8/8/2024:well-differentiated neuroendocrine tumor, grade 2 (carcinoid  tumor).  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  -Negative for Helicobacter pylori, confirmed by immunohistochemical stain performed with appropriate controls.      8/21/2024PET: FES avid right breast lesion compatible with the known malignancy. This appears slightly smaller compared to prior PET/CT.  2. Solitary FES-avid right axillary lymph node compatible with metastasis. This also appears smaller from prior PET/CT. Previously there was an additional smaller right axillary lymph node with mild FDG uptake which is not FES avid.  3. No suspicious FES avid lesions in the neck, abdomen or pelvis. Previously noted small left upper cervical chain lymph node does not demonstrate FES uptake and also appears slightly smaller on CT.      8/29/2024 Gabriella: started on Arimidex 1 mg p.o. daily. F/U 2 months.      8/30/2024 GI: Patient is following up with Dr. Up. Further recommendations will follow that visit. She is tentatively scheduled for repeat EGD at the 3-month karina.       9/19/2024 Jeovanny: incidentally discovered well-differentiated neuroendocrine tumor on upper endoscopy. This appeared to be in a small gastric polyp. recommend that she undergo EMR to evaluate the biopsy site. If there is no evidence of disease, she was likely adequately treated just with the biopsy. EMR to be after recovery from breast surgery.      10/2/2024 Chest xray: No acute cardiopulmonary disease.  Chronic apical scarring with right apical granuloma      10/29/2024    Breast, Right, RIGHT Breast modified radical mastectomy, suture marks Axillary tail, GIOVANA clipped lymph node in Axillary Tail with additional nodes:  - Multifocal residual invasive mammary carcinoma of no special type (ductal) s/p neoadjuvant     chemotherapy:  Largest residual focus of invasive  carcinoma, 21 mm in greatest dimension, present in    lower outer quadrant/LOQ, 7-8 o'clock:     -- [place] histologic grade 3 of 3 (total score: 8 of 9)        * Glandular (acinar) Tubular Differentiation 10-75%, score 2.        * Nuclear Pleomorphism 2-3 of 3, score 3.        * Mitotic Rate >/= 8/mm2, (>/= 15 mitoses/10HPF; 25 mitoses/10 HPF), score 3.    -- Confirmed by tumor cell immunophenotype:        * Positive: Pankeratin (CKAE1/3), E-cadherin, P120 (membranous), EMA (non-peripheral).        * Negative: p63, Calponin-B.    -- Associated prior biopsy site changes with GIOVANA  marker.    Second residual focus of invasive carcinoma with apocrine features, 8 mm in     greatest dimension, present 2.5 cm from largest invasive carcinoma between lower     outer and inner quadrants, 6 o'clock:    -- [place] histologic grade 2 of 3 (total score: 7 of 9)        * Glandular (acinar) Tubular Differentiation < 10%, score 3.        * Nuclear Pleomorphism 3 of 3, score 3.        * Mitotic Rate < 3/mm2, (</= 7 mitoses/10HPF; 0 mitoses/10 HPF), score 1.    -- Associated prior biopsy site changes with GIOVANA  marker.    Third focus of invasive carcinoma with apocrine features, 2 mm, present in random    fibrous tissue of LOQ.   - Ductal carcinoma in-situ (DCIS): Present; not an extensive intraductal component     (~ 5% of total residual tumor).     -- Size and Extent: 36 mm in greatest approximate extent; focally present in 9 sequential         and 1 non-sequential sections/blocks; 10 of 29 blocks; associated with and adjacent to both         invasive carcinomas.     -- Architectural Patterns: Solid, cribriform, comedo/clinging.    -- Nuclear grade: II-III (intermediate to high).    -- Necrosis: Present, focal and central necrosis identified.   - Residual Cancer [place] (RCB)/Treatment effect (largest residual invasive carcinoma):    -- Probable/definite response to presurgical therapy in invasive carcinoma (both  foci of        invasive carcinoma).    -- Greatest and secondary dimensions of Primary Tumor Bed: 22 x 20 mm.    -- Percentage of overall Cancer Cellularity: ~ 65%.    -- Percentage of Cancer that is In-situ disease: 5%.    -- Number of Positive lymph nodes: 4.    -- Diameter of Largest Fabienne Metastasis: 25 mm.    -- Residual Cancer [place]: 4.215    -- Residual Cancer [place] Class: RCB-III.  - Margins uninvolved by invasive and in-situ carcinoma:    -- Invasive carcinoma < 1 mm from nearest posterior margin; 5 mm from LOQ anterior margin.    -- DCIS 8 mm from nearest LOQ anterior margin; 16 mm from nearest posterior margin.   - Benign nipple and skin:    -- DCIS does not involve the nipple epidermis.     -- No dermal lymphatic and/or vascular invasion.   - Lymphatic and/or vascular invasion: Present.    -- D2-40 and Pankeratin/CKC/CD31 immunostains performed.   - Two axillary lymph nodes positive for metastatic carcinoma consistent with breast primary (2/2).      -- Largest fabienne metastasis: 8mm; 5 mm (smaller lymph node).     -- Extranodal extension: Present, dispersed over 3 mm area of prior biopsy site fibrosis.      -- Confirmed by positive Pankeratin (CKAE1/3), E-cadherin, p120 (membranous),         GATA3 immunostains.     -- Associated fibrosis probable minimal response to presurgical therapy in         metastatic carcinoma.      -- Largest lymph node with prior biopsy site changes and GIOVANA  marker.   - Microcalcifications: Present, associated with invasive carcinoma, DCIS and     non-neoplastic breast tissue.   - Benign fibroadenoma, 0.8 cm.   - Benign fibrocystic changes without atypia (usual ductal hyperplasia, columnar cell     change and hyperplasia, apocrine adenosis, fibroadenomatoid changes, cystic and     papillary apocrine metaplasia).    -- Associated prior biopsy site changes with [place] tie-shaped biopsy clip.       ** Traumatic neuroma present.   - Mammary duct ectasia.    B. Axillary lymph  nodes, Right Axillary level I and level II lymph nodes, regional resection:  - Two lymph nodes positive for metastatic carcinoma consistent with breast primary (2/2).     -- Confirmed by positive Pankeratin (CKAE1/3), E-cadherin, p120 (membranous),         GATA3 immunostains.    -- 25 mm (near complete ernie replacement of largest lymph node) and 2.5 mm         in greatest dimensions of metastases.     -- Extranodal extension present, < 2 mm.    -- Associated fibrosis probable minimal response to presurgical therapy in        metastatic carcinoma.    C. Breast, Left, Left Breast mastectomy suture marks axillary tail:  - Negative for malignancy, in-situ carcinoma and atypical ductal hyperplasia.   - Benign nipple and skin.  - Benign fibroadenoma (0.8 cm).   - Benign fibrocystic changes with atypia (atypical lobular hyperplasia/ALH with     pagetoid ductal spread, usual ductal hyperplasia, columnar cell lesions/columnar     cell change and hyperplasia, sclerosing and apocrine adenosis, fibroadenomatoid     changes, cystic and papillary apocrine metaplasia).    -- Confirmed by E-cadherin, p120, CK5/6, Calponin-B immunostains    - Microcalcifications: Present, associated with ALH and non-neoplastic breast tissue.   - Prior biopsy site changes with U-shaped biopsy clip, upper outer quadrant/UOQ, 2 o'clock.   - Mammary duct ectasia.    Electronically signed by Janny Gordillo MD on 11/8/2024 at 2230 EST   Note    Intradepartmental consultation (WT) agrees with the above diagnoses for specimens A & B. A copy of the final report is sent to Dr. ALEXANDRIA Valdez on 11/8/24 @ 2225 hours.      1.- Repeat HER2 testing (Current ASCO/CAP Recommendations): Indicated (s/p neoadjuvant therapy).       -- ER/SD/HER2 pending; will be reported in an addendum.       - Best representative tumor blocks:  A4, A8, B5 (lymph node metastasis).        -- Sufficient tumor present for          Agendia Mammaprint/Blueprint (1 cm2 of invasive tumor in  aggregate):  Yes.          MI Profile/Foundation One (at least 5 x 5 mm of tumor): Yes.     2.  Pathologic Stage Classification (pTNM, AJCC 8th Edition):       8th ed, AJCC Anatomic Stage:  at least Stage IIIA - ypT2, ypN2a, cM0, G3.     3.  8th ed. AJCC Pathologic Prognostic Stage (use AJCC update): N/A (status post neoadjuvant therapy).          11/25/2024 Lareef: s/p bilateral mastectomy she is healing well. 3 drains have been removed. Right side single drain is still continued report of more than 40 cc clear yellow fluid. We will keep the drain for 1 more week. Will ask one of my colleagues to reevaluate for possible drain removal on Friday.       11/29/2024 Surg Onc: pull drains  12/5/2024 HemOnc  1/7/2025 Endo/GI       Breast cancer metastasized to axillary lymph node, right (HCC)   4/4/2024 Initial Diagnosis    April 4, 2024 patient had biopsy of right breast mass showing invasive ductal carcinoma.  Right axillary node showed metastatic carcinoma consistent with breast primary.  ER 90%, [place] 5-7%, HER2 +2.  FISH negative.  Similar findings in the axillary node although [place] was 30%.  HER2 +2, FISH negative.  April 18, 2024 patient had CT chest ab pelvis showing soft tissue nodules right breast.  Right infrahilar and internal mammary nodes indeterminate.  Right axillary nodes up to 0.5 cm.  2 enhancing soft tissue nodules in the gastric fundus.  Bone scan showed focus of activity at anterior right seventh rib.     4/4/2024 Biopsy    Breast, Right, US BX RT BREAST 700 7CMFN 3 PASSES 12G MARQUEE:      - Invasive mammary carcinoma of no special type (ductal NST/invasive ductal carcinoma) with apocrine features, see note      - [place] grade 2 of 3 (total score: 6 of 9)          - Tubule formation: <10%, score 3          - Nuclear grade 2 of 3, score 2          - Mitoses < 3/mm2, (</= 7 mitoses/10HPF), score 1      - Invasive carcinoma involves 3 of 3 submitted core biopsies, max. dimension = 5 millimeters      -  Ductal carcinoma in situ (DCIS): Not identified      - Microcalcifications: Absent      - Lymphovascular invasion: Present     Breast, Right, US BX RT BREAST 900 7CMFN 3 PASSES 12G MARQU[place]:      - Invasive mammary carcinoma of no special type (ductal NST/invasive ductal carcinoma) with apocrine features, see note      - [place] grade 2 of 3 (total score: 6 of 9)          - Tubule formation: <10%, score 3          - Nuclear grade 2 of 3, score 2          - Mitoses < 3/mm2, (</= 7 mitoses/10HPF), score 1      - Invasive carcinoma involves 3 of 3 submitted core biopsies, max. dimension = 14 millimeters      - Ductal carcinoma in situ (DCIS): Not identified      - Microcalcifications: Absent      - Lymphovascular invasion: Not identified    Axillary lymph node, US BX RT AXILLARY TAIL LN 10:00 12CMFN 3 PASSES 12G [place]:      - Metastatic carcinoma consistent with mammary primary,    BREAST TUMOR PROGNOSTIC PROFILE     Performed on invasive carcinoma block E1:  Test Description Result Prognostic Interpretation   Estrogen Receptor/ER  Primary Antibody: SP-1  Internal control: Positive   External control: Positive 90%  Staining Intensity: Strong  Isabela Score*: 8 Positive   Progesterone Receptor/PgR  Primary Antibody:1E2  Internal control: Positive  External control: Positive 5-7%  Staining Intensity: Strong  Isabela Score*: 4 Positive   HER2 by IHC   Primary Antibody: 4B5 2+ Equivocal *      Performed on invasive carcinoma block F2:  Test Description Result Prognostic Interpretation   Estrogen Receptor/ER  Primary Antibody: SP-1  Internal control: Positive   External control: Positive 95%  Staining Intensity: Strong  Isabela Score*: 8 Positive   Progesterone Receptor/PgR  Primary Antibody:1E2  Internal control: Positive  External control: Positive 30%  Staining Intensity: Moderate  Isabela Score*: 5 Positive   HER2 by IHC   Primary Antibody: 4B5 2+ Equivocal *       Fluorescence in situ hybridization (FISH) analysis of  HER2 overexpression by invasive breast carcinoma cells, block E1 performed at [facility], [place], [place], yields the following results:  Test Description                        Interpretation  HER2 by FISH analysis:              Negative / Not Amplified.  Results  HER2: CEP-17 ratio :                  1.5: 1  Average HER2 Signal:                5.4  Average CEP-17 Signal:                          3.5  Number of selected invasive cells scanned           100     Fluorescence in situ hybridization (FISH) analysis of HER2 overexpression by invasive breast carcinoma cells, block F2 [email or web address], [place], [place], yields the following results:  Test Description                        Interpretation  HER2 by FISH analysis:              Negative / Not Amplified.  Results  HER2: CEP-17 ratio :                  1.3: 1  Average HER2 Signal:                3.5  Average CEP-17 Signal:                          2.6  Number of selected invasive cells scanned           50        4/4/2024 Initial Diagnosis    Malignant neoplasm of overlapping sites of right breast in female, estrogen receptor positive (HCC)     4/18/2024 Genetic Testing    AMBRY  A total of 36 genes were evaluated, including: APC, MIKE, BARD 1, BMPR1A, BRCA1, BRCA2, BRIP1, CDH1, CDK4, CKDN2A, CHEK2, DICER1, MLH1, MSH2, MSH6, MUTYH, NBN, NF1, NTHL1, PALB2, PMS2, PTEN, RAD51C, RECQL, SMAD4, SMARCA4, STK11, TP53, AXIN2, HOXB13, MSH3, POLD1, AND POLE, EPCAM, AND GREM1     LIKELY PATHOGENIC VARIANT CHEK2  VUS SDHA     10/29/2024 Surgery    Right modified radical mastectomy left simple mastectomy    RIGHT  Multifocal residual invasive mammary carcinoma of no special type (ductal) s/p neoadjuvant chemotherapy  21 mm, 8 mm, 2 mm  Grade 3   Margins negative  ER 90  SC 5  HER2 2+  FISH negative  4/4 Lymph nodes     Anatomic Stage IIIA  Prognostic Stage (use AJCC update): N/A (status post neoadjuvant  therapy).    LEFT  Benign fibroadenoma (0.8 cm), Benign fibrocystic changes with atypia (atypical lobular hyperplasia/ALH     10/29/2024 -  Cancer Staged    Staging form: Breast, AJCC 8th Edition  - Clinical stage from 10/29/2024: Stage IIIA (cT2, cN2, cM0, G3, ER+, [place]+, HER2-) - Signed by Jevon Valdez MD on 11/21/2024  Stage prefix: Initial diagnosis  Histologic grading system: 3 grade system       Malignant neoplasm of overlapping sites of right breast in female, estrogen receptor positive (HCC)   4/4/2024 Biopsy    Breast, Right, US BX RT BREAST 700 7CMFN 3 PASSES 12G [place]QU[place]:      - Invasive mammary carcinoma of no special type (ductal NST/invasive ductal carcinoma) with apocrine features, see note      - [place] grade 2 of 3 (total score: 6 of 9)          - Tubule formation: <10%, score 3          - Nuclear grade 2 of 3, score 2          - Mitoses < 3/mm2, (</= 7 mitoses/10HPF), score 1      - Invasive carcinoma involves 3 of 3 submitted core biopsies, max. dimension = 5 millimeters      - Ductal carcinoma in situ (DCIS): Not identified      - Microcalcifications: Absent      - Lymphovascular invasion: Present     Breast, Right, US BX RT BREAST 900 7CMFN 3 PASSES 12G MARQU[place]:      - Invasive mammary carcinoma of no special type (ductal NST/invasive ductal carcinoma) with apocrine features, see note      - [place] grade 2 of 3 (total score: 6 of 9)          - Tubule formation: <10%, score 3          - Nuclear grade 2 of 3, score 2          - Mitoses < 3/mm2, (</= 7 mitoses/10HPF), score 1      - Invasive carcinoma involves 3 of 3 submitted core biopsies, max. dimension = 14 millimeters      - Ductal carcinoma in situ (DCIS): Not identified      - Microcalcifications: Absent      - Lymphovascular invasion: Not identified    Axillary lymph node, US BX RT AXILLARY TAIL LN 10:00 12CMFN 3 PASSES 12G [place]:      - Metastatic carcinoma consistent with mammary primary,    BREAST TUMOR PROGNOSTIC  PROFILE     Performed on invasive carcinoma block E1:  Test Description Result Prognostic Interpretation   Estrogen Receptor/ER  Primary Antibody: SP-1  Internal control: Positive   External control: Positive 90%  Staining Intensity: Strong  Isabela Score*: 8 Positive   Progesterone Receptor/PgR  Primary Antibody:1E2  Internal control: Positive  External control: Positive 5-7%  Staining Intensity: Strong  Isabela Score*: 4 Positive   HER2 by IHC   Primary Antibody: 4B5 2+ Equivocal *      Performed on invasive carcinoma block F2:  Test Description Result Prognostic Interpretation   Estrogen Receptor/ER  Primary Antibody: SP-1  Internal control: Positive   External control: Positive 95%  Staining Intensity: Strong  Isabela Score*: 8 Positive   Progesterone Receptor/PgR  Primary Antibody:1E2  Internal control: Positive  External control: Positive 30%  Staining Intensity: Moderate  Isabela Score*: 5 Positive   HER2 by IHC   Primary Antibody: 4B5 2+ Equivocal *       Fluorescence in situ hybridization (FISH) analysis of HER2 overexpression by invasive breast carcinoma cells, block E1 [email or web address], [place], [place], yields the following results:  Test Description                        Interpretation  HER2 by FISH analysis:              Negative / Not Amplified.  Results  HER2: CEP-17 ratio :                  1.5: 1  Average HER2 Signal:                5.4  Average CEP-17 Signal:                          3.5  Number of selected invasive cells scanned           100     Fluorescence in situ hybridization (FISH) analysis of HER2 overexpression by invasive breast carcinoma cells, block F2 performed at [facility], [place], [place], yields the following results:  Test Description                        Interpretation  HER2 by FISH analysis:              Negative / Not Amplified.  Results  HER2: CEP-17 ratio :                  1.3: 1  Average HER2 Signal:                 3.5  Average CEP-17 Signal:                          2.6  Number of selected invasive cells scanned           50        4/4/2024 Initial Diagnosis    Malignant neoplasm of overlapping sites of right breast in female, estrogen receptor positive (HCC)     4/18/2024 Genetic Testing    AMBRY  A total of 36 genes were evaluated, including: APC, MIKE, BARD 1, BMPR1A, BRCA1, BRCA2, BRIP1, CDH1, CDK4, CKDN2A, CHEK2, DICER1, MLH1, MSH2, MSH6, MUTYH, NBN, NF1, NTHL1, PALB2, PMS2, PTEN, RAD51C, RECQL, SMAD4, SMARCA4, STK11, TP53, AXIN2, HOXB13, MSH3, POLD1, AND POLE, EPCAM, AND GREM1     LIKELY PATHOGENIC VARIANT CHEK2  VUS [facility]     10/29/2024 Surgery    Right modified radical mastectomy left simple mastectomy    RIGHT  Multifocal residual invasive mammary carcinoma of no special type (ductal) s/p neoadjuvant chemotherapy  21 mm, 8 mm, 2 mm  Grade 3   Margins negative  ER 90  IA 5  HER2 2+  FISH negative  4/4 Lymph nodes     Anatomic Stage IIIA  Prognostic Stage (use AJCC update): N/A (status post neoadjuvant therapy).    LEFT  Benign fibroadenoma (0.8 cm), Benign fibrocystic changes with atypia (atypical lobular hyperplasia/ALH           History of Present Illness: This is a 75 y/o female s/p bilateral mastectomy, presenting today for FRAHAT drain removal.  She reports <10cc daily for the past 3 days.  She denies any swelling, redness, fever or chills.       Review of Systems   Constitutional:  Negative for activity change, appetite change, chills and fever.   HENT: Negative.     Respiratory: Negative.     Cardiovascular: Negative.    Gastrointestinal: Negative.    Musculoskeletal: Negative.    Skin:  Positive for wound. Negative for color change.   Neurological: Negative.    Hematological: Negative.  Negative for adenopathy.   Psychiatric/Behavioral: Negative.               Patient Active Problem List   Diagnosis   • Chronic atrophic gastritis   • Dysphagia, pharyngoesophageal   • Urinary incontinence   • Gastroparesis   •  Hemorrhoids, external   • Asymptomatic postmenopausal status   • Essential hypertension   • Pernicious anemia   • Breast cancer metastasized to axillary lymph node, right (HCC)   • CHEK2 gene mutation positive   • Neuroendocrine carcinoma of stomach (HCC)   • Hyperlipidemia   • Malignant neoplasm of overlapping sites of right breast in female, estrogen receptor positive (HCC)     Past Medical History:   Diagnosis Date   • Cancer (HCC)     Breast, right   • Colon polyp    • Esophagitis    • History of benign breast tumor    • Hyperlipidemia    • Hypertension    • Urinary incontinence    • Venous ulcer of ankle (HCC) 01/10/2022     Past Surgical History:   Procedure Laterality Date   • ANKLE SURGERY Right    • BREAST EXCISIONAL BIOPSY Left 1998    benign   • BUNIONECTOMY Bilateral    • COLONOSCOPY     • CORRECTION HAMMER TOE Bilateral    • MAMMO STEREOTACTIC BREAST BIOPSY LEFT (ALL INC) Left 4/4/2024   • MAMMO STEREOTACTIC BREAST BIOPSY RIGHT (ALL INC) EACH ADD Right 4/4/2024   • MODIFIED RADICAL MASTECTOMY W/ AXILLARY LYMPH NODE DISSECTION Right 10/29/2024    Procedure: RIGHT MODIFIED RADICAL MASTECTOMY LEVEL I AND LEVEL II LYMPH NODE DISSECTION, GIOVANA CLIPS IN THE RIGHT BREAST X2, AXILLARY LN;  Surgeon: Jevon Valdez MD;  Location: [facility];  Service: Surgical Oncology   • SIMPLE MASTECTOMY Left 10/29/2024    Procedure: LEFT MASTECTOMY;  Surgeon: Jevon Valdez MD;  Location: [facility];  Service: Surgical Oncology   • US GUIDANCE BREAST BIOPSY RIGHT EACH ADDITIONAL Right 4/4/2024   • US GUIDED BREAST BIOPSY RIGHT COMPLETE Right 4/4/2024   • US GUIDED BREAST LYMPH NODE BIOPSY RIGHT Right 4/4/2024   • US GUIDED LYMPH NODE BIOPSY LEFT  5/15/2024   • VEIN LIGATION Right 06/06/2022    Procedure: Venaseal therapy and stab phlebectomy;  Surgeon: Gosia Shepherd MD;  Location: [facility];  Service: Vascular     Family History   Problem Relation Age of Onset   • No Known Problems Mother    •  Heart disease Father    • Throat cancer Brother         possibly throat cancer   • Thyroid cancer Brother         possibly thyroid cancer   • No Known Problems Daughter    • No Known Problems Maternal Aunt    • No Known Problems Maternal Aunt    • No Known Problems Maternal Aunt    • No Known Problems Maternal Aunt    • No Known Problems Paternal Aunt    • No Known Problems Paternal Aunt    • No Known Problems Paternal Aunt    • No Known Problems Paternal Aunt    • No Known Problems Maternal Grandmother    • No Known Problems Maternal Grandfather    • No Known Problems Paternal Grandmother    • No Known Problems Paternal Grandfather    • BRCA2 Negative Neg Hx    • BRCA2 Positive Neg Hx    • BRCA1 Positive Neg Hx    • BRCA1 Negative Neg Hx    • BRCA 1/2 Neg Hx    • Ovarian cancer Neg Hx    • Endometrial cancer Neg Hx    • Colon cancer Neg Hx    • Breast cancer additional onset Neg Hx    • Breast cancer Neg Hx      Social History     Socioeconomic History   • Marital status: /Civil Union     Spouse name: Not on file   • Number of children: Not on file   • Years of education: Not on file   • Highest education level: Not on file   Occupational History   • Not on file   Tobacco Use   • Smoking status: Former     Current packs/day: 0.00     Types: Cigarettes     Start date:      Quit date:      Years since quittin.9   • Smokeless tobacco: Never   Vaping Use   • Vaping status: Never Used   Substance and Sexual Activity   • Alcohol use: Not Currently     Alcohol/week: 1.0 standard drink of alcohol     Types: 1 Standard drinks or equivalent per week   • Drug use: Never   • Sexual activity: Yes     Partners: Male     Birth control/protection: None   Other Topics Concern   • Not on file   Social History Narrative   • Not on file     Social Drivers of Health     Financial Resource Strain: Low Risk  (2023)    Overall Financial Resource Strain (CARDIA)    • Difficulty of Paying Living Expenses: Not hard  at all   Food Insecurity: Patient Unable To Answer (10/29/2024)    Nursing - Inadequate Food Risk Classification    • Worried About Running Out of Food in the Last Year: Not on file    • Ran Out of Food in the Last Year: Not on file    • Ran Out of Food in the Last Year: 97   Transportation Needs: Patient Unable To Answer (10/29/2024)    Nursing - Transportation Risk Classification    • Lack of Transportation: Not on file    • Lack of Transportation: 97   Physical Activity: Inactive (2020)    Exercise Vital Sign    • Days of Exercise per Week: 0 days    • Minutes of Exercise per Session: 0 min   Stress: No Stress Concern Present (2020)    [employer] - Occupational Stress Questionnaire    • Feeling of Stress : Not at all   Social Connections: Not on file   Intimate Partner Violence: Patient Unable To Answer (10/29/2024)    Nursing IPS    • Feels Physically and Emotionally Safe: Not on file    • Physically Hurt by Someone: Not on file    • Humiliated or Emotionally Abused by Someone: Not on file    • Physically Hurt by Someone: 97    • Hurt or Threatened by Someone: 97   Housing Stability: Patient Unable To Answer (10/29/2024)    Nursing: Inadequate Housing Risk Classification    • Has Housing: Not on file    • Worried About Losing Housing: Not on file    • Unable to Get Utilities: Not on file    • Unable to Pay for Housing in the Last Year: 97    • Has Housin       Current Outpatient Medications:   •  acetaminophen (TYLENOL) 325 mg tablet, Take 650 mg by mouth every 6 (six) hours as needed for mild pain, Disp: , Rfl:   •  acetaminophen-codeine (TYLENOL with CODEINE #3) 300-30 MG per tablet, Take 1 tablet by mouth every 6 (six) hours as needed for severe pain, Disp: 30 tablet, Rfl: 0  •  anastrozole (ARIMIDEX) 1 mg tablet, Take 1 tablet (1 mg total) by mouth daily, Disp: 90 tablet, Rfl: 3  •  Ascorbic Acid (vitamin C) 100 MG tablet, Take 100 mg by mouth daily, Disp: , Rfl:    •  hydroCHLOROthiazide 25 mg tablet, TAKE 1 TABLET (25 MG TOTAL) BY MOUTH DAILY., Disp: 90 tablet, Rfl: 1  •  Multiple Vitamins-Minerals (multivitamin with minerals) tablet, Take 1 tablet by mouth daily, Disp: , Rfl:   •  pravastatin (PRAVACHOL) 40 mg tablet, TAKE 1 TABLET BY MOUTH DAILY AT BEDTIME, Disp: 90 tablet, Rfl: 1  •  Zinc 100 MG TABS, Take by mouth, Disp: , Rfl:   •  naproxen (NAPROSYN) 500 mg tablet, TAKE 1 TABLET BY MOUTH TWICE A DAY WITH FOOD (Patient not taking: Reported on 11/25/2024), Disp: 30 tablet, Rfl: 0    Current Facility-Administered Medications:   •  cyanocobalamin injection 1,000 mcg, 1,000 mcg, Intramuscular, Q30 Days, Lorraine Dennis DO, 1,000 mcg at 11/26/24 1033  Allergies   Allergen Reactions   • Penicillins Other (See Comments)     Unknown reaction, positive on allergy testing     Vitals:    11/29/24 1011   BP: 136/68   Pulse: 64   Resp: 16   Temp: 98 °F (36.7 °C)       Physical Exam  Vitals reviewed.   Constitutional:       General: She is not in acute distress.     Appearance: Normal appearance. She is normal weight. She is not ill-appearing or toxic-appearing.   HENT:      Head: Normocephalic and atraumatic.   Eyes:      General: No scleral icterus.  Cardiovascular:      Rate and Rhythm: Normal rate.   Pulmonary:      Effort: Pulmonary effort is normal.   Chest:      Comments: Right chest wall FARHAT drain removed.  There is no erythema, swelling or drainage present.  Musculoskeletal:         General: Normal range of motion.      Cervical back: Normal range of motion and neck supple.   Skin:     General: Skin is warm and dry.      Findings: No erythema.   Neurological:      General: No focal deficit present.      Mental Status: She is alert and oriented to person, place, and time.   Psychiatric:         Mood and Affect: Mood normal.         Behavior: Behavior normal.         Thought Content: Thought content normal.         Judgment: Judgment normal.

## 2024-11-29 NOTE — ASSESSMENT & PLAN NOTE
Final FARHAT drain was removed today without incident.  She will follow-up with Dr Valdez in 6 months for another clinical exam.

## 2024-12-05 ENCOUNTER — OFFICE VISIT (OUTPATIENT)
Dept: HEMATOLOGY ONCOLOGY | Facility: CLINIC | Age: 74
End: 2024-12-05
Payer: MEDICARE

## 2024-12-05 VITALS
HEART RATE: 68 BPM | BODY MASS INDEX: 23.11 KG/M2 | RESPIRATION RATE: 17 BRPM | WEIGHT: 143.8 LBS | SYSTOLIC BLOOD PRESSURE: 132 MMHG | DIASTOLIC BLOOD PRESSURE: 60 MMHG | TEMPERATURE: 98.3 F | OXYGEN SATURATION: 95 % | HEIGHT: 66 IN

## 2024-12-05 DIAGNOSIS — C77.3 BREAST CANCER METASTASIZED TO AXILLARY LYMPH NODE, RIGHT (HCC): Primary | ICD-10-CM

## 2024-12-05 DIAGNOSIS — C50.911 BREAST CANCER METASTASIZED TO AXILLARY LYMPH NODE, RIGHT (HCC): Primary | ICD-10-CM

## 2024-12-05 PROCEDURE — 99215 OFFICE O/P EST HI 40 MIN: CPT | Performed by: INTERNAL MEDICINE

## 2024-12-05 PROCEDURE — G2211 COMPLEX E/M VISIT ADD ON: HCPCS | Performed by: INTERNAL MEDICINE

## 2024-12-05 NOTE — LETTER
December 5, 2024     Lorraine Dennis, DO  [address] [facility] [address]    Patient: Juve Duarte   YOB: 1950   Date of Visit: 12/5/2024       Dear Dr. Dennis:    Thank you for referring Juve Duarte to me for evaluation. Below are my notes for this consultation.    If you have questions, please do not hesitate to call me. I look forward to following your patient along with you.         Sincerely,        Earnest Quiroz,         CC: MD Earnest Aguilar,   12/5/2024 10:30 AM  Sign when Signing Visit  [facility] [place]  [address] [address]  [phone]  [phone]    Juve Duarte,1950, [id number]  12/05/24    Discussion:   In summary, this is a 74-year-old female history of right breast infiltrating ductal carcinoma, T1, N1, MX.  ER 90, HI 5, HER2 +2.  FISH negative.  FDG PET/CT showed hypermetabolism in the right lateral breast and right axilla.  Hypermetabolic left upper cervical node, SUV 3.5 noted.  Patient was started on Arimidex 1 mg p.o. daily.  May 2024 EGD showed angioectasias, cauterized.  T1 well-differentiated neuroendocrine tumor x 2.  October 29, 2024 right mastectomy showed invasive ductal carcinoma, G3, 2.1 cm.  4/4 nodes positive.  ypT2, N2a.  ER 90, HI 6, HER2 +2, FISH negative.      I discussed the above with the patient.  The patient *** voiced understanding and agreement.  ______________________________________________________________________    Chief Complaint   Patient presents with    Follow-up       HPI:  Oncology History Overview Note    with multifocal right breast cancer with axillary lymph node metastatic disease.  With incidental finding of low-grade gastric neuroendocrine tumor. She is s/p bilateral mastectomy. Stage IIIA (cT2, cN2, cM0, G3, ER+, HI+, HER2-)       4/4/2024Breast, Left, Stereo bx left breast 2 o'clock, 2 cores with calcs, 8G 12cm:       - Benign breast parenchyma with usual ductal hyperplasia       - Microcalcification's present in benign epithelium      - Negative for atypia or carcinoma  B. Breast, Left, Stereo bx left breast 2 o'clock, 2 cores without calcs, 8G 12cm:      - Benign breast parenchyma with usual ductal hyperplasia and fibroadenomatoid change      - Microcalcification's present in benign epithelium      - Negative for atypia or carcinoma   C. Breast, Right, Stereo bx right breast 8 o'clock, 4 cores with calcs, 8g 12cm:      - Benign breast parenchyma with usual ductal hyperplasia and fibroadenomatoid change      - Microcalcification's present in benign epithelium, within stroma, and involving ducts      - Negative for atypia or carcinoma   D. Breast, Right, US BX RT BREAST 700 7CMFN 3 PASSES 12G MARQUEE:      - Invasive mammary carcinoma of no special type (ductal NST/invasive ductal carcinoma) with apocrine features, see note      - Elsy grade 2 of 3 (total score: 6 of 9)          - Tubule formation: <10%, score 3          - Nuclear grade 2 of 3, score 2          - Mitoses < 3/mm2, (</= 7 mitoses/10HPF), score 1      - Invasive carcinoma involves 3 of 3 submitted core biopsies, max. dimension = 5 millimeters      - Ductal carcinoma in situ (DCIS): Not identified      - Microcalcifications: Absent      - Lymphovascular invasion: Present       - See synoptic report for part D  E. Breast, Right, US BX RT BREAST 900 7CMFN 3 PASSES 12G MARQUEE:      - Invasive mammary carcinoma of no special type (ductal NST/invasive ductal carcinoma) with apocrine features, see note      - [place] grade 2 of 3 (total score: 6 of 9)          - Tubule formation: <10%, score 3          - Nuclear grade 2 of 3, score 2          - Mitoses < 3/mm2, (</= 7 mitoses/10HPF), score 1      - Invasive carcinoma involves 3 of 3 submitted core biopsies, max. dimension = 14 millimeters      - Ductal carcinoma in situ (DCIS): Not identified      -  Microcalcifications: Absent      - Lymphovascular invasion: Not identified      - See synoptic report for part E  F. Axillary lymph node, US BX RT AXILLARY TAIL LN 10:00 12CMFN 3 PASSES 12G MARROBEL:      - Metastatic carcinoma consistent with mammary primary, see note      4/18/2024 CT cap: Enhancing soft tissue nodules in the right breast corresponding to biopsy-proven cancer as described above. Benign nodule on the left also shown.  Mildly enlarged right infrahilar lymph node and few right internal mammary lymph nodes are indeterminate but raise concern for involvement with tumor.  Few right axillary and subpectoral lymph nodes measuring up to 0.5 cm in the short axis are indeterminate. Consider further evaluation with PET/CT.  2 enhancing soft tissue nodules in the gastric fundus as described above. These are of uncertain etiology and may represent GI stromal tumors, with metastatic disease from breast cancer considered less likely. Consider further evaluation with endoscopy.      4/23/2024 Bone scan: Focus of activity in a right anterior rib at the costochondral junction, likely the seventh rib. This is nonspecific and may represent either a metastasis or benign etiology such as prior trauma. Attention on follow-up.       5/2/2024 PET :. Right lateral breast hypermetabolic nodule compatible with known malignancy. There is right axillary metastatic adenopathy.  2. Hypermetabolic left upper cervical node is also concerning for metastasis.      5/6/2024 US head neck soft tissue: 1.8 x 0.5 x 0.7 cm left level 2 lymph node corresponding to the abnormality on the PET scan with with some indeterminate vascularity and no additional ultrasound suspicious features  Correlate clinically      5/15/2024 Lymph node biopsy: . Lymph Node, Left Upper Cervical, FNA (ThinPrep and smear preparations ):  Atypical cellular changes.  Rare atypical epithelioid cells present.  Mixed lymphocytes and scattered histiocytes present,  compatible with lymph node sampling.  No flow immunophenotypic evidence of a lymphoproliferative disorder based on limited antibody panel ([id number]#[id number] / [id number], evaluated by Vinny Zamora M.D.).  Acellularity on cell block precludes further evaluation.      8/8/2024:well-differentiated neuroendocrine tumor, grade 2 (carcinoid tumor).  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  -Negative for Helicobacter pylori, confirmed by immunohistochemical stain performed with appropriate controls.      8/21/2024PET: FES avid right breast lesion compatible with the known malignancy. This appears slightly smaller compared to prior PET/CT.  2. Solitary FES-avid right axillary lymph node compatible with metastasis. This also appears smaller from prior PET/CT. Previously there was an additional smaller right axillary lymph node with mild FDG uptake which is not FES avid.  3. No suspicious FES avid lesions in the neck, abdomen or pelvis. Previously noted small left upper cervical chain lymph node does not demonstrate FES uptake and also appears slightly smaller on CT.      8/29/2024 Gabriella: started on Arimidex 1 mg p.o. daily. F/U 2 months.      8/30/2024 GI: Patient is following up with Dr. Up. Further recommendations will follow that visit. She is tentatively scheduled for repeat EGD at the 3-month karina.       9/19/2024 Up: incidentally discovered well-differentiated neuroendocrine tumor on upper endoscopy. This appeared to be in a small gastric polyp. recommend that she undergo EMR to evaluate the biopsy site. If there is no evidence of disease, she was likely adequately treated just with the biopsy. EMR to be after recovery from breast surgery.      10/2/2024 Chest xray: No acute cardiopulmonary disease.  Chronic apical scarring with  right apical granuloma      10/29/2024    Breast, Right, RIGHT Breast modified radical mastectomy, suture marks Axillary tail, GIOVANA clipped lymph node in Axillary Tail with additional nodes:  - Multifocal residual invasive mammary carcinoma of no special type (ductal) s/p neoadjuvant     chemotherapy:  Largest residual focus of invasive carcinoma, 21 mm in greatest dimension, present in    lower outer quadrant/LOQ, 7-8 o'clock:     -- [place] histologic grade 3 of 3 (total score: 8 of 9)        * Glandular (acinar) Tubular Differentiation 10-75%, score 2.        * Nuclear Pleomorphism 2-3 of 3, score 3.        * Mitotic Rate >/= 8/mm2, (>/= 15 mitoses/10HPF; 25 mitoses/10 HPF), score 3.    -- Confirmed by tumor cell immunophenotype:        * Positive: Pankeratin (CKAE1/3), E-cadherin, P120 (membranous), EMA (non-peripheral).        * Negative: p63, Calponin-B.    -- Associated prior biopsy site changes with GIOVANA  marker.    Second residual focus of invasive carcinoma with apocrine features, 8 mm in     greatest dimension, present 2.5 cm from largest invasive carcinoma between lower     outer and inner quadrants, 6 o'clock:    -- Elsy histologic grade 2 of 3 (total score: 7 of 9)        * Glandular (acinar) Tubular Differentiation < 10%, score 3.        * Nuclear Pleomorphism 3 of 3, score 3.        * Mitotic Rate < 3/mm2, (</= 7 mitoses/10HPF; 0 mitoses/10 HPF), score 1.    -- Associated prior biopsy site changes with GIOVANA  marker.    Third focus of invasive carcinoma with apocrine features, 2 mm, present in random    fibrous tissue of LOQ.   - Ductal carcinoma in-situ (DCIS): Present; not an extensive intraductal component     (~ 5% of total residual tumor).     -- Size and Extent: 36 mm in greatest approximate extent; focally present in 9 sequential         and 1 non-sequential sections/blocks; 10 of 29 blocks; associated with and adjacent to both         invasive carcinomas.     --  Architectural Patterns: Solid, cribriform, comedo/clinging.    -- Nuclear grade: II-III (intermediate to high).    -- Necrosis: Present, focal and central necrosis identified.   - Residual Cancer [place] (RCB)/Treatment effect (largest residual invasive carcinoma):    -- Probable/definite response to presurgical therapy in invasive carcinoma (both foci of        invasive carcinoma).    -- Greatest and secondary dimensions of Primary Tumor Bed: 22 x 20 mm.    -- Percentage of overall Cancer Cellularity: ~ 65%.    -- Percentage of Cancer that is In-situ disease: 5%.    -- Number of Positive lymph nodes: 4.    -- Diameter of Largest Fabienne Metastasis: 25 mm.    -- Residual Cancer [place]: 4.215    -- Residual Cancer [place] Class: RCB-III.  - Margins uninvolved by invasive and in-situ carcinoma:    -- Invasive carcinoma < 1 mm from nearest posterior margin; 5 mm from LOQ anterior margin.    -- DCIS 8 mm from nearest LOQ anterior margin; 16 mm from nearest posterior margin.   - Benign nipple and skin:    -- DCIS does not involve the nipple epidermis.     -- No dermal lymphatic and/or vascular invasion.   - Lymphatic and/or vascular invasion: Present.    -- D2-40 and Pankeratin/CKC/CD31 immunostains performed.   - Two axillary lymph nodes positive for metastatic carcinoma consistent with breast primary (2/2).      -- Largest fabienne metastasis: 8mm; 5 mm (smaller lymph node).     -- Extranodal extension: Present, dispersed over 3 mm area of prior biopsy site fibrosis.      -- Confirmed by positive Pankeratin (CKAE1/3), E-cadherin, p120 (membranous),         GATA3 immunostains.     -- Associated fibrosis probable minimal response to presurgical therapy in         metastatic carcinoma.      -- Largest lymph node with prior biopsy site changes and GIOVANA  marker.   - Microcalcifications: Present, associated with invasive carcinoma, DCIS and     non-neoplastic breast tissue.   - Benign fibroadenoma, 0.8 cm.   - Benign  fibrocystic changes without atypia (usual ductal hyperplasia, columnar cell     change and hyperplasia, apocrine adenosis, fibroadenomatoid changes, cystic and     papillary apocrine metaplasia).    -- Associated prior biopsy site changes with Saffell tie-shaped biopsy clip.       ** Traumatic neuroma present.   - Mammary duct ectasia.    B. Axillary lymph nodes, Right Axillary level I and level II lymph nodes, regional resection:  - Two lymph nodes positive for metastatic carcinoma consistent with breast primary (2/2).     -- Confirmed by positive Pankeratin (CKAE1/3), E-cadherin, p120 (membranous),         GATA3 immunostains.    -- 25 mm (near complete ernie replacement of largest lymph node) and 2.5 mm         in greatest dimensions of metastases.     -- Extranodal extension present, < 2 mm.    -- Associated fibrosis probable minimal response to presurgical therapy in        metastatic carcinoma.    C. Breast, Left, Left Breast mastectomy suture marks axillary tail:  - Negative for malignancy, in-situ carcinoma and atypical ductal hyperplasia.   - Benign nipple and skin.  - Benign fibroadenoma (0.8 cm).   - Benign fibrocystic changes with atypia (atypical lobular hyperplasia/ALH with     pagetoid ductal spread, usual ductal hyperplasia, columnar cell lesions/columnar     cell change and hyperplasia, sclerosing and apocrine adenosis, fibroadenomatoid     changes, cystic and papillary apocrine metaplasia).    -- Confirmed by E-cadherin, p120, CK5/6, Calponin-B immunostains    - Microcalcifications: Present, associated with ALH and non-neoplastic breast tissue.   - Prior biopsy site changes with U-shaped biopsy clip, upper outer quadrant/UOQ, 2 o'clock.   - Mammary duct ectasia.    Electronically signed by Janny Gordillo MD on 11/8/2024 at 2230 EST   Note    Intradepartmental consultation (WT) agrees with the above diagnoses for specimens A & B. A copy of the final report is sent to Dr. ALEXANDRIA Valdez on 11/8/24 @ 7416  hours.      1.- Repeat HER2 testing (Current ASCO/CAP Recommendations): Indicated (s/p neoadjuvant therapy).       -- ER/MD/HER2 pending; will be reported in an addendum.       - Best representative tumor blocks:  A4, A8, B5 (lymph node metastasis).        -- Sufficient tumor present for          Agendia Mammaprint/Blueprint (1 cm2 of invasive tumor in aggregate):  Yes.          MI Profile/Foundation One (at least 5 x 5 mm of tumor): Yes.     2.  Pathologic Stage Classification (pTNM, AJCC 8th Edition):       8th ed, AJCC Anatomic Stage:  at least Stage IIIA - ypT2, ypN2a, cM0, G3.     3.  8th ed. AJCC Pathologic Prognostic Stage (use AJCC update): N/A (status post neoadjuvant therapy).          2024 Lareef: s/p bilateral mastectomy she is healing well. 3 drains have been removed. Right side single drain is still continued report of more than 40 cc clear yellow fluid. We will keep the drain for 1 more week. Will ask one of my colleagues to reevaluate for possible drain removal on Friday.       2024 Surg Onc: pull drains      2024 Dr. Quiroz      Upcomin2025 Endo/GI  24 Dr. Valdez     Breast cancer metastasized to axillary lymph node, right (HCC)   2024 Initial Diagnosis    2024 patient had biopsy of right breast mass showing invasive ductal carcinoma.  Right axillary node showed metastatic carcinoma consistent with breast primary.  ER 90%, MD 5-7%, HER2 +2.  FISH negative.  Similar findings in the axillary node although MD was 30%.  HER2 +2, FISH negative.  2024 patient had CT chest ab pelvis showing soft tissue nodules right breast.  Right infrahilar and internal mammary nodes indeterminate.  Right axillary nodes up to 0.5 cm.  2 enhancing soft tissue nodules in the gastric fundus.  Bone scan showed focus of activity at anterior right seventh rib.     2024 Biopsy    Breast, Right, US BX RT BREAST 700 7CMFN 3 PASSES 12G MARQUEE:      - Invasive mammary  carcinoma of no special type (ductal NST/invasive ductal carcinoma) with apocrine features, see note      - Elsy grade 2 of 3 (total score: 6 of 9)          - Tubule formation: <10%, score 3          - Nuclear grade 2 of 3, score 2          - Mitoses < 3/mm2, (</= 7 mitoses/10HPF), score 1      - Invasive carcinoma involves 3 of 3 submitted core biopsies, max. dimension = 5 millimeters      - Ductal carcinoma in situ (DCIS): Not identified      - Microcalcifications: Absent      - Lymphovascular invasion: Present     Breast, Right, US BX RT BREAST 900 7CMFN 3 PASSES 12G MARQUEE:      - Invasive mammary carcinoma of no special type (ductal NST/invasive ductal carcinoma) with apocrine features, see note      - [place] grade 2 of 3 (total score: 6 of 9)          - Tubule formation: <10%, score 3          - Nuclear grade 2 of 3, score 2          - Mitoses < 3/mm2, (</= 7 mitoses/10HPF), score 1      - Invasive carcinoma involves 3 of 3 submitted core biopsies, max. dimension = 14 millimeters      - Ductal carcinoma in situ (DCIS): Not identified      - Microcalcifications: Absent      - Lymphovascular invasion: Not identified    Axillary lymph node, US BX RT AXILLARY TAIL LN 10:00 12CMFN 3 PASSES 12G MARQUEE:      - Metastatic carcinoma consistent with mammary primary,    BREAST TUMOR PROGNOSTIC PROFILE     Performed on invasive carcinoma block E1:  Test Description Result Prognostic Interpretation   Estrogen Receptor/ER  Primary Antibody: SP-1  Internal control: Positive   External control: Positive 90%  Staining Intensity: Strong  Isabela Score*: 8 Positive   Progesterone Receptor/PgR  Primary Antibody:1E2  Internal control: Positive  External control: Positive 5-7%  Staining Intensity: Strong  Isabela Score*: 4 Positive   HER2 by IHC   Primary Antibody: 4B5 2+ Equivocal *      Performed on invasive carcinoma block F2:  Test Description Result Prognostic Interpretation   Estrogen Receptor/ER  Primary Antibody:  SP-1  Internal control: Positive   External control: Positive 95%  Staining Intensity: Strong  Isabela Score*: 8 Positive   Progesterone Receptor/PgR  Primary Antibody:1E2  Internal control: Positive  External control: Positive 30%  Staining Intensity: Moderate  Isabela Score*: 5 Positive   HER2 by IHC   Primary Antibody: 4B5 2+ Equivocal *       Fluorescence in situ hybridization (FISH) analysis of HER2 overexpression by invasive breast carcinoma cells, block E1 performed [facility], [place], [place], yields the following results:  Test Description                        Interpretation  HER2 by FISH analysis:              Negative / Not Amplified.  Results  HER2: CEP-17 ratio :                  1.5: 1  Average HER2 Signal:                5.4  Average CEP-17 Signal:                          3.5  Number of selected invasive cells scanned           100     Fluorescence in situ hybridization (FISH) analysis of HER2 overexpression by invasive breast carcinoma cells, block F2 performed at [facility], [place], [place], yields the following results:  Test Description                        Interpretation  HER2 by FISH analysis:              Negative / Not Amplified.  Results  HER2: CEP-17 ratio :                  1.3: 1  Average HER2 Signal:                3.5  Average CEP-17 Signal:                          2.6  Number of selected invasive cells scanned           50        4/4/2024 Initial Diagnosis    Malignant neoplasm of overlapping sites of right breast in female, estrogen receptor positive (HCC)     4/18/2024 Genetic Testing    AMBRY  A total of 36 genes were evaluated, including: APC, MIKE, BARD 1, BMPR1A, BRCA1, BRCA2, BRIP1, CDH1, CDK4, CKDN2A, CHEK2, DICER1, MLH1, MSH2, MSH6, MUTYH, NBN, NF1, NTHL1, PALB2, PMS2, PTEN, RAD51C, RECQL, SMAD4, SMARCA4, STK11, TP53, AXIN2, HOXB13, MSH3, POLD1, AND POLE, EPCAM, AND GREM1     LIKELY PATHOGENIC VARIANT CHEK2  VUS SDHA      10/29/2024 Surgery    Right modified radical mastectomy left simple mastectomy    RIGHT  Multifocal residual invasive mammary carcinoma of no special type (ductal) s/p neoadjuvant chemotherapy  21 mm, 8 mm, 2 mm  Grade 3   Margins negative  ER 90  MN 5  HER2 2+  FISH negative  4/4 Lymph nodes     Anatomic Stage IIIA  Prognostic Stage (use AJCC update): N/A (status post neoadjuvant therapy).    LEFT  Benign fibroadenoma (0.8 cm), Benign fibrocystic changes with atypia (atypical lobular hyperplasia/ALH     10/29/2024 -  Cancer Staged    Staging form: Breast, AJCC 8th Edition  - Clinical stage from 10/29/2024: Stage IIIA (cT2, cN2, cM0, G3, ER+, MN+, HER2-) - Signed by Jevon Valdez MD on 11/21/2024  Stage prefix: Initial diagnosis  Histologic grading system: 3 grade system       Malignant neoplasm of overlapping sites of right breast in female, estrogen receptor positive (HCC)   4/4/2024 Biopsy    Breast, Right, US BX RT BREAST 700 7CMFN 3 PASSES 12G [place]QU[place]:      - Invasive mammary carcinoma of no special type (ductal NST/invasive ductal carcinoma) with apocrine features, see note      - [place] grade 2 of 3 (total score: 6 of 9)          - Tubule formation: <10%, score 3          - Nuclear grade 2 of 3, score 2          - Mitoses < 3/mm2, (</= 7 mitoses/10HPF), score 1      - Invasive carcinoma involves 3 of 3 submitted core biopsies, max. dimension = 5 millimeters      - Ductal carcinoma in situ (DCIS): Not identified      - Microcalcifications: Absent      - Lymphovascular invasion: Present     Breast, Right, US BX RT BREAST 900 7CMFN 3 PASSES 12G [place]:      - Invasive mammary carcinoma of no special type (ductal NST/invasive ductal carcinoma) with apocrine features, see note      - Elsy grade 2 of 3 (total score: 6 of 9)          - Tubule formation: <10%, score 3          - Nuclear grade 2 of 3, score 2          - Mitoses < 3/mm2, (</= 7 mitoses/10HPF), score 1      - Invasive carcinoma  involves 3 of 3 submitted core biopsies, max. dimension = 14 millimeters      - Ductal carcinoma in situ (DCIS): Not identified      - Microcalcifications: Absent      - Lymphovascular invasion: Not identified    Axillary lymph node, US BX RT AXILLARY TAIL LN 10:00 12CMFN 3 PASSES 12G [place]:      - Metastatic carcinoma consistent with mammary primary,    BREAST TUMOR PROGNOSTIC PROFILE     Performed on invasive carcinoma block E1:  Test Description Result Prognostic Interpretation   Estrogen Receptor/ER  Primary Antibody: SP-1  Internal control: Positive   External control: Positive 90%  Staining Intensity: Strong  Isabela Score*: 8 Positive   Progesterone Receptor/PgR  Primary Antibody:1E2  Internal control: Positive  External control: Positive 5-7%  Staining Intensity: Strong  Isabela Score*: 4 Positive   HER2 by IHC   Primary Antibody: 4B5 2+ Equivocal *      Performed on invasive carcinoma block F2:  Test Description Result Prognostic Interpretation   Estrogen Receptor/ER  Primary Antibody: SP-1  Internal control: Positive   External control: Positive 95%  Staining Intensity: Strong  Isabela Score*: 8 Positive   Progesterone Receptor/PgR  Primary Antibody:1E2  Internal control: Positive  External control: Positive 30%  Staining Intensity: Moderate  Isabela Score*: 5 Positive   HER2 by IHC   Primary Antibody: 4B5 2+ Equivocal *       Fluorescence in situ hybridization (FISH) analysis of HER2 overexpression by invasive breast carcinoma cells, block E1 performed at [facility], [place], [place], yields the following results:  Test Description                        Interpretation  HER2 by FISH analysis:              Negative / Not Amplified.  Results  HER2: CEP-17 ratio :                  1.5: 1  Average HER2 Signal:                5.4  Average CEP-17 Signal:                          3.5  Number of selected invasive cells scanned           100     Fluorescence in situ hybridization (FISH) analysis  of HER2 overexpression by invasive breast carcinoma cells, block F2 performed at [facility], [place], [place], yields the following results:  Test Description                        Interpretation  HER2 by FISH analysis:              Negative / Not Amplified.  Results  HER2: CEP-17 ratio :                  1.3: 1  Average HER2 Signal:                3.5  Average CEP-17 Signal:                          2.6  Number of selected invasive cells scanned           50        4/4/2024 Initial Diagnosis    Malignant neoplasm of overlapping sites of right breast in female, estrogen receptor positive (HCC)     4/18/2024 Genetic Testing    AMBRY  A total of 36 genes were evaluated, including: APC, MIKE, BARD 1, BMPR1A, BRCA1, BRCA2, BRIP1, CDH1, CDK4, CKDN2A, CHEK2, DICER1, MLH1, MSH2, MSH6, MUTYH, NBN, NF1, NTHL1, PALB2, PMS2, PTEN, RAD51C, RECQL, SMAD4, SMARCA4, STK11, TP53, AXIN2, HOXB13, MSH3, POLD1, AND POLE, EPCAM, AND GREM1     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    LEFT  Benign fibroadenoma (0.8 cm), Benign fibrocystic changes with atypia (atypical lobular hyperplasia/ALH         Interval History:  ***  {performance status:12199}    Review of Systems   Constitutional:  Negative for appetite change, diaphoresis, fatigue and fever.   HENT:  Negative for sinus pain.    Eyes:  Negative for discharge.   Respiratory:  Negative for cough and shortness of breath.    Cardiovascular:  Negative for chest pain.   Gastrointestinal:  Negative for abdominal pain, constipation and diarrhea.   Endocrine: Negative for cold  intolerance.   Genitourinary:  Negative for difficulty urinating and hematuria.   Musculoskeletal:  Negative for joint swelling.   Skin:  Negative for rash.   Allergic/Immunologic: Negative for environmental allergies.   Neurological:  Negative for dizziness and headaches.   Hematological:  Negative for adenopathy.   Psychiatric/Behavioral:  Negative for agitation.        Past Medical History:   Diagnosis Date    Cancer (HCC)     Breast, right    Colon polyp     Esophagitis     History of benign breast tumor     Hyperlipidemia     Hypertension     Urinary incontinence     Venous ulcer of ankle (HCC) 01/10/2022     Patient Active Problem List   Diagnosis    Chronic atrophic gastritis    Dysphagia, pharyngoesophageal    Urinary incontinence    Gastroparesis    Hemorrhoids, external    Asymptomatic postmenopausal status    Essential hypertension    Pernicious anemia    Breast cancer metastasized to axillary lymph node, right (HCC)    CHEK2 gene mutation positive    Neuroendocrine carcinoma of stomach (HCC)    Hyperlipidemia    Malignant neoplasm of overlapping sites of right breast in female, estrogen receptor positive (HCC)       Current Outpatient Medications:     acetaminophen (TYLENOL) 325 mg tablet, Take 650 mg by mouth every 6 (six) hours as needed for mild pain, Disp: , Rfl:     anastrozole (ARIMIDEX) 1 mg tablet, Take 1 tablet (1 mg total) by mouth daily, Disp: 90 tablet, Rfl: 3    Ascorbic Acid (vitamin C) 100 MG tablet, Take 100 mg by mouth daily, Disp: , Rfl:     hydroCHLOROthiazide 25 mg tablet, TAKE 1 TABLET (25 MG TOTAL) BY MOUTH DAILY., Disp: 90 tablet, Rfl: 1    Multiple Vitamins-Minerals (multivitamin with minerals) tablet, Take 1 tablet by mouth daily, Disp: , Rfl:     pravastatin (PRAVACHOL) 40 mg tablet, TAKE 1 TABLET BY MOUTH DAILY AT BEDTIME, Disp: 90 tablet, Rfl: 1    Zinc 100 MG TABS, Take by mouth, Disp: , Rfl:     acetaminophen-codeine (TYLENOL with CODEINE #3) 300-30 MG per tablet, Take 1  tablet by mouth every 6 (six) hours as needed for severe pain, Disp: 30 tablet, Rfl: 0    naproxen (NAPROSYN) 500 mg tablet, TAKE 1 TABLET BY MOUTH TWICE A DAY WITH FOOD (Patient not taking: Reported on 11/25/2024), Disp: 30 tablet, Rfl: 0    Current Facility-Administered Medications:     cyanocobalamin injection 1,000 mcg, 1,000 mcg, Intramuscular, Q30 Days, Lorraine Mirlande Dennis, DO, 1,000 mcg at 11/26/24 1033  Allergies   Allergen Reactions    Penicillins Other (See Comments)     Unknown reaction, positive on allergy testing     Past Surgical History:   Procedure Laterality Date    ANKLE SURGERY Right     BREAST EXCISIONAL BIOPSY Left 1998    benign    BUNIONECTOMY Bilateral     COLONOSCOPY      CORRECTION HAMMER TOE Bilateral     MAMMO STEREOTACTIC BREAST BIOPSY LEFT (ALL INC) Left 4/4/2024    MAMMO STEREOTACTIC BREAST BIOPSY RIGHT (ALL INC) EACH ADD Right 4/4/2024    MODIFIED RADICAL MASTECTOMY W/ AXILLARY LYMPH NODE DISSECTION Right 10/29/2024    Procedure: RIGHT MODIFIED RADICAL MASTECTOMY LEVEL I AND LEVEL II LYMPH NODE DISSECTION, GIOVANA CLIPS IN THE RIGHT BREAST X2, AXILLARY LN;  Surgeon: Jevon Valdez MD;  Location: [facility];  Service: Surgical Oncology    SIMPLE MASTECTOMY Left 10/29/2024    Procedure: LEFT MASTECTOMY;  Surgeon: Jevon Valdez MD;  Location: [facility];  Service: Surgical Oncology    US GUIDANCE BREAST BIOPSY RIGHT EACH ADDITIONAL Right 4/4/2024    US GUIDED BREAST BIOPSY RIGHT COMPLETE Right 4/4/2024    US GUIDED BREAST LYMPH NODE BIOPSY RIGHT Right 4/4/2024    US GUIDED LYMPH NODE BIOPSY LEFT  5/15/2024    VEIN LIGATION Right 06/06/2022    Procedure: Venaseal therapy and stab phlebectomy;  Surgeon: Gosia Shepherd MD;  Location: [facility];  Service: Vascular     Social History    Objective:  Vitals:    12/05/24 0949   BP: 132/60   Patient Position: Sitting   Cuff Size: Standard   Pulse: 68   Resp: 17   Temp: 98.3 °F (36.8 °C)   TempSrc: Temporal   SpO2:  "95%   Weight: 65.2 kg (143 lb 12.8 oz)   Height: 5' 6\" (1.676 m)     Physical Exam  Constitutional:       Appearance: She is well-developed.   HENT:      Head: Normocephalic and atraumatic.   Eyes:      Pupils: Pupils are equal, round, and reactive to light.   Cardiovascular:      Rate and Rhythm: Normal rate.      Heart sounds: No murmur heard.  Pulmonary:      Effort: No respiratory distress.      Breath sounds: No wheezing or rales.   Abdominal:      General: There is no distension.      Palpations: Abdomen is soft.      Tenderness: There is no abdominal tenderness. There is no rebound.   Musculoskeletal:         General: No tenderness.      Cervical back: Neck supple.   Lymphadenopathy:      Cervical: No cervical adenopathy.   Skin:     General: Skin is warm.      Findings: No rash.   Neurological:      Mental Status: She is alert and oriented to person, place, and time.      Deep Tendon Reflexes: Reflexes normal.   Psychiatric:         Thought Content: Thought content normal.           Labs:  I personally reviewed the labs and imaging pertinent to this patient care.  "

## 2024-12-05 NOTE — LETTER
December 5, 2024     Lorraine Dennis, DO  [address] [facility] [address]    Patient: Juve Duarte   YOB: 1950   Date of Visit: 12/5/2024       Dear Dr. Dennis:    Thank you for referring Juve Duarte to me for evaluation. Below are my notes for this consultation.    If you have questions, please do not hesitate to call me. I look forward to following your patient along with you.         Sincerely,        Earnest Quiroz, DO        CC: MD Earnest Aguilar,   12/5/2024 11:01 AM  Sign when Signing Visit  [facility]  [address] [id number]  [phone]  [phone]    Juve Duarte,1950, [id number]  12/05/24    Discussion:   In summary, this is a 74-year-old female history of right breast infiltrating ductal carcinoma, T1, N1, MX.  ER 90, DC 5, HER2 +2.  FISH negative.  FDG PET/CT showed hypermetabolism in the right lateral breast and right axilla.  Hypermetabolic left upper cervical node, SUV 3.5 noted.  Patient was started on Arimidex 1 mg p.o. daily.  May 2024 EGD showed angioectasias, cauterized.  T1 well-differentiated neuroendocrine tumor x 2.  October 29, 2024 right mastectomy showed invasive ductal carcinoma, G3, 2.1 cm.  4/4 nodes positive.  ypT2, N2a.  ER 90, DC 6, HER2 +2, FISH negative.  She had no significant complications postoperatively.  We reviewed that options to move forward could include hormonal blockade, hormonal blockade plus CDK inhibitor, hormonal/CDK inhibitor plus chemotherapy.  Incremental risk for benefit declines and potential for toxicity rises in this order.  We also briefly discussed potential utility of adjuvant radiation therapy, applicable due to ernie status.  She has RT consult tomorrow.  I asked the patient and her  to consider this over the next few weeks.  I will meet them at that time.    I discussed the above with the patient.  The  patient  voiced understanding and agreement.  ______________________________________________________________________    Chief Complaint   Patient presents with   • Follow-up       HPI:  Oncology History Overview Note    with multifocal right breast cancer with axillary lymph node metastatic disease.  With incidental finding of low-grade gastric neuroendocrine tumor. She is s/p bilateral mastectomy. Stage IIIA (cT2, cN2, cM0, G3, ER+, TX+, HER2-)       4/4/2024Breast, Left, Stereo bx left breast 2 o'clock, 2 cores with calcs, 8G 12cm:      - Benign breast parenchyma with usual ductal hyperplasia       - Microcalcification's present in benign epithelium      - Negative for atypia or carcinoma  B. Breast, Left, Stereo bx left breast 2 o'clock, 2 cores without calcs, 8G 12cm:      - Benign breast parenchyma with usual ductal hyperplasia and fibroadenomatoid change      - Microcalcification's present in benign epithelium      - Negative for atypia or carcinoma   C. Breast, Right, Stereo bx right breast 8 o'clock, 4 cores with calcs, 8g 12cm:      - Benign breast parenchyma with usual ductal hyperplasia and fibroadenomatoid change      - Microcalcification's present in benign epithelium, within stroma, and involving ducts      - Negative for atypia or carcinoma   D. Breast, Right, US BX RT BREAST 700 7CMFN 3 PASSES 12G MARQUEE:      - Invasive mammary carcinoma of no special type (ductal NST/invasive ductal carcinoma) with apocrine features, see note      - [place] grade 2 of 3 (total score: 6 of 9)          - Tubule formation: <10%, score 3          - Nuclear grade 2 of 3, score 2          - Mitoses < 3/mm2, (</= 7 mitoses/10HPF), score 1      - Invasive carcinoma involves 3 of 3 submitted core biopsies, max. dimension = 5 millimeters      - Ductal carcinoma in situ (DCIS): Not identified      - Microcalcifications: Absent      - Lymphovascular invasion: Present       - See synoptic report for part D  E. Breast,  Right, US BX RT BREAST 900 7CMFN 3 PASSES 12G MARQUEE:      - Invasive mammary carcinoma of no special type (ductal NST/invasive ductal carcinoma) with apocrine features, see note      - Elsy grade 2 of 3 (total score: 6 of 9)          - Tubule formation: <10%, score 3          - Nuclear grade 2 of 3, score 2          - Mitoses < 3/mm2, (</= 7 mitoses/10HPF), score 1      - Invasive carcinoma involves 3 of 3 submitted core biopsies, max. dimension = 14 millimeters      - Ductal carcinoma in situ (DCIS): Not identified      - Microcalcifications: Absent      - Lymphovascular invasion: Not identified      - See synoptic report for part E  F. Axillary lymph node, US BX RT AXILLARY TAIL LN 10:00 12CMFN 3 PASSES 12G MARQUEE:      - Metastatic carcinoma consistent with mammary primary, see note      4/18/2024 CT cap: Enhancing soft tissue nodules in the right breast corresponding to biopsy-proven cancer as described above. Benign nodule on the left also shown.  Mildly enlarged right infrahilar lymph node and few right internal mammary lymph nodes are indeterminate but raise concern for involvement with tumor.  Few right axillary and subpectoral lymph nodes measuring up to 0.5 cm in the short axis are indeterminate. Consider further evaluation with PET/CT.  2 enhancing soft tissue nodules in the gastric fundus as described above. These are of uncertain etiology and may represent GI stromal tumors, with metastatic disease from breast cancer considered less likely. Consider further evaluation with endoscopy.      4/23/2024 Bone scan: Focus of activity in a right anterior rib at the costochondral junction, likely the seventh rib. This is nonspecific and may represent either a metastasis or benign etiology such as prior trauma. Attention on follow-up.       5/2/2024 PET :. Right lateral breast hypermetabolic nodule compatible with known malignancy. There is right axillary metastatic adenopathy.  2. Hypermetabolic left  upper cervical node is also concerning for metastasis.      5/6/2024 US head neck soft tissue: 1.8 x 0.5 x 0.7 cm left level 2 lymph node corresponding to the abnormality on the PET scan with with some indeterminate vascularity and no additional ultrasound suspicious features  Correlate clinically      5/15/2024 Lymph node biopsy: . Lymph Node, Left Upper Cervical, FNA (ThinPrep and smear preparations ):  Atypical cellular changes.  Rare atypical epithelioid cells present.  Mixed lymphocytes and scattered histiocytes present, compatible with lymph node sampling.  No flow immunophenotypic evidence of a lymphoproliferative disorder based on limited antibody panel ([employer]#[id number] / [id number], evaluated by Vinny Zamora M.D.).  Acellularity on cell block precludes further evaluation.      8/8/2024:well-differentiated neuroendocrine tumor, grade 2 (carcinoid tumor).  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  -Negative for Helicobacter pylori, confirmed by immunohistochemical stain performed with appropriate controls.      8/21/2024PET: FES avid right breast lesion compatible with the known malignancy. This appears slightly smaller compared to prior PET/CT.  2. Solitary FES-avid right axillary lymph node compatible with metastasis. This also appears smaller from prior PET/CT. Previously there was an additional smaller right axillary lymph node with mild FDG uptake which is not FES avid.  3. No suspicious FES avid lesions in the neck, abdomen or pelvis. Previously noted small left upper cervical chain lymph node does not demonstrate FES uptake and also appears slightly smaller on CT.      8/29/2024 Gabriella: started on Arimidex 1 mg p.o. daily. F/U 2 months.      8/30/2024 GI: Patient is following up with Dr. Up. Further recommendations  will follow that visit. She is tentatively scheduled for repeat EGD at the 3-month karina.       9/19/2024 Up: incidentally discovered well-differentiated neuroendocrine tumor on upper endoscopy. This appeared to be in a small gastric polyp. recommend that she undergo EMR to evaluate the biopsy site. If there is no evidence of disease, she was likely adequately treated just with the biopsy. EMR to be after recovery from breast surgery.      10/2/2024 Chest xray: No acute cardiopulmonary disease.  Chronic apical scarring with right apical granuloma      10/29/2024    Breast, Right, RIGHT Breast modified radical mastectomy, suture marks Axillary tail, GIOVANA clipped lymph node in Axillary Tail with additional nodes:  - Multifocal residual invasive mammary carcinoma of no special type (ductal) s/p neoadjuvant     chemotherapy:  Largest residual focus of invasive carcinoma, 21 mm in greatest dimension, present in    lower outer quadrant/LOQ, 7-8 o'clock:     -- Loving histologic grade 3 of 3 (total score: 8 of 9)        * Glandular (acinar) Tubular Differentiation 10-75%, score 2.        * Nuclear Pleomorphism 2-3 of 3, score 3.        * Mitotic Rate >/= 8/mm2, (>/= 15 mitoses/10HPF; 25 mitoses/10 HPF), score 3.    -- Confirmed by tumor cell immunophenotype:        * Positive: Pankeratin (CKAE1/3), E-cadherin, P120 (membranous), EMA (non-peripheral).        * Negative: p63, Calponin-B.    -- Associated prior biopsy site changes with GIOVANA  marker.    Second residual focus of invasive carcinoma with apocrine features, 8 mm in     greatest dimension, present 2.5 cm from largest invasive carcinoma between lower     outer and inner quadrants, 6 o'clock:    -- Elsy histologic grade 2 of 3 (total score: 7 of 9)        * Glandular (acinar) Tubular Differentiation < 10%, score 3.        * Nuclear Pleomorphism 3 of 3, score 3.        * Mitotic Rate < 3/mm2, (</= 7 mitoses/10HPF; 0 mitoses/10 HPF), score 1.    --  Associated prior biopsy site changes with GIOVANA  marker.    Third focus of invasive carcinoma with apocrine features, 2 mm, present in random    fibrous tissue of LOQ.   - Ductal carcinoma in-situ (DCIS): Present; not an extensive intraductal component     (~ 5% of total residual tumor).     -- Size and Extent: 36 mm in greatest approximate extent; focally present in 9 sequential         and 1 non-sequential sections/blocks; 10 of 29 blocks; associated with and adjacent to both         invasive carcinomas.     -- Architectural Patterns: Solid, cribriform, comedo/clinging.    -- Nuclear grade: II-III (intermediate to high).    -- Necrosis: Present, focal and central necrosis identified.   - Residual Cancer Califon (RCB)/Treatment effect (largest residual invasive carcinoma):    -- Probable/definite response to presurgical therapy in invasive carcinoma (both foci of        invasive carcinoma).    -- Greatest and secondary dimensions of Primary Tumor Bed: 22 x 20 mm.    -- Percentage of overall Cancer Cellularity: ~ 65%.    -- Percentage of Cancer that is In-situ disease: 5%.    -- Number of Positive lymph nodes: 4.    -- Diameter of Largest Fabienne Metastasis: 25 mm.    -- Residual Cancer Califon: 4.215    -- Residual Cancer Califon Class: RCB-III.  - Margins uninvolved by invasive and in-situ carcinoma:    -- Invasive carcinoma < 1 mm from nearest posterior margin; 5 mm from LOQ anterior margin.    -- DCIS 8 mm from nearest LOQ anterior margin; 16 mm from nearest posterior margin.   - Benign nipple and skin:    -- DCIS does not involve the nipple epidermis.     -- No dermal lymphatic and/or vascular invasion.   - Lymphatic and/or vascular invasion: Present.    -- D2-40 and Pankeratin/CKC/CD31 immunostains performed.   - Two axillary lymph nodes positive for metastatic carcinoma consistent with breast primary (2/2).      -- Largest fabienne metastasis: 8mm; 5 mm (smaller lymph node).     -- Extranodal extension: Present,  dispersed over 3 mm area of prior biopsy site fibrosis.      -- Confirmed by positive Pankeratin (CKAE1/3), E-cadherin, p120 (membranous),         GATA3 immunostains.     -- Associated fibrosis probable minimal response to presurgical therapy in         metastatic carcinoma.      -- Largest lymph node with prior biopsy site changes and IGOVANA  marker.   - Microcalcifications: Present, associated with invasive carcinoma, DCIS and     non-neoplastic breast tissue.   - Benign fibroadenoma, 0.8 cm.   - Benign fibrocystic changes without atypia (usual ductal hyperplasia, columnar cell     change and hyperplasia, apocrine adenosis, fibroadenomatoid changes, cystic and     papillary apocrine metaplasia).    -- Associated prior biopsy site changes with Gravette tie-shaped biopsy clip.       ** Traumatic neuroma present.   - Mammary duct ectasia.    B. Axillary lymph nodes, Right Axillary level I and level II lymph nodes, regional resection:  - Two lymph nodes positive for metastatic carcinoma consistent with breast primary (2/2).     -- Confirmed by positive Pankeratin (CKAE1/3), E-cadherin, p120 (membranous),         GATA3 immunostains.    -- 25 mm (near complete ernie replacement of largest lymph node) and 2.5 mm         in greatest dimensions of metastases.     -- Extranodal extension present, < 2 mm.    -- Associated fibrosis probable minimal response to presurgical therapy in        metastatic carcinoma.    C. Breast, Left, Left Breast mastectomy suture marks axillary tail:  - Negative for malignancy, in-situ carcinoma and atypical ductal hyperplasia.   - Benign nipple and skin.  - Benign fibroadenoma (0.8 cm).   - Benign fibrocystic changes with atypia (atypical lobular hyperplasia/ALH with     pagetoid ductal spread, usual ductal hyperplasia, columnar cell lesions/columnar     cell change and hyperplasia, sclerosing and apocrine adenosis, fibroadenomatoid     changes, cystic and papillary apocrine metaplasia).    --  Confirmed by E-cadherin, p120, CK5/6, Calponin-B immunostains    - Microcalcifications: Present, associated with ALH and non-neoplastic breast tissue.   - Prior biopsy site changes with U-shaped biopsy clip, upper outer quadrant/UOQ, 2 o'clock.   - Mammary duct ectasia.    Electronically signed by Janny Gordillo MD on 2024 at 2230 EST   Note    Intradepartmental consultation (WT) agrees with the above diagnoses for specimens A & B. A copy of the final report is sent to Dr. ALEXANDRIA Valdez on 24 @ 2225 hours.      1.- Repeat HER2 testing (Current ASCO/CAP Recommendations): Indicated (s/p neoadjuvant therapy).       -- ER/PA/HER2 pending; will be reported in an addendum.       - Best representative tumor blocks:  A4, A8, B5 (lymph node metastasis).        -- Sufficient tumor present for          Agendia Mammaprint/Blueprint (1 cm2 of invasive tumor in aggregate):  Yes.          MI Profile/Foundation One (at least 5 x 5 mm of tumor): Yes.     2.  Pathologic Stage Classification (pTNM, AJCC 8th Edition):       8th ed, AJCC Anatomic Stage:  at least Stage IIIA - ypT2, ypN2a, cM0, G3.     3.  8th ed. AJCC Pathologic Prognostic Stage (use AJCC update): N/A (status post neoadjuvant therapy).          2024 Lareef: s/p bilateral mastectomy she is healing well. 3 drains have been removed. Right side single drain is still continued report of more than 40 cc clear yellow fluid. We will keep the drain for 1 more week. Will ask one of my colleagues to reevaluate for possible drain removal on Friday.       2024 Surg Onc: pull drains      2024 Dr. Quiroz      Upcomin2025 Endo/GI  24 Dr. Valdez     Breast cancer metastasized to axillary lymph node, right (HCC)   2024 Initial Diagnosis    2024 patient had biopsy of right breast mass showing invasive ductal carcinoma.  Right axillary node showed metastatic carcinoma consistent with breast primary.  ER 90%, PA 5-7%, HER2 +2.  FISH  negative.  Similar findings in the axillary node although NH was 30%.  HER2 +2, FISH negative.  April 18, 2024 patient had CT chest ab pelvis showing soft tissue nodules right breast.  Right infrahilar and internal mammary nodes indeterminate.  Right axillary nodes up to 0.5 cm.  2 enhancing soft tissue nodules in the gastric fundus.  Bone scan showed focus of activity at anterior right seventh rib.     4/4/2024 Biopsy    Breast, Right, US BX RT BREAST 700 7CMFN 3 PASSES 12G MARQUEE:      - Invasive mammary carcinoma of no special type (ductal NST/invasive ductal carcinoma) with apocrine features, see note      - Elsy grade 2 of 3 (total score: 6 of 9)          - Tubule formation: <10%, score 3          - Nuclear grade 2 of 3, score 2          - Mitoses < 3/mm2, (</= 7 mitoses/10HPF), score 1      - Invasive carcinoma involves 3 of 3 submitted core biopsies, max. dimension = 5 millimeters      - Ductal carcinoma in situ (DCIS): Not identified      - Microcalcifications: Absent      - Lymphovascular invasion: Present     Breast, Right, US BX RT BREAST 900 7CMFN 3 PASSES 12G MARQUEE:      - Invasive mammary carcinoma of no special type (ductal NST/invasive ductal carcinoma) with apocrine features, see note      - Elsy grade 2 of 3 (total score: 6 of 9)          - Tubule formation: <10%, score 3          - Nuclear grade 2 of 3, score 2          - Mitoses < 3/mm2, (</= 7 mitoses/10HPF), score 1      - Invasive carcinoma involves 3 of 3 submitted core biopsies, max. dimension = 14 millimeters      - Ductal carcinoma in situ (DCIS): Not identified      - Microcalcifications: Absent      - Lymphovascular invasion: Not identified    Axillary lymph node, US BX RT AXILLARY TAIL LN 10:00 12CMFN 3 PASSES 12G MARQUEE:      - Metastatic carcinoma consistent with mammary primary,    BREAST TUMOR PROGNOSTIC PROFILE     Performed on invasive carcinoma block E1:  Test Description Result Prognostic Interpretation    Estrogen Receptor/ER  Primary Antibody: SP-1  Internal control: Positive   External control: Positive 90%  Staining Intensity: Strong  Isabela Score*: 8 Positive   Progesterone Receptor/PgR  Primary Antibody:1E2  Internal control: Positive  External control: Positive 5-7%  Staining Intensity: Strong  Isabela Score*: 4 Positive   HER2 by IHC   Primary Antibody: 4B5 2+ Equivocal *      Performed on invasive carcinoma block F2:  Test Description Result Prognostic Interpretation   Estrogen Receptor/ER  Primary Antibody: SP-1  Internal control: Positive   External control: Positive 95%  Staining Intensity: Strong  Isabela Score*: 8 Positive   Progesterone Receptor/PgR  Primary Antibody:1E2  Internal control: Positive  External control: Positive 30%  Staining Intensity: Moderate  Isabela Score*: 5 Positive   HER2 by IHC   Primary Antibody: 4B5 2+ Equivocal *       Fluorescence in situ hybridization (FISH) analysis of HER2 overexpression by invasive breast carcinoma cells, block E1 performed at [facility], [place], [place], yields the following results:  Test Description                        Interpretation  HER2 by FISH analysis:              Negative / Not Amplified.  Results  HER2: CEP-17 ratio :                  1.5: 1  Average HER2 Signal:                5.4  Average CEP-17 Signal:                          3.5  Number of selected invasive cells scanned           100     Fluorescence in situ hybridization (FISH) analysis of HER2 overexpression by invasive breast carcinoma cells, block F2 performed at [facility], [place], [place], yields the following results:  Test Description                        Interpretation  HER2 by FISH analysis:              Negative / Not Amplified.  Results  HER2: CEP-17 ratio :                  1.3: 1  Average HER2 Signal:                3.5  Average CEP-17 Signal:                          2.6  Number of selected invasive cells scanned           50         4/4/2024 Initial Diagnosis    Malignant neoplasm of overlapping sites of right breast in female, estrogen receptor positive (HCC)     4/18/2024 Genetic Testing    AMBRY  A total of 36 genes were evaluated, including: APC, MIKE, BARD 1, BMPR1A, BRCA1, BRCA2, BRIP1, CDH1, CDK4, CKDN2A, CHEK2, DICER1, MLH1, MSH2, MSH6, MUTYH, NBN, NF1, NTHL1, PALB2, PMS2, PTEN, RAD51C, RECQL, SMAD4, SMARCA4, STK11, TP53, AXIN2, HOXB13, MSH3, POLD1, AND POLE, EPCAM, AND GREM1     LIKELY PATHOGENIC VARIANT CHEK2  VUS SDHA     10/29/2024 Surgery    Right modified radical mastectomy left simple mastectomy    RIGHT  Multifocal residual invasive mammary carcinoma of no special type (ductal) s/p neoadjuvant chemotherapy  21 mm, 8 mm, 2 mm  Grade 3   Margins negative  ER 90  NM 5  HER2 2+  FISH negative  4/4 Lymph nodes     Anatomic Stage IIIA  Prognostic Stage (use AJCC update): N/A (status post neoadjuvant therapy).    LEFT  Benign fibroadenoma (0.8 cm), Benign fibrocystic changes with atypia (atypical lobular hyperplasia/ALH     10/29/2024 -  Cancer Staged    Staging form: Breast, AJCC 8th Edition  - Clinical stage from 10/29/2024: Stage IIIA (cT2, cN2, cM0, G3, ER+, NM+, HER2-) - Signed by Jevon Valdez MD on 11/21/2024  Stage prefix: Initial diagnosis  Histologic grading system: 3 grade system       Malignant neoplasm of overlapping sites of right breast in female, estrogen receptor positive (HCC)   4/4/2024 Biopsy    Breast, Right, US BX RT BREAST 700 7CMFN 3 PASSES 12G MARQUEE:      - Invasive mammary carcinoma of no special type (ductal NST/invasive ductal carcinoma) with apocrine features, see note      - Riva grade 2 of 3 (total score: 6 of 9)          - Tubule formation: <10%, score 3          - Nuclear grade 2 of 3, score 2          - Mitoses < 3/mm2, (</= 7 mitoses/10HPF), score 1      - Invasive carcinoma involves 3 of 3 submitted core biopsies, max. dimension = 5 millimeters      - Ductal carcinoma in situ  (DCIS): Not identified      - Microcalcifications: Absent      - Lymphovascular invasion: Present     Breast, Right, US BX RT BREAST 900 7CMFN 3 PASSES 12G MARQUEE:      - Invasive mammary carcinoma of no special type (ductal NST/invasive ductal carcinoma) with apocrine features, see note      - Elsy grade 2 of 3 (total score: 6 of 9)          - Tubule formation: <10%, score 3          - Nuclear grade 2 of 3, score 2          - Mitoses < 3/mm2, (</= 7 mitoses/10HPF), score 1      - Invasive carcinoma involves 3 of 3 submitted core biopsies, max. dimension = 14 millimeters      - Ductal carcinoma in situ (DCIS): Not identified      - Microcalcifications: Absent      - Lymphovascular invasion: Not identified    Axillary lymph node, US BX RT AXILLARY TAIL LN 10:00 12CMFN 3 PASSES 12G MARQUEE:      - Metastatic carcinoma consistent with mammary primary,    BREAST TUMOR PROGNOSTIC PROFILE     Performed on invasive carcinoma block E1:  Test Description Result Prognostic Interpretation   Estrogen Receptor/ER  Primary Antibody: SP-1  Internal control: Positive   External control: Positive 90%  Staining Intensity: Strong  Isabela Score*: 8 Positive   Progesterone Receptor/PgR  Primary Antibody:1E2  Internal control: Positive  External control: Positive 5-7%  Staining Intensity: Strong  Isabela Score*: 4 Positive   HER2 by IHC   Primary Antibody: 4B5 2+ Equivocal *      Performed on invasive carcinoma block F2:  Test Description Result Prognostic Interpretation   Estrogen Receptor/ER  Primary Antibody: SP-1  Internal control: Positive   External control: Positive 95%  Staining Intensity: Strong  Isabela Score*: 8 Positive   Progesterone Receptor/PgR  Primary Antibody:1E2  Internal control: Positive  External control: Positive 30%  Staining Intensity: Moderate  Isabela Score*: 5 Positive   HER2 by IHC   Primary Antibody: 4B5 2+ Equivocal *       Fluorescence in situ hybridization (FISH) analysis of HER2 overexpression by  invasive breast carcinoma cells, block E1 performed at [facility], [place], [place], yields the following results:  Test Description                        Interpretation  HER2 by FISH analysis:              Negative / Not Amplified.  Results  HER2: CEP-17 ratio :                  1.5: 1  Average HER2 Signal:                5.4  Average CEP-17 Signal:                          3.5  Number of selected invasive cells scanned           100     Fluorescence in situ hybridization (FISH) analysis of HER2 overexpression by invasive breast carcinoma cells, block F2 performed at [facility], [place], [place], yields the following results:  Test Description                        Interpretation  HER2 by FISH analysis:              Negative / Not Amplified.  Results  HER2: CEP-17 ratio :                  1.3: 1  Average HER2 Signal:                3.5  Average CEP-17 Signal:                          2.6  Number of selected invasive cells scanned           50        4/4/2024 Initial Diagnosis    Malignant neoplasm of overlapping sites of right breast in female, estrogen receptor positive (HCC)     4/18/2024 Genetic Testing    AMBRY  A total of 36 genes were evaluated, including: APC, MIKE, BARD 1, BMPR1A, BRCA1, BRCA2, BRIP1, CDH1, CDK4, CKDN2A, CHEK2, DICER1, MLH1, MSH2, MSH6, MUTYH, NBN, NF1, NTHL1, PALB2, PMS2, PTEN, RAD51C, RECQL, SMAD4, SMARCA4, STK11, TP53, AXIN2, HOXB13, MSH3, POLD1, AND POLE, EPCAM, AND GREM1     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    LEFT  Benign fibroadenoma (0.8  cm), Benign fibrocystic changes with atypia (atypical lobular hyperplasia/ALH         Interval History: Clinically stable.  ECOG-  0 - Asymptomatic    Review of Systems   Constitutional:  Negative for appetite change, diaphoresis, fatigue and fever.   HENT:  Negative for sinus pain.    Eyes:  Negative for discharge.   Respiratory:  Negative for cough and shortness of breath.    Cardiovascular:  Negative for chest pain.   Gastrointestinal:  Negative for abdominal pain, constipation and diarrhea.   Endocrine: Negative for cold intolerance.   Genitourinary:  Negative for difficulty urinating and hematuria.   Musculoskeletal:  Negative for joint swelling.   Skin:  Negative for rash.   Allergic/Immunologic: Negative for environmental allergies.   Neurological:  Negative for dizziness and headaches.   Hematological:  Negative for adenopathy.   Psychiatric/Behavioral:  Negative for agitation.        Past Medical History:   Diagnosis Date   • Cancer (HCC)     Breast, right   • Colon polyp    • Esophagitis    • History of benign breast tumor    • Hyperlipidemia    • Hypertension    • Urinary incontinence    • Venous ulcer of ankle (HCC) 01/10/2022     Patient Active Problem List   Diagnosis   • Chronic atrophic gastritis   • Dysphagia, pharyngoesophageal   • Urinary incontinence   • Gastroparesis   • Hemorrhoids, external   • Asymptomatic postmenopausal status   • Essential hypertension   • Pernicious anemia   • Breast cancer metastasized to axillary lymph node, right (HCC)   • CHEK2 gene mutation positive   • Neuroendocrine carcinoma of stomach (HCC)   • Hyperlipidemia   • Malignant neoplasm of overlapping sites of right breast in female, estrogen receptor positive (HCC)       Current Outpatient Medications:   •  acetaminophen (TYLENOL) 325 mg tablet, Take 650 mg by mouth every 6 (six) hours as needed for mild pain, Disp: , Rfl:   •  anastrozole (ARIMIDEX) 1 mg tablet, Take 1 tablet (1 mg total) by mouth daily, Disp: 90  tablet, Rfl: 3  •  Ascorbic Acid (vitamin C) 100 MG tablet, Take 100 mg by mouth daily, Disp: , Rfl:   •  hydroCHLOROthiazide 25 mg tablet, TAKE 1 TABLET (25 MG TOTAL) BY MOUTH DAILY., Disp: 90 tablet, Rfl: 1  •  Multiple Vitamins-Minerals (multivitamin with minerals) tablet, Take 1 tablet by mouth daily, Disp: , Rfl:   •  pravastatin (PRAVACHOL) 40 mg tablet, TAKE 1 TABLET BY MOUTH DAILY AT BEDTIME, Disp: 90 tablet, Rfl: 1  •  Zinc 100 MG TABS, Take by mouth, Disp: , Rfl:   •  acetaminophen-codeine (TYLENOL with CODEINE #3) 300-30 MG per tablet, Take 1 tablet by mouth every 6 (six) hours as needed for severe pain, Disp: 30 tablet, Rfl: 0  •  naproxen (NAPROSYN) 500 mg tablet, TAKE 1 TABLET BY MOUTH TWICE A DAY WITH FOOD (Patient not taking: Reported on 11/25/2024), Disp: 30 tablet, Rfl: 0    Current Facility-Administered Medications:   •  cyanocobalamin injection 1,000 mcg, 1,000 mcg, Intramuscular, Q30 Days, Lorraine Mirlande Dennis, , 1,000 mcg at 11/26/24 1033  Allergies   Allergen Reactions   • Penicillins Other (See Comments)     Unknown reaction, positive on allergy testing     Past Surgical History:   Procedure Laterality Date   • ANKLE SURGERY Right    • BREAST EXCISIONAL BIOPSY Left 1998    benign   • BUNIONECTOMY Bilateral    • COLONOSCOPY     • CORRECTION HAMMER TOE Bilateral    • MAMMO STEREOTACTIC BREAST BIOPSY LEFT (ALL INC) Left 4/4/2024   • MAMMO STEREOTACTIC BREAST BIOPSY RIGHT (ALL INC) EACH ADD Right 4/4/2024   • MODIFIED RADICAL MASTECTOMY W/ AXILLARY LYMPH NODE DISSECTION Right 10/29/2024    Procedure: RIGHT MODIFIED RADICAL MASTECTOMY LEVEL I AND LEVEL II LYMPH NODE DISSECTION, GIOVANA CLIPS IN THE RIGHT BREAST X2, AXILLARY LN;  Surgeon: Jevon Valdez MD;  Location: [facility];  Service: Surgical Oncology   • SIMPLE MASTECTOMY Left 10/29/2024    Procedure: LEFT MASTECTOMY;  Surgeon: Jevon Valdez MD;  Location: [facility];  Service: Surgical Oncology   • US GUIDANCE  "BREAST BIOPSY RIGHT EACH ADDITIONAL Right 4/4/2024   • US GUIDED BREAST BIOPSY RIGHT COMPLETE Right 4/4/2024   • US GUIDED BREAST LYMPH NODE BIOPSY RIGHT Right 4/4/2024   • US GUIDED LYMPH NODE BIOPSY LEFT  5/15/2024   • VEIN LIGATION Right 06/06/2022    Procedure: Venaseal therapy and stab phlebectomy;  Surgeon: Gosia Shepherd MD;  Location: [facility];  Service: Vascular     Social History    Objective:  Vitals:    12/05/24 0949   BP: 132/60   Patient Position: Sitting   Cuff Size: Standard   Pulse: 68   Resp: 17   Temp: 98.3 °F (36.8 °C)   TempSrc: Temporal   SpO2: 95%   Weight: 65.2 kg (143 lb 12.8 oz)   Height: 5' 6\" (1.676 m)     Physical Exam  Constitutional:       Appearance: She is well-developed.   HENT:      Head: Normocephalic and atraumatic.   Eyes:      Pupils: Pupils are equal, round, and reactive to light.   Cardiovascular:      Rate and Rhythm: Normal rate.      Heart sounds: No murmur heard.  Pulmonary:      Effort: No respiratory distress.      Breath sounds: No wheezing or rales.   Abdominal:      General: There is no distension.      Palpations: Abdomen is soft.      Tenderness: There is no abdominal tenderness. There is no rebound.   Musculoskeletal:         General: No tenderness.      Cervical back: Neck supple.   Lymphadenopathy:      Cervical: No cervical adenopathy.   Skin:     General: Skin is warm.      Findings: No rash.   Neurological:      Mental Status: She is alert and oriented to person, place, and time.      Deep Tendon Reflexes: Reflexes normal.   Psychiatric:         Thought Content: Thought content normal.           Labs:  I personally reviewed the labs and imaging pertinent to this patient care.  "

## 2024-12-05 NOTE — PROGRESS NOTES
[facility] [place]  [address] [facility] [address]  [facility] [id number]  [phone]    Juve Duarte,1950, [id number]  12/05/24    Discussion:   In summary, this is a 74-year-old female history of right breast infiltrating ductal carcinoma, T1, N1, MX.  ER 90, AL 5, HER2 +2.  FISH negative.  FDG PET/CT showed hypermetabolism in the right lateral breast and right axilla.  Hypermetabolic left upper cervical node, SUV 3.5 noted.  Patient was started on Arimidex 1 mg p.o. daily.  May 2024 EGD showed angioectasias, cauterized.  T1 well-differentiated neuroendocrine tumor x 2.  October 29, 2024 right mastectomy showed invasive ductal carcinoma, G3, 2.1 cm.  4/4 nodes positive.  ypT2, N2a.  ER 90, AL 6, HER2 +2, FISH negative.  She had no significant complications postoperatively.  We reviewed that options to move forward could include hormonal blockade, hormonal blockade plus CDK inhibitor, hormonal/CDK inhibitor plus chemotherapy.  Incremental risk for benefit declines and potential for toxicity rises in this order.  We also briefly discussed potential utility of adjuvant radiation therapy, applicable due to ernie status.  She has RT consult tomorrow.  I asked the patient and her  to consider this over the next few weeks.  I will meet them at that time.    I discussed the above with the patient.  The patient  voiced understanding and agreement.  ______________________________________________________________________    Chief Complaint   Patient presents with    Follow-up       HPI:  Oncology History Overview Note    with multifocal right breast cancer with axillary lymph node metastatic disease.  With incidental finding of low-grade gastric neuroendocrine tumor. She is s/p bilateral mastectomy. Stage IIIA (cT2, cN2, cM0, G3, ER+, AL+, HER2-)       4/4/2024Breast, Left, Stereo bx left breast 2 o'clock, 2 cores with calcs, 8G 12cm:      - Benign breast parenchyma with  usual ductal hyperplasia       - Microcalcification's present in benign epithelium      - Negative for atypia or carcinoma  B. Breast, Left, Stereo bx left breast 2 o'clock, 2 cores without calcs, 8G 12cm:      - Benign breast parenchyma with usual ductal hyperplasia and fibroadenomatoid change      - Microcalcification's present in benign epithelium      - Negative for atypia or carcinoma   C. Breast, Right, Stereo bx right breast 8 o'clock, 4 cores with calcs, 8g 12cm:      - Benign breast parenchyma with usual ductal hyperplasia and fibroadenomatoid change      - Microcalcification's present in benign epithelium, within stroma, and involving ducts      - Negative for atypia or carcinoma   D. Breast, Right, US BX RT BREAST 700 7CMFN 3 PASSES 12G MARQU[place]:      - Invasive mammary carcinoma of no special type (ductal NST/invasive ductal carcinoma) with apocrine features, see note      - Elsy grade 2 of 3 (total score: 6 of 9)          - Tubule formation: <10%, score 3          - Nuclear grade 2 of 3, score 2          - Mitoses < 3/mm2, (</= 7 mitoses/10HPF), score 1      - Invasive carcinoma involves 3 of 3 submitted core biopsies, max. dimension = 5 millimeters      - Ductal carcinoma in situ (DCIS): Not identified      - Microcalcifications: Absent      - Lymphovascular invasion: Present       - See synoptic report for part D  E. Breast, Right, US BX RT BREAST 900 7CMFN 3 PASSES 12G MAR[place]:      - Invasive mammary carcinoma of no special type (ductal NST/invasive ductal carcinoma) with apocrine features, see note      - Elsy grade 2 of 3 (total score: 6 of 9)          - Tubule formation: <10%, score 3          - Nuclear grade 2 of 3, score 2          - Mitoses < 3/mm2, (</= 7 mitoses/10HPF), score 1      - Invasive carcinoma involves 3 of 3 submitted core biopsies, max. dimension = 14 millimeters      - Ductal carcinoma in situ (DCIS): Not identified      - Microcalcifications: Absent      -  Lymphovascular invasion: Not identified      - See synoptic report for part E  F. Axillary lymph node, US BX RT AXILLARY TAIL LN 10:00 12CMFN 3 PASSES 12G MAR[place]:      - Metastatic carcinoma consistent with mammary primary, see note      4/18/2024 CT cap: Enhancing soft tissue nodules in the right breast corresponding to biopsy-proven cancer as described above. Benign nodule on the left also shown.  Mildly enlarged right infrahilar lymph node and few right internal mammary lymph nodes are indeterminate but raise concern for involvement with tumor.  Few right axillary and subpectoral lymph nodes measuring up to 0.5 cm in the short axis are indeterminate. Consider further evaluation with PET/CT.  2 enhancing soft tissue nodules in the gastric fundus as described above. These are of uncertain etiology and may represent GI stromal tumors, with metastatic disease from breast cancer considered less likely. Consider further evaluation with endoscopy.      4/23/2024 Bone scan: Focus of activity in a right anterior rib at the costochondral junction, likely the seventh rib. This is nonspecific and may represent either a metastasis or benign etiology such as prior trauma. Attention on follow-up.       5/2/2024 PET :. Right lateral breast hypermetabolic nodule compatible with known malignancy. There is right axillary metastatic adenopathy.  2. Hypermetabolic left upper cervical node is also concerning for metastasis.      5/6/2024 US head neck soft tissue: 1.8 x 0.5 x 0.7 cm left level 2 lymph node corresponding to the abnormality on the PET scan with with some indeterminate vascularity and no additional ultrasound suspicious features  Correlate clinically      5/15/2024 Lymph node biopsy: . Lymph Node, Left Upper Cervical, FNA (ThinPrep and smear preparations ):  Atypical cellular changes.  Rare atypical epithelioid cells present.  Mixed lymphocytes and scattered histiocytes present, compatible with lymph node sampling.  No  flow immunophenotypic evidence of a lymphoproliferative disorder based on limited antibody panel ([employer]#[id number] / [id number], evaluated by Vinny Zamora M.D.).  Acellularity on cell block precludes further evaluation.      8/8/2024:well-differentiated neuroendocrine tumor, grade 2 (carcinoid tumor).  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  -Negative for Helicobacter pylori, confirmed by immunohistochemical stain performed with appropriate controls.      8/21/2024PET: FES avid right breast lesion compatible with the known malignancy. This appears slightly smaller compared to prior PET/CT.  2. Solitary FES-avid right axillary lymph node compatible with metastasis. This also appears smaller from prior PET/CT. Previously there was an additional smaller right axillary lymph node with mild FDG uptake which is not FES avid.  3. No suspicious FES avid lesions in the neck, abdomen or pelvis. Previously noted small left upper cervical chain lymph node does not demonstrate FES uptake and also appears slightly smaller on CT.      8/29/2024 Gabriella: started on Arimidex 1 mg p.o. daily. F/U 2 months.      8/30/2024 GI: Patient is following up with Dr. Up. Further recommendations will follow that visit. She is tentatively scheduled for repeat EGD at the 3-month karina.       9/19/2024 Up: incidentally discovered well-differentiated neuroendocrine tumor on upper endoscopy. This appeared to be in a small gastric polyp. recommend that she undergo EMR to evaluate the biopsy site. If there is no evidence of disease, she was likely adequately treated just with the biopsy. EMR to be after recovery from breast surgery.      10/2/2024 Chest xray: No acute cardiopulmonary disease.  Chronic apical scarring with right apical granuloma      10/29/2024     Breast, Right, RIGHT Breast modified radical mastectomy, suture marks Axillary tail, GIOVANA clipped lymph node in Axillary Tail with additional nodes:  - Multifocal residual invasive mammary carcinoma of no special type (ductal) s/p neoadjuvant     chemotherapy:  Largest residual focus of invasive carcinoma, 21 mm in greatest dimension, present in    lower outer quadrant/LOQ, 7-8 o'clock:     -- [place] histologic grade 3 of 3 (total score: 8 of 9)        * Glandular (acinar) Tubular Differentiation 10-75%, score 2.        * Nuclear Pleomorphism 2-3 of 3, score 3.        * Mitotic Rate >/= 8/mm2, (>/= 15 mitoses/10HPF; 25 mitoses/10 HPF), score 3.    -- Confirmed by tumor cell immunophenotype:        * Positive: Pankeratin (CKAE1/3), E-cadherin, P120 (membranous), EMA (non-peripheral).        * Negative: p63, Calponin-B.    -- Associated prior biopsy site changes with GIOVANA  marker.    Second residual focus of invasive carcinoma with apocrine features, 8 mm in     greatest dimension, present 2.5 cm from largest invasive carcinoma between lower     outer and inner quadrants, 6 o'clock:    -- Elsy histologic grade 2 of 3 (total score: 7 of 9)        * Glandular (acinar) Tubular Differentiation < 10%, score 3.        * Nuclear Pleomorphism 3 of 3, score 3.        * Mitotic Rate < 3/mm2, (</= 7 mitoses/10HPF; 0 mitoses/10 HPF), score 1.    -- Associated prior biopsy site changes with GIOVANA  marker.    Third focus of invasive carcinoma with apocrine features, 2 mm, present in random    fibrous tissue of LOQ.   - Ductal carcinoma in-situ (DCIS): Present; not an extensive intraductal component     (~ 5% of total residual tumor).     -- Size and Extent: 36 mm in greatest approximate extent; focally present in 9 sequential         and 1 non-sequential sections/blocks; 10 of 29 blocks; associated with and adjacent to both         invasive carcinomas.     -- Architectural Patterns: Solid, cribriform,  comedo/clinging.    -- Nuclear grade: II-III (intermediate to high).    -- Necrosis: Present, focal and central necrosis identified.   - Residual Cancer [place] (RCB)/Treatment effect (largest residual invasive carcinoma):    -- Probable/definite response to presurgical therapy in invasive carcinoma (both foci of        invasive carcinoma).    -- Greatest and secondary dimensions of Primary Tumor Bed: 22 x 20 mm.    -- Percentage of overall Cancer Cellularity: ~ 65%.    -- Percentage of Cancer that is In-situ disease: 5%.    -- Number of Positive lymph nodes: 4.    -- Diameter of Largest Fabienne Metastasis: 25 mm.    -- Residual Cancer [place]: 4.215    -- Residual Cancer [place] Class: RCB-III.  - Margins uninvolved by invasive and in-situ carcinoma:    -- Invasive carcinoma < 1 mm from nearest posterior margin; 5 mm from LOQ anterior margin.    -- DCIS 8 mm from nearest LOQ anterior margin; 16 mm from nearest posterior margin.   - Benign nipple and skin:    -- DCIS does not involve the nipple epidermis.     -- No dermal lymphatic and/or vascular invasion.   - Lymphatic and/or vascular invasion: Present.    -- D2-40 and Pankeratin/CKC/CD31 immunostains performed.   - Two axillary lymph nodes positive for metastatic carcinoma consistent with breast primary (2/2).      -- Largest fabienne metastasis: 8mm; 5 mm (smaller lymph node).     -- Extranodal extension: Present, dispersed over 3 mm area of prior biopsy site fibrosis.      -- Confirmed by positive Pankeratin (CKAE1/3), E-cadherin, p120 (membranous),         GATA3 immunostains.     -- Associated fibrosis probable minimal response to presurgical therapy in         metastatic carcinoma.      -- Largest lymph node with prior biopsy site changes and GIOVANA  marker.   - Microcalcifications: Present, associated with invasive carcinoma, DCIS and     non-neoplastic breast tissue.   - Benign fibroadenoma, 0.8 cm.   - Benign fibrocystic changes without atypia (usual ductal  hyperplasia, columnar cell     change and hyperplasia, apocrine adenosis, fibroadenomatoid changes, cystic and     papillary apocrine metaplasia).    -- Associated prior biopsy site changes with [place] tie-shaped biopsy clip.       ** Traumatic neuroma present.   - Mammary duct ectasia.    B. Axillary lymph nodes, Right Axillary level I and level II lymph nodes, regional resection:  - Two lymph nodes positive for metastatic carcinoma consistent with breast primary (2/2).     -- Confirmed by positive Pankeratin (CKAE1/3), E-cadherin, p120 (membranous),         GATA3 immunostains.    -- 25 mm (near complete ernie replacement of largest lymph node) and 2.5 mm         in greatest dimensions of metastases.     -- Extranodal extension present, < 2 mm.    -- Associated fibrosis probable minimal response to presurgical therapy in        metastatic carcinoma.    C. Breast, Left, Left Breast mastectomy suture marks axillary tail:  - Negative for malignancy, in-situ carcinoma and atypical ductal hyperplasia.   - Benign nipple and skin.  - Benign fibroadenoma (0.8 cm).   - Benign fibrocystic changes with atypia (atypical lobular hyperplasia/ALH with     pagetoid ductal spread, usual ductal hyperplasia, columnar cell lesions/columnar     cell change and hyperplasia, sclerosing and apocrine adenosis, fibroadenomatoid     changes, cystic and papillary apocrine metaplasia).    -- Confirmed by E-cadherin, p120, CK5/6, Calponin-B immunostains    - Microcalcifications: Present, associated with ALH and non-neoplastic breast tissue.   - Prior biopsy site changes with U-shaped biopsy clip, upper outer quadrant/UOQ, 2 o'clock.   - Mammary duct ectasia.    Electronically signed by Janny Gordillo MD on 11/8/2024 at 2230 EST   Note    Intradepartmental consultation (WT) agrees with the above diagnoses for specimens A & B. A copy of the final report is sent to Dr. ALEXANDRIA Valdez on 11/8/24 @ 2222 hours.      1.- Repeat HER2 testing (Current  ASCO/CAP Recommendations): Indicated (s/p neoadjuvant therapy).       -- ER/NV/HER2 pending; will be reported in an addendum.       - Best representative tumor blocks:  A4, A8, B5 (lymph node metastasis).        -- Sufficient tumor present for          Agendia Mammaprint/Blueprint (1 cm2 of invasive tumor in aggregate):  Yes.          MI Profile/Foundation One (at least 5 x 5 mm of tumor): Yes.     2.  Pathologic Stage Classification (pTNM, AJCC 8th Edition):       8th ed, AJCC Anatomic Stage:  at least Stage IIIA - ypT2, ypN2a, cM0, G3.     3.  8th ed. AJCC Pathologic Prognostic Stage (use AJCC update): N/A (status post neoadjuvant therapy).          2024 Lareef: s/p bilateral mastectomy she is healing well. 3 drains have been removed. Right side single drain is still continued report of more than 40 cc clear yellow fluid. We will keep the drain for 1 more week. Will ask one of my colleagues to reevaluate for possible drain removal on Friday.       2024 Surg Onc: pull drains      2024 Dr. Quiroz      Upcomin2025 Endo/GI  24 Dr. Valdez     Breast cancer metastasized to axillary lymph node, right (HCC)   2024 Initial Diagnosis    2024 patient had biopsy of right breast mass showing invasive ductal carcinoma.  Right axillary node showed metastatic carcinoma consistent with breast primary.  ER 90%, NV 5-7%, HER2 +2.  FISH negative.  Similar findings in the axillary node although NV was 30%.  HER2 +2, FISH negative.  2024 patient had CT chest ab pelvis showing soft tissue nodules right breast.  Right infrahilar and internal mammary nodes indeterminate.  Right axillary nodes up to 0.5 cm.  2 enhancing soft tissue nodules in the gastric fundus.  Bone scan showed focus of activity at anterior right seventh rib.     2024 Biopsy    Breast, Right, US BX RT BREAST 700 7CMFN 3 PASSES 12G [place]QU[place]:      - Invasive mammary carcinoma of no special type (ductal NST/invasive  ductal carcinoma) with apocrine features, see note      - Elsy grade 2 of 3 (total score: 6 of 9)          - Tubule formation: <10%, score 3          - Nuclear grade 2 of 3, score 2          - Mitoses < 3/mm2, (</= 7 mitoses/10HPF), score 1      - Invasive carcinoma involves 3 of 3 submitted core biopsies, max. dimension = 5 millimeters      - Ductal carcinoma in situ (DCIS): Not identified      - Microcalcifications: Absent      - Lymphovascular invasion: Present     Breast, Right, US BX RT BREAST 900 7CMFN 3 PASSES 12G [place]QU[place]:      - Invasive mammary carcinoma of no special type (ductal NST/invasive ductal carcinoma) with apocrine features, see note      - [place] grade 2 of 3 (total score: 6 of 9)          - Tubule formation: <10%, score 3          - Nuclear grade 2 of 3, score 2          - Mitoses < 3/mm2, (</= 7 mitoses/10HPF), score 1      - Invasive carcinoma involves 3 of 3 submitted core biopsies, max. dimension = 14 millimeters      - Ductal carcinoma in situ (DCIS): Not identified      - Microcalcifications: Absent      - Lymphovascular invasion: Not identified    Axillary lymph node, US BX RT AXILLARY TAIL LN 10:00 12CMFN 3 PASSES 12G [place]:      - Metastatic carcinoma consistent with mammary primary,    BREAST TUMOR PROGNOSTIC PROFILE     Performed on invasive carcinoma block E1:  Test Description Result Prognostic Interpretation   Estrogen Receptor/ER  Primary Antibody: SP-1  Internal control: Positive   External control: Positive 90%  Staining Intensity: Strong  Isabela Score*: 8 Positive   Progesterone Receptor/PgR  Primary Antibody:1E2  Internal control: Positive  External control: Positive 5-7%  Staining Intensity: Strong  Isabela Score*: 4 Positive   HER2 by IHC   Primary Antibody: 4B5 2+ Equivocal *      Performed on invasive carcinoma block F2:  Test Description Result Prognostic Interpretation   Estrogen Receptor/ER  Primary Antibody: SP-1  Internal control: Positive   External  control: Positive 95%  Staining Intensity: Strong  Isabela Score*: 8 Positive   Progesterone Receptor/PgR  Primary Antibody:1E2  Internal control: Positive  External control: Positive 30%  Staining Intensity: Moderate  Isabela Score*: 5 Positive   HER2 by IHC   Primary Antibody: 4B5 2+ Equivocal *       Fluorescence in situ hybridization (FISH) analysis of HER2 overexpression by invasive breast carcinoma cells, block E1 [facility], [place], [place], yields the following results:  Test Description                        Interpretation  HER2 by FISH analysis:              Negative / Not Amplified.  Results  HER2: CEP-17 ratio :                  1.5: 1  Average HER2 Signal:                5.4  Average CEP-17 Signal:                          3.5  Number of selected invasive cells scanned           100     Fluorescence in situ hybridization (FISH) analysis of HER2 overexpression by invasive breast carcinoma cells, block F2 performed at [facility], [place], [place], yields the following results:  Test Description                        Interpretation  HER2 by FISH analysis:              Negative / Not Amplified.  Results  HER2: CEP-17 ratio :                  1.3: 1  Average HER2 Signal:                3.5  Average CEP-17 Signal:                          2.6  Number of selected invasive cells scanned           50        4/4/2024 Initial Diagnosis    Malignant neoplasm of overlapping sites of right breast in female, estrogen receptor positive (HCC)     4/18/2024 Genetic Testing    AMBRY  A total of 36 genes were evaluated, including: APC, MIKE, BARD 1, BMPR1A, BRCA1, BRCA2, BRIP1, CDH1, CDK4, CKDN2A, CHEK2, DICER1, MLH1, MSH2, MSH6, MUTYH, NBN, NF1, NTHL1, PALB2, PMS2, PTEN, RAD51C, RECQL, SMAD4, SMARCA4, STK11, TP53, AXIN2, HOXB13, MSH3, POLD1, AND POLE, EPCAM, AND GREM1     LIKELY PATHOGENIC VARIANT CHEK2  VUS SDHA     10/29/2024 Surgery    Right modified radical  mastectomy left simple mastectomy    RIGHT  Multifocal residual invasive mammary carcinoma of no special type (ductal) s/p neoadjuvant chemotherapy  21 mm, 8 mm, 2 mm  Grade 3   Margins negative  ER 90  CT 5  HER2 2+  FISH negative  4/4 Lymph nodes     Anatomic Stage IIIA  Prognostic Stage (use AJCC update): N/A (status post neoadjuvant therapy).    LEFT  Benign fibroadenoma (0.8 cm), Benign fibrocystic changes with atypia (atypical lobular hyperplasia/ALH     10/29/2024 -  Cancer Staged    Staging form: Breast, AJCC 8th Edition  - Clinical stage from 10/29/2024: Stage IIIA (cT2, cN2, cM0, G3, ER+, CT+, HER2-) - Signed by Jevon Valdez MD on 11/21/2024  Stage prefix: Initial diagnosis  Histologic grading system: 3 grade system       Malignant neoplasm of overlapping sites of right breast in female, estrogen receptor positive (HCC)   4/4/2024 Biopsy    Breast, Right, US BX RT BREAST 700 7CMFN 3 PASSES 12G [place]:      - Invasive mammary carcinoma of no special type (ductal NST/invasive ductal carcinoma) with apocrine features, see note      - Elsy grade 2 of 3 (total score: 6 of 9)          - Tubule formation: <10%, score 3          - Nuclear grade 2 of 3, score 2          - Mitoses < 3/mm2, (</= 7 mitoses/10HPF), score 1      - Invasive carcinoma involves 3 of 3 submitted core biopsies, max. dimension = 5 millimeters      - Ductal carcinoma in situ (DCIS): Not identified      - Microcalcifications: Absent      - Lymphovascular invasion: Present     Breast, Right, US BX RT BREAST 900 7CMFN 3 PASSES 12G [place]:      - Invasive mammary carcinoma of no special type (ductal NST/invasive ductal carcinoma) with apocrine features, see note      - Elsy grade 2 of 3 (total score: 6 of 9)          - Tubule formation: <10%, score 3          - Nuclear grade 2 of 3, score 2          - Mitoses < 3/mm2, (</= 7 mitoses/10HPF), score 1      - Invasive carcinoma involves 3 of 3 submitted core biopsies, max.  dimension = 14 millimeters      - Ductal carcinoma in situ (DCIS): Not identified      - Microcalcifications: Absent      - Lymphovascular invasion: Not identified    Axillary lymph node, US BX RT AXILLARY TAIL LN 10:00 12CMFN 3 PASSES 12G [place]:      - Metastatic carcinoma consistent with mammary primary,    BREAST TUMOR PROGNOSTIC PROFILE     Performed on invasive carcinoma block E1:  Test Description Result Prognostic Interpretation   Estrogen Receptor/ER  Primary Antibody: SP-1  Internal control: Positive   External control: Positive 90%  Staining Intensity: Strong  Isabela Score*: 8 Positive   Progesterone Receptor/PgR  Primary Antibody:1E2  Internal control: Positive  External control: Positive 5-7%  Staining Intensity: Strong  Isabela Score*: 4 Positive   HER2 by IHC   Primary Antibody: 4B5 2+ Equivocal *      Performed on invasive carcinoma block F2:  Test Description Result Prognostic Interpretation   Estrogen Receptor/ER  Primary Antibody: SP-1  Internal control: Positive   External control: Positive 95%  Staining Intensity: Strong  Isabela Score*: 8 Positive   Progesterone Receptor/PgR  Primary Antibody:1E2  Internal control: Positive  External control: Positive 30%  Staining Intensity: Moderate  Isabela Score*: 5 Positive   HER2 by IHC   Primary Antibody: 4B5 2+ Equivocal *       Fluorescence in situ hybridization (FISH) analysis of HER2 overexpression by invasive breast carcinoma cells, block E1 performed at [facility], [place], [place], yields the following results:  Test Description                        Interpretation  HER2 by FISH analysis:              Negative / Not Amplified.  Results  HER2: CEP-17 ratio :                  1.5: 1  Average HER2 Signal:                5.4  Average CEP-17 Signal:                          3.5  Number of selected invasive cells scanned           100     Fluorescence in situ hybridization (FISH) analysis of HER2 overexpression by invasive breast  carcinoma cells, block F2 performed at [facility], [place], [place], yields the following results:  Test Description                        Interpretation  HER2 by FISH analysis:              Negative / Not Amplified.  Results  HER2: CEP-17 ratio :                  1.3: 1  Average HER2 Signal:                3.5  Average CEP-17 Signal:                          2.6  Number of selected invasive cells scanned           50        4/4/2024 Initial Diagnosis    Malignant neoplasm of overlapping sites of right breast in female, estrogen receptor positive (HCC)     4/18/2024 Genetic Testing    AMBRY  A total of 36 genes were evaluated, including: APC, MIKE, BARD 1, BMPR1A, BRCA1, BRCA2, BRIP1, CDH1, CDK4, CKDN2A, CHEK2, DICER1, MLH1, MSH2, MSH6, MUTYH, NBN, NF1, NTHL1, PALB2, PMS2, PTEN, RAD51C, RECQL, SMAD4, SMARCA4, STK11, TP53, AXIN2, HOXB13, MSH3, POLD1, AND POLE, EPCAM, AND GREM1     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    LEFT  Benign fibroadenoma (0.8 cm), Benign fibrocystic changes with atypia (atypical lobular hyperplasia/ALH         Interval History: Clinically stable.  ECOG-  0 - Asymptomatic    Review of Systems   Constitutional:  Negative for appetite change, diaphoresis, fatigue and fever.   HENT:  Negative for sinus pain.    Eyes:  Negative for discharge.   Respiratory:  Negative for cough and shortness of breath.    Cardiovascular:  Negative for chest pain.   Gastrointestinal:  Negative for abdominal pain, constipation and diarrhea.   Endocrine: Negative for cold intolerance.   Genitourinary:   Negative for difficulty urinating and hematuria.   Musculoskeletal:  Negative for joint swelling.   Skin:  Negative for rash.   Allergic/Immunologic: Negative for environmental allergies.   Neurological:  Negative for dizziness and headaches.   Hematological:  Negative for adenopathy.   Psychiatric/Behavioral:  Negative for agitation.        Past Medical History:   Diagnosis Date    Cancer (HCC)     Breast, right    Colon polyp     Esophagitis     History of benign breast tumor     Hyperlipidemia     Hypertension     Urinary incontinence     Venous ulcer of ankle (HCC) 01/10/2022     Patient Active Problem List   Diagnosis    Chronic atrophic gastritis    Dysphagia, pharyngoesophageal    Urinary incontinence    Gastroparesis    Hemorrhoids, external    Asymptomatic postmenopausal status    Essential hypertension    Pernicious anemia    Breast cancer metastasized to axillary lymph node, right (HCC)    CHEK2 gene mutation positive    Neuroendocrine carcinoma of stomach (HCC)    Hyperlipidemia    Malignant neoplasm of overlapping sites of right breast in female, estrogen receptor positive (HCC)       Current Outpatient Medications:     acetaminophen (TYLENOL) 325 mg tablet, Take 650 mg by mouth every 6 (six) hours as needed for mild pain, Disp: , Rfl:     anastrozole (ARIMIDEX) 1 mg tablet, Take 1 tablet (1 mg total) by mouth daily, Disp: 90 tablet, Rfl: 3    Ascorbic Acid (vitamin C) 100 MG tablet, Take 100 mg by mouth daily, Disp: , Rfl:     hydroCHLOROthiazide 25 mg tablet, TAKE 1 TABLET (25 MG TOTAL) BY MOUTH DAILY., Disp: 90 tablet, Rfl: 1    Multiple Vitamins-Minerals (multivitamin with minerals) tablet, Take 1 tablet by mouth daily, Disp: , Rfl:     pravastatin (PRAVACHOL) 40 mg tablet, TAKE 1 TABLET BY MOUTH DAILY AT BEDTIME, Disp: 90 tablet, Rfl: 1    Zinc 100 MG TABS, Take by mouth, Disp: , Rfl:     acetaminophen-codeine (TYLENOL with CODEINE #3) 300-30 MG per tablet, Take 1 tablet by mouth every 6 (six)  hours as needed for severe pain, Disp: 30 tablet, Rfl: 0    naproxen (NAPROSYN) 500 mg tablet, TAKE 1 TABLET BY MOUTH TWICE A DAY WITH FOOD (Patient not taking: Reported on 11/25/2024), Disp: 30 tablet, Rfl: 0    Current Facility-Administered Medications:     cyanocobalamin injection 1,000 mcg, 1,000 mcg, Intramuscular, Q30 Days, Lorraine Mirlande Dennis, DO, 1,000 mcg at 11/26/24 1033  Allergies   Allergen Reactions    Penicillins Other (See Comments)     Unknown reaction, positive on allergy testing     Past Surgical History:   Procedure Laterality Date    ANKLE SURGERY Right     BREAST EXCISIONAL BIOPSY Left 1998    benign    BUNIONECTOMY Bilateral     COLONOSCOPY      CORRECTION HAMMER TOE Bilateral     MAMMO STEREOTACTIC BREAST BIOPSY LEFT (ALL INC) Left 4/4/2024    MAMMO STEREOTACTIC BREAST BIOPSY RIGHT (ALL INC) EACH ADD Right 4/4/2024    MODIFIED RADICAL MASTECTOMY W/ AXILLARY LYMPH NODE DISSECTION Right 10/29/2024    Procedure: RIGHT MODIFIED RADICAL MASTECTOMY LEVEL I AND LEVEL II LYMPH NODE DISSECTION, GIOVANA CLIPS IN THE RIGHT BREAST X2, AXILLARY LN;  Surgeon: Jevon Valdez MD;  Location: [facility];  Service: Surgical Oncology    SIMPLE MASTECTOMY Left 10/29/2024    Procedure: LEFT MASTECTOMY;  Surgeon: Jevon Valdez MD;  Location: [facility];  Service: Surgical Oncology    US GUIDANCE BREAST BIOPSY RIGHT EACH ADDITIONAL Right 4/4/2024    US GUIDED BREAST BIOPSY RIGHT COMPLETE Right 4/4/2024    US GUIDED BREAST LYMPH NODE BIOPSY RIGHT Right 4/4/2024    US GUIDED LYMPH NODE BIOPSY LEFT  5/15/2024    VEIN LIGATION Right 06/06/2022    Procedure: Venaseal therapy and stab phlebectomy;  Surgeon: Gosia Shepherd MD;  Location: [facility];  Service: Vascular     Social History     Objective:  Vitals:    12/05/24 0949   BP: 132/60   Patient Position: Sitting   Cuff Size: Standard   Pulse: 68   Resp: 17   Temp: 98.3 °F (36.8 °C)   TempSrc: Temporal   SpO2: 95%   Weight: 65.2 kg (143 lb  "12.8 oz)   Height: 5' 6\" (1.676 m)     Physical Exam  Constitutional:       Appearance: She is well-developed.   HENT:      Head: Normocephalic and atraumatic.   Eyes:      Pupils: Pupils are equal, round, and reactive to light.   Cardiovascular:      Rate and Rhythm: Normal rate.      Heart sounds: No murmur heard.  Pulmonary:      Effort: No respiratory distress.      Breath sounds: No wheezing or rales.   Abdominal:      General: There is no distension.      Palpations: Abdomen is soft.      Tenderness: There is no abdominal tenderness. There is no rebound.   Musculoskeletal:         General: No tenderness.      Cervical back: Neck supple.   Lymphadenopathy:      Cervical: No cervical adenopathy.   Skin:     General: Skin is warm.      Findings: No rash.   Neurological:      Mental Status: She is alert and oriented to person, place, and time.      Deep Tendon Reflexes: Reflexes normal.   Psychiatric:         Thought Content: Thought content normal.           Labs:  I personally reviewed the labs and imaging pertinent to this patient care.  "

## 2024-12-06 ENCOUNTER — CONSULT (OUTPATIENT)
Dept: RADIATION ONCOLOGY | Facility: CLINIC | Age: 74
End: 2024-12-06
Attending: RADIOLOGY
Payer: MEDICARE

## 2024-12-06 VITALS
HEART RATE: 67 BPM | WEIGHT: 144 LBS | DIASTOLIC BLOOD PRESSURE: 72 MMHG | SYSTOLIC BLOOD PRESSURE: 140 MMHG | TEMPERATURE: 97.8 F | BODY MASS INDEX: 23.24 KG/M2 | OXYGEN SATURATION: 92 %

## 2024-12-06 DIAGNOSIS — C50.811 MALIGNANT NEOPLASM OF OVERLAPPING SITES OF RIGHT BREAST IN FEMALE, ESTROGEN RECEPTOR POSITIVE (HCC): Primary | ICD-10-CM

## 2024-12-06 DIAGNOSIS — Z17.0 MALIGNANT NEOPLASM OF OVERLAPPING SITES OF RIGHT BREAST IN FEMALE, ESTROGEN RECEPTOR POSITIVE (HCC): Primary | ICD-10-CM

## 2024-12-06 DIAGNOSIS — C77.3 BREAST CANCER METASTASIZED TO AXILLARY LYMPH NODE, RIGHT (HCC): ICD-10-CM

## 2024-12-06 DIAGNOSIS — C50.911 BREAST CANCER METASTASIZED TO AXILLARY LYMPH NODE, RIGHT (HCC): ICD-10-CM

## 2024-12-06 PROCEDURE — 99205 OFFICE O/P NEW HI 60 MIN: CPT | Performed by: RADIOLOGY

## 2024-12-06 PROCEDURE — 99211 OFF/OP EST MAY X REQ PHY/QHP: CPT | Performed by: RADIOLOGY

## 2024-12-06 NOTE — LETTER
December 10, 2024     Jevon Valdez MD  [address]  [address]  [facility] [address]    Patient: Juve Duarte   YOB: 1950   Date of Visit: 2024       Dear Dr. Valdez:    Thank you for referring Juve Duarte to me for evaluation. Below are my notes for this consultation.    If you have questions, please do not hesitate to call me. I look forward to following your patient along with you.         Sincerely,        Kerri Patel MD        CC: DO Kerri Morfin MD  12/10/2024  9:02 AM  Sign when Signing Visit  Consultation Visit   Name: Juve Duarte      : 1950      MRN: [id number]  Encounter Provider: Kerri Patel MD  Encounter Date: 2024   Encounter department: [facility] RADIATION ONCOLOGY  :  Assessment & Plan  Malignant neoplasm of overlapping sites of right breast in female, estrogen receptor positive (HCC)  Juve Duarte 1950 is a 74 y.o. woman with a history of stage IIIA (V3xI5T2) G3 multifocal right breast cancer and synchronous low-grade gastric neuroendocrine tumor s/p bilateral mastectomies and right axillary lymph node resection achieving negative margins.  Pathology demonstrated 4/4 positive lymph nodes.  She had preoperative anastozole.  Tumor cells were ER+, MN+, HER2-. She has been offered various options of systemic therapy, but is currently undecided.  The gastric tumor was in polyp that was removed and she is currently undergoing surveillance.      Given her clinical presentation with 4 positive lymph nodes, she would benefit from adjuvant postmastectomy radiation to the chest wall and draining lymph nodes.  This would be expected to provide improved local regional control and possible survival.  We would plan to use conventional fractionation and standard dose without boost.  This is in accordance with NCCN guidelines.    The rationale and potential benefits, as well as the risks and acute and late side effects  and potential toxicities of radiation were discussed with the patient at length. Side effects discussed included, but were not limited to: Fatigue, skin erythema, hyperpigmentation, desquamation, fibrosis, shrinkage of the breast resulting asymmetry, rib weakening, pulmonary fibrosis, lymphedema, increased risk for cardiovascular disease. We discussed that if she decides to undergo reconstruction she will not be a candidate for expander placement due to prior radiation and will likely require JAIDEN consideration.    We discussed options of no radiation, but reviewed risks with this approach.    She has significant decreased range of motion of her right arm due to fibrotic changes.  We discussed need for PT and lymphedema consultation and she agreed to referral.  We will provide information to contact today and encouraged her to pursue.    The patient and her  were given the opportunity to ask questions and all questions were answered to their satisfaction.     She has agreed to pursue radiation.  We discussed that radiation is typically delivered post chemotherapy and so we await her decision regarding chemotherapy, which she is now leaning towards pursuing.    Orders:  •  Ambulatory referral to Radiation Oncology  •  Ambulatory referral to PT/OT lymphedema therapy; Future        History of Present Illness   Chief Complaint   Patient presents with   • Breast Cancer   • Consult   Pertinent Medical History   Juve Duarte 1950 is a 74 y.o. female  with multifocal right breast cancer with axillary lymph node metastatic disease and synchronous low-grade gastric neuroendocrine tumor. She is s/p bilateral mastectomy. Stage IIIA (cT2, cN2, cM0, G3, ER+, GA+, HER2-). She is being referred by Dr. Valdez and presents today for consult.     The patient self-palpated right breast nodule prompting bilateral diagnostic mammogram on 3/27/2024.    This demonstrated at least 2 suspicious masses and suspicious  calcifications in the right breast and abnormal appearing lymph node in the low axillary region of the right breast.  Ultrasound guided core needle biopsies were recommended.  Calcifications in the left breast are indeterminate.  Left stereotactic guided core needle biopsy is recommended.    4/4/2024 Bilateral Breast biopsies  Left breast biopses - benign  Right breast US BX at 700 and 900:      - Invasive mammary carcinoma of no special type (ductal NST/invasive ductal carcinoma) with apocrine features, grade 2  Axillary lymph node, US BX RT AXILLARY TAIL LN 10:00:      - Metastatic carcinoma consistent with mammary primary     4/18/2024 CT cap  Enhancing soft tissue nodules in the right breast corresponding to biopsy-proven cancer as described above. Benign nodule on the left also shown.  Mildly enlarged right infrahilar lymph node and few right internal mammary lymph nodes are indeterminate but raise concern for involvement with tumor.  Few right axillary and subpectoral lymph nodes measuring up to 0.5 cm in the short axis are indeterminate. Consider further evaluation with PET/CT.  2 enhancing soft tissue nodules in the gastric fundus as described above. These are of uncertain etiology and may represent GI stromal tumors, with metastatic disease from breast cancer considered less likely. Consider further evaluation with endoscopy.     4/23/2024 Bone scan: Focus of activity in a right anterior rib at the costochondral junction, likely the seventh rib. This is nonspecific and may represent either a metastasis or benign etiology such as prior trauma. Attention on follow-up.      5/2/2024 PET/CT   Right lateral breast hypermetabolic nodule compatible with known malignancy. There is right axillary metastatic adenopathy.  2. Hypermetabolic left upper cervical node is also concerning for metastasis.     5/6/2024 US head neck soft tissue:  1.8 x 0.5 x 0.7 cm left level 2 lymph node corresponding to the abnormality on  the PET scan with with some indeterminate vascularity and no additional ultrasound suspicious features  Correlate clinically     5/15/2024 Cervical Lymph node biopsy:  Atypical cellular changes.  Rare atypical epithelioid cells present.     8/8/2024 Stomach, fundus nodule biopsy  -well-differentiated neuroendocrine tumor, grade 2 (carcinoid tumor).  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  -Negative for Helicobacter pylori.        8/21/2024 CERIANNA PET/CT:   FES avid right breast lesion compatible with the known malignancy. This appears slightly smaller compared to prior PET/CT.  2. Solitary FES-avid right axillary lymph node compatible with metastasis. This also appears smaller from prior PET/CT. Previously there was an additional smaller right axillary lymph node with mild FDG uptake which is not FES avid.  3. No suspicious FES avid lesions in the neck, abdomen or pelvis. Previously noted small left upper cervical chain lymph node does not demonstrate FES uptake and also appears slightly smaller on CT.    8/29/2024 Gabriella: started on Arimidex 1 mg p.o. daily.        9/19/2024 Dr. Up, Surgical Oncology consultation:   incidentally discovered well-differentiated neuroendocrine tumor on upper endoscopy. This appeared to be in a small gastric polyp. recommend that she undergo EMR to evaluate the biopsy site. If there is no evidence of disease, she was likely adequately treated just with the biopsy. EMR to be after recovery from breast surgery.         10/29/2024   Right - RIGHT MODIFIED RADICAL MASTECTOMY LEVEL I AND LEVEL II LYMPH NODE DISSECTION. GIOVANA CLIPS IN THE RIGHT BREAST X2. AXILLARY LN  Left - LEFT MASTECTOMY  Right Breast  Multifocal grade 3 invasive ductal carcinoma with largest focus 21mm in size associated with grade 3  DCIS, no extensive component.  Extensive LVI, but no dermal LVI.  Margins were negative with close posterior margin at <1mm.  4/ lymph nodes demonstrated metastatic disease with largest deposit measuring 25mm and associated with LUCY.  Probable tumor response to systemic therapy.  Tumor cells were ER/CT(+) and HER2(-) per FISH.  Pathologic stage was IIIA (ypT2(m)pN2aM0).     2024 Surg Onc: pull drains      2024 Dr. Quiroz  reviewed that options to move forward could include hormonal blockade, hormonal blockade plus CDK inhibitor, hormonal/CDK inhibitor plus chemotherapy.   also briefly discussed potential utility of adjuvant radiation therapy, applicable due to ernie status. She has RT consult tomorrow.      The patient has no breast related complaints today.  She denies significant chest wall tenderness or pain.  Wounds are closed.  She denies palpable nodules, or suspicious skin changes chest wall bilaterally.  She has restriction of upper extremity on the right due to tightness across axilla.  Patient has been referred to PT, but deferred stating she had painful experience with PT prior.  She denies upper extremity edema bilaterally.    She is undecided regarding systemic therapy.   She states she is not interested in reconstruction and has no plans at this time.    Upcomin2025 Endo/GI  24 Dr. Valdez       Oncology History  Oncology History   Breast cancer metastasized to axillary lymph node, right (HCC)   2024 Initial Diagnosis    2024 patient had biopsy of right breast mass showing invasive ductal carcinoma.  Right axillary node showed metastatic carcinoma consistent with breast primary.  ER 90%, CT 5-7%, HER2 +2.  FISH negative.  Similar findings in the axillary node although CT was 30%.  HER2 +2, FISH negative.  2024 patient had CT chest ab pelvis showing soft tissue nodules right breast.  Right infrahilar and internal mammary nodes indeterminate.  Right  axillary nodes up to 0.5 cm.  2 enhancing soft tissue nodules in the gastric fundus.  Bone scan showed focus of activity at anterior right seventh rib.     4/4/2024 Biopsy    Breast, Right, US BX RT BREAST 700 7CMFN 3 PASSES 12G MARQUEE:      - Invasive mammary carcinoma of no special type (ductal NST/invasive ductal carcinoma) with apocrine features, see note      - [place] grade 2 of 3 (total score: 6 of 9)          - Tubule formation: <10%, score 3          - Nuclear grade 2 of 3, score 2          - Mitoses < 3/mm2, (</= 7 mitoses/10HPF), score 1      - Invasive carcinoma involves 3 of 3 submitted core biopsies, max. dimension = 5 millimeters      - Ductal carcinoma in situ (DCIS): Not identified      - Microcalcifications: Absent      - Lymphovascular invasion: Present     Breast, Right, US BX RT BREAST 900 7CMFN 3 PASSES 12G MARQUEE:      - Invasive mammary carcinoma of no special type (ductal NST/invasive ductal carcinoma) with apocrine features, see note      - Elsy grade 2 of 3 (total score: 6 of 9)          - Tubule formation: <10%, score 3          - Nuclear grade 2 of 3, score 2          - Mitoses < 3/mm2, (</= 7 mitoses/10HPF), score 1      - Invasive carcinoma involves 3 of 3 submitted core biopsies, max. dimension = 14 millimeters      - Ductal carcinoma in situ (DCIS): Not identified      - Microcalcifications: Absent      - Lymphovascular invasion: Not identified    Axillary lymph node, US BX RT AXILLARY TAIL LN 10:00 12CMFN 3 PASSES 12G MAY:      - Metastatic carcinoma consistent with mammary primary,    BREAST TUMOR PROGNOSTIC PROFILE     Performed on invasive carcinoma block E1:  Test Description Result Prognostic Interpretation   Estrogen Receptor/ER  Primary Antibody: SP-1  Internal control: Positive   External control: Positive 90%  Staining Intensity: Strong  Isabela Score*: 8 Positive   Progesterone Receptor/PgR  Primary Antibody:1E2  Internal control: Positive  External control:  Positive 5-7%  Staining Intensity: Strong  Isabela Score*: 4 Positive   HER2 by IHC   Primary Antibody: 4B5 2+ Equivocal *      Performed on invasive carcinoma block F2:  Test Description Result Prognostic Interpretation   Estrogen Receptor/ER  Primary Antibody: SP-1  Internal control: Positive   External control: Positive 95%  Staining Intensity: Strong  Isabela Score*: 8 Positive   Progesterone Receptor/PgR  Primary Antibody:1E2  Internal control: Positive  External control: Positive 30%  Staining Intensity: Moderate  Isabela Score*: 5 Positive   HER2 by IHC   Primary Antibody: 4B5 2+ Equivocal *       Fluorescence in situ hybridization (FISH) analysis of HER2 overexpression by invasive breast carcinoma cells, block E1 performed at [facility], [place], [place], yields the following results:  Test Description                        Interpretation  HER2 by FISH analysis:              Negative / Not Amplified.  Results  HER2: CEP-17 ratio :                  1.5: 1  Average HER2 Signal:                5.4  Average CEP-17 Signal:                          3.5  Number of selected invasive cells scanned           100     Fluorescence in situ hybridization (FISH) analysis of HER2 overexpression by invasive breast carcinoma cells, block F2 performed at [facility], [place], [place], yields the following results:  Test Description                        Interpretation  HER2 by FISH analysis:              Negative / Not Amplified.  Results  HER2: CEP-17 ratio :                  1.3: 1  Average HER2 Signal:                3.5  Average CEP-17 Signal:                          2.6  Number of selected invasive cells scanned           50        4/4/2024 Initial Diagnosis    Malignant neoplasm of overlapping sites of right breast in female, estrogen receptor positive (HCC)     4/18/2024 Genetic Testing    AMBRY  A total of 36 genes were evaluated, including: APC, MIKE, BARD 1, BMPR1A, BRCA1,  BRCA2, BRIP1, CDH1, CDK4, CKDN2A, CHEK2, DICER1, MLH1, MSH2, MSH6, MUTYH, NBN, NF1, NTHL1, PALB2, PMS2, PTEN, RAD51C, RECQL, SMAD4, SMARCA4, STK11, TP53, AXIN2, HOXB13, MSH3, POLD1, AND POLE, EPCAM, AND GREM1     LIKELY PATHOGENIC VARIANT CHEK2  VUS SDHA     10/29/2024 Surgery    Right modified radical mastectomy left simple mastectomy    RIGHT  Multifocal residual invasive mammary carcinoma of no special type (ductal) s/p neoadjuvant chemotherapy  21 mm, 8 mm, 2 mm  Grade 3   Margins negative  ER 90  NM 5  HER2 2+  FISH negative  4/4 Lymph nodes     Anatomic Stage IIIA  Prognostic Stage (use AJCC update): N/A (status post neoadjuvant therapy).    LEFT  Benign fibroadenoma (0.8 cm), Benign fibrocystic changes with atypia (atypical lobular hyperplasia/ALH     10/29/2024 -  Cancer Staged    Staging form: Breast, AJCC 8th Edition  - Clinical stage from 10/29/2024: Stage IIIA (cT2, cN2, cM0, G3, ER+, NM+, HER2-) - Signed by Jevon Valdez MD on 11/21/2024  Stage prefix: Initial diagnosis  Histologic grading system: 3 grade system       Malignant neoplasm of overlapping sites of right breast in female, estrogen receptor positive (HCC)   4/4/2024 Biopsy    Breast, Right, US BX RT BREAST 700 7CMFN 3 PASSES 12G MARQUEE:      - Invasive mammary carcinoma of no special type (ductal NST/invasive ductal carcinoma) with apocrine features, see note      - [place] grade 2 of 3 (total score: 6 of 9)          - Tubule formation: <10%, score 3          - Nuclear grade 2 of 3, score 2          - Mitoses < 3/mm2, (</= 7 mitoses/10HPF), score 1      - Invasive carcinoma involves 3 of 3 submitted core biopsies, max. dimension = 5 millimeters      - Ductal carcinoma in situ (DCIS): Not identified      - Microcalcifications: Absent      - Lymphovascular invasion: Present     Breast, Right, US BX RT BREAST 900 7CMFN 3 PASSES 12G MARQUEE:      - Invasive mammary carcinoma of no special type (ductal NST/invasive ductal  carcinoma) with apocrine features, see note      - Elsy grade 2 of 3 (total score: 6 of 9)          - Tubule formation: <10%, score 3          - Nuclear grade 2 of 3, score 2          - Mitoses < 3/mm2, (</= 7 mitoses/10HPF), score 1      - Invasive carcinoma involves 3 of 3 submitted core biopsies, max. dimension = 14 millimeters      - Ductal carcinoma in situ (DCIS): Not identified      - Microcalcifications: Absent      - Lymphovascular invasion: Not identified    Axillary lymph node, US BX RT AXILLARY TAIL LN 10:00 12CMFN 3 PASSES 12G MARQUEE:      - Metastatic carcinoma consistent with mammary primary,    BREAST TUMOR PROGNOSTIC PROFILE     Performed on invasive carcinoma block E1:  Test Description Result Prognostic Interpretation   Estrogen Receptor/ER  Primary Antibody: SP-1  Internal control: Positive   External control: Positive 90%  Staining Intensity: Strong  Isabela Score*: 8 Positive   Progesterone Receptor/PgR  Primary Antibody:1E2  Internal control: Positive  External control: Positive 5-7%  Staining Intensity: Strong  Isabela Score*: 4 Positive   HER2 by IHC   Primary Antibody: 4B5 2+ Equivocal *      Performed on invasive carcinoma block F2:  Test Description Result Prognostic Interpretation   Estrogen Receptor/ER  Primary Antibody: SP-1  Internal control: Positive   External control: Positive 95%  Staining Intensity: Strong  Isabela Score*: 8 Positive   Progesterone Receptor/PgR  Primary Antibody:1E2  Internal control: Positive  External control: Positive 30%  Staining Intensity: Moderate  Isabela Score*: 5 Positive   HER2 by IHC   Primary Antibody: 4B5 2+ Equivocal *       Fluorescence in situ hybridization (FISH) analysis of HER2 overexpression by invasive breast carcinoma cells, block E1 performed at [facility], [place], [place], yields the following results:  Test Description                        Interpretation  HER2 by FISH analysis:              Negative / Not  Amplified.  Results  HER2: CEP-17 ratio :                  1.5: 1  Average HER2 Signal:                5.4  Average CEP-17 Signal:                          3.5  Number of selected invasive cells scanned           100     Fluorescence in situ hybridization (FISH) analysis of HER2 overexpression by invasive breast carcinoma cells, block F2 performed at [facility], [place], [place], yields the following results:  Test Description                        Interpretation  HER2 by FISH analysis:              Negative / Not Amplified.  Results  HER2: CEP-17 ratio :                  1.3: 1  Average HER2 Signal:                3.5  Average CEP-17 Signal:                          2.6  Number of selected invasive cells scanned           50        4/4/2024 Initial Diagnosis    Malignant neoplasm of overlapping sites of right breast in female, estrogen receptor positive (HCC)     4/18/2024 Genetic Testing    AMBRY  A total of 36 genes were evaluated, including: APC, MIKE, BARD 1, BMPR1A, BRCA1, BRCA2, BRIP1, CDH1, CDK4, CKDN2A, CHEK2, DICER1, MLH1, MSH2, MSH6, MUTYH, NBN, NF1, NTHL1, PALB2, PMS2, PTEN, RAD51C, RECQL, SMAD4, SMARCA4, STK11, TP53, AXIN2, HOXB13, MSH3, POLD1, AND POLE, EPCAM, AND GREM1     LIKELY PATHOGENIC VARIANT CHEK2  VUS SDHA     10/29/2024 Surgery    Right modified radical mastectomy left simple mastectomy    RIGHT  Multifocal residual invasive mammary carcinoma of no special type (ductal) s/p neoadjuvant chemotherapy  21 mm, 8 mm, 2 mm  Grade 3   Margins negative  ER 90  ME 5  HER2 2+  FISH negative  4/4 Lymph nodes     Anatomic Stage IIIA  Prognostic Stage (use AJCC update): N/A (status post neoadjuvant therapy).    LEFT  Benign fibroadenoma (0.8 cm), Benign fibrocystic changes with atypia (atypical lobular hyperplasia/ALH       Review of Systems Refer to nursing note.       Objective   /72   Pulse 67   Temp 97.8 °F (36.6 °C)   Wt 65.3 kg (144 lb)   SpO2 92%   BMI 23.24 kg/m²      Pain Screening:  Pain Score: 0-No pain  ECOG  1  Physical Exam  Vitals and nursing note reviewed.   Constitutional:       General: She is not in acute distress.     Appearance: She is well-developed.   Cardiovascular:      Rate and Rhythm: Normal rate.   Pulmonary:      Breath sounds: No wheezing or rhonchi.   Chest:          Comments: Breast examination demonstrates bilateral mastectomy with wounds closed and well-healed.  No erythema, edema, discharge.  There are no palpable nodules or suspicious skin changes chest wall bilaterally.    Abdominal:      Palpations: Abdomen is soft.      Tenderness: There is no abdominal tenderness.   Musculoskeletal:      Right lower leg: No edema.      Left lower leg: No edema.      Comments: Decreased range of motion right upper extremity to about 90 degrees on extension.  No upper extremity edema bilaterally   Lymphadenopathy:      Cervical: No cervical adenopathy.      Upper Body:      Right upper body: No supraclavicular or axillary adenopathy.      Left upper body: No supraclavicular or axillary adenopathy.   Neurological:      Mental Status: She is alert and oriented to person, place, and time.        Radiology Results: I have reviewed radiology images/reports described above.   Pathology:  Collected 10/29/2024 12:23     Status: Edited Result - FINAL     Dx: Breast cancer metastasized to axillar...     Test Result Released: Yes (not seen)     0 Result Notes      Component  Ref Range & Units (hover)    Case Report   Surgical Pathology Report                         Case: [id number]                                   Authorizing Provider:  Jevon Valdez,  Collected:           10/29/2024 1223                                      MD                                                                            Ordering Location:     [facility] Received:            10/29/2024 1430                                      Operating Room                                                                 Pathologist:           Janny Gordillo MD                                                          Specimens:   A) - Breast, Right, RIGHT Breast mastectomy, suture marks Axillary tail , GIOVANA                       clipped lymph node in Axillary Tail with additional nodes                                            B) - Axillary lymph node, Right Axillary level I and level II lymph nodes                            C) - Breast, Left, Left Breast mastectomy suture marks axillary tail                       Addendum           Addendum electronically signed by Mirlande Morrell DO on 11/14/2024 at 1156 EST   Final Diagnosis   A. Breast, Right, RIGHT Breast modified radical mastectomy, suture marks Axillary tail, GIOVANA clipped lymph node in Axillary Tail with additional nodes:  - Multifocal residual invasive mammary carcinoma of no special type (ductal) s/p neoadjuvant     chemotherapy:  Largest residual focus of invasive carcinoma, 21 mm in greatest dimension, present in    lower outer quadrant/LOQ, 7-8 o'clock:     -- Elsy histologic grade 3 of 3 (total score: 8 of 9)        * Glandular (acinar) Tubular Differentiation 10-75%, score 2.        * Nuclear Pleomorphism 2-3 of 3, score 3.        * Mitotic Rate >/= 8/mm2, (>/= 15 mitoses/10HPF; 25 mitoses/10 HPF), score 3.    -- Confirmed by tumor cell immunophenotype:        * Positive: Pankeratin (CKAE1/3), E-cadherin, P120 (membranous), EMA (non-peripheral).        * Negative: p63, Calponin-B.    -- Associated prior biopsy site changes with GIOVANA  marker.    Second residual focus of invasive carcinoma with apocrine features, 8 mm in     greatest dimension, present 2.5 cm from largest invasive carcinoma between lower     outer and inner quadrants, 6 o'clock:    -- Elsy histologic grade 2 of 3 (total score: 7 of 9)        * Glandular (acinar) Tubular Differentiation < 10%, score 3.        * Nuclear Pleomorphism 3 of 3,  score 3.        * Mitotic Rate < 3/mm2, (</= 7 mitoses/10HPF; 0 mitoses/10 HPF), score 1.    -- Associated prior biopsy site changes with GIOVANA  marker.    Third focus of invasive carcinoma with apocrine features, 2 mm, present in random    fibrous tissue of LOQ.   - Ductal carcinoma in-situ (DCIS): Present; not an extensive intraductal component     (~ 5% of total residual tumor).     -- Size and Extent: 36 mm in greatest approximate extent; focally present in 9 sequential         and 1 non-sequential sections/blocks; 10 of 29 blocks; associated with and adjacent to both         invasive carcinomas.     -- Architectural Patterns: Solid, cribriform, comedo/clinging.    -- Nuclear grade: II-III (intermediate to high).    -- Necrosis: Present, focal and central necrosis identified.   - Residual Cancer [place] (RCB)/Treatment effect (largest residual invasive carcinoma):    -- Probable/definite response to presurgical therapy in invasive carcinoma (both foci of        invasive carcinoma).    -- Greatest and secondary dimensions of Primary Tumor Bed: 22 x 20 mm.    -- Percentage of overall Cancer Cellularity: ~ 65%.    -- Percentage of Cancer that is In-situ disease: 5%.    -- Number of Positive lymph nodes: 4.    -- Diameter of Largest Fabienne Metastasis: 25 mm.    -- Residual Cancer [place]: 4.215    -- Residual Cancer [place] Class: RCB-III.  - Margins uninvolved by invasive and in-situ carcinoma:    -- Invasive carcinoma < 1 mm from nearest posterior margin; 5 mm from LOQ anterior margin.    -- DCIS 8 mm from nearest LOQ anterior margin; 16 mm from nearest posterior margin.   - Benign nipple and skin:    -- DCIS does not involve the nipple epidermis.     -- No dermal lymphatic and/or vascular invasion.   - Lymphatic and/or vascular invasion: Present.    -- D2-40 and Pankeratin/CKC/CD31 immunostains performed.   - Two axillary lymph nodes positive for metastatic carcinoma consistent with breast primary (2/2).      --  Largest ernie metastasis: 8mm; 5 mm (smaller lymph node).     -- Extranodal extension: Present, dispersed over 3 mm area of prior biopsy site fibrosis.      -- Confirmed by positive Pankeratin (CKAE1/3), E-cadherin, p120 (membranous),         GATA3 immunostains.     -- Associated fibrosis probable minimal response to presurgical therapy in         metastatic carcinoma.      -- Largest lymph node with prior biopsy site changes and GIOVANA  marker.   - Microcalcifications: Present, associated with invasive carcinoma, DCIS and     non-neoplastic breast tissue.   - Benign fibroadenoma, 0.8 cm.   - Benign fibrocystic changes without atypia (usual ductal hyperplasia, columnar cell     change and hyperplasia, apocrine adenosis, fibroadenomatoid changes, cystic and     papillary apocrine metaplasia).    -- Associated prior biopsy site changes with York tie-shaped biopsy clip.       ** Traumatic neuroma present.   - Mammary duct ectasia.      B. Axillary lymph nodes, Right Axillary level I and level II lymph nodes, regional resection:  - Two lymph nodes positive for metastatic carcinoma consistent with breast primary (2/2).     -- Confirmed by positive Pankeratin (CKAE1/3), E-cadherin, p120 (membranous),         GATA3 immunostains.    -- 25 mm (near complete ernie replacement of largest lymph node) and 2.5 mm         in greatest dimensions of metastases.     -- Extranodal extension present, < 2 mm.    -- Associated fibrosis probable minimal response to presurgical therapy in        metastatic carcinoma.      C. Breast, Left, Left Breast mastectomy suture marks axillary tail:  - Negative for malignancy, in-situ carcinoma and atypical ductal hyperplasia.   - Benign nipple and skin.  - Benign fibroadenoma (0.8 cm).   - Benign fibrocystic changes with atypia (atypical lobular hyperplasia/ALH with     pagetoid ductal spread, usual ductal hyperplasia, columnar cell lesions/columnar     cell change and hyperplasia, sclerosing and  apocrine adenosis, fibroadenomatoid     changes, cystic and papillary apocrine metaplasia).    -- Confirmed by E-cadherin, p120, CK5/6, Calponin-B immunostains    - Microcalcifications: Present, associated with ALH and non-neoplastic breast tissue.   - Prior biopsy site changes with U-shaped biopsy clip, upper outer quadrant/UOQ, 2 o'clock.   - Mammary duct ectasia.    Electronically signed by Janny Gordillo MD on 11/8/2024 at 2230 EST   Note    Intradepartmental consultation (WT) agrees with the above diagnoses for specimens A & B. A copy of the final report is sent to Dr. ALEXANDRIA Valdez on 11/8/24 @ 2225 hours.      1.- Repeat HER2 testing (Current ASCO/CAP Recommendations): Indicated (s/p neoadjuvant therapy).       -- ER/MO/HER2 pending; will be reported in an addendum.       - Best representative tumor blocks:  A4, A8, B5 (lymph node metastasis).        -- Sufficient tumor present for          Agendia Mammaprint/Blueprint (1 cm2 of invasive tumor in aggregate):  Yes.          MI Profile/Foundation One (at least 5 x 5 mm of tumor): Yes.     2.  Pathologic Stage Classification (pTNM, AJCC 8th Edition):       8th ed, AJCC Anatomic Stage:  at least Stage IIIA - ypT2, ypN2a, cM0, G3.     3.  8th ed. AJCC Pathologic Prognostic Stage (use AJCC update): N/A (status post neoadjuvant therapy).    Additional Information    All reported additional testing was performed with appropriately reactive controls.  These tests were developed and their performance characteristics determined by [facility] or appropriate performing facility, though some tests may be performed on tissues which have not been validated for performance characteristics (such as staining performed on alcohol exposed cell blocks and decalcified tissues).  Results should be interpreted with caution and in the context of the patients’ clinical condition. These tests may not be cleared or approved by the U.S. Food and Drug Administration,  "though the FDA has determined that such clearance or approval is not necessary. These tests are used for clinical purposes and they should not be regarded as investigational or for research. This laboratory has been approved by CLIA 88, designated as a high-complexity laboratory and is qualified to perform these tests.  Interpretation performed at [facility], [address] [address].   Synoptic Checklist   INVASIVE CARCINOMA OF THE BREAST: Resection  8th Edition - Protocol posted: 6/19/2024  INVASIVE CARCINOMA OF THE BREAST: RESECTION - A, B  SPECIMEN   Procedure  Total mastectomy    Specimen Laterality  Right    TUMOR   Tumor Site  Lower outer quadrant    Histologic Type  Invasive carcinoma of no special type (ductal)    Histologic Grade (Elsy Histologic Score)     Glandular (Acinar) / Tubular Differentiation  Score 2    Nuclear Pleomorphism  Score 3    Mitotic Rate  Score 3    Overall Grade  Grade 3 (scores of 8 or 9)    Tumor Size  Greatest dimension of largest invasive focus (Millimeters): 21 mm   Tumor Focality  Multiple foci of invasive carcinoma    Number of Foci  3    Sizes of Individual Foci in Millimeters (mm)  21, 8, 2    Ductal Carcinoma In Situ (DCIS)  Present      Negative for extensive intraductal component (EIC)    Size (Extent) of DCIS  Estimated size (extent) of DCIS is at least (Millimeters): 36 mm   Architectural Patterns  [place]      Cribriform      Solid      Clinging.     Nuclear Grade  Grade III (high)    Necrosis  Present, central (expansive \"comedo\" necrosis)    Number of Blocks with DCIS  10    Number of Blocks Examined  29    Lobular Carcinoma In Situ (LCIS)  Not identified    Lymphatic and / or Vascular Invasion  Present      Extensive    Dermal Lymphatic and / or Vascular Invasion  Not identified    Microcalcifications  Present in DCIS      Present in invasive carcinoma      Present in non-neoplastic tissue    Treatment Effect in the Breast  Probable or definite " response to presurgical therapy in the invasive carcinoma    Treatment Effect in the Lymph Nodes  Probable or definite response to presurgical therapy in metastatic carcinoma    Residual Cancer [place] (RCB) Parameters     Greatest Dimension of Primary Tumor Bed Area (Millimeters)  22 mm   Second Greatest Dimension of Primary Tumor Bed Area (Millimeters)  20 mm   Percentage of Overall Cancer Cellularity  65 %   Percentage of Cancer that is In Situ Disease  5 %   Number of Positive Lymph Nodes  4    Diameter of Largest Fabienne Metastasis (Millimeters)  25 mm   Residual Cancer [place]  4.215    Residual Cancer [place] Class  RCB-III    MARGINS   Margin Status for Invasive Carcinoma  All margins negative for invasive carcinoma    Distance from Invasive Carcinoma to Closest Margin  Less than: 1 mm   Closest Margin(s) to Invasive Carcinoma  Posterior    Distance from Invasive Carcinoma to Anterior Margin  5 mm   Margin Status for DCIS  All margins negative for DCIS    Distance from DCIS to Closest Margin  8 mm   Closest Margin(s) to DCIS  Anterior    REGIONAL LYMPH NODES   Regional Lymph Node Status  Tumor present in regional lymph node(s)    Number of Lymph Nodes with Macrometastases  4    Number of Lymph Nodes with Micrometastases  0    Size of Largest Fabienne Metastatic Deposit  25 mm   Extranodal Extension  Present    Total Number of Lymph Nodes Examined (sentinel and non-sentinel)  4    pTNM CLASSIFICATION (AJCC 8th Edition)   Reporting of pT, pN, and (when applicable) pM categories is based on information available to the pathologist at the time the report is issued. As per the AJCC (Chapter 1, 8th Ed.) it is the managing physician's responsibility to establish the final pathologic stage based upon all pertinent information, including but potentially not limited to this pathology report.   Modified Classification  y    pT Category  pT2    T Suffix  (m)    pN Category  pN2a    SPECIAL STUDIES        Estrogen Receptor (ER)  Status  Positive (greater than 10% of cells demonstrate nuclear positivity)    Percentage of Cells with Nuclear Positivity  90 %        Progesterone Receptor (PgR) Status  Positive    Percentage of Cells with Nuclear Positivity  5-7 %        HER2 (by immunohistochemistry)  Equivocal (Score 2+)         HER2 (by in situ hybridization)  Negative (not amplified)    Testing Performed on Case Number  [id number]    Comment(s)  Repeat ER/NC/HER2 pending; will be reported in an addendum.    .           Administrative Statements   I have spent a total time of 60 minutes in caring for this patient on the day of the visit/encounter including Diagnostic results, Prognosis, Risks and benefits of tx options, Documenting in the medical record, Reviewing / ordering tests, medicine, procedures  , and Obtaining or reviewing history  .

## 2024-12-06 NOTE — ASSESSMENT & PLAN NOTE
Juve Duarte 1950 is a 74 y.o. woman with a history of stage IIIA ([id number]) G3 multifocal right breast cancer and synchronous low-grade gastric neuroendocrine tumor s/p bilateral mastectomies and right axillary lymph node resection achieving negative margins.  Pathology demonstrated 4/4 positive lymph nodes.  She had preoperative anastozole.  Tumor cells were ER+, [place]+, HER2-. She has been offered various options of systemic therapy, but is currently undecided.  The gastric tumor was in polyp that was removed and she is currently undergoing surveillance.      Given her clinical presentation with 4 positive lymph nodes, she would benefit from adjuvant postmastectomy radiation to the chest wall and draining lymph nodes.  This would be expected to provide improved local regional control and possible survival.  We would plan to use conventional fractionation and standard dose without boost.  This is in accordance with NCCN guidelines.    The rationale and potential benefits, as well as the risks and acute and late side effects and potential toxicities of radiation were discussed with the patient at length. Side effects discussed included, but were not limited to: Fatigue, skin erythema, hyperpigmentation, desquamation, fibrosis, shrinkage of the breast resulting asymmetry, rib weakening, pulmonary fibrosis, lymphedema, increased risk for cardiovascular disease. We discussed that if she decides to undergo reconstruction she will not be a candidate for expander placement due to prior radiation and will likely require JAIDEN consideration.    We discussed options of no radiation, but reviewed risks with this approach.    She has significant decreased range of motion of her right arm due to fibrotic changes.  We discussed need for PT and lymphedema consultation and she agreed to referral.  We will provide information to contact today and encouraged her to pursue.    The patient and her  were given the opportunity  to ask questions and all questions were answered to their satisfaction.     She has agreed to pursue radiation.  We discussed that radiation is typically delivered post chemotherapy and so we await her decision regarding chemotherapy, which she is now leaning towards pursuing.    Orders:    Ambulatory referral to Radiation Oncology    Ambulatory referral to PT/OT lymphedema therapy; Future

## 2024-12-06 NOTE — PROGRESS NOTES
Juve Duarte 1950 is a 74 y.o. female  with multifocal right breast cancer with axillary lymph node metastatic disease.  With incidental finding of low-grade gastric neuroendocrine tumor. She is s/p bilateral mastectomy. Stage IIIA (cT2, cN2, cM0, G3, ER+, MI+, HER2-). She is being referred by Dr. Valdez and presents today for consult.      4/4/2024Breast, Left, Stereo bx left breast 2 o'clock, 2 cores with calcs, 8G 12cm:      - Benign breast parenchyma with usual ductal hyperplasia       - Microcalcification's present in benign epithelium      - Negative for atypia or carcinoma  B. Breast, Left, Stereo bx left breast 2 o'clock, 2 cores without calcs, 8G 12cm:      - Benign breast parenchyma with usual ductal hyperplasia and fibroadenomatoid change      - Microcalcification's present in benign epithelium      - Negative for atypia or carcinoma   C. Breast, Right, Stereo bx right breast 8 o'clock, 4 cores with calcs, 8g 12cm:      - Benign breast parenchyma with usual ductal hyperplasia and fibroadenomatoid change      - Microcalcification's present in benign epithelium, within stroma, and involving ducts      - Negative for atypia or carcinoma   D. Breast, Right, US BX RT BREAST 700 7CMFN 3 PASSES 12G [place]:      - Invasive mammary carcinoma of no special type (ductal NST/invasive ductal carcinoma) with apocrine features, see note      - [place] grade 2 of 3 (total score: 6 of 9)          - Tubule formation: <10%, score 3          - Nuclear grade 2 of 3, score 2          - Mitoses < 3/mm2, (</= 7 mitoses/10HPF), score 1      - Invasive carcinoma involves 3 of 3 submitted core biopsies, max. dimension = 5 millimeters      - Ductal carcinoma in situ (DCIS): Not identified      - Microcalcifications: Absent      - Lymphovascular invasion: Present       - See synoptic report for part D  E. Breast, Right, US BX RT BREAST 900 7CMFN 3 PASSES 12G [place]:      - Invasive mammary carcinoma of no special type  (ductal NST/invasive ductal carcinoma) with apocrine features, see note      - [place] grade 2 of 3 (total score: 6 of 9)          - Tubule formation: <10%, score 3          - Nuclear grade 2 of 3, score 2          - Mitoses < 3/mm2, (</= 7 mitoses/10HPF), score 1      - Invasive carcinoma involves 3 of 3 submitted core biopsies, max. dimension = 14 millimeters      - Ductal carcinoma in situ (DCIS): Not identified      - Microcalcifications: Absent      - Lymphovascular invasion: Not identified      - See synoptic report for part E  F. Axillary lymph node, US BX RT AXILLARY TAIL LN 10:00 12CMFN 3 PASSES 12G [place]:      - Metastatic carcinoma consistent with mammary primary, see note      4/18/2024 CT cap: Enhancing soft tissue nodules in the right breast corresponding to biopsy-proven cancer as described above. Benign nodule on the left also shown.  Mildly enlarged right infrahilar lymph node and few right internal mammary lymph nodes are indeterminate but raise concern for involvement with tumor.  Few right axillary and subpectoral lymph nodes measuring up to 0.5 cm in the short axis are indeterminate. Consider further evaluation with PET/CT.  2 enhancing soft tissue nodules in the gastric fundus as described above. These are of uncertain etiology and may represent GI stromal tumors, with metastatic disease from breast cancer considered less likely. Consider further evaluation with endoscopy.      4/23/2024 Bone scan: Focus of activity in a right anterior rib at the costochondral junction, likely the seventh rib. This is nonspecific and may represent either a metastasis or benign etiology such as prior trauma. Attention on follow-up.       5/2/2024 PET :. Right lateral breast hypermetabolic nodule compatible with known malignancy. There is right axillary metastatic adenopathy.  2. Hypermetabolic left upper cervical node is also concerning for metastasis.      5/6/2024 US head neck soft tissue: 1.8 x 0.5 x 0.7  cm left level 2 lymph node corresponding to the abnormality on the PET scan with with some indeterminate vascularity and no additional ultrasound suspicious features  Correlate clinically      5/15/2024 Lymph node biopsy: . Lymph Node, Left Upper Cervical, FNA (ThinPrep and smear preparations ):  Atypical cellular changes.  Rare atypical epithelioid cells present.  Mixed lymphocytes and scattered histiocytes present, compatible with lymph node sampling.  No flow immunophenotypic evidence of a lymphoproliferative disorder based on limited antibody panel ([employer]#[id number] / [id number], evaluated by Vinny Zamora M.D.).  Acellularity on cell block precludes further evaluation.      8/8/2024:well-differentiated neuroendocrine tumor, grade 2 (carcinoid tumor).  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  -Negative for Helicobacter pylori, confirmed by immunohistochemical stain performed with appropriate controls.      8/21/2024PET: FES avid right breast lesion compatible with the known malignancy. This appears slightly smaller compared to prior PET/CT.  2. Solitary FES-avid right axillary lymph node compatible with metastasis. This also appears smaller from prior PET/CT. Previously there was an additional smaller right axillary lymph node with mild FDG uptake which is not FES avid.  3. No suspicious FES avid lesions in the neck, abdomen or pelvis. Previously noted small left upper cervical chain lymph node does not demonstrate FES uptake and also appears slightly smaller on CT.      8/29/2024 Gabriella: started on Arimidex 1 mg p.o. daily. F/U 2 months.      8/30/2024 GI: Patient is following up with Dr. Up. Further recommendations will follow that visit. She is tentatively scheduled for repeat EGD at the 3-month karina.       9/19/2024 Jeovanny:  incidentally discovered well-differentiated neuroendocrine tumor on upper endoscopy. This appeared to be in a small gastric polyp. recommend that she undergo EMR to evaluate the biopsy site. If there is no evidence of disease, she was likely adequately treated just with the biopsy. EMR to be after recovery from breast surgery.      10/2/2024 Chest xray: No acute cardiopulmonary disease.  Chronic apical scarring with right apical granuloma      10/29/2024    Breast, Right, RIGHT Breast modified radical mastectomy, suture marks Axillary tail, GIOVANA clipped lymph node in Axillary Tail with additional nodes:  - Multifocal residual invasive mammary carcinoma of no special type (ductal) s/p neoadjuvant     chemotherapy:  Largest residual focus of invasive carcinoma, 21 mm in greatest dimension, present in    lower outer quadrant/LOQ, 7-8 o'clock:     -- [place] histologic grade 3 of 3 (total score: 8 of 9)        * Glandular (acinar) Tubular Differentiation 10-75%, score 2.        * Nuclear Pleomorphism 2-3 of 3, score 3.        * Mitotic Rate >/= 8/mm2, (>/= 15 mitoses/10HPF; 25 mitoses/10 HPF), score 3.    -- Confirmed by tumor cell immunophenotype:        * Positive: Pankeratin (CKAE1/3), E-cadherin, P120 (membranous), EMA (non-peripheral).        * Negative: p63, Calponin-B.    -- Associated prior biopsy site changes with GIOVANA  marker.    Second residual focus of invasive carcinoma with apocrine features, 8 mm in     greatest dimension, present 2.5 cm from largest invasive carcinoma between lower     outer and inner quadrants, 6 o'clock:    -- [place] histologic grade 2 of 3 (total score: 7 of 9)        * Glandular (acinar) Tubular Differentiation < 10%, score 3.        * Nuclear Pleomorphism 3 of 3, score 3.        * Mitotic Rate < 3/mm2, (</= 7 mitoses/10HPF; 0 mitoses/10 HPF), score 1.    -- Associated prior biopsy site changes with GIOVANA  marker.    Third focus of invasive carcinoma with apocrine  features, 2 mm, present in random    fibrous tissue of LOQ.   - Ductal carcinoma in-situ (DCIS): Present; not an extensive intraductal component     (~ 5% of total residual tumor).     -- Size and Extent: 36 mm in greatest approximate extent; focally present in 9 sequential         and 1 non-sequential sections/blocks; 10 of 29 blocks; associated with and adjacent to both         invasive carcinomas.     -- Architectural Patterns: Solid, cribriform, comedo/clinging.    -- Nuclear grade: II-III (intermediate to high).    -- Necrosis: Present, focal and central necrosis identified.   - Residual Cancer [place] (RCB)/Treatment effect (largest residual invasive carcinoma):    -- Probable/definite response to presurgical therapy in invasive carcinoma (both foci of        invasive carcinoma).    -- Greatest and secondary dimensions of Primary Tumor Bed: 22 x 20 mm.    -- Percentage of overall Cancer Cellularity: ~ 65%.    -- Percentage of Cancer that is In-situ disease: 5%.    -- Number of Positive lymph nodes: 4.    -- Diameter of Largest [place] Metastasis: 25 mm.    -- Residual Cancer [place]: 4.215    -- Residual Cancer [place] Class: RCB-III.  - Margins uninvolved by invasive and in-situ carcinoma:    -- Invasive carcinoma < 1 mm from nearest posterior margin; 5 mm from LOQ anterior margin.    -- DCIS 8 mm from nearest LOQ anterior margin; 16 mm from nearest posterior margin.   - Benign nipple and skin:    -- DCIS does not involve the nipple epidermis.     -- No dermal lymphatic and/or vascular invasion.   - Lymphatic and/or vascular invasion: Present.    -- D2-40 and Pankeratin/CKC/CD31 immunostains performed.   - Two axillary lymph nodes positive for metastatic carcinoma consistent with breast primary (2/2).      -- Largest fabienne metastasis: 8mm; 5 mm (smaller lymph node).     -- Extranodal extension: Present, dispersed over 3 mm area of prior biopsy site fibrosis.      -- Confirmed by positive Pankeratin (CKAE1/3),  E-cadherin, p120 (membranous),         GATA3 immunostains.     -- Associated fibrosis probable minimal response to presurgical therapy in         metastatic carcinoma.      -- Largest lymph node with prior biopsy site changes and GIOVANA  marker.   - Microcalcifications: Present, associated with invasive carcinoma, DCIS and     non-neoplastic breast tissue.   - Benign fibroadenoma, 0.8 cm.   - Benign fibrocystic changes without atypia (usual ductal hyperplasia, columnar cell     change and hyperplasia, apocrine adenosis, fibroadenomatoid changes, cystic and     papillary apocrine metaplasia).    -- Associated prior biopsy site changes with [place] tie-shaped biopsy clip.       ** Traumatic neuroma present.   - Mammary duct ectasia.    B. Axillary lymph nodes, Right Axillary level I and level II lymph nodes, regional resection:  - Two lymph nodes positive for metastatic carcinoma consistent with breast primary (2/2).     -- Confirmed by positive Pankeratin (CKAE1/3), E-cadherin, p120 (membranous),         GATA3 immunostains.    -- 25 mm (near complete ernie replacement of largest lymph node) and 2.5 mm         in greatest dimensions of metastases.     -- Extranodal extension present, < 2 mm.    -- Associated fibrosis probable minimal response to presurgical therapy in        metastatic carcinoma.    C. Breast, Left, Left Breast mastectomy suture marks axillary tail:  - Negative for malignancy, in-situ carcinoma and atypical ductal hyperplasia.   - Benign nipple and skin.  - Benign fibroadenoma (0.8 cm).   - Benign fibrocystic changes with atypia (atypical lobular hyperplasia/ALH with     pagetoid ductal spread, usual ductal hyperplasia, columnar cell lesions/columnar     cell change and hyperplasia, sclerosing and apocrine adenosis, fibroadenomatoid     changes, cystic and papillary apocrine metaplasia).    -- Confirmed by E-cadherin, p120, CK5/6, Calponin-B immunostains    - Microcalcifications: Present, associated  with ALH and non-neoplastic breast tissue.   - Prior biopsy site changes with U-shaped biopsy clip, upper outer quadrant/UOQ, 2 o'clock.   - Mammary duct ectasia.    Electronically signed by Janny Gordillo MD on 2024 at 2230 EST   Note    Intradepartmental consultation (WT) agrees with the above diagnoses for specimens A & B. A copy of the final report is sent to Dr. ALEXANDRIA Valdez on 24 @ 2225 hours.      1.- Repeat HER2 testing (Current ASCO/CAP Recommendations): Indicated (s/p neoadjuvant therapy).       -- ER/IA/HER2 pending; will be reported in an addendum.       - Best representative tumor blocks:  A4, A8, B5 (lymph node metastasis).        -- Sufficient tumor present for          Agendia Mammaprint/Blueprint (1 cm2 of invasive tumor in aggregate):  Yes.          MI Profile/Foundation One (at least 5 x 5 mm of tumor): Yes.     2.  Pathologic Stage Classification (pTNM, AJCC 8th Edition):       8th ed, AJCC Anatomic Stage:  at least Stage IIIA - ypT2, ypN2a, cM0, G3.     3.  8th ed. AJCC Pathologic Prognostic Stage (use AJCC update): N/A (status post neoadjuvant therapy).          2024 Lareef: s/p bilateral mastectomy she is healing well. 3 drains have been removed. Right side single drain is still continued report of more than 40 cc clear yellow fluid. We will keep the drain for 1 more week. Will ask one of my colleagues to reevaluate for possible drain removal on Friday.       2024 Surg Onc: pull drains      2024 Dr. Quiroz  reviewed that options to move forward could include hormonal blockade, hormonal blockade plus CDK inhibitor, hormonal/CDK inhibitor plus chemotherapy.   also briefly discussed potential utility of adjuvant radiation therapy, applicable due to ernie status. She has RT consult tomorrow.       Upcomin2025 Endo/GI  24 Dr. Valdez    Oncology History   Breast cancer metastasized to axillary lymph node, right (HCC)   2024 Initial Diagnosis      2024 patient had biopsy of right breast mass showing invasive ductal carcinoma.  Right axillary node showed metastatic carcinoma consistent with breast primary.  ER 90%, OH 5-7%, HER2 +2.  FISH negative.  Similar findings in the axillary node although OH was 30%.  HER2 +2, FISH negative.  April 18, 2024 patient had CT chest ab pelvis showing soft tissue nodules right breast.  Right infrahilar and internal mammary nodes indeterminate.  Right axillary nodes up to 0.5 cm.  2 enhancing soft tissue nodules in the gastric fundus.  Bone scan showed focus of activity at anterior right seventh rib.     4/4/2024 Biopsy    Breast, Right, US BX RT BREAST 700 7CMFN 3 PASSES 12G [place]:      - Invasive mammary carcinoma of no special type (ductal NST/invasive ductal carcinoma) with apocrine features, see note      - [place] grade 2 of 3 (total score: 6 of 9)          - Tubule formation: <10%, score 3          - Nuclear grade 2 of 3, score 2          - Mitoses < 3/mm2, (</= 7 mitoses/10HPF), score 1      - Invasive carcinoma involves 3 of 3 submitted core biopsies, max. dimension = 5 millimeters      - Ductal carcinoma in situ (DCIS): Not identified      - Microcalcifications: Absent      - Lymphovascular invasion: Present     Breast, Right, US BX RT BREAST 900 7CMFN 3 PASSES 12G [place]:      - Invasive mammary carcinoma of no special type (ductal NST/invasive ductal carcinoma) with apocrine features, see note      - [place] grade 2 of 3 (total score: 6 of 9)          - Tubule formation: <10%, score 3          - Nuclear grade 2 of 3, score 2          - Mitoses < 3/mm2, (</= 7 mitoses/10HPF), score 1      - Invasive carcinoma involves 3 of 3 submitted core biopsies, max. dimension = 14 millimeters      - Ductal carcinoma in situ (DCIS): Not identified      - Microcalcifications: Absent      - Lymphovascular invasion: Not identified    Axillary lymph node, US BX RT AXILLARY TAIL LN 10:00 12CMFN 3 PASSES 12G EDE:      -  Metastatic carcinoma consistent with mammary primary,    BREAST TUMOR PROGNOSTIC PROFILE     Performed on invasive carcinoma block E1:  Test Description Result Prognostic Interpretation   Estrogen Receptor/ER  Primary Antibody: SP-1  Internal control: Positive   External control: Positive 90%  Staining Intensity: Strong  Isabela Score*: 8 Positive   Progesterone Receptor/PgR  Primary Antibody:1E2  Internal control: Positive  External control: Positive 5-7%  Staining Intensity: Strong  Isabela Score*: 4 Positive   HER2 by IHC   Primary Antibody: 4B5 2+ Equivocal *      Performed on invasive carcinoma block F2:  Test Description Result Prognostic Interpretation   Estrogen Receptor/ER  Primary Antibody: SP-1  Internal control: Positive   External control: Positive 95%  Staining Intensity: Strong  Isabela Score*: 8 Positive   Progesterone Receptor/PgR  Primary Antibody:1E2  Internal control: Positive  External control: Positive 30%  Staining Intensity: Moderate  Isabela Score*: 5 Positive   HER2 by IHC   Primary Antibody: 4B5 2+ Equivocal *       Fluorescence in situ hybridization (FISH) analysis of HER2 overexpression by invasive breast carcinoma cells, block E1 performed at [facility], [place], [place], yields the following results:  Test Description                        Interpretation  HER2 by FISH analysis:              Negative / Not Amplified.  Results  HER2: CEP-17 ratio :                  1.5: 1  Average HER2 Signal:                5.4  Average CEP-17 Signal:                          3.5  Number of selected invasive cells scanned           100     Fluorescence in situ hybridization (FISH) analysis of HER2 overexpression by invasive breast carcinoma cells, block F2 performed at [facility], [place], [place], yields the following results:  Test Description                        Interpretation  HER2 by FISH analysis:              Negative / Not Amplified.  Results  HER2:  CEP-17 ratio :                  1.3: 1  Average HER2 Signal:                3.5  Average CEP-17 Signal:                          2.6  Number of selected invasive cells scanned           50        4/4/2024 Initial Diagnosis    Malignant neoplasm of overlapping sites of right breast in female, estrogen receptor positive (HCC)     4/18/2024 Genetic Testing    AMBRY  A total of 36 genes were evaluated, including: APC, MIKE, BARD 1, BMPR1A, BRCA1, BRCA2, BRIP1, CDH1, CDK4, CKDN2A, CHEK2, DICER1, MLH1, MSH2, MSH6, MUTYH, NBN, NF1, NTHL1, PALB2, PMS2, PTEN, RAD51C, RECQL, SMAD4, SMARCA4, STK11, TP53, AXIN2, HOXB13, MSH3, POLD1, AND POLE, EPCAM, AND GREM1     LIKELY PATHOGENIC VARIANT CHEK2  VUS SDHA     10/29/2024 Surgery    Right modified radical mastectomy left simple mastectomy    RIGHT  Multifocal residual invasive mammary carcinoma of no special type (ductal) s/p neoadjuvant chemotherapy  21 mm, 8 mm, 2 mm  Grade 3   Margins negative  ER 90  TX 5  HER2 2+  FISH negative  4/4 Lymph nodes     Anatomic Stage IIIA  Prognostic Stage (use AJCC update): N/A (status post neoadjuvant therapy).    LEFT  Benign fibroadenoma (0.8 cm), Benign fibrocystic changes with atypia (atypical lobular hyperplasia/ALH     10/29/2024 -  Cancer Staged    Staging form: Breast, AJCC 8th Edition  - Clinical stage from 10/29/2024: Stage IIIA (cT2, cN2, cM0, G3, ER+, TX+, HER2-) - Signed by Jevon Valdez MD on 11/21/2024  Stage prefix: Initial diagnosis  Histologic grading system: 3 grade system       Malignant neoplasm of overlapping sites of right breast in female, estrogen receptor positive (HCC)   4/4/2024 Biopsy    Breast, Right, US BX RT BREAST 700 7CMFN 3 PASSES 12G [place]:      - Invasive mammary carcinoma of no special type (ductal NST/invasive ductal carcinoma) with apocrine features, see note      - [place] grade 2 of 3 (total score: 6 of 9)          - Tubule formation: <10%, score 3          - Nuclear grade 2 of 3, score  2          - Mitoses < 3/mm2, (</= 7 mitoses/10HPF), score 1      - Invasive carcinoma involves 3 of 3 submitted core biopsies, max. dimension = 5 millimeters      - Ductal carcinoma in situ (DCIS): Not identified      - Microcalcifications: Absent      - Lymphovascular invasion: Present     Breast, Right, US BX RT BREAST 900 7CMFN 3 PASSES 12G [place]:      - Invasive mammary carcinoma of no special type (ductal NST/invasive ductal carcinoma) with apocrine features, see note      - [place] grade 2 of 3 (total score: 6 of 9)          - Tubule formation: <10%, score 3          - Nuclear grade 2 of 3, score 2          - Mitoses < 3/mm2, (</= 7 mitoses/10HPF), score 1      - Invasive carcinoma involves 3 of 3 submitted core biopsies, max. dimension = 14 millimeters      - Ductal carcinoma in situ (DCIS): Not identified      - Microcalcifications: Absent      - Lymphovascular invasion: Not identified    Axillary lymph node, US BX RT AXILLARY TAIL LN 10:00 12CMFN 3 PASSES 12G [place]:      - Metastatic carcinoma consistent with mammary primary,    BREAST TUMOR PROGNOSTIC PROFILE     Performed on invasive carcinoma block E1:  Test Description Result Prognostic Interpretation   Estrogen Receptor/ER  Primary Antibody: SP-1  Internal control: Positive   External control: Positive 90%  Staining Intensity: Strong  Isabela Score*: 8 Positive   Progesterone Receptor/PgR  Primary Antibody:1E2  Internal control: Positive  External control: Positive 5-7%  Staining Intensity: Strong  Isabela Score*: 4 Positive   HER2 by IHC   Primary Antibody: 4B5 2+ Equivocal *      Performed on invasive carcinoma block F2:  Test Description Result Prognostic Interpretation   Estrogen Receptor/ER  Primary Antibody: SP-1  Internal control: Positive   External control: Positive 95%  Staining Intensity: Strong  Isabela Score*: 8 Positive   Progesterone Receptor/PgR  Primary Antibody:1E2  Internal control: Positive  External control: Positive  30%  Staining Intensity: Moderate  Isabela Score*: 5 Positive   HER2 by IHC   Primary Antibody: 4B5 2+ Equivocal *       Fluorescence in situ hybridization (FISH) analysis of HER2 overexpression by invasive breast carcinoma cells, block E1 performed at [facility], [place], [place], yields the following results:  Test Description                        Interpretation  HER2 by FISH analysis:              Negative / Not Amplified.  Results  HER2: CEP-17 ratio :                  1.5: 1  Average HER2 Signal:                5.4  Average CEP-17 Signal:                          3.5  Number of selected invasive cells scanned           100     Fluorescence in situ hybridization (FISH) analysis of HER2 overexpression by invasive breast carcinoma cells, block F2 performed at [facility], [place], [place], yields the following results:  Test Description                        Interpretation  HER2 by FISH analysis:              Negative / Not Amplified.  Results  HER2: CEP-17 ratio :                  1.3: 1  Average HER2 Signal:                3.5  Average CEP-17 Signal:                          2.6  Number of selected invasive cells scanned           50        4/4/2024 Initial Diagnosis    Malignant neoplasm of overlapping sites of right breast in female, estrogen receptor positive (HCC)     4/18/2024 Genetic Testing    AMBRY  A total of 36 genes were evaluated, including: APC, MIKE, BARD 1, BMPR1A, BRCA1, BRCA2, BRIP1, CDH1, CDK4, CKDN2A, CHEK2, DICER1, MLH1, MSH2, MSH6, MUTYH, NBN, NF1, NTHL1, PALB2, PMS2, PTEN, RAD51C, RECQL, SMAD4, SMARCA4, STK11, TP53, AXIN2, HOXB13, MSH3, POLD1, AND POLE, EPCAM, AND GREM1     LIKELY PATHOGENIC VARIANT CHEK2  VUS SDHA     10/29/2024 Surgery    Right modified radical mastectomy left simple mastectomy    RIGHT  Multifocal residual invasive mammary carcinoma of no special type (ductal) s/p neoadjuvant chemotherapy  21 mm, 8 mm, 2 mm  Grade 3   Margins  negative  ER 90  NV 5  HER2 2+  FISH negative  4/4 Lymph nodes     Anatomic Stage IIIA  Prognostic Stage (use AJCC update): N/A (status post neoadjuvant therapy).    LEFT  Benign fibroadenoma (0.8 cm), Benign fibrocystic changes with atypia (atypical lobular hyperplasia/ALH         Review of Systems:  Review of Systems   Constitutional:  Positive for fatigue.   HENT:  Positive for dental problem (full dentures) and postnasal drip.    Eyes:         Wears glasses   Respiratory: Negative.     Cardiovascular: Negative.    Gastrointestinal:  Positive for constipation.   Genitourinary:  Positive for frequency.        Stress urinary incontinence   Musculoskeletal:  Positive for neck stiffness.        Limited ROM both shoulders since surgery   Skin:         B/L mastectomy surgical wounds healing   Allergic/Immunologic: Positive for environmental allergies.   Neurological: Negative.    Hematological: Negative.    Psychiatric/Behavioral: Negative.         Clinical Trial: no    OB/GYN History:  The patient underwent menarche at 15 years  Menopause Status Post  No LMP recorded. Patient is postmenopausal.  Menopause at 50} years.  Menopause Reason: natural  Hormone replacement therapy: no.    1   Para 1   Age at first delivery being 19 years.   Nursing: no.   Birth control pills: yes.  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, side effects, SIM    MST: completed    Implantable Devices (Port, Pacemaker, pain stimulator): no    Hip Replacement: no      [unfilled]  Health Maintenance   Topic Date Due    Hepatitis C Screening  Never done    Pneumococcal Vaccine: 65+ Years (1 of 2 - PCV) 1956    Zoster Vaccine (1 of 2) 1969    RSV Vaccine Age 60+ Years (1 - Risk 60-74 years 1-dose series) Never done    Falls: Plan of Care  Never done    COVID-19 Vaccine (3 - Moderna risk series) 07/10/2021    Influenza Vaccine  (1) 09/01/2024    Medicare Annual Wellness Visit (AWV)  11/16/2024    Depression Screening  05/20/2025    Fall Risk  05/20/2025    Urinary Incontinence Screening  05/20/2025    BMI: Adult  12/05/2025    Colorectal Cancer Screening  05/25/2026    Osteoporosis Screening  Completed    RSV Vaccine age 0-20 Months  Aged Out    HIB Vaccine  Aged Out    IPV Vaccine  Aged Out    Hepatitis A Vaccine  Aged Out    Meningococcal ACWY Vaccine  Aged Out    HPV Vaccine  Aged Out     Past Medical History:   Diagnosis Date    Cancer (HCC)     Breast, right    Colon polyp     Esophagitis     History of benign breast tumor     Hyperlipidemia     Hypertension     Urinary incontinence     Venous ulcer of ankle (HCC) 01/10/2022     Past Surgical History:   Procedure Laterality Date    ANKLE SURGERY Right     BREAST EXCISIONAL BIOPSY Left 1998    benign    BUNIONECTOMY Bilateral     COLONOSCOPY      CORRECTION HAMMER TOE Bilateral     MAMMO STEREOTACTIC BREAST BIOPSY LEFT (ALL INC) Left 4/4/2024    MAMMO STEREOTACTIC BREAST BIOPSY RIGHT (ALL INC) EACH ADD Right 4/4/2024    MODIFIED RADICAL MASTECTOMY W/ AXILLARY LYMPH NODE DISSECTION Right 10/29/2024    Procedure: RIGHT MODIFIED RADICAL MASTECTOMY LEVEL I AND LEVEL II LYMPH NODE DISSECTION, GIOVANA CLIPS IN THE RIGHT BREAST X2, AXILLARY LN;  Surgeon: Jevon Valdez MD;  Location: [facility];  Service: Surgical Oncology    SIMPLE MASTECTOMY Left 10/29/2024    Procedure: LEFT MASTECTOMY;  Surgeon: Jevon Valdez MD;  Location: [facility];  Service: Surgical Oncology    US GUIDANCE BREAST BIOPSY RIGHT EACH ADDITIONAL Right 4/4/2024    US GUIDED BREAST BIOPSY RIGHT COMPLETE Right 4/4/2024    US GUIDED BREAST LYMPH NODE BIOPSY RIGHT Right 4/4/2024    US GUIDED LYMPH NODE BIOPSY LEFT  5/15/2024    VEIN LIGATION Right 06/06/2022    Procedure: Venaseal therapy and stab phlebectomy;  Surgeon: Gosia Shepherd MD;  Location: [facility];  Service: Vascular     Family History    Problem Relation Age of Onset    No Known Problems Mother     Heart disease Father     Throat cancer Brother         possibly throat cancer    Thyroid cancer Brother         possibly thyroid cancer    No Known Problems Daughter     No Known Problems Maternal Aunt     No Known Problems Maternal Aunt     No Known Problems Maternal Aunt     No Known Problems Maternal Aunt     No Known Problems Paternal Aunt     No Known Problems Paternal Aunt     No Known Problems Paternal Aunt     No Known Problems Paternal Aunt     No Known Problems Maternal Grandmother     No Known Problems Maternal Grandfather     No Known Problems Paternal Grandmother     No Known Problems Paternal Grandfather     BRCA2 Negative Neg Hx     BRCA2 Positive Neg Hx     BRCA1 Positive Neg Hx     BRCA1 Negative Neg Hx     BRCA 1/2 Neg Hx     Ovarian cancer Neg Hx     Endometrial cancer Neg Hx     Colon cancer Neg Hx     Breast cancer additional onset Neg Hx     Breast cancer Neg Hx      Social History     Tobacco Use    Smoking status: Former     Current packs/day: 0.00     Types: Cigarettes     Start date:      Quit date:      Years since quittin.9    Smokeless tobacco: Never   Vaping Use    Vaping status: Never Used   Substance Use Topics    Alcohol use: Not Currently     Alcohol/week: 1.0 standard drink of alcohol     Types: 1 Standard drinks or equivalent per week    Drug use: Never        Current Outpatient Medications:     acetaminophen (TYLENOL) 325 mg tablet, Take 650 mg by mouth every 6 (six) hours as needed for mild pain, Disp: , Rfl:     acetaminophen-codeine (TYLENOL with CODEINE #3) 300-30 MG per tablet, Take 1 tablet by mouth every 6 (six) hours as needed for severe pain, Disp: 30 tablet, Rfl: 0    anastrozole (ARIMIDEX) 1 mg tablet, Take 1 tablet (1 mg total) by mouth daily, Disp: 90 tablet, Rfl: 3    Ascorbic Acid (vitamin C) 100 MG tablet, Take 100 mg by mouth daily, Disp: , Rfl:     hydroCHLOROthiazide 25 mg tablet, TAKE  1 TABLET (25 MG TOTAL) BY MOUTH DAILY., Disp: 90 tablet, Rfl: 1    Multiple Vitamins-Minerals (multivitamin with minerals) tablet, Take 1 tablet by mouth daily, Disp: , Rfl:     naproxen (NAPROSYN) 500 mg tablet, TAKE 1 TABLET BY MOUTH TWICE A DAY WITH FOOD (Patient not taking: Reported on 11/25/2024), Disp: 30 tablet, Rfl: 0    pravastatin (PRAVACHOL) 40 mg tablet, TAKE 1 TABLET BY MOUTH DAILY AT BEDTIME, Disp: 90 tablet, Rfl: 1    Zinc 100 MG TABS, Take by mouth, Disp: , Rfl:     Current Facility-Administered Medications:     cyanocobalamin injection 1,000 mcg, 1,000 mcg, Intramuscular, Q30 Days, Lorraine Dennis DO, 1,000 mcg at 11/26/24 1033  Allergies   Allergen Reactions    Penicillins Other (See Comments)     Unknown reaction, positive on allergy testing      There were no vitals filed for this visit.

## 2024-12-06 NOTE — PROGRESS NOTES
Consultation Visit   Name: Juve Duarte      : 1950      MRN: [id number]  Encounter Provider: Kerri Patel MD  Encounter Date: 2024   Encounter department: [facility] RADIATION ONCOLOGY  :  Assessment & Plan  Malignant neoplasm of overlapping sites of right breast in female, estrogen receptor positive (HCC)  Juve Duarte 1950 is a 74 y.o. woman with a history of stage IIIA (F2aM3F9) G3 multifocal right breast cancer and synchronous low-grade gastric neuroendocrine tumor s/p bilateral mastectomies and right axillary lymph node resection achieving negative margins.  Pathology demonstrated 4/4 positive lymph nodes.  She had preoperative anastozole.  Tumor cells were ER+, [place]+, HER2-. She has been offered various options of systemic therapy, but is currently undecided.  The gastric tumor was in polyp that was removed and she is currently undergoing surveillance.      Given her clinical presentation with 4 positive lymph nodes, she would benefit from adjuvant postmastectomy radiation to the chest wall and draining lymph nodes.  This would be expected to provide improved local regional control and possible survival.  We would plan to use conventional fractionation and standard dose without boost.  This is in accordance with NCCN guidelines.    The rationale and potential benefits, as well as the risks and acute and late side effects and potential toxicities of radiation were discussed with the patient at length. Side effects discussed included, but were not limited to: Fatigue, skin erythema, hyperpigmentation, desquamation, fibrosis, shrinkage of the breast resulting asymmetry, rib weakening, pulmonary fibrosis, lymphedema, increased risk for cardiovascular disease. We discussed that if she decides to undergo reconstruction she will not be a candidate for expander placement due to prior radiation and will likely require JAIDEN consideration.    We discussed options of no radiation, but  reviewed risks with this approach.    She has significant decreased range of motion of her right arm due to fibrotic changes.  We discussed need for PT and lymphedema consultation and she agreed to referral.  We will provide information to contact today and encouraged her to pursue.    The patient and her  were given the opportunity to ask questions and all questions were answered to their satisfaction.     She has agreed to pursue radiation.  We discussed that radiation is typically delivered post chemotherapy and so we await her decision regarding chemotherapy, which she is now leaning towards pursuing.    Orders:    Ambulatory referral to Radiation Oncology    Ambulatory referral to PT/OT lymphedema therapy; Future        History of Present Illness   Chief Complaint   Patient presents with    Breast Cancer    Consult   Pertinent Medical History   Juve Duarte 1950 is a 74 y.o. female  with multifocal right breast cancer with axillary lymph node metastatic disease and synchronous low-grade gastric neuroendocrine tumor. She is s/p bilateral mastectomy. Stage IIIA (cT2, cN2, cM0, G3, ER+, SC+, HER2-). She is being referred by Dr. Valdez and presents today for consult.     The patient self-palpated right breast nodule prompting bilateral diagnostic mammogram on 3/27/2024.    This demonstrated at least 2 suspicious masses and suspicious calcifications in the right breast and abnormal appearing lymph node in the low axillary region of the right breast.  Ultrasound guided core needle biopsies were recommended.  Calcifications in the left breast are indeterminate.  Left stereotactic guided core needle biopsy is recommended.    4/4/2024 Bilateral Breast biopsies  Left breast biopses - benign  Right breast US BX at 700 and 900:      - Invasive mammary carcinoma of no special type (ductal NST/invasive ductal carcinoma) with apocrine features, grade 2  Axillary lymph node, US BX RT AXILLARY TAIL LN 10:00:      -  Metastatic carcinoma consistent with mammary primary     4/18/2024 CT cap  Enhancing soft tissue nodules in the right breast corresponding to biopsy-proven cancer as described above. Benign nodule on the left also shown.  Mildly enlarged right infrahilar lymph node and few right internal mammary lymph nodes are indeterminate but raise concern for involvement with tumor.  Few right axillary and subpectoral lymph nodes measuring up to 0.5 cm in the short axis are indeterminate. Consider further evaluation with PET/CT.  2 enhancing soft tissue nodules in the gastric fundus as described above. These are of uncertain etiology and may represent GI stromal tumors, with metastatic disease from breast cancer considered less likely. Consider further evaluation with endoscopy.     4/23/2024 Bone scan: Focus of activity in a right anterior rib at the costochondral junction, likely the seventh rib. This is nonspecific and may represent either a metastasis or benign etiology such as prior trauma. Attention on follow-up.      5/2/2024 PET/CT   Right lateral breast hypermetabolic nodule compatible with known malignancy. There is right axillary metastatic adenopathy.  2. Hypermetabolic left upper cervical node is also concerning for metastasis.     5/6/2024 US head neck soft tissue:  1.8 x 0.5 x 0.7 cm left level 2 lymph node corresponding to the abnormality on the PET scan with with some indeterminate vascularity and no additional ultrasound suspicious features  Correlate clinically     5/15/2024 Cervical Lymph node biopsy:  Atypical cellular changes.  Rare atypical epithelioid cells present.     8/8/2024 Stomach, fundus nodule biopsy  -well-differentiated neuroendocrine tumor, grade 2 (carcinoid tumor).  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  -Negative for Helicobacter pylori.        8/21/2024 CERIANNA PET/CT:   FES avid right breast lesion compatible with the known malignancy. This appears slightly smaller compared to prior PET/CT.  2. Solitary FES-avid right axillary lymph node compatible with metastasis. This also appears smaller from prior PET/CT. Previously there was an additional smaller right axillary lymph node with mild FDG uptake which is not FES avid.  3. No suspicious FES avid lesions in the neck, abdomen or pelvis. Previously noted small left upper cervical chain lymph node does not demonstrate FES uptake and also appears slightly smaller on CT.    8/29/2024 Gabriella: started on Arimidex 1 mg p.o. daily.        9/19/2024 Dr. Up, Surgical Oncology consultation:   incidentally discovered well-differentiated neuroendocrine tumor on upper endoscopy. This appeared to be in a small gastric polyp. recommend that she undergo EMR to evaluate the biopsy site. If there is no evidence of disease, she was likely adequately treated just with the biopsy. EMR to be after recovery from breast surgery.         10/29/2024   Right - RIGHT MODIFIED RADICAL MASTECTOMY LEVEL I AND LEVEL II LYMPH NODE DISSECTION. GIOVANA CLIPS IN THE RIGHT BREAST X2. AXILLARY LN  Left - LEFT MASTECTOMY  Right Breast  Multifocal grade 3 invasive ductal carcinoma with largest focus 21mm in size associated with grade 3 DCIS, no extensive component.  Extensive LVI, but no dermal LVI.  Margins were negative with close posterior margin at <1mm.  4/4 lymph nodes demonstrated metastatic disease with largest deposit measuring 25mm and associated with LUCY.  Probable tumor response to systemic therapy.  Tumor cells were ER/TN(+) and HER2(-) per FISH.  Pathologic stage was IIIA (ypT2(m)pN2aM0).     11/29/2024 Surg Onc: pull drains      12/5/2024 Dr. Quiroz  reviewed that options to move forward could include hormonal blockade, hormonal blockade plus CDK inhibitor, hormonal/CDK inhibitor  plus chemotherapy.   also briefly discussed potential utility of adjuvant radiation therapy, applicable due to ernie status. She has RT consult tomorrow.      The patient has no breast related complaints today.  She denies significant chest wall tenderness or pain.  Wounds are closed.  She denies palpable nodules, or suspicious skin changes chest wall bilaterally.  She has restriction of upper extremity on the right due to tightness across axilla.  Patient has been referred to PT, but deferred stating she had painful experience with PT prior.  She denies upper extremity edema bilaterally.    She is undecided regarding systemic therapy.   She states she is not interested in reconstruction and has no plans at this time.    Upcomin2025 Endo/GI  24 Dr. Valdez       Oncology History   Oncology History   Breast cancer metastasized to axillary lymph node, right (HCC)   2024 Initial Diagnosis    2024 patient had biopsy of right breast mass showing invasive ductal carcinoma.  Right axillary node showed metastatic carcinoma consistent with breast primary.  ER 90%, WV 5-7%, HER2 +2.  FISH negative.  Similar findings in the axillary node although WV was 30%.  HER2 +2, FISH negative.  2024 patient had CT chest ab pelvis showing soft tissue nodules right breast.  Right infrahilar and internal mammary nodes indeterminate.  Right axillary nodes up to 0.5 cm.  2 enhancing soft tissue nodules in the gastric fundus.  Bone scan showed focus of activity at anterior right seventh rib.     2024 Biopsy    Breast, Right, US BX RT BREAST 700 7CMFN 3 PASSES 12G MARQUEE:      - Invasive mammary carcinoma of no special type (ductal NST/invasive ductal carcinoma) with apocrine features, see note      - Elsy grade 2 of 3 (total score: 6 of 9)          - Tubule formation: <10%, score 3          - Nuclear grade 2 of 3, score 2          - Mitoses < 3/mm2, (</= 7 mitoses/10HPF), score 1      - Invasive  carcinoma involves 3 of 3 submitted core biopsies, max. dimension = 5 millimeters      - Ductal carcinoma in situ (DCIS): Not identified      - Microcalcifications: Absent      - Lymphovascular invasion: Present     Breast, Right, US BX RT BREAST 900 7CMFN 3 PASSES 12G MARQUEE:      - Invasive mammary carcinoma of no special type (ductal NST/invasive ductal carcinoma) with apocrine features, see note      - [place] grade 2 of 3 (total score: 6 of 9)          - Tubule formation: <10%, score 3          - Nuclear grade 2 of 3, score 2          - Mitoses < 3/mm2, (</= 7 mitoses/10HPF), score 1      - Invasive carcinoma involves 3 of 3 submitted core biopsies, max. dimension = 14 millimeters      - Ductal carcinoma in situ (DCIS): Not identified      - Microcalcifications: Absent      - Lymphovascular invasion: Not identified    Axillary lymph node, US BX RT AXILLARY TAIL LN 10:00 12CMFN 3 PASSES 12G MARQU[place]:      - Metastatic carcinoma consistent with mammary primary,    BREAST TUMOR PROGNOSTIC PROFILE     Performed on invasive carcinoma block E1:  Test Description Result Prognostic Interpretation   Estrogen Receptor/ER  Primary Antibody: SP-1  Internal control: Positive   External control: Positive 90%  Staining Intensity: Strong  Isabela Score*: 8 Positive   Progesterone Receptor/PgR  Primary Antibody:1E2  Internal control: Positive  External control: Positive 5-7%  Staining Intensity: Strong  Isabela Score*: 4 Positive   HER2 by IHC   Primary Antibody: 4B5 2+ Equivocal *      Performed on invasive carcinoma block F2:  Test Description Result Prognostic Interpretation   Estrogen Receptor/ER  Primary Antibody: SP-1  Internal control: Positive   External control: Positive 95%  Staining Intensity: Strong  Isabela Score*: 8 Positive   Progesterone Receptor/PgR  Primary Antibody:1E2  Internal control: Positive  External control: Positive 30%  Staining Intensity: Moderate  Isabela Score*: 5 Positive   HER2 by IHC   Primary  Antibody: 4B5 2+ Equivocal *       Fluorescence in situ hybridization (FISH) analysis of HER2 overexpression by invasive breast carcinoma cells, block E1 [facility], [place], [place], yields the following results:  Test Description                        Interpretation  HER2 by FISH analysis:              Negative / Not Amplified.  Results  HER2: CEP-17 ratio :                  1.5: 1  Average HER2 Signal:                5.4  Average CEP-17 Signal:                          3.5  Number of selected invasive cells scanned           100     Fluorescence in situ hybridization (FISH) analysis of HER2 overexpression by invasive breast carcinoma cells, block F2 performed at [facility], [place], [place], yields the following results:  Test Description                        Interpretation  HER2 by FISH analysis:              Negative / Not Amplified.  Results  HER2: CEP-17 ratio :                  1.3: 1  Average HER2 Signal:                3.5  Average CEP-17 Signal:                          2.6  Number of selected invasive cells scanned           50        4/4/2024 Initial Diagnosis    Malignant neoplasm of overlapping sites of right breast in female, estrogen receptor positive (HCC)     4/18/2024 Genetic Testing    AMBRY  A total of 36 genes were evaluated, including: APC, MIKE, BARD 1, BMPR1A, BRCA1, BRCA2, BRIP1, CDH1, CDK4, CKDN2A, CHEK2, DICER1, MLH1, MSH2, MSH6, MUTYH, NBN, NF1, NTHL1, PALB2, PMS2, PTEN, RAD51C, RECQL, SMAD4, SMARCA4, STK11, TP53, AXIN2, HOXB13, MSH3, POLD1, AND POLE, EPCAM, AND GREM1     LIKELY PATHOGENIC VARIANT CHEK2  VUS SDHA     10/29/2024 Surgery    Right modified radical mastectomy left simple mastectomy    RIGHT  Multifocal residual invasive mammary carcinoma of no special type (ductal) s/p neoadjuvant chemotherapy  21 mm, 8 mm, 2 mm  Grade 3   Margins negative  ER 90  MA 5  HER2 2+  FISH negative  4/4 Lymph nodes     Anatomic Stage  IIIA  Prognostic Stage (use AJCC update): N/A (status post neoadjuvant therapy).    LEFT  Benign fibroadenoma (0.8 cm), Benign fibrocystic changes with atypia (atypical lobular hyperplasia/ALH     10/29/2024 -  Cancer Staged    Staging form: Breast, AJCC 8th Edition  - Clinical stage from 10/29/2024: Stage IIIA (cT2, cN2, cM0, G3, ER+, PA+, HER2-) - Signed by Jevon Valdez MD on 11/21/2024  Stage prefix: Initial diagnosis  Histologic grading system: 3 grade system       Malignant neoplasm of overlapping sites of right breast in female, estrogen receptor positive (HCC)   4/4/2024 Biopsy    Breast, Right, US BX RT BREAST 700 7CMFN 3 PASSES 12G MARQUEE:      - Invasive mammary carcinoma of no special type (ductal NST/invasive ductal carcinoma) with apocrine features, see note      - Elsy grade 2 of 3 (total score: 6 of 9)          - Tubule formation: <10%, score 3          - Nuclear grade 2 of 3, score 2          - Mitoses < 3/mm2, (</= 7 mitoses/10HPF), score 1      - Invasive carcinoma involves 3 of 3 submitted core biopsies, max. dimension = 5 millimeters      - Ductal carcinoma in situ (DCIS): Not identified      - Microcalcifications: Absent      - Lymphovascular invasion: Present     Breast, Right, US BX RT BREAST 900 7CMFN 3 PASSES 12G [place]QU[place]:      - Invasive mammary carcinoma of no special type (ductal NST/invasive ductal carcinoma) with apocrine features, see note      - [place] grade 2 of 3 (total score: 6 of 9)          - Tubule formation: <10%, score 3          - Nuclear grade 2 of 3, score 2          - Mitoses < 3/mm2, (</= 7 mitoses/10HPF), score 1      - Invasive carcinoma involves 3 of 3 submitted core biopsies, max. dimension = 14 millimeters      - Ductal carcinoma in situ (DCIS): Not identified      - Microcalcifications: Absent      - Lymphovascular invasion: Not identified    Axillary lymph node, US BX RT AXILLARY TAIL LN 10:00 12CMFN 3 PASSES 12G [place]QU[place]:      - Metastatic  carcinoma consistent with mammary primary,    BREAST TUMOR PROGNOSTIC PROFILE     Performed on invasive carcinoma block E1:  Test Description Result Prognostic Interpretation   Estrogen Receptor/ER  Primary Antibody: SP-1  Internal control: Positive   External control: Positive 90%  Staining Intensity: Strong  Isabela Score*: 8 Positive   Progesterone Receptor/PgR  Primary Antibody:1E2  Internal control: Positive  External control: Positive 5-7%  Staining Intensity: Strong  Isabela Score*: 4 Positive   HER2 by IHC   Primary Antibody: 4B5 2+ Equivocal *      Performed on invasive carcinoma block F2:  Test Description Result Prognostic Interpretation   Estrogen Receptor/ER  Primary Antibody: SP-1  Internal control: Positive   External control: Positive 95%  Staining Intensity: Strong  Isabela Score*: 8 Positive   Progesterone Receptor/PgR  Primary Antibody:1E2  Internal control: Positive  External control: Positive 30%  Staining Intensity: Moderate  Isabela Score*: 5 Positive   HER2 by IHC   Primary Antibody: 4B5 2+ Equivocal *       Fluorescence in situ hybridization (FISH) analysis of HER2 overexpression by invasive breast carcinoma cells, block E1 performed at [email or web address], [place], [place], yields the following results:  Test Description                        Interpretation  HER2 by FISH analysis:              Negative / Not Amplified.  Results  HER2: CEP-17 ratio :                  1.5: 1  Average HER2 Signal:                5.4  Average CEP-17 Signal:                          3.5  Number of selected invasive cells scanned           100     Fluorescence in situ hybridization (FISH) analysis of HER2 overexpression by invasive breast carcinoma cells, block F2 performed at [email or web address], [place], [place], yields the following results:  Test Description                        Interpretation  HER2 by FISH analysis:              Negative / Not Amplified.  Results  HER2: CEP-17 ratio :                   1.3: 1  Average HER2 Signal:                3.5  Average CEP-17 Signal:                          2.6  Number of selected invasive cells scanned           50        4/4/2024 Initial Diagnosis    Malignant neoplasm of overlapping sites of right breast in female, estrogen receptor positive (HCC)     4/18/2024 Genetic Testing    AMBRY  A total of 36 genes were evaluated, including: APC, MIKE, BARD 1, BMPR1A, BRCA1, BRCA2, BRIP1, CDH1, CDK4, CKDN2A, CHEK2, DICER1, MLH1, MSH2, MSH6, MUTYH, NBN, NF1, NTHL1, PALB2, PMS2, PTEN, RAD51C, RECQL, SMAD4, SMARCA4, STK11, TP53, AXIN2, HOXB13, MSH3, POLD1, AND POLE, EPCAM, AND GREM1     LIKELY PATHOGENIC VARIANT CHEK2  VUS SDHA     10/29/2024 Surgery    Right modified radical mastectomy left simple mastectomy    RIGHT  Multifocal residual invasive mammary carcinoma of no special type (ductal) s/p neoadjuvant chemotherapy  21 mm, 8 mm, 2 mm  Grade 3   Margins negative  ER 90  NC 5  HER2 2+  FISH negative  4/4 Lymph nodes     Anatomic Stage IIIA  Prognostic Stage (use AJCC update): N/A (status post neoadjuvant therapy).    LEFT  Benign fibroadenoma (0.8 cm), Benign fibrocystic changes with atypia (atypical lobular hyperplasia/ALH        Review of Systems Refer to nursing note.       Objective   /72   Pulse 67   Temp 97.8 °F (36.6 °C)   Wt 65.3 kg (144 lb)   SpO2 92%   BMI 23.24 kg/m²     Pain Screening:  Pain Score: 0-No pain  ECOG  1  Physical Exam  Vitals and nursing note reviewed.   Constitutional:       General: She is not in acute distress.     Appearance: She is well-developed.   Cardiovascular:      Rate and Rhythm: Normal rate.   Pulmonary:      Breath sounds: No wheezing or rhonchi.   Chest:          Comments: Breast examination demonstrates bilateral mastectomy with wounds closed and well-healed.  No erythema, edema, discharge.  There are no palpable nodules or suspicious skin changes chest wall bilaterally.    Abdominal:      Palpations: Abdomen is  soft.      Tenderness: There is no abdominal tenderness.   Musculoskeletal:      Right lower leg: No edema.      Left lower leg: No edema.      Comments: Decreased range of motion right upper extremity to about 90 degrees on extension.  No upper extremity edema bilaterally   Lymphadenopathy:      Cervical: No cervical adenopathy.      Upper Body:      Right upper body: No supraclavicular or axillary adenopathy.      Left upper body: No supraclavicular or axillary adenopathy.   Neurological:      Mental Status: She is alert and oriented to person, place, and time.        Radiology Results: I have reviewed radiology images/reports described above.   Pathology:  Collected 10/29/2024 12:23     Status: Edited Result - FINAL     Dx: Breast cancer metastasized to axillar...     Test Result Released: Yes (not seen)     0 Result Notes      Component  Ref Range & Units (hover)    Case Report   Surgical Pathology Report                         Case: [id number]                                   Authorizing Provider:  Jevon aVldez,  Collected:           10/29/2024 1223                                      MD                                                                            Ordering Location:     [facility] Received:            10/29/2024 1430                                      Operating Room                                                                Pathologist:           Janny Gordillo MD                                                          Specimens:   A) - Breast, Right, RIGHT Breast mastectomy, suture marks Axillary tail , GIOVANA                       clipped lymph node in Axillary Tail with additional nodes                                            B) - Axillary lymph node, Right Axillary level I and level II lymph nodes                            C) - Breast, Left, Left Breast mastectomy suture marks axillary tail                       Addendum           Addendum  electronically signed by Mirlande Morrell DO on 11/14/2024 at 1156 EST   Final Diagnosis   A. Breast, Right, RIGHT Breast modified radical mastectomy, suture marks Axillary tail, GIOVANA clipped lymph node in Axillary Tail with additional nodes:  - Multifocal residual invasive mammary carcinoma of no special type (ductal) s/p neoadjuvant     chemotherapy:  Largest residual focus of invasive carcinoma, 21 mm in greatest dimension, present in    lower outer quadrant/LOQ, 7-8 o'clock:     -- [place] histologic grade 3 of 3 (total score: 8 of 9)        * Glandular (acinar) Tubular Differentiation 10-75%, score 2.        * Nuclear Pleomorphism 2-3 of 3, score 3.        * Mitotic Rate >/= 8/mm2, (>/= 15 mitoses/10HPF; 25 mitoses/10 HPF), score 3.    -- Confirmed by tumor cell immunophenotype:        * Positive: Pankeratin (CKAE1/3), E-cadherin, P120 (membranous), EMA (non-peripheral).        * Negative: p63, Calponin-B.    -- Associated prior biopsy site changes with GIOVANA  marker.    Second residual focus of invasive carcinoma with apocrine features, 8 mm in     greatest dimension, present 2.5 cm from largest invasive carcinoma between lower     outer and inner quadrants, 6 o'clock:    -- Elsy histologic grade 2 of 3 (total score: 7 of 9)        * Glandular (acinar) Tubular Differentiation < 10%, score 3.        * Nuclear Pleomorphism 3 of 3, score 3.        * Mitotic Rate < 3/mm2, (</= 7 mitoses/10HPF; 0 mitoses/10 HPF), score 1.    -- Associated prior biopsy site changes with GIOVANA  marker.    Third focus of invasive carcinoma with apocrine features, 2 mm, present in random    fibrous tissue of LOQ.   - Ductal carcinoma in-situ (DCIS): Present; not an extensive intraductal component     (~ 5% of total residual tumor).     -- Size and Extent: 36 mm in greatest approximate extent; focally present in 9 sequential         and 1 non-sequential sections/blocks; 10 of 29 blocks; associated with and adjacent  to both         invasive carcinomas.     -- Architectural Patterns: Solid, cribriform, comedo/clinging.    -- Nuclear grade: II-III (intermediate to high).    -- Necrosis: Present, focal and central necrosis identified.   - Residual Cancer [place] (RCB)/Treatment effect (largest residual invasive carcinoma):    -- Probable/definite response to presurgical therapy in invasive carcinoma (both foci of        invasive carcinoma).    -- Greatest and secondary dimensions of Primary Tumor Bed: 22 x 20 mm.    -- Percentage of overall Cancer Cellularity: ~ 65%.    -- Percentage of Cancer that is In-situ disease: 5%.    -- Number of Positive lymph nodes: 4.    -- Diameter of Largest Fabienne Metastasis: 25 mm.    -- Residual Cancer [place]: 4.215    -- Residual Cancer [place] Class: RCB-III.  - Margins uninvolved by invasive and in-situ carcinoma:    -- Invasive carcinoma < 1 mm from nearest posterior margin; 5 mm from LOQ anterior margin.    -- DCIS 8 mm from nearest LOQ anterior margin; 16 mm from nearest posterior margin.   - Benign nipple and skin:    -- DCIS does not involve the nipple epidermis.     -- No dermal lymphatic and/or vascular invasion.   - Lymphatic and/or vascular invasion: Present.    -- D2-40 and Pankeratin/CKC/CD31 immunostains performed.   - Two axillary lymph nodes positive for metastatic carcinoma consistent with breast primary (2/2).      -- Largest fabienne metastasis: 8mm; 5 mm (smaller lymph node).     -- Extranodal extension: Present, dispersed over 3 mm area of prior biopsy site fibrosis.      -- Confirmed by positive Pankeratin (CKAE1/3), E-cadherin, p120 (membranous),         GATA3 immunostains.     -- Associated fibrosis probable minimal response to presurgical therapy in         metastatic carcinoma.      -- Largest lymph node with prior biopsy site changes and GIOVANA  marker.   - Microcalcifications: Present, associated with invasive carcinoma, DCIS and     non-neoplastic breast tissue.   -  Benign fibroadenoma, 0.8 cm.   - Benign fibrocystic changes without atypia (usual ductal hyperplasia, columnar cell     change and hyperplasia, apocrine adenosis, fibroadenomatoid changes, cystic and     papillary apocrine metaplasia).    -- Associated prior biopsy site changes with [place] tie-shaped biopsy clip.       ** Traumatic neuroma present.   - Mammary duct ectasia.      B. Axillary lymph nodes, Right Axillary level I and level II lymph nodes, regional resection:  - Two lymph nodes positive for metastatic carcinoma consistent with breast primary (2/2).     -- Confirmed by positive Pankeratin (CKAE1/3), E-cadherin, p120 (membranous),         GATA3 immunostains.    -- 25 mm (near complete ernie replacement of largest lymph node) and 2.5 mm         in greatest dimensions of metastases.     -- Extranodal extension present, < 2 mm.    -- Associated fibrosis probable minimal response to presurgical therapy in        metastatic carcinoma.      C. Breast, Left, Left Breast mastectomy suture marks axillary tail:  - Negative for malignancy, in-situ carcinoma and atypical ductal hyperplasia.   - Benign nipple and skin.  - Benign fibroadenoma (0.8 cm).   - Benign fibrocystic changes with atypia (atypical lobular hyperplasia/ALH with     pagetoid ductal spread, usual ductal hyperplasia, columnar cell lesions/columnar     cell change and hyperplasia, sclerosing and apocrine adenosis, fibroadenomatoid     changes, cystic and papillary apocrine metaplasia).    -- Confirmed by E-cadherin, p120, CK5/6, Calponin-B immunostains    - Microcalcifications: Present, associated with ALH and non-neoplastic breast tissue.   - Prior biopsy site changes with U-shaped biopsy clip, upper outer quadrant/UOQ, 2 o'clock.   - Mammary duct ectasia.    Electronically signed by Janny Gordillo MD on 11/8/2024 at 2230 EST   Note    Intradepartmental consultation (WT) agrees with the above diagnoses for specimens A & B. A copy of the final report  is sent to Dr. ALEXANDRIA Valdez on 11/8/24 @ 2222 hours.      1.- Repeat HER2 testing (Current ASCO/CAP Recommendations): Indicated (s/p neoadjuvant therapy).       -- ER/TN/HER2 pending; will be reported in an addendum.       - Best representative tumor blocks:  A4, A8, B5 (lymph node metastasis).        -- Sufficient tumor present for          Agendia Mammaprint/Blueprint (1 cm2 of invasive tumor in aggregate):  Yes.          MI Profile/Foundation One (at least 5 x 5 mm of tumor): Yes.     2.  Pathologic Stage Classification (pTNM, AJCC 8th Edition):       8th ed, AJCC Anatomic Stage:  at least Stage IIIA - ypT2, ypN2a, cM0, G3.     3.  8th ed. AJCC Pathologic Prognostic Stage (use AJCC update): N/A (status post neoadjuvant therapy).    Additional Information    All reported additional testing was performed with appropriately reactive controls.  These tests were developed and their performance characteristics determined by [facility] or appropriate performing facility, though some tests may be performed on tissues which have not been validated for performance characteristics (such as staining performed on alcohol exposed cell blocks and decalcified tissues).  Results should be interpreted with caution and in the context of the patients’ clinical condition. These tests may not be cleared or approved by the U.S. Food and Drug Administration, though the FDA has determined that such clearance or approval is not necessary. These tests are used for clinical purposes and they should not be regarded as investigational or for research. This laboratory has been approved by CLIA 88, designated as a high-complexity laboratory and is qualified to perform these tests.  Interpretation performed at [facility], [address] [address].   Synoptic Checklist   INVASIVE CARCINOMA OF THE BREAST: Resection  8th Edition - Protocol posted: 6/19/2024  INVASIVE CARCINOMA OF THE BREAST: RESECTION - A,  "B  SPECIMEN   Procedure  Total mastectomy    Specimen Laterality  Right    TUMOR   Tumor Site  Lower outer quadrant    Histologic Type  Invasive carcinoma of no special type (ductal)    Histologic Grade ([place] Histologic Score)     Glandular (Acinar) / Tubular Differentiation  Score 2    Nuclear Pleomorphism  Score 3    Mitotic Rate  Score 3    Overall Grade  Grade 3 (scores of 8 or 9)    Tumor Size  Greatest dimension of largest invasive focus (Millimeters): 21 mm   Tumor Focality  Multiple foci of invasive carcinoma    Number of Foci  3    Sizes of Individual Foci in Millimeters (mm)  21, 8, 2    Ductal Carcinoma In Situ (DCIS)  Present      Negative for extensive intraductal component (EIC)    Size (Extent) of DCIS  Estimated size (extent) of DCIS is at least (Millimeters): 36 mm   Architectural Patterns  [place]      Cribriform      Solid      [place]ling[place].     Nuclear Grade  Grade III (high)    Necrosis  Present, central (expansive \"comedo\" necrosis)    Number of Blocks with DCIS  10    Number of Blocks Examined  29    Lobular Carcinoma In Situ (LCIS)  Not identified    Lymphatic and / or Vascular Invasion  Present      Extensive    Dermal Lymphatic and / or Vascular Invasion  Not identified    Microcalcifications  Present in DCIS      Present in invasive carcinoma      Present in non-neoplastic tissue    Treatment Effect in the Breast  Probable or definite response to presurgical therapy in the invasive carcinoma    Treatment Effect in the Lymph Nodes  Probable or definite response to presurgical therapy in metastatic carcinoma    Residual Cancer [place] (RCB) Parameters     Greatest Dimension of Primary Tumor Bed Area (Millimeters)  22 mm   Second Greatest Dimension of Primary Tumor Bed Area (Millimeters)  20 mm   Percentage of Overall Cancer Cellularity  65 %   Percentage of Cancer that is In Situ Disease  5 %   Number of Positive Lymph Nodes  4    Diameter of Largest Fabienne Metastasis (Millimeters)  25 mm "   Residual Cancer [place]  4.215    Residual Cancer [place] Class  RCB-III    MARGINS   Margin Status for Invasive Carcinoma  All margins negative for invasive carcinoma    Distance from Invasive Carcinoma to Closest Margin  Less than: 1 mm   Closest Margin(s) to Invasive Carcinoma  Posterior    Distance from Invasive Carcinoma to Anterior Margin  5 mm   Margin Status for DCIS  All margins negative for DCIS    Distance from DCIS to Closest Margin  8 mm   Closest Margin(s) to DCIS  Anterior    REGIONAL LYMPH NODES   Regional Lymph Node Status  Tumor present in regional lymph node(s)    Number of Lymph Nodes with Macrometastases  4    Number of Lymph Nodes with Micrometastases  0    Size of Largest Fabienne Metastatic Deposit  25 mm   Extranodal Extension  Present    Total Number of Lymph Nodes Examined (sentinel and non-sentinel)  4    pTNM CLASSIFICATION (AJCC 8th Edition)   Reporting of pT, pN, and (when applicable) pM categories is based on information available to the pathologist at the time the report is issued. As per the AJCC (Chapter 1, 8th Ed.) it is the managing physician's responsibility to establish the final pathologic stage based upon all pertinent information, including but potentially not limited to this pathology report.   Modified Classification  y    pT Category  pT2    T Suffix  (m)    pN Category  pN2a    SPECIAL STUDIES        Estrogen Receptor (ER) Status  Positive (greater than 10% of cells demonstrate nuclear positivity)    Percentage of Cells with Nuclear Positivity  90 %        Progesterone Receptor (PgR) Status  Positive    Percentage of Cells with Nuclear Positivity  5-7 %        HER2 (by immunohistochemistry)  Equivocal (Score 2+)         HER2 (by in situ hybridization)  Negative (not amplified)    Testing Performed on Case Number  [id number]    Comment(s)  Repeat ER/NV/HER2 pending; will be reported in an addendum.    .           Administrative Statements   I have spent a total time of 60  minutes in caring for this patient on the day of the visit/encounter including Diagnostic results, Prognosis, Risks and benefits of tx options, Documenting in the medical record, Reviewing / ordering tests, medicine, procedures  , and Obtaining or reviewing history  .

## 2024-12-10 ENCOUNTER — TELEPHONE (OUTPATIENT)
Dept: RADIATION ONCOLOGY | Facility: CLINIC | Age: 74
End: 2024-12-10

## 2024-12-10 NOTE — TELEPHONE ENCOUNTER
Juve called back. She has decided not to have chemotherapy and would like to proceed with radiation therapy. She has decreased ROM in her right arm and Dr. Patel ordered PT which patient thought Dr. Patel wanted prior to RT. Discussed with Dr. Patel. Dr. Patel recommended Juve be scheduled for SIM next week and see if she is able to lay in position with arm above head.

## 2024-12-10 NOTE — TELEPHONE ENCOUNTER
MICHAEL ONC - spoke with Juve.  She is scheduled for SIM 12/18/24, 10:00 AM w/ Dr. Patel at the [facility]...KD

## 2024-12-10 NOTE — TELEPHONE ENCOUNTER
Called Juve to follow up on her treatment decision. She had consult with Dr. Patel on 12/6/24. At the visit she was undecided about chemotherapy that was offered by Dr. Quiroz. ELLI/ELLEN on CP identifying myself, left office number and requested a call back.

## 2024-12-11 PROCEDURE — 77263 THER RADIOLOGY TX PLNG CPLX: CPT | Performed by: RADIOLOGY

## 2024-12-12 ENCOUNTER — DOCUMENTATION (OUTPATIENT)
Age: 74
End: 2024-12-12

## 2024-12-12 ENCOUNTER — TELEPHONE (OUTPATIENT)
Dept: HEMATOLOGY ONCOLOGY | Facility: CLINIC | Age: 74
End: 2024-12-12

## 2024-12-12 ENCOUNTER — CLINICAL SUPPORT (OUTPATIENT)
Facility: OTHER | Age: 74
End: 2024-12-12

## 2024-12-12 DIAGNOSIS — C77.3 BREAST CANCER METASTASIZED TO AXILLARY LYMPH NODE, RIGHT (HCC): Primary | ICD-10-CM

## 2024-12-12 DIAGNOSIS — C50.911 BREAST CANCER METASTASIZED TO AXILLARY LYMPH NODE, RIGHT (HCC): Primary | ICD-10-CM

## 2024-12-12 LAB
DME PARACHUTE DELIVERY DATE REQUESTED: NORMAL
DME PARACHUTE ITEM DESCRIPTION: NORMAL
DME PARACHUTE ORDER STATUS: NORMAL
DME PARACHUTE SUPPLIER NAME: NORMAL
DME PARACHUTE SUPPLIER PHONE: NORMAL

## 2024-12-12 NOTE — TELEPHONE ENCOUNTER
Patient called to inform  she will not be doing chemotherapy. Per patient she will continue with radiation and the two pills as requested. Patient did not name medication.         Thanks!

## 2024-12-12 NOTE — TELEPHONE ENCOUNTER
Pt stated Oncology Provider: Dr Quiroz    Actionable item: Patient requesting TOÑITO to Noemi/Dr Berumen.    What is the reason for the call/chief complaint?    Patient called to state she received message from JADA Tilley regarding the Ibrance. She wanted to check if she should begin taking it as soon as she receives it or wait until after RT. She has been taking the Arimidex daily as prescribed.     I confirmed from JADA Tilley and advised patient to stop the Arimidex the day before RT and resume the day after. Also informed her, we are working on authorization for the Ibrance through her insurance, but she is not to begin it until after RT.    She is requesting to transfer care to Dr Berumen at [facility] as the [place] office is quite a distance from her home. Patient lives in [place], [place], and said she mentioned this request to the staff she spoke to earlier today.    She is asking for this transfer to be initiated asap, so her next visit will be with Dr Berumen.

## 2024-12-12 NOTE — PROGRESS NOTES
Received request from clinical for patient to start on Ibrance 125mg daily for 21 days on 7 days off. Auth has been submitted and marked urgent and this came back approved.    BIN:       [id number]  EDMOND:      MD  ID:          [id number]  GRP:      [id number]    CaseId:[id number];Status:Approved;Review Type:Prior Auth;Coverage Start Date:11/12/2024;Coverage End Date:12/12/2025;  Authorization Expiration Date: 12/11/2025

## 2024-12-12 NOTE — TELEPHONE ENCOUNTER
Returned call to pt, left VM advising I got her message that she would like to move forward with arimidex and Ibrance. Advised Ibrance request sent to oral chemo team to begin working on auth and that I will send her a MyChart message with Ibrance information for her to review.

## 2024-12-12 NOTE — PROGRESS NOTES
Mastectomy Bra Fitting Order Details    Juve Duarte  1950  [id number]    Reason For Visit  Patient is post op B mastectomy    Precautions   BCA    Subjective  Patient presents today for post op visit s/p B mastectomy;  She would like to obtain bras and prosthesis.    Surgery Type: Mastectomy    Lymph node removal yes    Date of surgery 10/29/24    Surgical side bilateral    Objective  Patient reports that she typically wore a 38C bra prior to surgery    Assessment    Band:  17 x 2 = 34  Cup:   18 x 2 = 36    Trial of:    Susie Richards XL  Cara CALLOWAY   Natasha 38C  Natasha 40C    Prosthesis size 6 and 7     Plan  Natasha 40 C BLK x 1  Natasha 40 C off white x 1  Natasha 40 C Cathy x 1     B prosthesis #390 Size 7 Natura Light 2S  x 2    Order Details   Natasha 40 C BLK x 1  Natasha 40 C off white x 1  Natasha 40 C Cathy x 1     B prosthesis #390 Size 7 Natura Light 2S  x 2

## 2024-12-13 ENCOUNTER — TELEPHONE (OUTPATIENT)
Dept: HEMATOLOGY ONCOLOGY | Facility: CLINIC | Age: 74
End: 2024-12-13

## 2024-12-13 ENCOUNTER — TELEPHONE (OUTPATIENT)
Dept: SURGICAL ONCOLOGY | Facility: CLINIC | Age: 74
End: 2024-12-13

## 2024-12-13 NOTE — TELEPHONE ENCOUNTER
Oncology Finance Advocacy Intake and Intervention  Oncology Finance Counselor/Advocate placed call to patient. This writer informed patient that this writer is here to assist patient with billing questions, financial assistance, payment/payment plans, quotes, copayment assistance, insurance optimization, and insurance navigation.    This writer conducted a thorough benefit review of copayment, deductible, and out of pocket cost. This information is documented below and has been reviewed with patient.     Copayment:  Deductible:  Out of Pocket Cost:  Insurance optimization (Limited benefit vs self-pay):  Patient assistance status:Patient Outreach High Co Pay  Free Drug Applications:N/A  Oral Chemo Application:Ibrance  BIN#:[id number]  PCN#:MD THOMSON#:[id number]  ID: [id number]  Copay:$2,911.93  Interventions:    E mail received today:    High copay: $2911.93 - please let me know of any available funding options.    ThanksJamie    From: Suha Saucedo <[email or web address]>   Sent: Thursday, December 12, 2024 5:12 PM  To: Jamie Sotelo <[email or web address]>; Colleen Briseno <[email or web address]>  Cc: [facility] <[email or web address]>; Suha Saucedo <[email or web address]>  Subject: RE: Elpidio Maloney,  This one has been approved.  CaseId:[id number];Status:Approved;Review Type:Prior Auth;Coverage Start Date:11/12/2024;Coverage End Date:12/12/2025;  Authorization Expiration Date: 12/11/2025      Please let us know if you can fill!!  ThanksSuha    So I would review and find that there was no funding by diagnosis for the patient so I will pursue the free drug with [employer].    I placed a call to the patient and introduced myself and that my role at [facility] today would be to submit an application on her behalf to [employer] for her Ibrance that has a high co pay of $2,911.93. I will send the application to  [email or web address] today to be completed and returned back to me or dropped off at the doctor's office for me to process with [employer]. I asked the patient if they had Medicare Part D coverage and the patient stated she did not. I would have included a LIS (Medicare Part Dextra Help) application to the e mail. If she finds out that she does I will add it to the e mail. If they do they realize they have Part D they can answer the questions on the LIS application. We can use their responses to submit an official LIS application through [employer]. Once we submit, it takes a general 2-3 week review window before you would receive a letter stating your determination.     Information above was review thoroughly with patient and patient was advise of possible assistance programs/interventions. If any question arise patient can contact this writer at below information. This information was given to patient at time of contact.      Alireza Denise  Phone:[phone]  Email: [email or web address]

## 2024-12-13 NOTE — TELEPHONE ENCOUNTER
Called patient with new appt schedule TOÑITO . Left message with all info and with hope line tel number

## 2024-12-18 ENCOUNTER — OFFICE VISIT (OUTPATIENT)
Dept: RADIATION ONCOLOGY | Facility: CLINIC | Age: 74
End: 2024-12-18
Attending: RADIOLOGY
Payer: MEDICARE

## 2024-12-18 VITALS
BODY MASS INDEX: 23.14 KG/M2 | TEMPERATURE: 97.6 F | RESPIRATION RATE: 18 BRPM | OXYGEN SATURATION: 95 % | DIASTOLIC BLOOD PRESSURE: 74 MMHG | WEIGHT: 144 LBS | SYSTOLIC BLOOD PRESSURE: 128 MMHG | HEART RATE: 67 BPM | HEIGHT: 66 IN

## 2024-12-18 DIAGNOSIS — C77.3 BREAST CANCER METASTASIZED TO AXILLARY LYMPH NODE, RIGHT (HCC): Primary | ICD-10-CM

## 2024-12-18 DIAGNOSIS — C50.911 BREAST CANCER METASTASIZED TO AXILLARY LYMPH NODE, RIGHT (HCC): Primary | ICD-10-CM

## 2024-12-18 PROCEDURE — 99211 OFF/OP EST MAY X REQ PHY/QHP: CPT | Performed by: RADIOLOGY

## 2024-12-18 PROCEDURE — 77290 THER RAD SIMULAJ FIELD CPLX: CPT | Performed by: RADIOLOGY

## 2024-12-18 PROCEDURE — 77334 RADIATION TREATMENT AID(S): CPT | Performed by: RADIOLOGY

## 2024-12-18 NOTE — ASSESSMENT & PLAN NOTE
Juve Duarte 1950 is a 74 y.o. woman with a history of stage IIIA ([id number]) G3 multifocal right breast cancer and synchronous low-grade gastric neuroendocrine tumor s/p bilateral mastectomies and right axillary lymph node resection achieving negative margins.  Pathology demonstrated 4/4 positive lymph nodes.  Tumor cells were ER+, FL+, HER2-. The gastric tumor was in polyp that was removed and she is currently undergoing surveillance.  She has decided to defer chemotherapy and presents for CT simulation today.  Endocrine therapy planned.  -Patient and  allowed to ask questions that were answered to their satisfaction.  -Informed consent obtained.  -CT simulation today.

## 2024-12-18 NOTE — PROGRESS NOTES
Follow-up Visit   Name: Juve Duarte      : 1950      MRN: [id number]  Encounter Provider: Kerri Patel MD  Encounter Date: 2024   Encounter department: [facility] RADIATION ONCOLOGY  :  Assessment & Plan  Breast cancer metastasized to axillary lymph node, right (HCC)  Juve Duarte 1950 is a 74 y.o. woman with a history of stage IIIA ([id number]) G3 multifocal right breast cancer and synchronous low-grade gastric neuroendocrine tumor s/p bilateral mastectomies and right axillary lymph node resection achieving negative margins.  Pathology demonstrated 4/4 positive lymph nodes.  Tumor cells were ER+, DC+, HER2-. The gastric tumor was in polyp that was removed and she is currently undergoing surveillance.  She has decided to defer chemotherapy and presents for CT simulation today.  Endocrine therapy planned.  -Patient and  allowed to ask questions that were answered to their satisfaction.  -Informed consent obtained.  -CT simulation today.         History of Present Illness   Chief Complaint   Patient presents with    Follow-up     Radiation oncology    Pertinent Medical History   Juve Duarte 1950 is a 74 y.o. woman with a history of stage IIIA ([id number]) G3 multifocal right breast cancer and synchronous low-grade gastric neuroendocrine tumor s/p bilateral mastectomies and right axillary lymph node resection achieving negative margins.  Pathology demonstrated 4/4 positive lymph nodes.  She had preoperative anastozole.  Tumor cells were ER+, DC+, HER2-. The gastric tumor was in polyp that was removed and she is currently undergoing surveillance.  She was seen in consultation on 2024 and offered adjuvant radiation.  She agreed, but was undecided regarding chemotherapy and so did not commit to CT simulation as coordinated care was needed.    She since decided to defer chemotherapy.  She will plan endocrine therapy with CDK inhibitor post radiation.  She has limited range  of motion, but no upper extremity edema.  Fibrosis across bilateral chest wall, right more than left, unchanged.    She presents for CT simulation and informed consent today.    Oncology History   Oncology History   Breast cancer metastasized to axillary lymph node, right (HCC)   4/4/2024 Initial Diagnosis    April 4, 2024 patient had biopsy of right breast mass showing invasive ductal carcinoma.  Right axillary node showed metastatic carcinoma consistent with breast primary.  ER 90%, MA 5-7%, HER2 +2.  FISH negative.  Similar findings in the axillary node although MA was 30%.  HER2 +2, FISH negative.  April 18, 2024 patient had CT chest ab pelvis showing soft tissue nodules right breast.  Right infrahilar and internal mammary nodes indeterminate.  Right axillary nodes up to 0.5 cm.  2 enhancing soft tissue nodules in the gastric fundus.  Bone scan showed focus of activity at anterior right seventh rib.     4/4/2024 Biopsy    Breast, Right, US BX RT BREAST 700 7CMFN 3 PASSES 12G [place]:      - Invasive mammary carcinoma of no special type (ductal NST/invasive ductal carcinoma) with apocrine features, see note      - [place] grade 2 of 3 (total score: 6 of 9)          - Tubule formation: <10%, score 3          - Nuclear grade 2 of 3, score 2          - Mitoses < 3/mm2, (</= 7 mitoses/10HPF), score 1      - Invasive carcinoma involves 3 of 3 submitted core biopsies, max. dimension = 5 millimeters      - Ductal carcinoma in situ (DCIS): Not identified      - Microcalcifications: Absent      - Lymphovascular invasion: Present     Breast, Right, US BX RT BREAST 900 7CMFN 3 PASSES 12G [place]:      - Invasive mammary carcinoma of no special type (ductal NST/invasive ductal carcinoma) with apocrine features, see note      - [place] grade 2 of 3 (total score: 6 of 9)          - Tubule formation: <10%, score 3          - Nuclear grade 2 of 3, score 2          - Mitoses < 3/mm2, (</= 7 mitoses/10HPF), score 1      -  Invasive carcinoma involves 3 of 3 submitted core biopsies, max. dimension = 14 millimeters      - Ductal carcinoma in situ (DCIS): Not identified      - Microcalcifications: Absent      - Lymphovascular invasion: Not identified    Axillary lymph node, US BX RT AXILLARY TAIL LN 10:00 12CMFN 3 PASSES 12G [place]:      - Metastatic carcinoma consistent with mammary primary,    BREAST TUMOR PROGNOSTIC PROFILE     Performed on invasive carcinoma block E1:  Test Description Result Prognostic Interpretation   Estrogen Receptor/ER  Primary Antibody: SP-1  Internal control: Positive   External control: Positive 90%  Staining Intensity: Strong  Isabela Score*: 8 Positive   Progesterone Receptor/PgR  Primary Antibody:1E2  Internal control: Positive  External control: Positive 5-7%  Staining Intensity: Strong  Isabela Score*: 4 Positive   HER2 by IHC   Primary Antibody: 4B5 2+ Equivocal *      Performed on invasive carcinoma block F2:  Test Description Result Prognostic Interpretation   Estrogen Receptor/ER  Primary Antibody: SP-1  Internal control: Positive   External control: Positive 95%  Staining Intensity: Strong  Isabela Score*: 8 Positive   Progesterone Receptor/PgR  Primary Antibody:1E2  Internal control: Positive  External control: Positive 30%  Staining Intensity: Moderate  Isabela Score*: 5 Positive   HER2 by IHC   Primary Antibody: 4B5 2+ Equivocal *       Fluorescence in situ hybridization (FISH) analysis of HER2 overexpression by invasive breast carcinoma cells, block E1 performed at [facility], [place], [place], yields the following results:  Test Description                        Interpretation  HER2 by FISH analysis:              Negative / Not Amplified.  Results  HER2: CEP-17 ratio :                  1.5: 1  Average HER2 Signal:                5.4  Average CEP-17 Signal:                          3.5  Number of selected invasive cells scanned           100     Fluorescence in situ  hybridization (FISH) analysis of HER2 overexpression by invasive breast carcinoma cells, block F2 performed at [facility], [place], [place], yields the following results:  Test Description                        Interpretation  HER2 by FISH analysis:              Negative / Not Amplified.  Results  HER2: CEP-17 ratio :                  1.3: 1  Average HER2 Signal:                3.5  Average CEP-17 Signal:                          2.6  Number of selected invasive cells scanned           50        4/4/2024 Initial Diagnosis    Malignant neoplasm of overlapping sites of right breast in female, estrogen receptor positive (HCC)     4/18/2024 Genetic Testing    AMBRY  A total of 36 genes were evaluated, including: APC, MIKE, BARD 1, BMPR1A, BRCA1, BRCA2, BRIP1, CDH1, CDK4, CKDN2A, CHEK2, DICER1, MLH1, MSH2, MSH6, MUTYH, NBN, NF1, NTHL1, PALB2, PMS2, PTEN, RAD51C, RECQL, SMAD4, SMARCA4, STK11, TP53, AXIN2, HOXB13, MSH3, POLD1, AND POLE, EPCAM, AND GREM1     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    LEFT  Benign fibroadenoma (0.8 cm), Benign fibrocystic changes with atypia (atypical lobular hyperplasia/ALH     10/29/2024 -  Cancer Staged    Staging form: Breast, AJCC 8th Edition  - Clinical stage from 10/29/2024: Stage IIIA (cT2, cN2, cM0, G3, ER+, RI+, HER2-) - Signed by Jevon Valdez MD on 11/21/2024  Stage prefix: Initial diagnosis  Histologic grading system: 3 grade system       Malignant neoplasm of overlapping sites of right breast in female, estrogen receptor positive (HCC)   4/4/2024 Biopsy     Breast, Right, US BX RT BREAST 700 7CMFN 3 PASSES 12G [place]:      - Invasive mammary carcinoma of no special type (ductal NST/invasive ductal carcinoma) with apocrine features, see note      - Elsy grade 2 of 3 (total score: 6 of 9)          - Tubule formation: <10%, score 3          - Nuclear grade 2 of 3, score 2          - Mitoses < 3/mm2, (</= 7 mitoses/10HPF), score 1      - Invasive carcinoma involves 3 of 3 submitted core biopsies, max. dimension = 5 millimeters      - Ductal carcinoma in situ (DCIS): Not identified      - Microcalcifications: Absent      - Lymphovascular invasion: Present     Breast, Right, US BX RT BREAST 900 7CMFN 3 PASSES 12G [place]:      - Invasive mammary carcinoma of no special type (ductal NST/invasive ductal carcinoma) with apocrine features, see note      - [place] grade 2 of 3 (total score: 6 of 9)          - Tubule formation: <10%, score 3          - Nuclear grade 2 of 3, score 2          - Mitoses < 3/mm2, (</= 7 mitoses/10HPF), score 1      - Invasive carcinoma involves 3 of 3 submitted core biopsies, max. dimension = 14 millimeters      - Ductal carcinoma in situ (DCIS): Not identified      - Microcalcifications: Absent      - Lymphovascular invasion: Not identified    Axillary lymph node, US BX RT AXILLARY TAIL LN 10:00 12CMFN 3 PASSES 12G [place]:      - Metastatic carcinoma consistent with mammary primary,    BREAST TUMOR PROGNOSTIC PROFILE     Performed on invasive carcinoma block E1:  Test Description Result Prognostic Interpretation   Estrogen Receptor/ER  Primary Antibody: SP-1  Internal control: Positive   External control: Positive 90%  Staining Intensity: Strong  Isabela Score*: 8 Positive   Progesterone Receptor/PgR  Primary Antibody:1E2  Internal control: Positive  External control: Positive 5-7%  Staining Intensity: Strong  Isabela Score*: 4 Positive   HER2 by IHC   Primary Antibody: 4B5 2+ Equivocal *      Performed on invasive carcinoma block  F2:  Test Description Result Prognostic Interpretation   Estrogen Receptor/ER  Primary Antibody: SP-1  Internal control: Positive   External control: Positive 95%  Staining Intensity: Strong  Isabela Score*: 8 Positive   Progesterone Receptor/PgR  Primary Antibody:1E2  Internal control: Positive  External control: Positive 30%  Staining Intensity: Moderate  Isabela Score*: 5 Positive   HER2 by IHC   Primary Antibody: 4B5 2+ Equivocal *       Fluorescence in situ hybridization (FISH) analysis of HER2 overexpression by invasive breast carcinoma cells, block E1 performed at [facility], [place], [place], yields the following results:  Test Description                        Interpretation  HER2 by FISH analysis:              Negative / Not Amplified.  Results  HER2: CEP-17 ratio :                  1.5: 1  Average HER2 Signal:                5.4  Average CEP-17 Signal:                          3.5  Number of selected invasive cells scanned           100     Fluorescence in situ hybridization (FISH) analysis of HER2 overexpression by invasive breast carcinoma cells, block F2 performed at [facility], [place], [place], yields the following results:  Test Description                        Interpretation  HER2 by FISH analysis:              Negative / Not Amplified.  Results  HER2: CEP-17 ratio :                  1.3: 1  Average HER2 Signal:                3.5  Average CEP-17 Signal:                          2.6  Number of selected invasive cells scanned           50        4/4/2024 Initial Diagnosis    Malignant neoplasm of overlapping sites of right breast in female, estrogen receptor positive (HCC)     4/18/2024 Genetic Testing    AMBRY  A total of 36 genes were evaluated, including: APC, MIKE, BARD 1, BMPR1A, BRCA1, BRCA2, BRIP1, CDH1, CDK4, CKDN2A, CHEK2, DICER1, MLH1, MSH2, MSH6, MUTYH, NBN, NF1, NTHL1, PALB2, PMS2, PTEN, RAD51C, RECQL, SMAD4, SMARCA4, STK11, TP53, AXIN2, HOXB13,  "MSH3, POLD1, AND POLE, EPCAM, AND GREM1     LIKELY PATHOGENIC VARIANT CHEK2  VUS SDHA     10/29/2024 Surgery    Right modified radical mastectomy left simple mastectomy    RIGHT  Multifocal residual invasive mammary carcinoma of no special type (ductal) s/p neoadjuvant chemotherapy  21 mm, 8 mm, 2 mm  Grade 3   Margins negative  ER 90  MN 5  HER2 2+  FISH negative  4/4 Lymph nodes     Anatomic Stage IIIA  Prognostic Stage (use AJCC update): N/A (status post neoadjuvant therapy).    LEFT  Benign fibroadenoma (0.8 cm), Benign fibrocystic changes with atypia (atypical lobular hyperplasia/ALH        Review of Systems Refer to nursing note.      Objective   /74 (BP Location: Left arm, Patient Position: Sitting, Cuff Size: Standard)   Pulse 67   Temp 97.6 °F (36.4 °C) (Temporal)   Resp 18   Ht 5' 6\" (1.676 m)   Wt 65.3 kg (144 lb)   SpO2 95%   BMI 23.24 kg/m²     Pain Screening:  Pain Score: 0-No pain  ECOG  1  Physical Exam   Patient in no acute distress.  Bilateral chest wall with healed incisions.  No erythema, palpable nodules, supicious skin changes bilaterally  No upper extremity edema bilaterally.  There is decreased range of motion right upper extremity.  "

## 2024-12-18 NOTE — PROGRESS NOTES
Juve Duarte 1950 is a 74 y.o. female   with a history of stage IIIA (Y1cC4W6) G3 multifocal right breast cancer and synchronous low-grade gastric neuroendocrine tumor s/p bilateral mastectomies and right axillary lymph node resection achieving negative margins.  Pathology demonstrated 4/4 positive lymph nodes.  She had preoperative anastozole.  Tumor cells were ER+, MN+, HER2-.  The gastric tumor was in polyp that was removed and she is currently undergoing surveillance. She has been offered various options of systemic therapy and has decided on arimidex and Ibrance.  She has decided to proceed with radiation therapy and presents for follow up for consent and simulation.              Follow up visit     Oncology History   Breast cancer metastasized to axillary lymph node, right (HCC)   4/4/2024 Initial Diagnosis    April 4, 2024 patient had biopsy of right breast mass showing invasive ductal carcinoma.  Right axillary node showed metastatic carcinoma consistent with breast primary.  ER 90%, MN 5-7%, HER2 +2.  FISH negative.  Similar findings in the axillary node although MN was 30%.  HER2 +2, FISH negative.  April 18, 2024 patient had CT chest ab pelvis showing soft tissue nodules right breast.  Right infrahilar and internal mammary nodes indeterminate.  Right axillary nodes up to 0.5 cm.  2 enhancing soft tissue nodules in the gastric fundus.  Bone scan showed focus of activity at anterior right seventh rib.     4/4/2024 Biopsy    Breast, Right, US BX RT BREAST 700 7CMFN 3 PASSES 12G MARQUEE:      - Invasive mammary carcinoma of no special type (ductal NST/invasive ductal carcinoma) with apocrine features, see note      - [place] grade 2 of 3 (total score: 6 of 9)          - Tubule formation: <10%, score 3          - Nuclear grade 2 of 3, score 2          - Mitoses < 3/mm2, (</= 7 mitoses/10HPF), score 1      - Invasive carcinoma involves 3 of 3 submitted core biopsies, max. dimension = 5 millimeters       - Ductal carcinoma in situ (DCIS): Not identified      - Microcalcifications: Absent      - Lymphovascular invasion: Present     Breast, Right, US BX RT BREAST 900 7CMFN 3 PASSES 12G MARQUEE:      - Invasive mammary carcinoma of no special type (ductal NST/invasive ductal carcinoma) with apocrine features, see note      - [place] grade 2 of 3 (total score: 6 of 9)          - Tubule formation: <10%, score 3          - Nuclear grade 2 of 3, score 2          - Mitoses < 3/mm2, (</= 7 mitoses/10HPF), score 1      - Invasive carcinoma involves 3 of 3 submitted core biopsies, max. dimension = 14 millimeters      - Ductal carcinoma in situ (DCIS): Not identified      - Microcalcifications: Absent      - Lymphovascular invasion: Not identified    Axillary lymph node, US BX RT AXILLARY TAIL LN 10:00 12CMFN 3 PASSES 12G MARQU[place]:      - Metastatic carcinoma consistent with mammary primary,    BREAST TUMOR PROGNOSTIC PROFILE     Performed on invasive carcinoma block E1:  Test Description Result Prognostic Interpretation   Estrogen Receptor/ER  Primary Antibody: SP-1  Internal control: Positive   External control: Positive 90%  Staining Intensity: Strong  Isabela Score*: 8 Positive   Progesterone Receptor/PgR  Primary Antibody:1E2  Internal control: Positive  External control: Positive 5-7%  Staining Intensity: Strong  Isabela Score*: 4 Positive   HER2 by IHC   Primary Antibody: 4B5 2+ Equivocal *      Performed on invasive carcinoma block F2:  Test Description Result Prognostic Interpretation   Estrogen Receptor/ER  Primary Antibody: SP-1  Internal control: Positive   External control: Positive 95%  Staining Intensity: Strong  Isabela Score*: 8 Positive   Progesterone Receptor/PgR  Primary Antibody:1E2  Internal control: Positive  External control: Positive 30%  Staining Intensity: Moderate  Isabela Score*: 5 Positive   HER2 by IHC   Primary Antibody: 4B5 2+ Equivocal *       Fluorescence in situ hybridization (FISH) analysis of  HER2 overexpression by invasive breast carcinoma cells, block E1 performed at [facility], [place], [place], yields the following results:  Test Description                        Interpretation  HER2 by FISH analysis:              Negative / Not Amplified.  Results  HER2: CEP-17 ratio :                  1.5: 1  Average HER2 Signal:                5.4  Average CEP-17 Signal:                          3.5  Number of selected invasive cells scanned           100     Fluorescence in situ hybridization (FISH) analysis of HER2 overexpression by invasive breast carcinoma cells, block F2 performed at [facility], [place], [place], yields the following results:  Test Description                        Interpretation  HER2 by FISH analysis:              Negative / Not Amplified.  Results  HER2: CEP-17 ratio :                  1.3: 1  Average HER2 Signal:                3.5  Average CEP-17 Signal:                          2.6  Number of selected invasive cells scanned           50        4/4/2024 Initial Diagnosis    Malignant neoplasm of overlapping sites of right breast in female, estrogen receptor positive (HCC)     4/18/2024 Genetic Testing    AMBRY  A total of 36 genes were evaluated, including: APC, MIKE, BARD 1, BMPR1A, BRCA1, BRCA2, BRIP1, CDH1, CDK4, CKDN2A, CHEK2, DICER1, MLH1, MSH2, MSH6, MUTYH, NBN, NF1, NTHL1, PALB2, PMS2, PTEN, RAD51C, RECQL, SMAD4, SMARCA4, STK11, TP53, AXIN2, HOXB13, MSH3, POLD1, AND POLE, EPCAM, AND GREM1     LIKELY PATHOGENIC VARIANT CHEK2  VUS SDHA     10/29/2024 Surgery    Right modified radical mastectomy left simple mastectomy    RIGHT  Multifocal residual invasive mammary carcinoma of no special type (ductal) s/p neoadjuvant chemotherapy  21 mm, 8 mm, 2 mm  Grade 3   Margins negative  ER 90  DC 5  HER2 2+  FISH negative  4/4 Lymph nodes     Anatomic Stage IIIA  Prognostic Stage (use AJCC update): N/A (status post neoadjuvant  therapy).    LEFT  Benign fibroadenoma (0.8 cm), Benign fibrocystic changes with atypia (atypical lobular hyperplasia/ALH     10/29/2024 -  Cancer Staged    Staging form: Breast, AJCC 8th Edition  - Clinical stage from 10/29/2024: Stage IIIA (cT2, cN2, cM0, G3, ER+, TX+, HER2-) - Signed by Jevon Valdez MD on 11/21/2024  Stage prefix: Initial diagnosis  Histologic grading system: 3 grade system       Malignant neoplasm of overlapping sites of right breast in female, estrogen receptor positive (HCC)   4/4/2024 Biopsy    Breast, Right, US BX RT BREAST 700 7CMFN 3 PASSES 12G MARQU[place]:      - Invasive mammary carcinoma of no special type (ductal NST/invasive ductal carcinoma) with apocrine features, see note      - [place] grade 2 of 3 (total score: 6 of 9)          - Tubule formation: <10%, score 3          - Nuclear grade 2 of 3, score 2          - Mitoses < 3/mm2, (</= 7 mitoses/10HPF), score 1      - Invasive carcinoma involves 3 of 3 submitted core biopsies, max. dimension = 5 millimeters      - Ductal carcinoma in situ (DCIS): Not identified      - Microcalcifications: Absent      - Lymphovascular invasion: Present     Breast, Right, US BX RT BREAST 900 7CMFN 3 PASSES 12G [place]:      - Invasive mammary carcinoma of no special type (ductal NST/invasive ductal carcinoma) with apocrine features, see note      - [place] grade 2 of 3 (total score: 6 of 9)          - Tubule formation: <10%, score 3          - Nuclear grade 2 of 3, score 2          - Mitoses < 3/mm2, (</= 7 mitoses/10HPF), score 1      - Invasive carcinoma involves 3 of 3 submitted core biopsies, max. dimension = 14 millimeters      - Ductal carcinoma in situ (DCIS): Not identified      - Microcalcifications: Absent      - Lymphovascular invasion: Not identified    Axillary lymph node, US BX RT AXILLARY TAIL LN 10:00 12CMFN 3 PASSES 12G [place]:      - Metastatic carcinoma consistent with mammary primary,    BREAST TUMOR PROGNOSTIC  PROFILE     Performed on invasive carcinoma block E1:  Test Description Result Prognostic Interpretation   Estrogen Receptor/ER  Primary Antibody: SP-1  Internal control: Positive   External control: Positive 90%  Staining Intensity: Strong  Isabela Score*: 8 Positive   Progesterone Receptor/PgR  Primary Antibody:1E2  Internal control: Positive  External control: Positive 5-7%  Staining Intensity: Strong  Isabela Score*: 4 Positive   HER2 by IHC   Primary Antibody: 4B5 2+ Equivocal *      Performed on invasive carcinoma block F2:  Test Description Result Prognostic Interpretation   Estrogen Receptor/ER  Primary Antibody: SP-1  Internal control: Positive   External control: Positive 95%  Staining Intensity: Strong  Isabela Score*: 8 Positive   Progesterone Receptor/PgR  Primary Antibody:1E2  Internal control: Positive  External control: Positive 30%  Staining Intensity: Moderate  Isabela Score*: 5 Positive   HER2 by IHC   Primary Antibody: 4B5 2+ Equivocal *       Fluorescence in situ hybridization (FISH) analysis of HER2 overexpression by invasive breast carcinoma cells, block E1 [facility], [place], [place], yields the following results:  Test Description                        Interpretation  HER2 by FISH analysis:              Negative / Not Amplified.  Results  HER2: CEP-17 ratio :                  1.5: 1  Average HER2 Signal:                5.4  Average CEP-17 Signal:                          3.5  Number of selected invasive cells scanned           100     Fluorescence in situ hybridization (FISH) analysis of HER2 overexpression by invasive breast carcinoma cells, block F2 [facility] at [facility], [place], [place], yields the following results:  Test Description                        Interpretation  HER2 by FISH analysis:              Negative / Not Amplified.  Results  HER2: CEP-17 ratio :                  1.3: 1  Average HER2 Signal:                 3.5  Average CEP-17 Signal:                          2.6  Number of selected invasive cells scanned           50        4/4/2024 Initial Diagnosis    Malignant neoplasm of overlapping sites of right breast in female, estrogen receptor positive (HCC)     4/18/2024 Genetic Testing    AMBRY  A total of 36 genes were evaluated, including: APC, MIKE, BARD 1, BMPR1A, BRCA1, BRCA2, BRIP1, CDH1, CDK4, CKDN2A, CHEK2, DICER1, MLH1, MSH2, MSH6, MUTYH, NBN, NF1, NTHL1, PALB2, PMS2, PTEN, RAD51C, RECQL, SMAD4, SMARCA4, STK11, TP53, AXIN2, HOXB13, MSH3, POLD1, AND POLE, EPCAM, AND GREM1     LIKELY PATHOGENIC VARIANT CHEK2  VUS SDHA     10/29/2024 Surgery    Right modified radical mastectomy left simple mastectomy    RIGHT  Multifocal residual invasive mammary carcinoma of no special type (ductal) s/p neoadjuvant chemotherapy  21 mm, 8 mm, 2 mm  Grade 3   Margins negative  ER 90  ID 5  HER2 2+  FISH negative  4/4 Lymph nodes     Anatomic Stage IIIA  Prognostic Stage (use AJCC update): N/A (status post neoadjuvant therapy).    LEFT  Benign fibroadenoma (0.8 cm), Benign fibrocystic changes with atypia (atypical lobular hyperplasia/ALH         Review of Systems:  Review of Systems   Constitutional: Negative.    HENT:  Positive for hearing loss.    Eyes: Negative.         Wears glasses    Respiratory: Negative.     Cardiovascular: Negative.    Gastrointestinal: Negative.    Endocrine: Negative.    Genitourinary:  Positive for frequency.   Musculoskeletal: Negative.    Skin: Negative.    Allergic/Immunologic: Negative.    Neurological: Negative.    Hematological: Negative.    Psychiatric/Behavioral: Negative.           Health Maintenance   Topic Date Due    Hepatitis C Screening  Never done    Pneumococcal Vaccine: 65+ Years (1 of 2 - PCV) [birth year]/1956    Zoster Vaccine (1 of 2) 01/31/1969    RSV Vaccine Age 60+ Years (1 - Risk 60-74 years 1-dose series) Never done    Falls: Plan of Care  Never done    COVID-19 Vaccine (3 - Moderna risk  series) 07/10/2021    Influenza Vaccine (1) 09/01/2024    Medicare Annual Wellness Visit (AWV)  11/16/2024    Fall Risk  05/20/2025    Urinary Incontinence Screening  05/20/2025    Depression Screening  12/06/2025    BMI: Adult  12/06/2025    Colorectal Cancer Screening  05/25/2026    Osteoporosis Screening  Completed    RSV Vaccine age 0-20 Months  Aged Out    HIB Vaccine  Aged Out    IPV Vaccine  Aged Out    Hepatitis A Vaccine  Aged Out    Meningococcal ACWY Vaccine  Aged Out    HPV Vaccine  Aged Out     Patient Active Problem List   Diagnosis    Chronic atrophic gastritis    Dysphagia, pharyngoesophageal    Urinary incontinence    Gastroparesis    Hemorrhoids, external    Asymptomatic postmenopausal status    Essential hypertension    Pernicious anemia    Breast cancer metastasized to axillary lymph node, right (HCC)    CHEK2 gene mutation positive    Neuroendocrine carcinoma of stomach (HCC)    Hyperlipidemia    Malignant neoplasm of overlapping sites of right breast in female, estrogen receptor positive (HCC)     Past Medical History:   Diagnosis Date    Breast cancer (HCC)     Cancer (HCC)     Breast, right    Colon polyp     Esophagitis     Gastric cancer (HCC)     History of benign breast tumor     Hyperlipidemia     Hypertension     Urinary incontinence     Venous ulcer of ankle (HCC) 01/10/2022     Past Surgical History:   Procedure Laterality Date    ANKLE SURGERY Right     BREAST EXCISIONAL BIOPSY Left 1998    benign    BUNIONECTOMY Bilateral     COLONOSCOPY      CORRECTION HAMMER TOE Bilateral     MAMMO STEREOTACTIC BREAST BIOPSY LEFT (ALL INC) Left 4/4/2024    MAMMO STEREOTACTIC BREAST BIOPSY RIGHT (ALL INC) EACH ADD Right 4/4/2024    MODIFIED RADICAL MASTECTOMY W/ AXILLARY LYMPH NODE DISSECTION Right 10/29/2024    Procedure: RIGHT MODIFIED RADICAL MASTECTOMY LEVEL I AND LEVEL II LYMPH NODE DISSECTION, GIOVANA CLIPS IN THE RIGHT BREAST X2, AXILLARY LN;  Surgeon: Jevon Valdez MD;  Location:  [facility];  Service: Surgical Oncology    SIMPLE MASTECTOMY Left 10/29/2024    Procedure: LEFT MASTECTOMY;  Surgeon: Jevon Valdez MD;  Location: [facility];  Service: Surgical Oncology    US GUIDANCE BREAST BIOPSY RIGHT EACH ADDITIONAL Right 4/4/2024    US GUIDED BREAST BIOPSY RIGHT COMPLETE Right 4/4/2024    US GUIDED BREAST LYMPH NODE BIOPSY RIGHT Right 4/4/2024    US GUIDED LYMPH NODE BIOPSY LEFT  5/15/2024    VEIN LIGATION Right 06/06/2022    Procedure: Venaseal therapy and stab phlebectomy;  Surgeon: Gosia Shepherd MD;  Location: [facility];  Service: Vascular     Family History   Problem Relation Age of Onset    No Known Problems Mother     Heart disease Father     Throat cancer Brother         possibly throat cancer    Thyroid cancer Brother         possibly thyroid cancer    No Known Problems Daughter     No Known Problems Maternal Aunt     No Known Problems Maternal Aunt     No Known Problems Maternal Aunt     No Known Problems Maternal Aunt     No Known Problems Paternal Aunt     No Known Problems Paternal Aunt     No Known Problems Paternal Aunt     No Known Problems Paternal Aunt     No Known Problems Maternal Grandmother     No Known Problems Maternal Grandfather     No Known Problems Paternal Grandmother     No Known Problems Paternal Grandfather     BRCA2 Negative Neg Hx     BRCA2 Positive Neg Hx     BRCA1 Positive Neg Hx     BRCA1 Negative Neg Hx     BRCA 1/2 Neg Hx     Ovarian cancer Neg Hx     Endometrial cancer Neg Hx     Colon cancer Neg Hx     Breast cancer additional onset Neg Hx     Breast cancer Neg Hx      Social History     Socioeconomic History    Marital status: /Civil Union     Spouse name: Not on file    Number of children: Not on file    Years of education: Not on file    Highest education level: Not on file   Occupational History    Not on file   Tobacco Use    Smoking status: Former     Current packs/day: 0.00     Types: Cigarettes     Start date: 1976      Quit date:      Years since quittin.9    Smokeless tobacco: Never   Vaping Use    Vaping status: Never Used   Substance and Sexual Activity    Alcohol use: Not Currently    Drug use: Never    Sexual activity: Yes     Partners: Male     Birth control/protection: None   Other Topics Concern    Not on file   Social History Narrative    Not on file     Social Drivers of Health     Financial Resource Strain: Low Risk  (2023)    Overall Financial Resource Strain (CARDIA)     Difficulty of Paying Living Expenses: Not hard at all   Food Insecurity: Patient Unable To Answer (10/29/2024)    Nursing - Inadequate Food Risk Classification     Worried About Running Out of Food in the Last Year: Not on file     Ran Out of Food in the Last Year: Not on file     Ran Out of Food in the Last Year: 97   Transportation Needs: Patient Unable To Answer (10/29/2024)    Nursing - Transportation Risk Classification     Lack of Transportation: Not on file     Lack of Transportation: 97   Physical Activity: Inactive (2020)    Exercise Vital Sign     Days of Exercise per Week: 0 days     Minutes of Exercise per Session: 0 min   Stress: No Stress Concern Present (2020)    [place] citizen [place] [employer] - Occupational Stress Questionnaire     Feeling of Stress : Not at all   Social Connections: Not on file   Intimate Partner Violence: Patient Unable To Answer (10/29/2024)    Nursing IPS     Feels Physically and Emotionally Safe: Not on file     Physically Hurt by Someone: Not on file     Humiliated or Emotionally Abused by Someone: Not on file     Physically Hurt by Someone: 97     Hurt or Threatened by Someone: 97   Housing Stability: Patient Unable To Answer (10/29/2024)    Nursing: Inadequate Housing Risk Classification     Has Housing: Not on file     Worried About Losing Housing: Not on file     Unable to Get Utilities: Not on file     Unable to Pay for Housing in the Last Year: 97     Has Housin        Current Outpatient Medications:     acetaminophen (TYLENOL) 325 mg tablet, Take 650 mg by mouth every 6 (six) hours as needed for mild pain, Disp: , Rfl:     acetaminophen-codeine (TYLENOL with CODEINE #3) 300-30 MG per tablet, Take 1 tablet by mouth every 6 (six) hours as needed for severe pain (Patient not taking: Reported on 12/6/2024), Disp: 30 tablet, Rfl: 0    anastrozole (ARIMIDEX) 1 mg tablet, Take 1 tablet (1 mg total) by mouth daily, Disp: 90 tablet, Rfl: 3    Ascorbic Acid (vitamin C) 100 MG tablet, Take 100 mg by mouth daily, Disp: , Rfl:     hydroCHLOROthiazide 25 mg tablet, TAKE 1 TABLET (25 MG TOTAL) BY MOUTH DAILY., Disp: 90 tablet, Rfl: 1    Multiple Vitamins-Minerals (multivitamin with minerals) tablet, Take 1 tablet by mouth daily, Disp: , Rfl:     naproxen (NAPROSYN) 500 mg tablet, TAKE 1 TABLET BY MOUTH TWICE A DAY WITH FOOD, Disp: 30 tablet, Rfl: 0    palbociclib (IBRANCE) 100 MG tablet, Take 1 tablet (100 mg total) by mouth daily 21 days on, followed by 7 days off, Disp: 30 tablet, Rfl: 3    pravastatin (PRAVACHOL) 40 mg tablet, TAKE 1 TABLET BY MOUTH DAILY AT BEDTIME, Disp: 90 tablet, Rfl: 1    Zinc 100 MG TABS, Take by mouth, Disp: , Rfl:     Current Facility-Administered Medications:     cyanocobalamin injection 1,000 mcg, 1,000 mcg, Intramuscular, Q30 Days, Lorraine Dennis DO, 1,000 mcg at 11/26/24 1033  Allergies   Allergen Reactions    Penicillins Other (See Comments)     Unknown reaction, positive on allergy testing     There were no vitals filed for this visit.

## 2024-12-20 ENCOUNTER — TELEPHONE (OUTPATIENT)
Dept: SURGICAL ONCOLOGY | Facility: CLINIC | Age: 74
End: 2024-12-20

## 2024-12-20 NOTE — TELEPHONE ENCOUNTER
Placed call to the patient regarding their application status for their Ibrance. The patient thought that it was coming in the mail so she failed to look at her e mails. During our conversation she was sorting through her e mails and so I suggested that I'd re-resend it now . Then she stated that she couldn't print it out and asked for hard copies. I then spoke with her  Oscar and said if you like I'm here at the [place] office and I could print them out for them to complete. He agreed that would work because they are coming this way to do some shopping today. Any other information that is required for the JACKELIN form can be brought back at another time for me to submit as well.   I also asked that they bring 1040's or SSI information for me to also submit to [employer] as it is part of their process.

## 2024-12-23 ENCOUNTER — TELEPHONE (OUTPATIENT)
Dept: SURGICAL ONCOLOGY | Facility: CLINIC | Age: 74
End: 2024-12-23

## 2024-12-23 ENCOUNTER — CLINICAL SUPPORT (OUTPATIENT)
Age: 74
End: 2024-12-23
Payer: MEDICARE

## 2024-12-23 DIAGNOSIS — D51.0 PERNICIOUS ANEMIA: Primary | ICD-10-CM

## 2024-12-23 PROCEDURE — 96372 THER/PROPH/DIAG INJ SC/IM: CPT

## 2024-12-23 RX ADMIN — CYANOCOBALAMIN 1000 MCG: 1000 INJECTION, SOLUTION INTRAMUSCULAR; SUBCUTANEOUS at 09:46

## 2024-12-30 ENCOUNTER — TELEPHONE (OUTPATIENT)
Dept: SURGICAL ONCOLOGY | Facility: CLINIC | Age: 74
End: 2024-12-30

## 2024-12-30 NOTE — TELEPHONE ENCOUNTER
Called [employer] and spoke with Kelsey GONZALEZ [phone] to follow up on right fax 12/24/24 application missing information.   Kelsey requested that page 1 of 6 requires the insurance information to be completed and that page 2 of 6 parts 5 & 6 require check marks before the patient consents.   I will make the corrections and fax back out to [employer].

## 2024-12-31 ENCOUNTER — TELEPHONE (OUTPATIENT)
Dept: SURGICAL ONCOLOGY | Facility: CLINIC | Age: 74
End: 2024-12-31

## 2024-12-31 PROCEDURE — 77295 3-D RADIOTHERAPY PLAN: CPT | Performed by: RADIOLOGY

## 2024-12-31 PROCEDURE — 77334 RADIATION TREATMENT AID(S): CPT | Performed by: RADIOLOGY

## 2024-12-31 PROCEDURE — 77300 RADIATION THERAPY DOSE PLAN: CPT | Performed by: RADIOLOGY

## 2024-12-31 NOTE — TELEPHONE ENCOUNTER
Right Fax received patient approved for Ibrance through 12/31/2025. E mail sent to Oral Chemo team

## 2025-01-03 DIAGNOSIS — C77.3 BREAST CANCER METASTASIZED TO AXILLARY LYMPH NODE, RIGHT (HCC): Primary | ICD-10-CM

## 2025-01-03 DIAGNOSIS — C50.911 BREAST CANCER METASTASIZED TO AXILLARY LYMPH NODE, RIGHT (HCC): Primary | ICD-10-CM

## 2025-01-06 ENCOUNTER — APPOINTMENT (OUTPATIENT)
Dept: RADIATION ONCOLOGY | Facility: CLINIC | Age: 75
End: 2025-01-06
Attending: RADIOLOGY
Payer: MEDICARE

## 2025-01-06 ENCOUNTER — TELEPHONE (OUTPATIENT)
Dept: HEMATOLOGY ONCOLOGY | Facility: CLINIC | Age: 75
End: 2025-01-06

## 2025-01-06 DIAGNOSIS — C77.3 BREAST CANCER METASTASIZED TO AXILLARY LYMPH NODE, RIGHT (HCC): ICD-10-CM

## 2025-01-06 DIAGNOSIS — C50.911 BREAST CANCER METASTASIZED TO AXILLARY LYMPH NODE, RIGHT (HCC): ICD-10-CM

## 2025-01-06 PROCEDURE — 77387 GUIDANCE FOR RADJ TX DLVR: CPT | Performed by: RADIOLOGY

## 2025-01-06 PROCEDURE — 77280 THER RAD SIMULAJ FIELD SMPL: CPT | Performed by: RADIOLOGY

## 2025-01-07 ENCOUNTER — APPOINTMENT (OUTPATIENT)
Dept: RADIATION ONCOLOGY | Facility: CLINIC | Age: 75
End: 2025-01-07
Attending: RADIOLOGY
Payer: MEDICARE

## 2025-01-07 ENCOUNTER — DOCUMENTATION (OUTPATIENT)
Dept: HEMATOLOGY ONCOLOGY | Facility: CLINIC | Age: 75
End: 2025-01-07

## 2025-01-07 PROCEDURE — 77427 RADIATION TX MANAGEMENT X5: CPT | Performed by: RADIOLOGY

## 2025-01-07 PROCEDURE — G6002 STEREOSCOPIC X-RAY GUIDANCE: HCPCS | Performed by: STUDENT IN AN ORGANIZED HEALTH CARE EDUCATION/TRAINING PROGRAM

## 2025-01-07 PROCEDURE — 77387 GUIDANCE FOR RADJ TX DLVR: CPT | Performed by: STUDENT IN AN ORGANIZED HEALTH CARE EDUCATION/TRAINING PROGRAM

## 2025-01-07 PROCEDURE — 77412 RADIATION TX DELIVERY LVL 3: CPT | Performed by: STUDENT IN AN ORGANIZED HEALTH CARE EDUCATION/TRAINING PROGRAM

## 2025-01-07 NOTE — PROGRESS NOTES
Called [employer] and scheduled Ibrance delivery. Expected delivery 1/9. Spoke with pt earlier and she is aware not to start until after radiation.

## 2025-01-08 ENCOUNTER — APPOINTMENT (OUTPATIENT)
Dept: RADIATION ONCOLOGY | Facility: CLINIC | Age: 75
End: 2025-01-08
Attending: RADIOLOGY
Payer: MEDICARE

## 2025-01-08 PROCEDURE — 77412 RADIATION TX DELIVERY LVL 3: CPT | Performed by: RADIOLOGY

## 2025-01-08 PROCEDURE — 77387 GUIDANCE FOR RADJ TX DLVR: CPT | Performed by: RADIOLOGY

## 2025-01-08 PROCEDURE — G6002 STEREOSCOPIC X-RAY GUIDANCE: HCPCS | Performed by: RADIOLOGY

## 2025-01-08 PROCEDURE — 77331 SPECIAL RADIATION DOSIMETRY: CPT | Performed by: RADIOLOGY

## 2025-01-09 ENCOUNTER — APPOINTMENT (OUTPATIENT)
Dept: RADIATION ONCOLOGY | Facility: CLINIC | Age: 75
End: 2025-01-09
Attending: RADIOLOGY
Payer: MEDICARE

## 2025-01-09 PROCEDURE — 77412 RADIATION TX DELIVERY LVL 3: CPT | Performed by: STUDENT IN AN ORGANIZED HEALTH CARE EDUCATION/TRAINING PROGRAM

## 2025-01-09 PROCEDURE — 77387 GUIDANCE FOR RADJ TX DLVR: CPT | Performed by: STUDENT IN AN ORGANIZED HEALTH CARE EDUCATION/TRAINING PROGRAM

## 2025-01-09 PROCEDURE — G6002 STEREOSCOPIC X-RAY GUIDANCE: HCPCS | Performed by: STUDENT IN AN ORGANIZED HEALTH CARE EDUCATION/TRAINING PROGRAM

## 2025-01-10 ENCOUNTER — APPOINTMENT (OUTPATIENT)
Age: 75
End: 2025-01-10
Payer: MEDICARE

## 2025-01-10 ENCOUNTER — APPOINTMENT (OUTPATIENT)
Dept: RADIATION ONCOLOGY | Facility: CLINIC | Age: 75
End: 2025-01-10
Attending: RADIOLOGY
Payer: MEDICARE

## 2025-01-10 DIAGNOSIS — C77.3 BREAST CANCER METASTASIZED TO AXILLARY LYMPH NODE, RIGHT (HCC): ICD-10-CM

## 2025-01-10 DIAGNOSIS — C50.911 BREAST CANCER METASTASIZED TO AXILLARY LYMPH NODE, RIGHT (HCC): ICD-10-CM

## 2025-01-10 LAB
BASOPHILS # BLD AUTO: 0.03 THOUSANDS/ΜL (ref 0–0.1)
BASOPHILS NFR BLD AUTO: 1 % (ref 0–1)
EOSINOPHIL # BLD AUTO: 0.05 THOUSAND/ΜL (ref 0–0.61)
EOSINOPHIL NFR BLD AUTO: 1 % (ref 0–6)
ERYTHROCYTE [DISTWIDTH] IN BLOOD BY AUTOMATED COUNT: 14.6 % (ref 11.6–15.1)
HCT VFR BLD AUTO: 34.6 % (ref 34.8–46.1)
HGB BLD-MCNC: 10.7 G/DL (ref 11.5–15.4)
IMM GRANULOCYTES # BLD AUTO: 0.02 THOUSAND/UL (ref 0–0.2)
IMM GRANULOCYTES NFR BLD AUTO: 1 % (ref 0–2)
LYMPHOCYTES # BLD AUTO: 0.6 THOUSANDS/ΜL (ref 0.6–4.47)
LYMPHOCYTES NFR BLD AUTO: 17 % (ref 14–44)
MCH RBC QN AUTO: 25.2 PG (ref 26.8–34.3)
MCHC RBC AUTO-ENTMCNC: 30.9 G/DL (ref 31.4–37.4)
MCV RBC AUTO: 81 FL (ref 82–98)
MONOCYTES # BLD AUTO: 0.29 THOUSAND/ΜL (ref 0.17–1.22)
MONOCYTES NFR BLD AUTO: 8 % (ref 4–12)
NEUTROPHILS # BLD AUTO: 2.52 THOUSANDS/ΜL (ref 1.85–7.62)
NEUTS SEG NFR BLD AUTO: 72 % (ref 43–75)
NRBC BLD AUTO-RTO: 0 /100 WBCS
PLATELET # BLD AUTO: 132 THOUSANDS/UL (ref 149–390)
PMV BLD AUTO: 12.5 FL (ref 8.9–12.7)
RBC # BLD AUTO: 4.25 MILLION/UL (ref 3.81–5.12)
WBC # BLD AUTO: 3.51 THOUSAND/UL (ref 4.31–10.16)

## 2025-01-10 PROCEDURE — 36415 COLL VENOUS BLD VENIPUNCTURE: CPT

## 2025-01-10 PROCEDURE — 77412 RADIATION TX DELIVERY LVL 3: CPT | Performed by: RADIOLOGY

## 2025-01-10 PROCEDURE — 85025 COMPLETE CBC W/AUTO DIFF WBC: CPT

## 2025-01-13 ENCOUNTER — APPOINTMENT (OUTPATIENT)
Dept: RADIATION ONCOLOGY | Facility: CLINIC | Age: 75
End: 2025-01-13
Attending: RADIOLOGY
Payer: MEDICARE

## 2025-01-13 PROCEDURE — G6002 STEREOSCOPIC X-RAY GUIDANCE: HCPCS | Performed by: RADIOLOGY

## 2025-01-13 PROCEDURE — 77336 RADIATION PHYSICS CONSULT: CPT | Performed by: RADIOLOGY

## 2025-01-13 PROCEDURE — 77412 RADIATION TX DELIVERY LVL 3: CPT | Performed by: RADIOLOGY

## 2025-01-13 PROCEDURE — 77387 GUIDANCE FOR RADJ TX DLVR: CPT | Performed by: RADIOLOGY

## 2025-01-14 ENCOUNTER — APPOINTMENT (OUTPATIENT)
Dept: RADIATION ONCOLOGY | Facility: CLINIC | Age: 75
End: 2025-01-14
Attending: RADIOLOGY
Payer: MEDICARE

## 2025-01-14 PROCEDURE — G6002 STEREOSCOPIC X-RAY GUIDANCE: HCPCS | Performed by: STUDENT IN AN ORGANIZED HEALTH CARE EDUCATION/TRAINING PROGRAM

## 2025-01-14 PROCEDURE — 77412 RADIATION TX DELIVERY LVL 3: CPT | Performed by: STUDENT IN AN ORGANIZED HEALTH CARE EDUCATION/TRAINING PROGRAM

## 2025-01-14 PROCEDURE — 77427 RADIATION TX MANAGEMENT X5: CPT | Performed by: RADIOLOGY

## 2025-01-14 PROCEDURE — 77387 GUIDANCE FOR RADJ TX DLVR: CPT | Performed by: STUDENT IN AN ORGANIZED HEALTH CARE EDUCATION/TRAINING PROGRAM

## 2025-01-15 ENCOUNTER — APPOINTMENT (OUTPATIENT)
Dept: RADIATION ONCOLOGY | Facility: CLINIC | Age: 75
End: 2025-01-15
Attending: RADIOLOGY
Payer: MEDICARE

## 2025-01-15 PROCEDURE — G6002 STEREOSCOPIC X-RAY GUIDANCE: HCPCS | Performed by: RADIOLOGY

## 2025-01-15 PROCEDURE — 77412 RADIATION TX DELIVERY LVL 3: CPT | Performed by: RADIOLOGY

## 2025-01-15 PROCEDURE — 77387 GUIDANCE FOR RADJ TX DLVR: CPT | Performed by: RADIOLOGY

## 2025-01-16 ENCOUNTER — APPOINTMENT (OUTPATIENT)
Dept: RADIATION ONCOLOGY | Facility: CLINIC | Age: 75
End: 2025-01-16
Attending: RADIOLOGY
Payer: MEDICARE

## 2025-01-16 DIAGNOSIS — Z17.0 MALIGNANT NEOPLASM OF OVERLAPPING SITES OF RIGHT BREAST IN FEMALE, ESTROGEN RECEPTOR POSITIVE (HCC): Primary | ICD-10-CM

## 2025-01-16 DIAGNOSIS — C50.811 MALIGNANT NEOPLASM OF OVERLAPPING SITES OF RIGHT BREAST IN FEMALE, ESTROGEN RECEPTOR POSITIVE (HCC): Primary | ICD-10-CM

## 2025-01-16 PROCEDURE — 77387 GUIDANCE FOR RADJ TX DLVR: CPT | Performed by: STUDENT IN AN ORGANIZED HEALTH CARE EDUCATION/TRAINING PROGRAM

## 2025-01-16 PROCEDURE — G6002 STEREOSCOPIC X-RAY GUIDANCE: HCPCS | Performed by: STUDENT IN AN ORGANIZED HEALTH CARE EDUCATION/TRAINING PROGRAM

## 2025-01-16 PROCEDURE — 77412 RADIATION TX DELIVERY LVL 3: CPT | Performed by: STUDENT IN AN ORGANIZED HEALTH CARE EDUCATION/TRAINING PROGRAM

## 2025-01-17 ENCOUNTER — APPOINTMENT (OUTPATIENT)
Dept: RADIATION ONCOLOGY | Facility: CLINIC | Age: 75
End: 2025-01-17
Attending: RADIOLOGY
Payer: MEDICARE

## 2025-01-17 PROCEDURE — 77387 GUIDANCE FOR RADJ TX DLVR: CPT | Performed by: RADIOLOGY

## 2025-01-17 PROCEDURE — 77412 RADIATION TX DELIVERY LVL 3: CPT | Performed by: RADIOLOGY

## 2025-01-17 PROCEDURE — G6002 STEREOSCOPIC X-RAY GUIDANCE: HCPCS | Performed by: RADIOLOGY

## 2025-01-20 ENCOUNTER — APPOINTMENT (OUTPATIENT)
Dept: RADIATION ONCOLOGY | Facility: CLINIC | Age: 75
End: 2025-01-20
Attending: RADIOLOGY
Payer: MEDICARE

## 2025-01-20 PROCEDURE — G6002 STEREOSCOPIC X-RAY GUIDANCE: HCPCS | Performed by: STUDENT IN AN ORGANIZED HEALTH CARE EDUCATION/TRAINING PROGRAM

## 2025-01-20 PROCEDURE — 77387 GUIDANCE FOR RADJ TX DLVR: CPT | Performed by: STUDENT IN AN ORGANIZED HEALTH CARE EDUCATION/TRAINING PROGRAM

## 2025-01-20 PROCEDURE — 77336 RADIATION PHYSICS CONSULT: CPT | Performed by: RADIOLOGY

## 2025-01-20 PROCEDURE — 77412 RADIATION TX DELIVERY LVL 3: CPT | Performed by: STUDENT IN AN ORGANIZED HEALTH CARE EDUCATION/TRAINING PROGRAM

## 2025-01-21 ENCOUNTER — APPOINTMENT (OUTPATIENT)
Dept: RADIATION ONCOLOGY | Facility: CLINIC | Age: 75
End: 2025-01-21
Attending: RADIOLOGY
Payer: MEDICARE

## 2025-01-21 PROCEDURE — 77412 RADIATION TX DELIVERY LVL 3: CPT | Performed by: RADIOLOGY

## 2025-01-21 PROCEDURE — 77387 GUIDANCE FOR RADJ TX DLVR: CPT | Performed by: RADIOLOGY

## 2025-01-21 PROCEDURE — 77427 RADIATION TX MANAGEMENT X5: CPT | Performed by: RADIOLOGY

## 2025-01-21 PROCEDURE — G6002 STEREOSCOPIC X-RAY GUIDANCE: HCPCS | Performed by: RADIOLOGY

## 2025-01-22 ENCOUNTER — APPOINTMENT (OUTPATIENT)
Dept: RADIATION ONCOLOGY | Facility: CLINIC | Age: 75
End: 2025-01-22
Attending: RADIOLOGY
Payer: MEDICARE

## 2025-01-22 DIAGNOSIS — Z17.0 MALIGNANT NEOPLASM OF OVERLAPPING SITES OF RIGHT BREAST IN FEMALE, ESTROGEN RECEPTOR POSITIVE (HCC): Primary | ICD-10-CM

## 2025-01-22 DIAGNOSIS — C50.811 MALIGNANT NEOPLASM OF OVERLAPPING SITES OF RIGHT BREAST IN FEMALE, ESTROGEN RECEPTOR POSITIVE (HCC): Primary | ICD-10-CM

## 2025-01-22 LAB
RAD ONC ARIA COURSE FIRST TREATMENT DATE: NORMAL
RAD ONC ARIA COURSE ID: NORMAL
RAD ONC ARIA COURSE INTENT: NORMAL
RAD ONC ARIA COURSE LAST TREATMENT DATE: NORMAL
RAD ONC ARIA COURSE SESSION NUMBER: 13
RAD ONC ARIA COURSE START DATE: NORMAL
RAD ONC ARIA COURSE TREATMENT ELAPSED DAYS: 15
RAD ONC ARIA PLAN FRACTIONS TREATED TO DATE: 12
RAD ONC ARIA PLAN FRACTIONS TREATED TO DATE: 12
RAD ONC ARIA PLAN ID: NORMAL
RAD ONC ARIA PLAN ID: NORMAL
RAD ONC ARIA PLAN NAME: NORMAL
RAD ONC ARIA PLAN NAME: NORMAL
RAD ONC ARIA PLAN PRESCRIBED DOSE PER FRACTION: 2 GY
RAD ONC ARIA PLAN PRESCRIBED DOSE PER FRACTION: 2 GY
RAD ONC ARIA PLAN PRIMARY REFERENCE POINT: NORMAL
RAD ONC ARIA PLAN PRIMARY REFERENCE POINT: NORMAL
RAD ONC ARIA PLAN TOTAL FRACTIONS PRESCRIBED: 25
RAD ONC ARIA PLAN TOTAL FRACTIONS PRESCRIBED: 25
RAD ONC ARIA PLAN TOTAL PRESCRIBED DOSE: 5000 CGY
RAD ONC ARIA PLAN TOTAL PRESCRIBED DOSE: 5000 CGY
RAD ONC ARIA REFERENCE POINT DOSAGE GIVEN TO DATE: 24 GY
RAD ONC ARIA REFERENCE POINT DOSAGE GIVEN TO DATE: 24 GY
RAD ONC ARIA REFERENCE POINT ID: NORMAL
RAD ONC ARIA REFERENCE POINT ID: NORMAL
RAD ONC ARIA REFERENCE POINT SESSION DOSAGE GIVEN: 2 GY
RAD ONC ARIA REFERENCE POINT SESSION DOSAGE GIVEN: 2 GY

## 2025-01-22 PROCEDURE — 77387 GUIDANCE FOR RADJ TX DLVR: CPT | Performed by: RADIOLOGY

## 2025-01-22 PROCEDURE — G6002 STEREOSCOPIC X-RAY GUIDANCE: HCPCS | Performed by: RADIOLOGY

## 2025-01-22 PROCEDURE — 77412 RADIATION TX DELIVERY LVL 3: CPT | Performed by: RADIOLOGY

## 2025-01-23 ENCOUNTER — APPOINTMENT (OUTPATIENT)
Dept: RADIATION ONCOLOGY | Facility: CLINIC | Age: 75
End: 2025-01-23
Attending: RADIOLOGY
Payer: MEDICARE

## 2025-01-23 LAB
RAD ONC ARIA COURSE FIRST TREATMENT DATE: NORMAL
RAD ONC ARIA COURSE ID: NORMAL
RAD ONC ARIA COURSE INTENT: NORMAL
RAD ONC ARIA COURSE LAST TREATMENT DATE: NORMAL
RAD ONC ARIA COURSE SESSION NUMBER: 14
RAD ONC ARIA COURSE START DATE: NORMAL
RAD ONC ARIA COURSE TREATMENT ELAPSED DAYS: 16
RAD ONC ARIA PLAN FRACTIONS TREATED TO DATE: 13
RAD ONC ARIA PLAN FRACTIONS TREATED TO DATE: 13
RAD ONC ARIA PLAN ID: NORMAL
RAD ONC ARIA PLAN ID: NORMAL
RAD ONC ARIA PLAN NAME: NORMAL
RAD ONC ARIA PLAN NAME: NORMAL
RAD ONC ARIA PLAN PRESCRIBED DOSE PER FRACTION: 2 GY
RAD ONC ARIA PLAN PRESCRIBED DOSE PER FRACTION: 2 GY
RAD ONC ARIA PLAN PRIMARY REFERENCE POINT: NORMAL
RAD ONC ARIA PLAN PRIMARY REFERENCE POINT: NORMAL
RAD ONC ARIA PLAN TOTAL FRACTIONS PRESCRIBED: 25
RAD ONC ARIA PLAN TOTAL FRACTIONS PRESCRIBED: 25
RAD ONC ARIA PLAN TOTAL PRESCRIBED DOSE: 5000 CGY
RAD ONC ARIA PLAN TOTAL PRESCRIBED DOSE: 5000 CGY
RAD ONC ARIA REFERENCE POINT DOSAGE GIVEN TO DATE: 26 GY
RAD ONC ARIA REFERENCE POINT DOSAGE GIVEN TO DATE: 26 GY
RAD ONC ARIA REFERENCE POINT ID: NORMAL
RAD ONC ARIA REFERENCE POINT ID: NORMAL
RAD ONC ARIA REFERENCE POINT SESSION DOSAGE GIVEN: 2 GY
RAD ONC ARIA REFERENCE POINT SESSION DOSAGE GIVEN: 2 GY

## 2025-01-23 PROCEDURE — G6002 STEREOSCOPIC X-RAY GUIDANCE: HCPCS | Performed by: STUDENT IN AN ORGANIZED HEALTH CARE EDUCATION/TRAINING PROGRAM

## 2025-01-23 PROCEDURE — 77412 RADIATION TX DELIVERY LVL 3: CPT | Performed by: STUDENT IN AN ORGANIZED HEALTH CARE EDUCATION/TRAINING PROGRAM

## 2025-01-23 PROCEDURE — 77387 GUIDANCE FOR RADJ TX DLVR: CPT | Performed by: STUDENT IN AN ORGANIZED HEALTH CARE EDUCATION/TRAINING PROGRAM

## 2025-01-24 ENCOUNTER — TELEPHONE (OUTPATIENT)
Dept: OBGYN CLINIC | Facility: OTHER | Age: 75
End: 2025-01-24

## 2025-01-24 ENCOUNTER — APPOINTMENT (OUTPATIENT)
Dept: RADIATION ONCOLOGY | Facility: CLINIC | Age: 75
End: 2025-01-24
Attending: RADIOLOGY
Payer: MEDICARE

## 2025-01-24 LAB
RAD ONC ARIA COURSE FIRST TREATMENT DATE: NORMAL
RAD ONC ARIA COURSE ID: NORMAL
RAD ONC ARIA COURSE INTENT: NORMAL
RAD ONC ARIA COURSE LAST TREATMENT DATE: NORMAL
RAD ONC ARIA COURSE SESSION NUMBER: 15
RAD ONC ARIA COURSE START DATE: NORMAL
RAD ONC ARIA COURSE TREATMENT ELAPSED DAYS: 17
RAD ONC ARIA PLAN FRACTIONS TREATED TO DATE: 14
RAD ONC ARIA PLAN FRACTIONS TREATED TO DATE: 14
RAD ONC ARIA PLAN ID: NORMAL
RAD ONC ARIA PLAN ID: NORMAL
RAD ONC ARIA PLAN NAME: NORMAL
RAD ONC ARIA PLAN NAME: NORMAL
RAD ONC ARIA PLAN PRESCRIBED DOSE PER FRACTION: 2 GY
RAD ONC ARIA PLAN PRESCRIBED DOSE PER FRACTION: 2 GY
RAD ONC ARIA PLAN PRIMARY REFERENCE POINT: NORMAL
RAD ONC ARIA PLAN PRIMARY REFERENCE POINT: NORMAL
RAD ONC ARIA PLAN TOTAL FRACTIONS PRESCRIBED: 25
RAD ONC ARIA PLAN TOTAL FRACTIONS PRESCRIBED: 25
RAD ONC ARIA PLAN TOTAL PRESCRIBED DOSE: 5000 CGY
RAD ONC ARIA PLAN TOTAL PRESCRIBED DOSE: 5000 CGY
RAD ONC ARIA REFERENCE POINT DOSAGE GIVEN TO DATE: 28 GY
RAD ONC ARIA REFERENCE POINT DOSAGE GIVEN TO DATE: 28 GY
RAD ONC ARIA REFERENCE POINT ID: NORMAL
RAD ONC ARIA REFERENCE POINT ID: NORMAL
RAD ONC ARIA REFERENCE POINT SESSION DOSAGE GIVEN: 2 GY
RAD ONC ARIA REFERENCE POINT SESSION DOSAGE GIVEN: 2 GY

## 2025-01-24 PROCEDURE — G6002 STEREOSCOPIC X-RAY GUIDANCE: HCPCS | Performed by: RADIOLOGY

## 2025-01-24 PROCEDURE — 77412 RADIATION TX DELIVERY LVL 3: CPT | Performed by: RADIOLOGY

## 2025-01-24 PROCEDURE — 77387 GUIDANCE FOR RADJ TX DLVR: CPT | Performed by: RADIOLOGY

## 2025-01-27 ENCOUNTER — APPOINTMENT (OUTPATIENT)
Age: 75
End: 2025-01-27
Payer: MEDICARE

## 2025-01-27 ENCOUNTER — APPOINTMENT (OUTPATIENT)
Dept: RADIATION ONCOLOGY | Facility: CLINIC | Age: 75
End: 2025-01-27
Attending: RADIOLOGY
Payer: MEDICARE

## 2025-01-27 DIAGNOSIS — Z17.0 MALIGNANT NEOPLASM OF OVERLAPPING SITES OF RIGHT BREAST IN FEMALE, ESTROGEN RECEPTOR POSITIVE (HCC): ICD-10-CM

## 2025-01-27 DIAGNOSIS — C50.811 MALIGNANT NEOPLASM OF OVERLAPPING SITES OF RIGHT BREAST IN FEMALE, ESTROGEN RECEPTOR POSITIVE (HCC): ICD-10-CM

## 2025-01-27 LAB
BASOPHILS # BLD AUTO: 0.03 THOUSANDS/ΜL (ref 0–0.1)
BASOPHILS NFR BLD AUTO: 1 % (ref 0–1)
EOSINOPHIL # BLD AUTO: 0.05 THOUSAND/ΜL (ref 0–0.61)
EOSINOPHIL NFR BLD AUTO: 2 % (ref 0–6)
ERYTHROCYTE [DISTWIDTH] IN BLOOD BY AUTOMATED COUNT: 14.9 % (ref 11.6–15.1)
HCT VFR BLD AUTO: 33.6 % (ref 34.8–46.1)
HGB BLD-MCNC: 10.4 G/DL (ref 11.5–15.4)
IMM GRANULOCYTES # BLD AUTO: 0.01 THOUSAND/UL (ref 0–0.2)
IMM GRANULOCYTES NFR BLD AUTO: 0 % (ref 0–2)
LYMPHOCYTES # BLD AUTO: 0.38 THOUSANDS/ΜL (ref 0.6–4.47)
LYMPHOCYTES NFR BLD AUTO: 12 % (ref 14–44)
MCH RBC QN AUTO: 25.5 PG (ref 26.8–34.3)
MCHC RBC AUTO-ENTMCNC: 31 G/DL (ref 31.4–37.4)
MCV RBC AUTO: 82 FL (ref 82–98)
MONOCYTES # BLD AUTO: 0.31 THOUSAND/ΜL (ref 0.17–1.22)
MONOCYTES NFR BLD AUTO: 10 % (ref 4–12)
NEUTROPHILS # BLD AUTO: 2.43 THOUSANDS/ΜL (ref 1.85–7.62)
NEUTS SEG NFR BLD AUTO: 75 % (ref 43–75)
NRBC BLD AUTO-RTO: 0 /100 WBCS
PLATELET # BLD AUTO: 112 THOUSANDS/UL (ref 149–390)
PMV BLD AUTO: 13.6 FL (ref 8.9–12.7)
RAD ONC ARIA COURSE FIRST TREATMENT DATE: NORMAL
RAD ONC ARIA COURSE ID: NORMAL
RAD ONC ARIA COURSE INTENT: NORMAL
RAD ONC ARIA COURSE LAST TREATMENT DATE: NORMAL
RAD ONC ARIA COURSE SESSION NUMBER: 16
RAD ONC ARIA COURSE START DATE: NORMAL
RAD ONC ARIA COURSE TREATMENT ELAPSED DAYS: 20
RAD ONC ARIA PLAN FRACTIONS TREATED TO DATE: 15
RAD ONC ARIA PLAN FRACTIONS TREATED TO DATE: 15
RAD ONC ARIA PLAN ID: NORMAL
RAD ONC ARIA PLAN ID: NORMAL
RAD ONC ARIA PLAN NAME: NORMAL
RAD ONC ARIA PLAN NAME: NORMAL
RAD ONC ARIA PLAN PRESCRIBED DOSE PER FRACTION: 2 GY
RAD ONC ARIA PLAN PRESCRIBED DOSE PER FRACTION: 2 GY
RAD ONC ARIA PLAN PRIMARY REFERENCE POINT: NORMAL
RAD ONC ARIA PLAN PRIMARY REFERENCE POINT: NORMAL
RAD ONC ARIA PLAN TOTAL FRACTIONS PRESCRIBED: 25
RAD ONC ARIA PLAN TOTAL FRACTIONS PRESCRIBED: 25
RAD ONC ARIA PLAN TOTAL PRESCRIBED DOSE: 5000 CGY
RAD ONC ARIA PLAN TOTAL PRESCRIBED DOSE: 5000 CGY
RAD ONC ARIA REFERENCE POINT DOSAGE GIVEN TO DATE: 30 GY
RAD ONC ARIA REFERENCE POINT DOSAGE GIVEN TO DATE: 30 GY
RAD ONC ARIA REFERENCE POINT ID: NORMAL
RAD ONC ARIA REFERENCE POINT ID: NORMAL
RAD ONC ARIA REFERENCE POINT SESSION DOSAGE GIVEN: 2 GY
RAD ONC ARIA REFERENCE POINT SESSION DOSAGE GIVEN: 2 GY
RBC # BLD AUTO: 4.08 MILLION/UL (ref 3.81–5.12)
WBC # BLD AUTO: 3.21 THOUSAND/UL (ref 4.31–10.16)

## 2025-01-27 PROCEDURE — 85025 COMPLETE CBC W/AUTO DIFF WBC: CPT

## 2025-01-27 PROCEDURE — 77412 RADIATION TX DELIVERY LVL 3: CPT | Performed by: RADIOLOGY

## 2025-01-27 PROCEDURE — 77387 GUIDANCE FOR RADJ TX DLVR: CPT | Performed by: RADIOLOGY

## 2025-01-27 PROCEDURE — G6002 STEREOSCOPIC X-RAY GUIDANCE: HCPCS | Performed by: RADIOLOGY

## 2025-01-27 PROCEDURE — 77336 RADIATION PHYSICS CONSULT: CPT | Performed by: RADIOLOGY

## 2025-01-27 PROCEDURE — 36415 COLL VENOUS BLD VENIPUNCTURE: CPT

## 2025-01-28 ENCOUNTER — CLINICAL SUPPORT (OUTPATIENT)
Facility: OTHER | Age: 75
End: 2025-01-28

## 2025-01-28 ENCOUNTER — APPOINTMENT (OUTPATIENT)
Dept: RADIATION ONCOLOGY | Facility: CLINIC | Age: 75
End: 2025-01-28
Attending: RADIOLOGY
Payer: MEDICARE

## 2025-01-28 LAB
RAD ONC ARIA COURSE FIRST TREATMENT DATE: NORMAL
RAD ONC ARIA COURSE ID: NORMAL
RAD ONC ARIA COURSE INTENT: NORMAL
RAD ONC ARIA COURSE LAST TREATMENT DATE: NORMAL
RAD ONC ARIA COURSE SESSION NUMBER: 17
RAD ONC ARIA COURSE START DATE: NORMAL
RAD ONC ARIA COURSE TREATMENT ELAPSED DAYS: 21
RAD ONC ARIA PLAN FRACTIONS TREATED TO DATE: 16
RAD ONC ARIA PLAN FRACTIONS TREATED TO DATE: 16
RAD ONC ARIA PLAN ID: NORMAL
RAD ONC ARIA PLAN ID: NORMAL
RAD ONC ARIA PLAN NAME: NORMAL
RAD ONC ARIA PLAN NAME: NORMAL
RAD ONC ARIA PLAN PRESCRIBED DOSE PER FRACTION: 2 GY
RAD ONC ARIA PLAN PRESCRIBED DOSE PER FRACTION: 2 GY
RAD ONC ARIA PLAN PRIMARY REFERENCE POINT: NORMAL
RAD ONC ARIA PLAN PRIMARY REFERENCE POINT: NORMAL
RAD ONC ARIA PLAN TOTAL FRACTIONS PRESCRIBED: 25
RAD ONC ARIA PLAN TOTAL FRACTIONS PRESCRIBED: 25
RAD ONC ARIA PLAN TOTAL PRESCRIBED DOSE: 5000 CGY
RAD ONC ARIA PLAN TOTAL PRESCRIBED DOSE: 5000 CGY
RAD ONC ARIA REFERENCE POINT DOSAGE GIVEN TO DATE: 32 GY
RAD ONC ARIA REFERENCE POINT DOSAGE GIVEN TO DATE: 32 GY
RAD ONC ARIA REFERENCE POINT ID: NORMAL
RAD ONC ARIA REFERENCE POINT ID: NORMAL
RAD ONC ARIA REFERENCE POINT SESSION DOSAGE GIVEN: 2 GY
RAD ONC ARIA REFERENCE POINT SESSION DOSAGE GIVEN: 2 GY

## 2025-01-28 PROCEDURE — 77412 RADIATION TX DELIVERY LVL 3: CPT | Performed by: STUDENT IN AN ORGANIZED HEALTH CARE EDUCATION/TRAINING PROGRAM

## 2025-01-28 PROCEDURE — 77387 GUIDANCE FOR RADJ TX DLVR: CPT | Performed by: STUDENT IN AN ORGANIZED HEALTH CARE EDUCATION/TRAINING PROGRAM

## 2025-01-28 PROCEDURE — G6002 STEREOSCOPIC X-RAY GUIDANCE: HCPCS | Performed by: STUDENT IN AN ORGANIZED HEALTH CARE EDUCATION/TRAINING PROGRAM

## 2025-01-28 PROCEDURE — 77427 RADIATION TX MANAGEMENT X5: CPT | Performed by: RADIOLOGY

## 2025-01-28 NOTE — PROGRESS NOTES
Mastectomy Bra Fitting Order Details    Juve Duarte  1950  [id number]    Reason For Visit  Re-size for mastectomy bras    Precautions   Client is currently having radiation therapy  BCA    Subjective  Client reports new bras are too tight.  Would like to return and try larger size    Surgery Type: Mastectomy    Lymph node removal yes    Date of surgery 11/24    Surgical side bilateral    Objective  Client reports that the bras are too tight and digging into her.    Assessment  Client with well healed incisions.  No skin irritation noted at this time.    Trial of Natasha 42C  Trial of Natasha 42D  Trial of Natasha 44B  Trial of Cara L  Trial of Cristiana 42 C  Trial of Meme 40 C    Plan  Replace returned garments with new sizes, same colors.  Ship to home    Order Details   Natasha 44 B  Ro Nude x 1  Natasha 44 B BLK x 1  Natasha 44 B Off WH x 1

## 2025-01-29 ENCOUNTER — APPOINTMENT (OUTPATIENT)
Dept: RADIATION ONCOLOGY | Facility: CLINIC | Age: 75
End: 2025-01-29
Attending: RADIOLOGY
Payer: MEDICARE

## 2025-01-29 DIAGNOSIS — C50.811 MALIGNANT NEOPLASM OF OVERLAPPING SITES OF RIGHT BREAST IN FEMALE, ESTROGEN RECEPTOR POSITIVE (HCC): Primary | ICD-10-CM

## 2025-01-29 DIAGNOSIS — Z17.0 MALIGNANT NEOPLASM OF OVERLAPPING SITES OF RIGHT BREAST IN FEMALE, ESTROGEN RECEPTOR POSITIVE (HCC): Primary | ICD-10-CM

## 2025-01-29 LAB
RAD ONC ARIA COURSE FIRST TREATMENT DATE: NORMAL
RAD ONC ARIA COURSE ID: NORMAL
RAD ONC ARIA COURSE INTENT: NORMAL
RAD ONC ARIA COURSE LAST TREATMENT DATE: NORMAL
RAD ONC ARIA COURSE SESSION NUMBER: 18
RAD ONC ARIA COURSE START DATE: NORMAL
RAD ONC ARIA COURSE TREATMENT ELAPSED DAYS: 22
RAD ONC ARIA PLAN FRACTIONS TREATED TO DATE: 17
RAD ONC ARIA PLAN FRACTIONS TREATED TO DATE: 17
RAD ONC ARIA PLAN ID: NORMAL
RAD ONC ARIA PLAN ID: NORMAL
RAD ONC ARIA PLAN NAME: NORMAL
RAD ONC ARIA PLAN NAME: NORMAL
RAD ONC ARIA PLAN PRESCRIBED DOSE PER FRACTION: 2 GY
RAD ONC ARIA PLAN PRESCRIBED DOSE PER FRACTION: 2 GY
RAD ONC ARIA PLAN PRIMARY REFERENCE POINT: NORMAL
RAD ONC ARIA PLAN PRIMARY REFERENCE POINT: NORMAL
RAD ONC ARIA PLAN TOTAL FRACTIONS PRESCRIBED: 25
RAD ONC ARIA PLAN TOTAL FRACTIONS PRESCRIBED: 25
RAD ONC ARIA PLAN TOTAL PRESCRIBED DOSE: 5000 CGY
RAD ONC ARIA PLAN TOTAL PRESCRIBED DOSE: 5000 CGY
RAD ONC ARIA REFERENCE POINT DOSAGE GIVEN TO DATE: 34 GY
RAD ONC ARIA REFERENCE POINT DOSAGE GIVEN TO DATE: 34 GY
RAD ONC ARIA REFERENCE POINT ID: NORMAL
RAD ONC ARIA REFERENCE POINT ID: NORMAL
RAD ONC ARIA REFERENCE POINT SESSION DOSAGE GIVEN: 2 GY
RAD ONC ARIA REFERENCE POINT SESSION DOSAGE GIVEN: 2 GY

## 2025-01-29 PROCEDURE — 77387 GUIDANCE FOR RADJ TX DLVR: CPT | Performed by: RADIOLOGY

## 2025-01-29 PROCEDURE — G6002 STEREOSCOPIC X-RAY GUIDANCE: HCPCS | Performed by: RADIOLOGY

## 2025-01-29 PROCEDURE — 77412 RADIATION TX DELIVERY LVL 3: CPT | Performed by: RADIOLOGY

## 2025-01-30 ENCOUNTER — APPOINTMENT (OUTPATIENT)
Dept: RADIATION ONCOLOGY | Facility: CLINIC | Age: 75
End: 2025-01-30
Attending: RADIOLOGY
Payer: MEDICARE

## 2025-01-30 LAB
RAD ONC ARIA COURSE FIRST TREATMENT DATE: NORMAL
RAD ONC ARIA COURSE ID: NORMAL
RAD ONC ARIA COURSE INTENT: NORMAL
RAD ONC ARIA COURSE LAST TREATMENT DATE: NORMAL
RAD ONC ARIA COURSE SESSION NUMBER: 19
RAD ONC ARIA COURSE START DATE: NORMAL
RAD ONC ARIA COURSE TREATMENT ELAPSED DAYS: 23
RAD ONC ARIA PLAN FRACTIONS TREATED TO DATE: 18
RAD ONC ARIA PLAN FRACTIONS TREATED TO DATE: 18
RAD ONC ARIA PLAN ID: NORMAL
RAD ONC ARIA PLAN ID: NORMAL
RAD ONC ARIA PLAN NAME: NORMAL
RAD ONC ARIA PLAN NAME: NORMAL
RAD ONC ARIA PLAN PRESCRIBED DOSE PER FRACTION: 2 GY
RAD ONC ARIA PLAN PRESCRIBED DOSE PER FRACTION: 2 GY
RAD ONC ARIA PLAN PRIMARY REFERENCE POINT: NORMAL
RAD ONC ARIA PLAN PRIMARY REFERENCE POINT: NORMAL
RAD ONC ARIA PLAN TOTAL FRACTIONS PRESCRIBED: 25
RAD ONC ARIA PLAN TOTAL FRACTIONS PRESCRIBED: 25
RAD ONC ARIA PLAN TOTAL PRESCRIBED DOSE: 5000 CGY
RAD ONC ARIA PLAN TOTAL PRESCRIBED DOSE: 5000 CGY
RAD ONC ARIA REFERENCE POINT DOSAGE GIVEN TO DATE: 36 GY
RAD ONC ARIA REFERENCE POINT DOSAGE GIVEN TO DATE: 36 GY
RAD ONC ARIA REFERENCE POINT ID: NORMAL
RAD ONC ARIA REFERENCE POINT ID: NORMAL
RAD ONC ARIA REFERENCE POINT SESSION DOSAGE GIVEN: 2 GY
RAD ONC ARIA REFERENCE POINT SESSION DOSAGE GIVEN: 2 GY

## 2025-01-30 PROCEDURE — 77387 GUIDANCE FOR RADJ TX DLVR: CPT | Performed by: STUDENT IN AN ORGANIZED HEALTH CARE EDUCATION/TRAINING PROGRAM

## 2025-01-30 PROCEDURE — G6002 STEREOSCOPIC X-RAY GUIDANCE: HCPCS | Performed by: STUDENT IN AN ORGANIZED HEALTH CARE EDUCATION/TRAINING PROGRAM

## 2025-01-30 PROCEDURE — 77412 RADIATION TX DELIVERY LVL 3: CPT | Performed by: STUDENT IN AN ORGANIZED HEALTH CARE EDUCATION/TRAINING PROGRAM

## 2025-01-31 ENCOUNTER — APPOINTMENT (OUTPATIENT)
Dept: RADIATION ONCOLOGY | Facility: CLINIC | Age: 75
End: 2025-01-31
Attending: RADIOLOGY
Payer: MEDICARE

## 2025-01-31 LAB
RAD ONC ARIA COURSE FIRST TREATMENT DATE: NORMAL
RAD ONC ARIA COURSE ID: NORMAL
RAD ONC ARIA COURSE INTENT: NORMAL
RAD ONC ARIA COURSE LAST TREATMENT DATE: NORMAL
RAD ONC ARIA COURSE SESSION NUMBER: 20
RAD ONC ARIA COURSE START DATE: NORMAL
RAD ONC ARIA COURSE TREATMENT ELAPSED DAYS: 24
RAD ONC ARIA PLAN FRACTIONS TREATED TO DATE: 19
RAD ONC ARIA PLAN FRACTIONS TREATED TO DATE: 19
RAD ONC ARIA PLAN ID: NORMAL
RAD ONC ARIA PLAN ID: NORMAL
RAD ONC ARIA PLAN NAME: NORMAL
RAD ONC ARIA PLAN NAME: NORMAL
RAD ONC ARIA PLAN PRESCRIBED DOSE PER FRACTION: 2 GY
RAD ONC ARIA PLAN PRESCRIBED DOSE PER FRACTION: 2 GY
RAD ONC ARIA PLAN PRIMARY REFERENCE POINT: NORMAL
RAD ONC ARIA PLAN PRIMARY REFERENCE POINT: NORMAL
RAD ONC ARIA PLAN TOTAL FRACTIONS PRESCRIBED: 25
RAD ONC ARIA PLAN TOTAL FRACTIONS PRESCRIBED: 25
RAD ONC ARIA PLAN TOTAL PRESCRIBED DOSE: 5000 CGY
RAD ONC ARIA PLAN TOTAL PRESCRIBED DOSE: 5000 CGY
RAD ONC ARIA REFERENCE POINT DOSAGE GIVEN TO DATE: 38 GY
RAD ONC ARIA REFERENCE POINT DOSAGE GIVEN TO DATE: 38 GY
RAD ONC ARIA REFERENCE POINT ID: NORMAL
RAD ONC ARIA REFERENCE POINT ID: NORMAL
RAD ONC ARIA REFERENCE POINT SESSION DOSAGE GIVEN: 2 GY
RAD ONC ARIA REFERENCE POINT SESSION DOSAGE GIVEN: 2 GY

## 2025-01-31 PROCEDURE — 77387 GUIDANCE FOR RADJ TX DLVR: CPT | Performed by: RADIOLOGY

## 2025-01-31 PROCEDURE — G6002 STEREOSCOPIC X-RAY GUIDANCE: HCPCS | Performed by: RADIOLOGY

## 2025-01-31 PROCEDURE — 77412 RADIATION TX DELIVERY LVL 3: CPT | Performed by: RADIOLOGY

## 2025-02-03 ENCOUNTER — APPOINTMENT (OUTPATIENT)
Dept: RADIATION ONCOLOGY | Facility: CLINIC | Age: 75
End: 2025-02-03
Payer: MEDICARE

## 2025-02-03 LAB
RAD ONC ARIA COURSE FIRST TREATMENT DATE: NORMAL
RAD ONC ARIA COURSE ID: NORMAL
RAD ONC ARIA COURSE INTENT: NORMAL
RAD ONC ARIA COURSE LAST TREATMENT DATE: NORMAL
RAD ONC ARIA COURSE SESSION NUMBER: 21
RAD ONC ARIA COURSE START DATE: NORMAL
RAD ONC ARIA COURSE TREATMENT ELAPSED DAYS: 27
RAD ONC ARIA PLAN FRACTIONS TREATED TO DATE: 20
RAD ONC ARIA PLAN FRACTIONS TREATED TO DATE: 20
RAD ONC ARIA PLAN ID: NORMAL
RAD ONC ARIA PLAN ID: NORMAL
RAD ONC ARIA PLAN NAME: NORMAL
RAD ONC ARIA PLAN NAME: NORMAL
RAD ONC ARIA PLAN PRESCRIBED DOSE PER FRACTION: 2 GY
RAD ONC ARIA PLAN PRESCRIBED DOSE PER FRACTION: 2 GY
RAD ONC ARIA PLAN PRIMARY REFERENCE POINT: NORMAL
RAD ONC ARIA PLAN PRIMARY REFERENCE POINT: NORMAL
RAD ONC ARIA PLAN TOTAL FRACTIONS PRESCRIBED: 25
RAD ONC ARIA PLAN TOTAL FRACTIONS PRESCRIBED: 25
RAD ONC ARIA PLAN TOTAL PRESCRIBED DOSE: 5000 CGY
RAD ONC ARIA PLAN TOTAL PRESCRIBED DOSE: 5000 CGY
RAD ONC ARIA REFERENCE POINT DOSAGE GIVEN TO DATE: 40 GY
RAD ONC ARIA REFERENCE POINT DOSAGE GIVEN TO DATE: 40 GY
RAD ONC ARIA REFERENCE POINT ID: NORMAL
RAD ONC ARIA REFERENCE POINT ID: NORMAL
RAD ONC ARIA REFERENCE POINT SESSION DOSAGE GIVEN: 2 GY
RAD ONC ARIA REFERENCE POINT SESSION DOSAGE GIVEN: 2 GY

## 2025-02-03 PROCEDURE — 77412 RADIATION TX DELIVERY LVL 3: CPT | Performed by: RADIOLOGY

## 2025-02-03 PROCEDURE — G6002 STEREOSCOPIC X-RAY GUIDANCE: HCPCS | Performed by: RADIOLOGY

## 2025-02-03 PROCEDURE — 77387 GUIDANCE FOR RADJ TX DLVR: CPT | Performed by: RADIOLOGY

## 2025-02-03 PROCEDURE — 77336 RADIATION PHYSICS CONSULT: CPT | Performed by: RADIOLOGY

## 2025-02-04 ENCOUNTER — APPOINTMENT (OUTPATIENT)
Dept: RADIATION ONCOLOGY | Facility: CLINIC | Age: 75
End: 2025-02-04
Attending: RADIOLOGY
Payer: MEDICARE

## 2025-02-04 LAB
RAD ONC ARIA COURSE FIRST TREATMENT DATE: NORMAL
RAD ONC ARIA COURSE ID: NORMAL
RAD ONC ARIA COURSE INTENT: NORMAL
RAD ONC ARIA COURSE LAST TREATMENT DATE: NORMAL
RAD ONC ARIA COURSE SESSION NUMBER: 22
RAD ONC ARIA COURSE START DATE: NORMAL
RAD ONC ARIA COURSE TREATMENT ELAPSED DAYS: 28
RAD ONC ARIA PLAN FRACTIONS TREATED TO DATE: 21
RAD ONC ARIA PLAN FRACTIONS TREATED TO DATE: 21
RAD ONC ARIA PLAN ID: NORMAL
RAD ONC ARIA PLAN ID: NORMAL
RAD ONC ARIA PLAN NAME: NORMAL
RAD ONC ARIA PLAN NAME: NORMAL
RAD ONC ARIA PLAN PRESCRIBED DOSE PER FRACTION: 2 GY
RAD ONC ARIA PLAN PRESCRIBED DOSE PER FRACTION: 2 GY
RAD ONC ARIA PLAN PRIMARY REFERENCE POINT: NORMAL
RAD ONC ARIA PLAN PRIMARY REFERENCE POINT: NORMAL
RAD ONC ARIA PLAN TOTAL FRACTIONS PRESCRIBED: 25
RAD ONC ARIA PLAN TOTAL FRACTIONS PRESCRIBED: 25
RAD ONC ARIA PLAN TOTAL PRESCRIBED DOSE: 5000 CGY
RAD ONC ARIA PLAN TOTAL PRESCRIBED DOSE: 5000 CGY
RAD ONC ARIA REFERENCE POINT DOSAGE GIVEN TO DATE: 42 GY
RAD ONC ARIA REFERENCE POINT DOSAGE GIVEN TO DATE: 42 GY
RAD ONC ARIA REFERENCE POINT ID: NORMAL
RAD ONC ARIA REFERENCE POINT ID: NORMAL
RAD ONC ARIA REFERENCE POINT SESSION DOSAGE GIVEN: 2 GY
RAD ONC ARIA REFERENCE POINT SESSION DOSAGE GIVEN: 2 GY

## 2025-02-04 PROCEDURE — 77427 RADIATION TX MANAGEMENT X5: CPT | Performed by: RADIOLOGY

## 2025-02-04 PROCEDURE — 77387 GUIDANCE FOR RADJ TX DLVR: CPT | Performed by: STUDENT IN AN ORGANIZED HEALTH CARE EDUCATION/TRAINING PROGRAM

## 2025-02-04 PROCEDURE — 77412 RADIATION TX DELIVERY LVL 3: CPT | Performed by: STUDENT IN AN ORGANIZED HEALTH CARE EDUCATION/TRAINING PROGRAM

## 2025-02-05 ENCOUNTER — APPOINTMENT (OUTPATIENT)
Dept: RADIATION ONCOLOGY | Facility: CLINIC | Age: 75
End: 2025-02-05
Attending: RADIOLOGY
Payer: MEDICARE

## 2025-02-05 LAB
RAD ONC ARIA COURSE FIRST TREATMENT DATE: NORMAL
RAD ONC ARIA COURSE ID: NORMAL
RAD ONC ARIA COURSE INTENT: NORMAL
RAD ONC ARIA COURSE LAST TREATMENT DATE: NORMAL
RAD ONC ARIA COURSE SESSION NUMBER: 23
RAD ONC ARIA COURSE START DATE: NORMAL
RAD ONC ARIA COURSE TREATMENT ELAPSED DAYS: 29
RAD ONC ARIA PLAN FRACTIONS TREATED TO DATE: 22
RAD ONC ARIA PLAN FRACTIONS TREATED TO DATE: 22
RAD ONC ARIA PLAN ID: NORMAL
RAD ONC ARIA PLAN ID: NORMAL
RAD ONC ARIA PLAN NAME: NORMAL
RAD ONC ARIA PLAN NAME: NORMAL
RAD ONC ARIA PLAN PRESCRIBED DOSE PER FRACTION: 2 GY
RAD ONC ARIA PLAN PRESCRIBED DOSE PER FRACTION: 2 GY
RAD ONC ARIA PLAN PRIMARY REFERENCE POINT: NORMAL
RAD ONC ARIA PLAN PRIMARY REFERENCE POINT: NORMAL
RAD ONC ARIA PLAN TOTAL FRACTIONS PRESCRIBED: 25
RAD ONC ARIA PLAN TOTAL FRACTIONS PRESCRIBED: 25
RAD ONC ARIA PLAN TOTAL PRESCRIBED DOSE: 5000 CGY
RAD ONC ARIA PLAN TOTAL PRESCRIBED DOSE: 5000 CGY
RAD ONC ARIA REFERENCE POINT DOSAGE GIVEN TO DATE: 44 GY
RAD ONC ARIA REFERENCE POINT DOSAGE GIVEN TO DATE: 44 GY
RAD ONC ARIA REFERENCE POINT ID: NORMAL
RAD ONC ARIA REFERENCE POINT ID: NORMAL
RAD ONC ARIA REFERENCE POINT SESSION DOSAGE GIVEN: 2 GY
RAD ONC ARIA REFERENCE POINT SESSION DOSAGE GIVEN: 2 GY

## 2025-02-05 PROCEDURE — G6002 STEREOSCOPIC X-RAY GUIDANCE: HCPCS | Performed by: RADIOLOGY

## 2025-02-05 PROCEDURE — 77387 GUIDANCE FOR RADJ TX DLVR: CPT | Performed by: RADIOLOGY

## 2025-02-05 PROCEDURE — 77412 RADIATION TX DELIVERY LVL 3: CPT | Performed by: RADIOLOGY

## 2025-02-06 ENCOUNTER — APPOINTMENT (OUTPATIENT)
Dept: RADIATION ONCOLOGY | Facility: CLINIC | Age: 75
End: 2025-02-06
Attending: RADIOLOGY
Payer: MEDICARE

## 2025-02-07 ENCOUNTER — APPOINTMENT (OUTPATIENT)
Dept: RADIATION ONCOLOGY | Facility: CLINIC | Age: 75
End: 2025-02-07
Attending: RADIOLOGY
Payer: MEDICARE

## 2025-02-07 LAB
RAD ONC ARIA COURSE FIRST TREATMENT DATE: NORMAL
RAD ONC ARIA COURSE ID: NORMAL
RAD ONC ARIA COURSE INTENT: NORMAL
RAD ONC ARIA COURSE LAST TREATMENT DATE: NORMAL
RAD ONC ARIA COURSE SESSION NUMBER: 24
RAD ONC ARIA COURSE START DATE: NORMAL
RAD ONC ARIA COURSE TREATMENT ELAPSED DAYS: 31
RAD ONC ARIA PLAN FRACTIONS TREATED TO DATE: 23
RAD ONC ARIA PLAN FRACTIONS TREATED TO DATE: 23
RAD ONC ARIA PLAN ID: NORMAL
RAD ONC ARIA PLAN ID: NORMAL
RAD ONC ARIA PLAN NAME: NORMAL
RAD ONC ARIA PLAN NAME: NORMAL
RAD ONC ARIA PLAN PRESCRIBED DOSE PER FRACTION: 2 GY
RAD ONC ARIA PLAN PRESCRIBED DOSE PER FRACTION: 2 GY
RAD ONC ARIA PLAN PRIMARY REFERENCE POINT: NORMAL
RAD ONC ARIA PLAN PRIMARY REFERENCE POINT: NORMAL
RAD ONC ARIA PLAN TOTAL FRACTIONS PRESCRIBED: 25
RAD ONC ARIA PLAN TOTAL FRACTIONS PRESCRIBED: 25
RAD ONC ARIA PLAN TOTAL PRESCRIBED DOSE: 5000 CGY
RAD ONC ARIA PLAN TOTAL PRESCRIBED DOSE: 5000 CGY
RAD ONC ARIA REFERENCE POINT DOSAGE GIVEN TO DATE: 46 GY
RAD ONC ARIA REFERENCE POINT DOSAGE GIVEN TO DATE: 46 GY
RAD ONC ARIA REFERENCE POINT ID: NORMAL
RAD ONC ARIA REFERENCE POINT ID: NORMAL
RAD ONC ARIA REFERENCE POINT SESSION DOSAGE GIVEN: 2 GY
RAD ONC ARIA REFERENCE POINT SESSION DOSAGE GIVEN: 2 GY

## 2025-02-07 PROCEDURE — 77387 GUIDANCE FOR RADJ TX DLVR: CPT | Performed by: RADIOLOGY

## 2025-02-07 PROCEDURE — 77412 RADIATION TX DELIVERY LVL 3: CPT | Performed by: RADIOLOGY

## 2025-02-07 PROCEDURE — G6002 STEREOSCOPIC X-RAY GUIDANCE: HCPCS | Performed by: RADIOLOGY

## 2025-02-10 ENCOUNTER — APPOINTMENT (OUTPATIENT)
Dept: RADIATION ONCOLOGY | Facility: CLINIC | Age: 75
End: 2025-02-10
Attending: RADIOLOGY
Payer: MEDICARE

## 2025-02-10 LAB
RAD ONC ARIA COURSE FIRST TREATMENT DATE: NORMAL
RAD ONC ARIA COURSE ID: NORMAL
RAD ONC ARIA COURSE INTENT: NORMAL
RAD ONC ARIA COURSE LAST TREATMENT DATE: NORMAL
RAD ONC ARIA COURSE SESSION NUMBER: 25
RAD ONC ARIA COURSE START DATE: NORMAL
RAD ONC ARIA COURSE TREATMENT ELAPSED DAYS: 34
RAD ONC ARIA PLAN FRACTIONS TREATED TO DATE: 24
RAD ONC ARIA PLAN FRACTIONS TREATED TO DATE: 24
RAD ONC ARIA PLAN ID: NORMAL
RAD ONC ARIA PLAN ID: NORMAL
RAD ONC ARIA PLAN NAME: NORMAL
RAD ONC ARIA PLAN NAME: NORMAL
RAD ONC ARIA PLAN PRESCRIBED DOSE PER FRACTION: 2 GY
RAD ONC ARIA PLAN PRESCRIBED DOSE PER FRACTION: 2 GY
RAD ONC ARIA PLAN PRIMARY REFERENCE POINT: NORMAL
RAD ONC ARIA PLAN PRIMARY REFERENCE POINT: NORMAL
RAD ONC ARIA PLAN TOTAL FRACTIONS PRESCRIBED: 25
RAD ONC ARIA PLAN TOTAL FRACTIONS PRESCRIBED: 25
RAD ONC ARIA PLAN TOTAL PRESCRIBED DOSE: 5000 CGY
RAD ONC ARIA PLAN TOTAL PRESCRIBED DOSE: 5000 CGY
RAD ONC ARIA REFERENCE POINT DOSAGE GIVEN TO DATE: 48 GY
RAD ONC ARIA REFERENCE POINT DOSAGE GIVEN TO DATE: 48 GY
RAD ONC ARIA REFERENCE POINT ID: NORMAL
RAD ONC ARIA REFERENCE POINT ID: NORMAL
RAD ONC ARIA REFERENCE POINT SESSION DOSAGE GIVEN: 2 GY
RAD ONC ARIA REFERENCE POINT SESSION DOSAGE GIVEN: 2 GY

## 2025-02-10 PROCEDURE — 77387 GUIDANCE FOR RADJ TX DLVR: CPT | Performed by: RADIOLOGY

## 2025-02-10 PROCEDURE — G6002 STEREOSCOPIC X-RAY GUIDANCE: HCPCS | Performed by: RADIOLOGY

## 2025-02-10 PROCEDURE — 77412 RADIATION TX DELIVERY LVL 3: CPT | Performed by: RADIOLOGY

## 2025-02-11 ENCOUNTER — APPOINTMENT (OUTPATIENT)
Dept: RADIATION ONCOLOGY | Facility: CLINIC | Age: 75
End: 2025-02-11
Attending: RADIOLOGY
Payer: MEDICARE

## 2025-02-11 LAB
RAD ONC ARIA COURSE FIRST TREATMENT DATE: NORMAL
RAD ONC ARIA COURSE ID: NORMAL
RAD ONC ARIA COURSE INTENT: NORMAL
RAD ONC ARIA COURSE LAST TREATMENT DATE: NORMAL
RAD ONC ARIA COURSE SESSION NUMBER: 26
RAD ONC ARIA COURSE START DATE: NORMAL
RAD ONC ARIA COURSE TREATMENT ELAPSED DAYS: 35
RAD ONC ARIA PLAN FRACTIONS TREATED TO DATE: 25
RAD ONC ARIA PLAN FRACTIONS TREATED TO DATE: 25
RAD ONC ARIA PLAN ID: NORMAL
RAD ONC ARIA PLAN ID: NORMAL
RAD ONC ARIA PLAN NAME: NORMAL
RAD ONC ARIA PLAN NAME: NORMAL
RAD ONC ARIA PLAN PRESCRIBED DOSE PER FRACTION: 2 GY
RAD ONC ARIA PLAN PRESCRIBED DOSE PER FRACTION: 2 GY
RAD ONC ARIA PLAN PRIMARY REFERENCE POINT: NORMAL
RAD ONC ARIA PLAN PRIMARY REFERENCE POINT: NORMAL
RAD ONC ARIA PLAN TOTAL FRACTIONS PRESCRIBED: 25
RAD ONC ARIA PLAN TOTAL FRACTIONS PRESCRIBED: 25
RAD ONC ARIA PLAN TOTAL PRESCRIBED DOSE: 5000 CGY
RAD ONC ARIA PLAN TOTAL PRESCRIBED DOSE: 5000 CGY
RAD ONC ARIA REFERENCE POINT DOSAGE GIVEN TO DATE: 50 GY
RAD ONC ARIA REFERENCE POINT DOSAGE GIVEN TO DATE: 50 GY
RAD ONC ARIA REFERENCE POINT ID: NORMAL
RAD ONC ARIA REFERENCE POINT ID: NORMAL
RAD ONC ARIA REFERENCE POINT SESSION DOSAGE GIVEN: 2 GY
RAD ONC ARIA REFERENCE POINT SESSION DOSAGE GIVEN: 2 GY

## 2025-02-11 PROCEDURE — 77412 RADIATION TX DELIVERY LVL 3: CPT | Performed by: STUDENT IN AN ORGANIZED HEALTH CARE EDUCATION/TRAINING PROGRAM

## 2025-02-11 PROCEDURE — G6002 STEREOSCOPIC X-RAY GUIDANCE: HCPCS | Performed by: STUDENT IN AN ORGANIZED HEALTH CARE EDUCATION/TRAINING PROGRAM

## 2025-02-11 PROCEDURE — 77387 GUIDANCE FOR RADJ TX DLVR: CPT | Performed by: STUDENT IN AN ORGANIZED HEALTH CARE EDUCATION/TRAINING PROGRAM

## 2025-02-11 PROCEDURE — 77336 RADIATION PHYSICS CONSULT: CPT | Performed by: RADIOLOGY

## 2025-02-17 ENCOUNTER — CLINICAL SUPPORT (OUTPATIENT)
Age: 75
End: 2025-02-17
Payer: MEDICARE

## 2025-02-17 DIAGNOSIS — E53.8 B12 DEFICIENCY: Primary | ICD-10-CM

## 2025-02-17 PROCEDURE — 96372 THER/PROPH/DIAG INJ SC/IM: CPT

## 2025-02-17 RX ADMIN — CYANOCOBALAMIN 1000 MCG: 1000 INJECTION, SOLUTION INTRAMUSCULAR; SUBCUTANEOUS at 09:50

## 2025-02-24 ENCOUNTER — TELEPHONE (OUTPATIENT)
Age: 75
End: 2025-02-24

## 2025-02-24 NOTE — TELEPHONE ENCOUNTER
Patient calling in to report that she finished her Radiation treatment and she is asking when she is to start the Ibrance ordered by Dr. Quiroz and when to restart the anastrozole.  She reports was not informed when she needs to start this and would like a call back with instructions.  I told that I would send this to her care for follow up and call back.  She thanked me.

## 2025-02-25 NOTE — TELEPHONE ENCOUNTER
Reviewed with Dr. Quiroz.  OK to start anastrozole and ibrance.    Returned call to patient.  Aware and agreeable with recommendations. Will start Ibrance and anastrozole on 3/3.  Reviewed dosing instructions for both medications.  Patient verbalized understanding.

## 2025-02-26 ENCOUNTER — TELEPHONE (OUTPATIENT)
Dept: SURGICAL ONCOLOGY | Facility: CLINIC | Age: 75
End: 2025-02-26

## 2025-02-26 NOTE — TELEPHONE ENCOUNTER
VM received from the patient's  Oscar:    Andrea, this is Raji Duarte about the pills for my wife. Everything was in good shape, but we just got a call. I have a couple of questions. I don't know who to turn to here. [phone] Thank you very much.  You received a voice mail from RAJI.    I returned his call and Raji stated that he had received a call from [employer] requested a NEW application for the patient's Ibrance. [employer] was requesting [facility] information, a prior authorization, and I believe a letter of hardship.  I stated to Raji that I would contact [employer] because he and I both have the fax stating that the patient was approved for Ibrance through 12/31/25.    I called [employer] and spoke with Radha who took down all of the information along with my call back number and then we were cutoff.    I called back and spoke with Dominick NESBITT who was able to review the patient's case and since I informed her that the patient did not have Medicare D and [facility], Dominick asked for the prior authorization information. That information was in the patient's chart and so I told Dominick I'd fax that to her today. Dominick stated this would open up the case and probably provided the patient with a follow up and a great chance of a positive outcome.

## 2025-03-10 ENCOUNTER — APPOINTMENT (OUTPATIENT)
Age: 75
End: 2025-03-10
Payer: MEDICARE

## 2025-03-10 DIAGNOSIS — D50.9 IRON DEFICIENCY ANEMIA, UNSPECIFIED IRON DEFICIENCY ANEMIA TYPE: ICD-10-CM

## 2025-03-10 DIAGNOSIS — Z17.0 MALIGNANT NEOPLASM OF OVERLAPPING SITES OF RIGHT BREAST IN FEMALE, ESTROGEN RECEPTOR POSITIVE (HCC): ICD-10-CM

## 2025-03-10 DIAGNOSIS — C50.811 MALIGNANT NEOPLASM OF OVERLAPPING SITES OF RIGHT BREAST IN FEMALE, ESTROGEN RECEPTOR POSITIVE (HCC): ICD-10-CM

## 2025-03-10 LAB
ANISOCYTOSIS BLD QL SMEAR: PRESENT
BASOPHILS # BLD MANUAL: 0.08 THOUSAND/UL (ref 0–0.1)
BASOPHILS NFR MAR MANUAL: 3 % (ref 0–1)
EOSINOPHIL # BLD MANUAL: 0 THOUSAND/UL (ref 0–0.4)
EOSINOPHIL NFR BLD MANUAL: 0 % (ref 0–6)
ERYTHROCYTE [DISTWIDTH] IN BLOOD BY AUTOMATED COUNT: 15 % (ref 11.6–15.1)
FERRITIN SERPL-MCNC: 8 NG/ML (ref 11–307)
HCT VFR BLD AUTO: 33.2 % (ref 34.8–46.1)
HGB BLD-MCNC: 10.2 G/DL (ref 11.5–15.4)
IRON SATN MFR SERPL: 10 % (ref 15–50)
IRON SERPL-MCNC: 53 UG/DL (ref 50–212)
LYMPHOCYTES # BLD AUTO: 0.29 THOUSAND/UL (ref 0.6–4.47)
LYMPHOCYTES # BLD AUTO: 9 % (ref 14–44)
MCH RBC QN AUTO: 24.7 PG (ref 26.8–34.3)
MCHC RBC AUTO-ENTMCNC: 30.7 G/DL (ref 31.4–37.4)
MCV RBC AUTO: 80 FL (ref 82–98)
MONOCYTES # BLD AUTO: 0.03 THOUSAND/UL (ref 0–1.22)
MONOCYTES NFR BLD: 1 % (ref 4–12)
NEUTROPHILS # BLD MANUAL: 2.24 THOUSAND/UL (ref 1.85–7.62)
NEUTS SEG NFR BLD AUTO: 85 % (ref 43–75)
OVALOCYTES BLD QL SMEAR: PRESENT
PLATELET # BLD AUTO: 137 THOUSANDS/UL (ref 149–390)
PLATELET BLD QL SMEAR: ABNORMAL
PMV BLD AUTO: 13 FL (ref 8.9–12.7)
POIKILOCYTOSIS BLD QL SMEAR: PRESENT
POLYCHROMASIA BLD QL SMEAR: PRESENT
RBC # BLD AUTO: 4.13 MILLION/UL (ref 3.81–5.12)
RBC MORPH BLD: PRESENT
TIBC SERPL-MCNC: 506.8 UG/DL (ref 250–450)
TRANSFERRIN SERPL-MCNC: 362 MG/DL (ref 203–362)
UIBC SERPL-MCNC: 454 UG/DL (ref 155–355)
VARIANT LYMPHS # BLD AUTO: 2 %
WBC # BLD AUTO: 2.63 THOUSAND/UL (ref 4.31–10.16)

## 2025-03-10 PROCEDURE — 82728 ASSAY OF FERRITIN: CPT

## 2025-03-10 PROCEDURE — 36415 COLL VENOUS BLD VENIPUNCTURE: CPT

## 2025-03-10 PROCEDURE — 83550 IRON BINDING TEST: CPT

## 2025-03-10 PROCEDURE — 83540 ASSAY OF IRON: CPT

## 2025-03-10 PROCEDURE — 85027 COMPLETE CBC AUTOMATED: CPT

## 2025-03-10 PROCEDURE — 85007 BL SMEAR W/DIFF WBC COUNT: CPT

## 2025-03-11 ENCOUNTER — RESULTS FOLLOW-UP (OUTPATIENT)
Age: 75
End: 2025-03-11

## 2025-03-11 DIAGNOSIS — E61.1 IRON DEFICIENCY: Primary | ICD-10-CM

## 2025-03-11 RX ORDER — FERROUS SULFATE 324(65)MG
324 TABLET, DELAYED RELEASE (ENTERIC COATED) ORAL
Qty: 180 TABLET | Refills: 0 | Status: SHIPPED | OUTPATIENT
Start: 2025-03-11

## 2025-03-11 NOTE — TELEPHONE ENCOUNTER
----- Message from Lorraine Dennis DO sent at 3/11/2025 11:12 AM EDT -----  Please notify pt that her iron level is very low.   I recommend taking oral iron . I will send to pharmacy. She is to take them twice a day with anything that contains vitamin C  in order to increase absorption. We will repeat iron level in 3 months.

## 2025-03-17 ENCOUNTER — OFFICE VISIT (OUTPATIENT)
Age: 75
End: 2025-03-17
Payer: MEDICARE

## 2025-03-17 VITALS
HEART RATE: 62 BPM | RESPIRATION RATE: 16 BRPM | TEMPERATURE: 98 F | BODY MASS INDEX: 23.91 KG/M2 | DIASTOLIC BLOOD PRESSURE: 70 MMHG | SYSTOLIC BLOOD PRESSURE: 130 MMHG | OXYGEN SATURATION: 95 % | HEIGHT: 66 IN | WEIGHT: 148.8 LBS

## 2025-03-17 DIAGNOSIS — M79.671 BILATERAL FOOT PAIN: ICD-10-CM

## 2025-03-17 DIAGNOSIS — D51.0 PERNICIOUS ANEMIA: ICD-10-CM

## 2025-03-17 DIAGNOSIS — M79.672 BILATERAL FOOT PAIN: ICD-10-CM

## 2025-03-17 DIAGNOSIS — E78.2 MIXED HYPERLIPIDEMIA: ICD-10-CM

## 2025-03-17 DIAGNOSIS — Z00.00 MEDICARE ANNUAL WELLNESS VISIT, SUBSEQUENT: Primary | ICD-10-CM

## 2025-03-17 DIAGNOSIS — C50.911 BREAST CANCER METASTASIZED TO AXILLARY LYMPH NODE, RIGHT (HCC): ICD-10-CM

## 2025-03-17 DIAGNOSIS — Z23 ENCOUNTER FOR IMMUNIZATION: ICD-10-CM

## 2025-03-17 DIAGNOSIS — C77.3 BREAST CANCER METASTASIZED TO AXILLARY LYMPH NODE, RIGHT (HCC): ICD-10-CM

## 2025-03-17 PROCEDURE — 99214 OFFICE O/P EST MOD 30 MIN: CPT | Performed by: FAMILY MEDICINE

## 2025-03-17 PROCEDURE — 96372 THER/PROPH/DIAG INJ SC/IM: CPT | Performed by: FAMILY MEDICINE

## 2025-03-17 PROCEDURE — G0439 PPPS, SUBSEQ VISIT: HCPCS | Performed by: FAMILY MEDICINE

## 2025-03-17 RX ADMIN — CYANOCOBALAMIN 1000 MCG: 1000 INJECTION, SOLUTION INTRAMUSCULAR; SUBCUTANEOUS at 10:09

## 2025-03-17 NOTE — ASSESSMENT & PLAN NOTE
Longstanding issue.  Receives B12 injections every 30 days.  Last B12 level normalized from less than 50.  Will continue to monitor.  Orders:    Vitamin B12; Future

## 2025-03-17 NOTE — PROGRESS NOTES
Name: Juve Duarte      : 1950      MRN: [id number]  Encounter Provider: Lorraine Dennis DO  Encounter Date: 3/17/2025   Encounter department: [facility] [place]    Assessment & Plan  Medicare annual wellness visit, subsequent    Orders:    Comprehensive metabolic panel; Future    Encounter for immunization [Z23]  B12 injection.  See below       Breast cancer metastasized to axillary lymph node, right (HCC)  In remission.  Status chemotherapy and bilateral mastectomy.  Will be on anastrozole therapy lifelong       Pernicious anemia  Longstanding issue.  Receives B12 injections every 30 days.  Last B12 level normalized from less than 50.  Will continue to monitor.  Orders:    Vitamin B12; Future    Bilateral foot pain  Persistent.  Worsening since chemotherapy.  Will plan to follow-up with specialist.  Referral provided  Orders:    Ambulatory Referral to Podiatry; Future    Mixed hyperlipidemia  On statin therapy.  Patient to continue.  Orders:    Lipid Panel with Direct LDL reflex; Future      Falls Plan of Care: balance, strength, and gait training instructions were provided.       Preventive health issues were discussed with patient, and age appropriate screening tests were ordered as noted in patient's After Visit Summary. Personalized health advice and appropriate referrals for health education or preventive services given if needed, as noted in patient's After Visit Summary.    History of Present Illness     Patient presents for annual.       Patient Care Team:  Lorraine Dennis DO as PCP - General (Family Medicine)  RADHA Mak as  Care Manager (Oncology)  Jevon Valdez MD (Surgical Oncology)  Earnest Quiroz DO as Medical Oncologist (Hematology and Oncology)  Curtis Up MD (Surgical Oncology)  JAQUELIN Ascencio as Nurse Practitioner (Surgical Oncology)  Kerri Patel MD (Radiation Oncology)    Review of Systems    Constitutional:  Negative for fever.   Respiratory:  Negative for shortness of breath.    Cardiovascular:  Negative for chest pain.   Gastrointestinal:  Positive for constipation. Negative for diarrhea.   Musculoskeletal:  Negative for gait problem.   Neurological:  Negative for headaches.     Medical History Reviewed by provider this encounter:       Annual Wellness Visit Questionnaire   Juve is here for her Subsequent Wellness visit.     Health Risk Assessment:   Patient rates overall health as good. Patient feels that their physical health rating is same. Patient is satisfied with their life. Eyesight was rated as same. Hearing was rated as same. Patient feels that their emotional and mental health rating is same. Patients states they are never, rarely angry. Patient states they are always unusually tired/fatigued. Pain experienced in the last 7 days has been none. Patient states that she has experienced no weight loss or gain in last 6 months.     Fall Risk Screening:   In the past year, patient has experienced: no history of falling in past year      Urinary Incontinence Screening:   Patient has not leaked urine accidently in the last six months.     Home Safety:  Patient does not have trouble with stairs inside or outside of their home. Patient has working smoke alarms and has working carbon monoxide detector. Home safety hazards include: none.     Nutrition:   Current diet is Regular.     Medications:   Patient is currently taking over-the-counter supplements. OTC medications include: see medication list. Patient is able to manage medications.     Activities of Daily Living (ADLs)/Instrumental Activities of Daily Living (IADLs):   Walk and transfer into and out of bed and chair?: Yes  Dress and groom yourself?: Yes    Bathe or shower yourself?: Yes    Feed yourself? Yes  Do your laundry/housekeeping?: Yes  Manage your money, pay your bills and track your expenses?: Yes  Make your own meals?: Yes    Do your  own shopping?: Yes    Previous Hospitalizations:   Any hospitalizations or ED visits within the last 12 months?: Yes    How many hospitalizations have you had in the last year?: 1-2    Advance Care Planning:   Living will: No      PREVENTIVE SCREENINGS      Cardiovascular Screening:    General: Screening Not Indicated and History Lipid Disorder      Diabetes Screening:     General: Screening Current      Colorectal Cancer Screening:     General: Screening Current      Breast Cancer Screening:     General: Screening Not Indicated and History Breast Cancer      Cervical Cancer Screening:    General: Screening Not Indicated      Lung Cancer Screening:     General: Screening Not Indicated and History Lung Cancer    Screening, Brief Intervention, and Referral to Treatment (SBIRT)     Screening  Typical number of drinks in a day: 0  Typical number of drinks in a week: 0  Interpretation: Low risk drinking behavior.    Single Item Drug Screening:  How often have you used an illegal drug (including marijuana) or a prescription medication for non-medical reasons in the past year? never    Single Item Drug Screen Score: 0  Interpretation: Negative screen for possible drug use disorder    Social Drivers of Health     Financial Resource Strain: Low Risk  (2023)    Overall Financial Resource Strain (CARDIA)     Difficulty of Paying Living Expenses: Not hard at all   Food Insecurity: Patient Unable To Answer (10/29/2024)    Nursing - Inadequate Food Risk Classification     Ran Out of Food in the Last Year: Patient unable to answer   Transportation Needs: Patient Unable To Answer (10/29/2024)    Nursing - Transportation Risk Classification     Lack of Transportation: Patient unable to answer   Housing Stability: Patient Unable To Answer (10/29/2024)    Nursing: Inadequate Housing Risk Classification     Unable to Pay for Housing in the Last Year: Patient unable to answer     Has Housin   Utilities: Patient Unable To  Answer (10/29/2024)    Nursing - Utilities Risk Classification     Threatened with loss of utilities: Patient unable to answer     No results found.    Objective   Wt 67.5 kg (148 lb 12.8 oz)   BMI 24.02 kg/m²     Physical Exam  HENT:      Head: Normocephalic.      Right Ear: External ear normal.      Left Ear: External ear normal.      Mouth/Throat:      Mouth: Mucous membranes are moist.   Eyes:      Conjunctiva/sclera: Conjunctivae normal.      Pupils: Pupils are equal, round, and reactive to light.   Cardiovascular:      Rate and Rhythm: Normal rate and regular rhythm.      Heart sounds: No murmur heard.  Pulmonary:      Effort: Pulmonary effort is normal.      Breath sounds: Normal breath sounds.   Abdominal:      General: Bowel sounds are normal.      Palpations: Abdomen is soft.   Musculoskeletal:      Right lower leg: No edema.      Left lower leg: No edema.   Neurological:      Mental Status: She is alert and oriented to person, place, and time.      Gait: Gait normal.   Psychiatric:         Mood and Affect: Mood normal.         Behavior: Behavior normal.

## 2025-03-17 NOTE — ASSESSMENT & PLAN NOTE
In remission.  Status chemotherapy and bilateral mastectomy.  Will be on anastrozole therapy lifelong

## 2025-03-19 ENCOUNTER — OFFICE VISIT (OUTPATIENT)
Dept: RADIATION ONCOLOGY | Facility: CLINIC | Age: 75
End: 2025-03-19
Attending: RADIOLOGY
Payer: MEDICARE

## 2025-03-19 VITALS
TEMPERATURE: 97 F | BODY MASS INDEX: 23.73 KG/M2 | HEART RATE: 62 BPM | WEIGHT: 147 LBS | OXYGEN SATURATION: 96 % | RESPIRATION RATE: 16 BRPM | DIASTOLIC BLOOD PRESSURE: 72 MMHG | SYSTOLIC BLOOD PRESSURE: 140 MMHG

## 2025-03-19 DIAGNOSIS — C50.911 BREAST CANCER METASTASIZED TO AXILLARY LYMPH NODE, RIGHT (HCC): Primary | ICD-10-CM

## 2025-03-19 DIAGNOSIS — C77.3 BREAST CANCER METASTASIZED TO AXILLARY LYMPH NODE, RIGHT (HCC): Primary | ICD-10-CM

## 2025-03-19 PROCEDURE — 99211 OFF/OP EST MAY X REQ PHY/QHP: CPT | Performed by: RADIOLOGY

## 2025-03-19 PROCEDURE — 99024 POSTOP FOLLOW-UP VISIT: CPT | Performed by: RADIOLOGY

## 2025-03-19 RX ORDER — OMEGA-3S/DHA/EPA/FISH OIL/D3 300MG-1000
400 CAPSULE ORAL DAILY
COMMUNITY

## 2025-03-19 NOTE — PROGRESS NOTES
Juve Duarte 1950 is a 75 y.o. female with a history of stage IIIA ([id number]) G3 multifocal right breast cancer and synchronous low-grade gastric neuroendocrine tumor s/p bilateral mastectomies and right axillary lymph node resection achieving negative margins. Pathology demonstrated 4/4 positive lymph nodes. She had preoperative anastozole.  Tumor cells were ER+, TX+, HER2-. She deferred chemotherapy.  The gastric tumor was in polyp that was removed and she is currently undergoing surveillance.  She completed adjuvant radiation on 2/11/25. She returns today for follow up.    The patient tolerated radiation well.  She had expected fatigue and G2+ skin toxicity managed aggressively with topical emollients as tolerated.  She suffered no other significant toxicity due to radiation.       Upcoming:  3/25/25 Dr. Berumen  5/29/25 Dr. Valdez      Follow up visit     Oncology History   Breast cancer metastasized to axillary lymph node, right (HCC)   4/4/2024 Initial Diagnosis    April 4, 2024 patient had biopsy of right breast mass showing invasive ductal carcinoma.  Right axillary node showed metastatic carcinoma consistent with breast primary.  ER 90%, TX 5-7%, HER2 +2.  FISH negative.  Similar findings in the axillary node although TX was 30%.  HER2 +2, FISH negative.  April 18, 2024 patient had CT chest ab pelvis showing soft tissue nodules right breast.  Right infrahilar and internal mammary nodes indeterminate.  Right axillary nodes up to 0.5 cm.  2 enhancing soft tissue nodules in the gastric fundus.  Bone scan showed focus of activity at anterior right seventh rib.     4/4/2024 Biopsy    Breast, Right, US BX RT BREAST 700 7CMFN 3 PASSES 12G [place]:      - Invasive mammary carcinoma of no special type (ductal NST/invasive ductal carcinoma) with apocrine features, see note      - [place] grade 2 of 3 (total score: 6 of 9)          - Tubule formation: <10%, score 3          - Nuclear grade 2 of 3, score 2           - Mitoses < 3/mm2, (</= 7 mitoses/10HPF), score 1      - Invasive carcinoma involves 3 of 3 submitted core biopsies, max. dimension = 5 millimeters      - Ductal carcinoma in situ (DCIS): Not identified      - Microcalcifications: Absent      - Lymphovascular invasion: Present     Breast, Right, US BX RT BREAST 900 7CMFN 3 PASSES 12G [place]:      - Invasive mammary carcinoma of no special type (ductal NST/invasive ductal carcinoma) with apocrine features, see note      - [place] grade 2 of 3 (total score: 6 of 9)          - Tubule formation: <10%, score 3          - Nuclear grade 2 of 3, score 2          - Mitoses < 3/mm2, (</= 7 mitoses/10HPF), score 1      - Invasive carcinoma involves 3 of 3 submitted core biopsies, max. dimension = 14 millimeters      - Ductal carcinoma in situ (DCIS): Not identified      - Microcalcifications: Absent      - Lymphovascular invasion: Not identified    Axillary lymph node, US BX RT AXILLARY TAIL LN 10:00 12CMFN 3 PASSES 12G [place]:      - Metastatic carcinoma consistent with mammary primary,    BREAST TUMOR PROGNOSTIC PROFILE     Performed on invasive carcinoma block E1:  Test Description Result Prognostic Interpretation   Estrogen Receptor/ER  Primary Antibody: SP-1  Internal control: Positive   External control: Positive 90%  Staining Intensity: Strong  Isabela Score*: 8 Positive   Progesterone Receptor/PgR  Primary Antibody:1E2  Internal control: Positive  External control: Positive 5-7%  Staining Intensity: Strong  Isabela Score*: 4 Positive   HER2 by IHC   Primary Antibody: 4B5 2+ Equivocal *      Performed on invasive carcinoma block F2:  Test Description Result Prognostic Interpretation   Estrogen Receptor/ER  Primary Antibody: SP-1  Internal control: Positive   External control: Positive 95%  Staining Intensity: Strong  Isabela Score*: 8 Positive   Progesterone Receptor/PgR  Primary Antibody:1E2  Internal control: Positive  External control: Positive 30%  Staining  Intensity: Moderate  Isabela Score*: 5 Positive   HER2 by IHC   Primary Antibody: 4B5 2+ Equivocal *       Fluorescence in situ hybridization (FISH) analysis of HER2 overexpression by invasive breast carcinoma cells, block E1 performed at [facility], [place], [place], yields the following results:  Test Description                        Interpretation  HER2 by FISH analysis:              Negative / Not Amplified.  Results  HER2: CEP-17 ratio :                  1.5: 1  Average HER2 Signal:                5.4  Average CEP-17 Signal:                          3.5  Number of selected invasive cells scanned           100     Fluorescence in situ hybridization (FISH) analysis of HER2 overexpression by invasive breast carcinoma cells, block F2 performed at [facility], [place], [place], yields the following results:  Test Description                        Interpretation  HER2 by FISH analysis:              Negative / Not Amplified.  Results  HER2: CEP-17 ratio :                  1.3: 1  Average HER2 Signal:                3.5  Average CEP-17 Signal:                          2.6  Number of selected invasive cells scanned           50        4/4/2024 Initial Diagnosis    Malignant neoplasm of overlapping sites of right breast in female, estrogen receptor positive (HCC)     4/18/2024 Genetic Testing    AMBRY  A total of 36 genes were evaluated, including: APC, MIKE, BARD 1, BMPR1A, BRCA1, BRCA2, BRIP1, CDH1, CDK4, CKDN2A, CHEK2, DICER1, MLH1, MSH2, MSH6, MUTYH, NBN, NF1, NTHL1, PALB2, PMS2, PTEN, RAD51C, RECQL, SMAD4, SMARCA4, STK11, TP53, AXIN2, HOXB13, MSH3, POLD1, AND POLE, EPCAM, AND GREM1     LIKELY PATHOGENIC VARIANT CHEK2  VUS SDHA     10/29/2024 Surgery    Right modified radical mastectomy left simple mastectomy    RIGHT  Multifocal residual invasive mammary carcinoma of no special type (ductal) s/p neoadjuvant chemotherapy  21 mm, 8 mm, 2 mm  Grade 3   Margins negative  ER  90  CO 5  HER2 2+  FISH negative  4/4 Lymph nodes     Anatomic Stage IIIA  Prognostic Stage (use AJCC update): N/A (status post neoadjuvant therapy).    LEFT  Benign fibroadenoma (0.8 cm), Benign fibrocystic changes with atypia (atypical lobular hyperplasia/ALH     10/29/2024 -  Cancer Staged    Staging form: Breast, AJCC 8th Edition  - Clinical stage from 10/29/2024: Stage IIIA (cT2, cN2, cM0, G3, ER+, CO+, HER2-) - Signed by Jevon Valdez MD on 11/21/2024  Stage prefix: Initial diagnosis  Histologic grading system: 3 grade system       1/7/2025 - 2/11/2025 Radiation    Treatments:  Course: C1  Plan ID Energy Fractions Dose per Fraction (cGy) Dose Correction (cGy) Total Dose Delivered (cGy) Elapsed Days   BH Axilla R 6X 25 / 25 200 0 5,000 35   BH CW R 6X 25 / 25 200 0 5,000 35   Treatment Dates:  1/7/2025 - 2/11/2025     Malignant neoplasm of overlapping sites of right breast in female, estrogen receptor positive (HCC)   4/4/2024 Biopsy    Breast, Right, US BX RT BREAST 700 7CMFN 3 PASSES 12G [place]:      - Invasive mammary carcinoma of no special type (ductal NST/invasive ductal carcinoma) with apocrine features, see note      - [place] grade 2 of 3 (total score: 6 of 9)          - Tubule formation: <10%, score 3          - Nuclear grade 2 of 3, score 2          - Mitoses < 3/mm2, (</= 7 mitoses/10HPF), score 1      - Invasive carcinoma involves 3 of 3 submitted core biopsies, max. dimension = 5 millimeters      - Ductal carcinoma in situ (DCIS): Not identified      - Microcalcifications: Absent      - Lymphovascular invasion: Present     Breast, Right, US BX RT BREAST 900 7CMFN 3 PASSES 12G [place]:      - Invasive mammary carcinoma of no special type (ductal NST/invasive ductal carcinoma) with apocrine features, see note      - [place] grade 2 of 3 (total score: 6 of 9)          - Tubule formation: <10%, score 3          - Nuclear grade 2 of 3, score 2          - Mitoses < 3/mm2, (</= 7  mitoses/10HPF), score 1      - Invasive carcinoma involves 3 of 3 submitted core biopsies, max. dimension = 14 millimeters      - Ductal carcinoma in situ (DCIS): Not identified      - Microcalcifications: Absent      - Lymphovascular invasion: Not identified    Axillary lymph node, US BX RT AXILLARY TAIL LN 10:00 12CMFN 3 PASSES 12G [place]:      - Metastatic carcinoma consistent with mammary primary,    BREAST TUMOR PROGNOSTIC PROFILE     Performed on invasive carcinoma block E1:  Test Description Result Prognostic Interpretation   Estrogen Receptor/ER  Primary Antibody: SP-1  Internal control: Positive   External control: Positive 90%  Staining Intensity: Strong  Isabela Score*: 8 Positive   Progesterone Receptor/PgR  Primary Antibody:1E2  Internal control: Positive  External control: Positive 5-7%  Staining Intensity: Strong  Isabela Score*: 4 Positive   HER2 by IHC   Primary Antibody: 4B5 2+ Equivocal *      Performed on invasive carcinoma block F2:  Test Description Result Prognostic Interpretation   Estrogen Receptor/ER  Primary Antibody: SP-1  Internal control: Positive   External control: Positive 95%  Staining Intensity: Strong  Isabela Score*: 8 Positive   Progesterone Receptor/PgR  Primary Antibody:1E2  Internal control: Positive  External control: Positive 30%  Staining Intensity: Moderate  Isabela Score*: 5 Positive   HER2 by IHC   Primary Antibody: 4B5 2+ Equivocal *       Fluorescence in situ hybridization (FISH) analysis of HER2 overexpression by invasive breast carcinoma cells, block E1 performed at [facility], [place], [place], yields the following results:  Test Description                        Interpretation  HER2 by FISH analysis:              Negative / Not Amplified.  Results  HER2: CEP-17 ratio :                  1.5: 1  Average HER2 Signal:                5.4  Average CEP-17 Signal:                          3.5  Number of selected invasive cells scanned           100      Fluorescence in situ hybridization (FISH) analysis of HER2 overexpression by invasive breast carcinoma cells, block F2 performed at [facility], [place], [place], yields the following results:  Test Description                        Interpretation  HER2 by FISH analysis:              Negative / Not Amplified.  Results  HER2: CEP-17 ratio :                  1.3: 1  Average HER2 Signal:                3.5  Average CEP-17 Signal:                          2.6  Number of selected invasive cells scanned           50        4/4/2024 Initial Diagnosis    Malignant neoplasm of overlapping sites of right breast in female, estrogen receptor positive (HCC)     4/18/2024 Genetic Testing    AMBRY  A total of 36 genes were evaluated, including: APC, MIKE, BARD 1, BMPR1A, BRCA1, BRCA2, BRIP1, CDH1, CDK4, CKDN2A, CHEK2, DICER1, MLH1, MSH2, MSH6, MUTYH, NBN, NF1, NTHL1, PALB2, PMS2, PTEN, RAD51C, RECQL, SMAD4, SMARCA4, STK11, TP53, AXIN2, HOXB13, MSH3, POLD1, AND POLE, EPCAM, AND GREM1     LIKELY PATHOGENIC VARIANT CHEK2  VUS SDHA     10/29/2024 Surgery    Right modified radical mastectomy left simple mastectomy    RIGHT  Multifocal residual invasive mammary carcinoma of no special type (ductal) s/p neoadjuvant chemotherapy  21 mm, 8 mm, 2 mm  Grade 3   Margins negative  ER 90  CO 5  HER2 2+  FISH negative  4/4 Lymph nodes     Anatomic Stage IIIA  Prognostic Stage (use AJCC update): N/A (status post neoadjuvant therapy).    LEFT  Benign fibroadenoma (0.8 cm), Benign fibrocystic changes with atypia (atypical lobular hyperplasia/ALH     1/7/2025 - 2/11/2025 Radiation    Treatments:  Course: C1  Plan ID Energy Fractions Dose per Fraction (cGy) Dose Correction (cGy) Total Dose Delivered (cGy) Elapsed Days   BH Axilla R 6X 25 / 25 200 0 5,000 35   BH CW R 6X 25 / 25 200 0 5,000 35   Treatment Dates:  1/7/2025 - 2/11/2025         Review of Systems:  Review of Systems   Constitutional:  Positive for fatigue.    Musculoskeletal:         Good ROM right arm   Skin:  Negative for color change.   Neurological:  Positive for numbness (B/L feet worse since starting Ibrance).       Clinical Trial: no    Pregnancy test needed:  not applicable        Health Maintenance   Topic Date Due    Hepatitis C Screening  Never done    Zoster Vaccine (1 of 2) [birth year]/1969    Pneumococcal Vaccine: 65+ Years (1 of 2 - PCV) 01[birth year]31/1969    COVID-19 Vaccine (3 - Moderna risk series) 07/10/2021    Influenza Vaccine (1) 09/01/2024    RSV Vaccine for Pregnant Patients and Patients Age 60+ Years (1 - 1-dose 75+ series) Never done    Depression Screening  12/18/2025    Fall Risk  03/17/2026    Urinary Incontinence Screening  03/17/2026    Medicare Annual Wellness Visit (AWV)  03/17/2026    BMI: Adult  03/17/2026    Falls: Plan of Care  03/17/2026    Colorectal Cancer Screening  05/25/2026    Osteoporosis Screening  Completed    Meningococcal B Vaccine  Aged Out    RSV Vaccine age 0-20 Months  Aged Out    HIB Vaccine  Aged Out    IPV Vaccine  Aged Out    Hepatitis A Vaccine  Aged Out    Meningococcal ACWY Vaccine  Aged Out    HPV Vaccine  Aged Out     Patient Active Problem List   Diagnosis    Chronic atrophic gastritis    Dysphagia, pharyngoesophageal    Urinary incontinence    Gastroparesis    Hemorrhoids, external    Asymptomatic postmenopausal status    Essential hypertension    Pernicious anemia    Breast cancer metastasized to axillary lymph node, right (HCC)    CHEK2 gene mutation positive    Neuroendocrine carcinoma of stomach (HCC)    Hyperlipidemia    Malignant neoplasm of overlapping sites of right breast in female, estrogen receptor positive (HCC)     Past Medical History:   Diagnosis Date    Breast cancer (HCC)     Cancer (HCC)     Breast, right    Colon polyp     Esophagitis     Gastric cancer (HCC)     History of benign breast tumor     Hyperlipidemia     Hypertension     Urinary incontinence     Venous ulcer of ankle (HCC) 01/10/2022      Past Surgical History:   Procedure Laterality Date    ANKLE SURGERY Right     BREAST EXCISIONAL BIOPSY Left 1998    benign    BUNIONECTOMY Bilateral     COLONOSCOPY      CORRECTION HAMMER TOE Bilateral     MAMMO STEREOTACTIC BREAST BIOPSY LEFT (ALL INC) Left 4/4/2024    MAMMO STEREOTACTIC BREAST BIOPSY RIGHT (ALL INC) EACH ADD Right 4/4/2024    MODIFIED RADICAL MASTECTOMY W/ AXILLARY LYMPH NODE DISSECTION Right 10/29/2024    Procedure: RIGHT MODIFIED RADICAL MASTECTOMY LEVEL I AND LEVEL II LYMPH NODE DISSECTION, GIOVANA CLIPS IN THE RIGHT BREAST X2, AXILLARY LN;  Surgeon: Jevon Valdez MD;  Location: [facility];  Service: Surgical Oncology    SIMPLE MASTECTOMY Left 10/29/2024    Procedure: LEFT MASTECTOMY;  Surgeon: Jevon Valdez MD;  Location: [facility];  Service: Surgical Oncology    US GUIDANCE BREAST BIOPSY RIGHT EACH ADDITIONAL Right 4/4/2024    US GUIDED BREAST BIOPSY RIGHT COMPLETE Right 4/4/2024    US GUIDED BREAST LYMPH NODE BIOPSY RIGHT Right 4/4/2024    US GUIDED LYMPH NODE BIOPSY LEFT  5/15/2024    VEIN LIGATION Right 06/06/2022    Procedure: Venaseal therapy and stab phlebectomy;  Surgeon: Gosia Shepherd MD;  Location: [facility];  Service: Vascular     Family History   Problem Relation Age of Onset    No Known Problems Mother     Heart disease Father     Throat cancer Brother         possibly throat cancer    Thyroid cancer Brother         possibly thyroid cancer    No Known Problems Daughter     No Known Problems Maternal Aunt     No Known Problems Maternal Aunt     No Known Problems Maternal Aunt     No Known Problems Maternal Aunt     No Known Problems Paternal Aunt     No Known Problems Paternal Aunt     No Known Problems Paternal Aunt     No Known Problems Paternal Aunt     No Known Problems Maternal Grandmother     No Known Problems Maternal Grandfather     No Known Problems Paternal Grandmother     No Known Problems Paternal Grandfather     BRCA2 Negative Neg Hx      BRCA2 Positive Neg Hx     BRCA1 Positive Neg Hx     BRCA1 Negative Neg Hx     BRCA 1/2 Neg Hx     Ovarian cancer Neg Hx     Endometrial cancer Neg Hx     Colon cancer Neg Hx     Breast cancer additional onset Neg Hx     Breast cancer Neg Hx      Social History     Socioeconomic History    Marital status: /Civil Union     Spouse name: Not on file    Number of children: Not on file    Years of education: Not on file    Highest education level: Not on file   Occupational History    Not on file   Tobacco Use    Smoking status: Former     Current packs/day: 0.00     Types: Cigarettes     Start date:      Quit date:      Years since quittin.2    Smokeless tobacco: Never   Vaping Use    Vaping status: Never Used   Substance and Sexual Activity    Alcohol use: Not Currently    Drug use: Never    Sexual activity: Yes     Partners: Male     Birth control/protection: None   Other Topics Concern    Not on file   Social History Narrative    Not on file     Social Drivers of Health     Financial Resource Strain: Low Risk  (2023)    Overall Financial Resource Strain (CARDIA)     Difficulty of Paying Living Expenses: Not hard at all   Food Insecurity: No Food Insecurity (3/17/2025)    Hunger Vital Sign     Worried About Running Out of Food in the Last Year: Never true     Ran Out of Food in the Last Year: Never true   Transportation Needs: No Transportation Needs (3/17/2025)    PRAPARE - Transportation     Lack of Transportation (Medical): No     Lack of Transportation (Non-Medical): No   Physical Activity: Inactive (2020)    Exercise Vital Sign     Days of Exercise per Week: 0 days     Minutes of Exercise per Session: 0 min   Stress: No Stress Concern Present (2020)    [place] [employer] - Occupational Stress Questionnaire     Feeling of Stress : Not at all   Social Connections: Not on file   Intimate Partner Violence: Patient Unable To Answer (10/29/2024)    Nursing IPS      Feels Physically and Emotionally Safe: Not on file     Physically Hurt by Someone: Not on file     Humiliated or Emotionally Abused by Someone: Not on file     Physically Hurt by Someone: Patient unable to answer     Hurt or Threatened by Someone: Patient unable to answer   Housing Stability: Low Risk  (3/17/2025)    Housing Stability Vital Sign     Unable to Pay for Housing in the Last Year: No     Number of Times Moved in the Last Year: 0     Homeless in the Last Year: No       Current Outpatient Medications:     acetaminophen (TYLENOL) 325 mg tablet, Take 650 mg by mouth every 6 (six) hours as needed for mild pain, Disp: , Rfl:     acetaminophen-codeine (TYLENOL with CODEINE #3) 300-30 MG per tablet, Take 1 tablet by mouth every 6 (six) hours as needed for severe pain, Disp: 30 tablet, Rfl: 0    anastrozole (ARIMIDEX) 1 mg tablet, Take 1 tablet (1 mg total) by mouth daily, Disp: 90 tablet, Rfl: 3    Ascorbic Acid (vitamin C) 100 MG tablet, Take 100 mg by mouth daily, Disp: , Rfl:     ferrous sulfate 324 (65 Fe) mg, Take 1 tablet (324 mg total) by mouth 2 (two) times a day before meals, Disp: 180 tablet, Rfl: 0    hydroCHLOROthiazide 25 mg tablet, TAKE 1 TABLET (25 MG TOTAL) BY MOUTH DAILY., Disp: 90 tablet, Rfl: 1    Multiple Vitamins-Minerals (multivitamin with minerals) tablet, Take 1 tablet by mouth daily, Disp: , Rfl:     naproxen (NAPROSYN) 500 mg tablet, TAKE 1 TABLET BY MOUTH TWICE A DAY WITH FOOD, Disp: 30 tablet, Rfl: 0    palbociclib (IBRANCE) 100 MG tablet, Take 1 tablet (100 mg total) by mouth daily 21 days on, followed by 7 days off, Disp: 30 tablet, Rfl: 11    pravastatin (PRAVACHOL) 40 mg tablet, TAKE 1 TABLET BY MOUTH DAILY AT BEDTIME, Disp: 90 tablet, Rfl: 1    Zinc 100 MG TABS, Take by mouth, Disp: , Rfl:     Current Facility-Administered Medications:     cyanocobalamin injection 1,000 mcg, 1,000 mcg, Intramuscular, Q30 Days, Lorraine Dennis DO, 1,000 mcg at 03/17/25 1009  Allergies    Allergen Reactions    Penicillins Other (See Comments)     Unknown reaction, positive on allergy testing     There were no vitals filed for this visit.

## 2025-03-19 NOTE — PROGRESS NOTES
Follow-up Visit   Name: Juve Duarte      : 1950      MRN: [id number]  Encounter Provider: Kerri Patel MD  Encounter Date: 3/19/2025   [facility]  :  Assessment & Plan  Breast cancer metastasized to axillary lymph node, right (HCC)  Juve Duarte 1950 is a 75 y.o. female with a history of stage IIIA ([id number]) G3 multifocal right breast cancer and synchronous low-grade gastric neuroendocrine tumor s/p bilateral mastectomies and right axillary lymph node resection achieving negative margins. Pathology demonstrated 4/4 positive lymph nodes. She had preoperative anastozole.  Tumor cells were ER+, VA+, HER2-. She deferred chemotherapy.  She completed adjuvant radiation on 25. She is maintained on Arimidex and Ibrance.    Clinically KATE  -Ibrance maintenance, but notes significant peripheral neuropathy.  Plans to discuss with Medical Oncology next week follow-up.  -Arimidex maintenance, tolerating well.  -Compliant with follow-up with Medical Oncology.  -Compliant with Surgical Oncology follow-up.  -Follow-up 6 months.    Recovering from radiation  -Recommend daily emollient use to irradiated skin for 6 months to promote continued skin healing  -Instructed to practice sun protection to irradiated skin.    Decreased range of motion  -Related to treatment.  Recommend PT/OT given risk of lymphedema as well.  She will consider.           History of Present Illness   Chief Complaint   Patient presents with    Breast Cancer    Follow-up   Pertinent Medical History   Juve Duarte 1950 is a 75 y.o. female with a history of stage IIIA ([id number]) G3 multifocal right breast cancer and synchronous low-grade gastric neuroendocrine tumor s/p bilateral mastectomies and right axillary lymph node resection achieving negative margins. Pathology demonstrated 4/4 positive lymph nodes. She had preoperative anastozole.  Tumor cells were ER+, VA+, HER2-. She  deferred chemotherapy.  The gastric tumor was in polyp that was removed and she is currently undergoing surveillance.  She completed adjuvant radiation on 2/11/25. She is undergoing Arimidex and Ibrance maintenance, but complains of significant peripheral neuropathy.  She returns today for follow up.     The patient denies pain or tenderness of the chest wall bilaterally.  No upper extremity edema.  She has decreased range of motion right upper extremity, but feels this is better than before.  She notes skin changes right posterior upper back and skin is dry.  Using emollients regularly.        Upcoming:  3/25/25 Dr. Berumen  5/29/25 Dr. Valdez     Oncology History   Cancer Staging   Breast cancer metastasized to axillary lymph node, right (HCC)  Staging form: Breast, AJCC 8th Edition  - Clinical stage from 10/29/2024: Stage IIIA (cT2, cN2, cM0, G3, ER+, MN+, HER2-) - Signed by Jevon Valdez MD on 11/21/2024  Stage prefix: Initial diagnosis  Histologic grading system: 3 grade system  Oncology History   Breast cancer metastasized to axillary lymph node, right (HCC)   4/4/2024 Initial Diagnosis    April 4, 2024 patient had biopsy of right breast mass showing invasive ductal carcinoma.  Right axillary node showed metastatic carcinoma consistent with breast primary.  ER 90%, MN 5-7%, HER2 +2.  FISH negative.  Similar findings in the axillary node although MN was 30%.  HER2 +2, FISH negative.  April 18, 2024 patient had CT chest ab pelvis showing soft tissue nodules right breast.  Right infrahilar and internal mammary nodes indeterminate.  Right axillary nodes up to 0.5 cm.  2 enhancing soft tissue nodules in the gastric fundus.  Bone scan showed focus of activity at anterior right seventh rib.     4/4/2024 Biopsy    Breast, Right, US BX RT BREAST 700 7CMFN 3 PASSES 12G MARQU[place]:      - Invasive mammary carcinoma of no special type (ductal NST/invasive ductal carcinoma) with apocrine features, see note      -  [place] grade 2 of 3 (total score: 6 of 9)          - Tubule formation: <10%, score 3          - Nuclear grade 2 of 3, score 2          - Mitoses < 3/mm2, (</= 7 mitoses/10HPF), score 1      - Invasive carcinoma involves 3 of 3 submitted core biopsies, max. dimension = 5 millimeters      - Ductal carcinoma in situ (DCIS): Not identified      - Microcalcifications: Absent      - Lymphovascular invasion: Present     Breast, Right, US BX RT BREAST 900 7CMFN 3 PASSES 12G [place]:      - Invasive mammary carcinoma of no special type (ductal NST/invasive ductal carcinoma) with apocrine features, see note      - [place] grade 2 of 3 (total score: 6 of 9)          - Tubule formation: <10%, score 3          - Nuclear grade 2 of 3, score 2          - Mitoses < 3/mm2, (</= 7 mitoses/10HPF), score 1      - Invasive carcinoma involves 3 of 3 submitted core biopsies, max. dimension = 14 millimeters      - Ductal carcinoma in situ (DCIS): Not identified      - Microcalcifications: Absent      - Lymphovascular invasion: Not identified    Axillary lymph node, US BX RT AXILLARY TAIL LN 10:00 12CMFN 3 PASSES 12G [place]:      - Metastatic carcinoma consistent with mammary primary,    BREAST TUMOR PROGNOSTIC PROFILE     Performed on invasive carcinoma block E1:  Test Description Result Prognostic Interpretation   Estrogen Receptor/ER  Primary Antibody: SP-1  Internal control: Positive   External control: Positive 90%  Staining Intensity: Strong  Isabela Score*: 8 Positive   Progesterone Receptor/PgR  Primary Antibody:1E2  Internal control: Positive  External control: Positive 5-7%  Staining Intensity: Strong  Isabela Score*: 4 Positive   HER2 by IHC   Primary Antibody: 4B5 2+ Equivocal *      Performed on invasive carcinoma block F2:  Test Description Result Prognostic Interpretation   Estrogen Receptor/ER  Primary Antibody: SP-1  Internal control: Positive   External control: Positive 95%  Staining Intensity: Strong  Isabela  Score*: 8 Positive   Progesterone Receptor/PgR  Primary Antibody:1E2  Internal control: Positive  External control: Positive 30%  Staining Intensity: Moderate  Isabela Score*: 5 Positive   HER2 by IHC   Primary Antibody: 4B5 2+ Equivocal *       Fluorescence in situ hybridization (FISH) analysis of HER2 overexpression by invasive breast carcinoma cells, block E1 performed at [facility], [place], [place], yields the following results:  Test Description                        Interpretation  HER2 by FISH analysis:              Negative / Not Amplified.  Results  HER2: CEP-17 ratio :                  1.5: 1  Average HER2 Signal:                5.4  Average CEP-17 Signal:                          3.5  Number of selected invasive cells scanned           100     Fluorescence in situ hybridization (FISH) analysis of HER2 overexpression by invasive breast carcinoma cells, block F2 performed at [facility], [place], [place], yields the following results:  Test Description                        Interpretation  HER2 by FISH analysis:              Negative / Not Amplified.  Results  HER2: CEP-17 ratio :                  1.3: 1  Average HER2 Signal:                3.5  Average CEP-17 Signal:                          2.6  Number of selected invasive cells scanned           50        4/4/2024 Initial Diagnosis    Malignant neoplasm of overlapping sites of right breast in female, estrogen receptor positive (HCC)     4/18/2024 Genetic Testing    AMBRY  A total of 36 genes were evaluated, including: APC, MIKE, BARD 1, BMPR1A, BRCA1, BRCA2, BRIP1, CDH1, CDK4, CKDN2A, CHEK2, DICER1, MLH1, MSH2, MSH6, MUTYH, NBN, NF1, NTHL1, PALB2, PMS2, PTEN, RAD51C, RECQL, SMAD4, SMARCA4, STK11, TP53, AXIN2, HOXB13, MSH3, POLD1, AND POLE, EPCAM, AND GREM1     LIKELY PATHOGENIC VARIANT CHEK2  VUS SDHA     10/29/2024 Surgery    Right modified radical mastectomy left simple mastectomy    RIGHT  Multifocal residual  invasive mammary carcinoma of no special type (ductal) s/p neoadjuvant chemotherapy  21 mm, 8 mm, 2 mm  Grade 3   Margins negative  ER 90  OH 5  HER2 2+  FISH negative  4/4 Lymph nodes     Anatomic Stage IIIA  Prognostic Stage (use AJCC update): N/A (status post neoadjuvant therapy).    LEFT  Benign fibroadenoma (0.8 cm), Benign fibrocystic changes with atypia (atypical lobular hyperplasia/ALH     10/29/2024 -  Cancer Staged    Staging form: Breast, AJCC 8th Edition  - Clinical stage from 10/29/2024: Stage IIIA (cT2, cN2, cM0, G3, ER+, OH+, HER2-) - Signed by Jevon Valdez MD on 11/21/2024  Stage prefix: Initial diagnosis  Histologic grading system: 3 grade system       1/7/2025 - 2/11/2025 Radiation    Treatments:  Course: C1  Plan ID Energy Fractions Dose per Fraction (cGy) Dose Correction (cGy) Total Dose Delivered (cGy) Elapsed Days   BH Axilla R 6X 25 / 25 200 0 5,000 35   BH CW R 6X 25 / 25 200 0 5,000 35   Treatment Dates:  1/7/2025 - 2/11/2025     Malignant neoplasm of overlapping sites of right breast in female, estrogen receptor positive (HCC)   4/4/2024 Biopsy    Breast, Right, US BX RT BREAST 700 7CMFN 3 PASSES 12G [place]:      - Invasive mammary carcinoma of no special type (ductal NST/invasive ductal carcinoma) with apocrine features, see note      - [place] grade 2 of 3 (total score: 6 of 9)          - Tubule formation: <10%, score 3          - Nuclear grade 2 of 3, score 2          - Mitoses < 3/mm2, (</= 7 mitoses/10HPF), score 1      - Invasive carcinoma involves 3 of 3 submitted core biopsies, max. dimension = 5 millimeters      - Ductal carcinoma in situ (DCIS): Not identified      - Microcalcifications: Absent      - Lymphovascular invasion: Present     Breast, Right, US BX RT BREAST 900 7CMFN 3 PASSES 12G [place]:      - Invasive mammary carcinoma of no special type (ductal NST/invasive ductal carcinoma) with apocrine features, see note      - Elsy grade 2 of 3 (total  score: 6 of 9)          - Tubule formation: <10%, score 3          - Nuclear grade 2 of 3, score 2          - Mitoses < 3/mm2, (</= 7 mitoses/10HPF), score 1      - Invasive carcinoma involves 3 of 3 submitted core biopsies, max. dimension = 14 millimeters      - Ductal carcinoma in situ (DCIS): Not identified      - Microcalcifications: Absent      - Lymphovascular invasion: Not identified    Axillary lymph node, US BX RT AXILLARY TAIL LN 10:00 12CMFN 3 PASSES 12G [place]:      - Metastatic carcinoma consistent with mammary primary,    BREAST TUMOR PROGNOSTIC PROFILE     Performed on invasive carcinoma block E1:  Test Description Result Prognostic Interpretation   Estrogen Receptor/ER  Primary Antibody: SP-1  Internal control: Positive   External control: Positive 90%  Staining Intensity: Strong  Isabela Score*: 8 Positive   Progesterone Receptor/PgR  Primary Antibody:1E2  Internal control: Positive  External control: Positive 5-7%  Staining Intensity: Strong  Isabela Score*: 4 Positive   HER2 by IHC   Primary Antibody: 4B5 2+ Equivocal *      Performed on invasive carcinoma block F2:  Test Description Result Prognostic Interpretation   Estrogen Receptor/ER  Primary Antibody: SP-1  Internal control: Positive   External control: Positive 95%  Staining Intensity: Strong  Isabela Score*: 8 Positive   Progesterone Receptor/PgR  Primary Antibody:1E2  Internal control: Positive  External control: Positive 30%  Staining Intensity: Moderate  Isabela Score*: 5 Positive   HER2 by IHC   Primary Antibody: 4B5 2+ Equivocal *       Fluorescence in situ hybridization (FISH) analysis of HER2 overexpression by invasive breast carcinoma cells, block E1 performed at [facility], [place], [place], yields the following results:  Test Description                        Interpretation  HER2 by FISH analysis:              Negative / Not Amplified.  Results  HER2: CEP-17 ratio :                  1.5: 1  Average HER2 Signal:                 5.4  Average CEP-17 Signal:                          3.5  Number of selected invasive cells scanned           100     Fluorescence in situ hybridization (FISH) analysis of HER2 overexpression by invasive breast carcinoma cells, block F2 performed at "[employer] [facility], [place], [place], yields the following results:  Test Description                        Interpretation  HER2 by FISH analysis:              Negative / Not Amplified.  Results  HER2: CEP-17 ratio :                  1.3: 1  Average HER2 Signal:                3.5  Average CEP-17 Signal:                          2.6  Number of selected invasive cells scanned           50        4/4/2024 Initial Diagnosis    Malignant neoplasm of overlapping sites of right breast in female, estrogen receptor positive (HCC)     4/18/2024 Genetic Testing    AMBRY  A total of 36 genes were evaluated, including: APC, MIKE, BARD 1, BMPR1A, BRCA1, BRCA2, BRIP1, CDH1, CDK4, CKDN2A, CHEK2, DICER1, MLH1, MSH2, MSH6, MUTYH, NBN, NF1, NTHL1, PALB2, PMS2, PTEN, RAD51C, RECQL, SMAD4, SMARCA4, STK11, TP53, AXIN2, HOXB13, MSH3, POLD1, AND POLE, EPCAM, AND GREM1     LIKELY PATHOGENIC VARIANT CHEK2  VUS SDHA     10/29/2024 Surgery    Right modified radical mastectomy left simple mastectomy    RIGHT  Multifocal residual invasive mammary carcinoma of no special type (ductal) s/p neoadjuvant chemotherapy  21 mm, 8 mm, 2 mm  Grade 3   Margins negative  ER 90  AL 5  HER2 2+  FISH negative  4/4 Lymph nodes     Anatomic Stage IIIA  Prognostic Stage (use AJCC update): N/A (status post neoadjuvant therapy).    LEFT  Benign fibroadenoma (0.8 cm), Benign fibrocystic changes with atypia (atypical lobular hyperplasia/ALH     1/7/2025 - 2/11/2025 Radiation    Treatments:  Course: C1  Plan ID Energy Fractions Dose per Fraction (cGy) Dose Correction (cGy) Total Dose Delivered (cGy) Elapsed Days   BH Axilla R 6X 25 / 25 200 0 5,000 35   BH CW R 6X 25 / 25 200 0 5,000 35    Treatment Dates:  2025 - 2025        Review of Systems Refer to nursing note.    Current Outpatient Medications on File Prior to Visit   Medication Sig Dispense Refill    acetaminophen (TYLENOL) 325 mg tablet Take 650 mg by mouth every 6 (six) hours as needed for mild pain      anastrozole (ARIMIDEX) 1 mg tablet Take 1 tablet (1 mg total) by mouth daily 90 tablet 3    Ascorbic Acid (vitamin C) 100 MG tablet Take 100 mg by mouth daily      cholecalciferol (VITAMIN D3) 400 units tablet Take 400 Units by mouth daily      ferrous sulfate 324 (65 Fe) mg Take 1 tablet (324 mg total) by mouth 2 (two) times a day before meals 180 tablet 0    hydroCHLOROthiazide 25 mg tablet TAKE 1 TABLET (25 MG TOTAL) BY MOUTH DAILY. 90 tablet 1    Multiple Vitamins-Minerals (multivitamin with minerals) tablet Take 1 tablet by mouth daily      naproxen (NAPROSYN) 500 mg tablet TAKE 1 TABLET BY MOUTH TWICE A DAY WITH FOOD 30 tablet 0    palbociclib (IBRANCE) 100 MG tablet Take 1 tablet (100 mg total) by mouth daily 21 days on, followed by 7 days off 30 tablet 11    pravastatin (PRAVACHOL) 40 mg tablet TAKE 1 TABLET BY MOUTH DAILY AT BEDTIME 90 tablet 1    Zinc 100 MG TABS Take by mouth      acetaminophen-codeine (TYLENOL with CODEINE #3) 300-30 MG per tablet Take 1 tablet by mouth every 6 (six) hours as needed for severe pain (Patient not taking: Reported on 3/19/2025) 30 tablet 0     Current Facility-Administered Medications on File Prior to Visit   Medication Dose Route Frequency Provider Last Rate Last Admin    cyanocobalamin injection 1,000 mcg  1,000 mcg Intramuscular Q30 Days Lorraine Dennis DO   1,000 mcg at 25 1009      Social History     Tobacco Use    Smoking status: Former     Current packs/day: 0.00     Types: Cigarettes     Start date:      Quit date:      Years since quittin.2    Smokeless tobacco: Never   Vaping Use    Vaping status: Never Used   Substance and Sexual Activity    Alcohol  "use: Not Currently    Drug use: Never    Sexual activity: Yes     Partners: Male     Birth control/protection: None         Objective   /72   Pulse 62   Temp (!) 97 °F (36.1 °C)   Resp 16   Wt 66.7 kg (147 lb)   SpO2 96%   BMI 23.73 kg/m²     Pain Screening:  Pain Score: 0-No pain  ECOG  1  Physical Exam  Vitals and nursing note reviewed.   Constitutional:       General: She is not in acute distress.     Appearance: She is well-developed.   Cardiovascular:      Rate and Rhythm: Normal rate.   Pulmonary:      Breath sounds: No wheezing, rhonchi or rales.   Chest:      Comments: She is status post bilateral mastectomies.  There is diffuse hyperpigmentation and fibrosis right chest wall. No desquamation.  No palpable nodules or suspicious skin changes bilaterally.  Abdominal:      Palpations: Abdomen is soft.      Tenderness: There is no abdominal tenderness.   Musculoskeletal:      Right lower leg: No edema.      Left lower leg: No edema.      Comments: No upper extremity edema.  Decreased range of motion about to 50 degrees on full extension   Lymphadenopathy:      Cervical: No cervical adenopathy.      Upper Body:      Right upper body: No supraclavicular or axillary adenopathy.      Left upper body: No supraclavicular or axillary adenopathy.   Neurological:      Mental Status: She is alert and oriented to person, place, and time.      Gait: Gait normal.            Administrative Statements   I have spent a total time of 26 minutes in caring for this patient on the day of the visit/encounter   Portions of the record may have been created with voice recognition software.  Occasional wrong word or \"sound a like\" substitutions may have occurred due to the inherent limitations of voice recognition software.  Read the chart carefully and recognize, using context, where substitutions have occurred.  "

## 2025-03-19 NOTE — ASSESSMENT & PLAN NOTE
Juve Duarte 1950 is a 75 y.o. female with a history of stage IIIA ([id number]) G3 multifocal right breast cancer and synchronous low-grade gastric neuroendocrine tumor s/p bilateral mastectomies and right axillary lymph node resection achieving negative margins. Pathology demonstrated 4/4 positive lymph nodes. She had preoperative anastozole.  Tumor cells were ER+, NM+, HER2-. She deferred chemotherapy.  She completed adjuvant radiation on 2/11/25. She is maintained on Arimidex and Ibrance.    Clinically KATE  -Ibrance maintenance, but notes significant peripheral neuropathy.  Plans to discuss with Medical Oncology next week follow-up.  -Arimidex maintenance, tolerating well.  -Compliant with follow-up with Medical Oncology.  -Compliant with Surgical Oncology follow-up.  -Follow-up 6 months.    Recovering from radiation  -Recommend daily emollient use to irradiated skin for 6 months to promote continued skin healing  -Instructed to practice sun protection to irradiated skin.    Decreased range of motion  -Related to treatment.  Recommend PT/OT given risk of lymphedema as well.  She will consider.

## 2025-03-25 ENCOUNTER — TELEPHONE (OUTPATIENT)
Dept: HEMATOLOGY ONCOLOGY | Facility: CLINIC | Age: 75
End: 2025-03-25

## 2025-03-25 ENCOUNTER — OFFICE VISIT (OUTPATIENT)
Dept: HEMATOLOGY ONCOLOGY | Facility: CLINIC | Age: 75
End: 2025-03-25
Payer: MEDICARE

## 2025-03-25 VITALS
TEMPERATURE: 98.2 F | DIASTOLIC BLOOD PRESSURE: 64 MMHG | RESPIRATION RATE: 17 BRPM | BODY MASS INDEX: 23.95 KG/M2 | SYSTOLIC BLOOD PRESSURE: 132 MMHG | HEIGHT: 66 IN | WEIGHT: 149 LBS

## 2025-03-25 DIAGNOSIS — I10 ESSENTIAL HYPERTENSION: ICD-10-CM

## 2025-03-25 DIAGNOSIS — Z15.89 CHEK2 GENE MUTATION POSITIVE: ICD-10-CM

## 2025-03-25 DIAGNOSIS — C77.3 BREAST CANCER METASTASIZED TO AXILLARY LYMPH NODE, RIGHT (HCC): Primary | ICD-10-CM

## 2025-03-25 DIAGNOSIS — E78.2 MIXED HYPERLIPIDEMIA: ICD-10-CM

## 2025-03-25 DIAGNOSIS — C7A.8 NEUROENDOCRINE CARCINOMA OF STOMACH (HCC): ICD-10-CM

## 2025-03-25 DIAGNOSIS — C50.911 BREAST CANCER METASTASIZED TO AXILLARY LYMPH NODE, RIGHT (HCC): Primary | ICD-10-CM

## 2025-03-25 PROCEDURE — G2211 COMPLEX E/M VISIT ADD ON: HCPCS | Performed by: INTERNAL MEDICINE

## 2025-03-25 PROCEDURE — 99215 OFFICE O/P EST HI 40 MIN: CPT | Performed by: INTERNAL MEDICINE

## 2025-03-25 RX ORDER — GABAPENTIN 100 MG/1
100 CAPSULE ORAL
Qty: 30 CAPSULE | Refills: 1 | Status: SHIPPED | OUTPATIENT
Start: 2025-03-25

## 2025-03-25 NOTE — TELEPHONE ENCOUNTER
Called and spoke with patient and made her aware of new medication that Dr Berumen would like to change her to. Made her aware that Ribociclib has better results in her particular condition. Advised that I would send her through my chart information on the medication and if she can please let me know when she would be available to come and sign consent. Explained the process to her and she verbalizes understanding.

## 2025-03-25 NOTE — ASSESSMENT & PLAN NOTE
Cancer Staging   Breast cancer metastasized to axillary lymph node, right (HCC)  Staging form: Breast, AJCC 8th Edition  - Clinical stage from 10/29/2024: Stage IIIA (cT2, cN2, cM0, G3, ER+, MO+, HER2-) - Signed by Jevon Valdez MD on 11/21/2024    Status post bilateral mastectomy and right axillary lymph node dissection followed by radiation treatment that was completed on 2/11/2025.  Given the fact the patient had multiple foci of invasive disease, grade 3 with 4 lymph nodes positive she was started on adjuvant palbociclib and Arimidex after completing radiation.  Will transition patient to Ribociclib (400 mg per day for 3 weeks, followed by 1 week off ) as per NCCN guidelines for 3 years.   Continue Arimidex 1 mg daily.  Continue calcium and vitamin D supplements.    Orders:    gabapentin (Neurontin) 100 mg capsule; Take 1 capsule (100 mg total) by mouth daily at bedtime

## 2025-03-25 NOTE — TELEPHONE ENCOUNTER
Attempted to call patient and left  message to please call our office for some updates that were discussed after she had left. Dr Berumen would like to offer a different medication that has been more effective in her particular situation. Wanted to discuss the medication further and have her come to office to sign consent and discuss with one of nurses and provide material  Hope line number was left to return our call at their earliest convenience.

## 2025-03-25 NOTE — PROGRESS NOTES
Name: Juve Duarte      : 1950      MRN: [id number]  Encounter Provider: Ton Berumen MD  Encounter Date: 3/25/2025   Encounter department: [facility] [place]  :  Assessment & Plan  Breast cancer metastasized to axillary lymph node, right (HCC)   Cancer Staging   Breast cancer metastasized to axillary lymph node, right (HCC)  Staging form: Breast, AJCC 8th Edition  - Clinical stage from 10/29/2024: Stage IIIA (cT2, cN2, cM0, G3, ER+, HI+, HER2-) - Signed by Jevon Valdez MD on 2024    Status post bilateral mastectomy and right axillary lymph node dissection followed by radiation treatment that was completed on 2025.  Given the fact the patient had multiple foci of invasive disease, grade 3 with 4 lymph nodes positive she was started on adjuvant palbociclib and Arimidex after completing radiation.  Will transition patient to Ribociclib (400 mg per day for 3 weeks, followed by 1 week off ) as per NCCN guidelines for 3 years.   Continue Arimidex 1 mg daily.  Continue calcium and vitamin D supplements.    Orders:    gabapentin (Neurontin) 100 mg capsule; Take 1 capsule (100 mg total) by mouth daily at bedtime    CHEK2 gene mutation positive         Mixed hyperlipidemia  Continue statins as per PCP.         Neuroendocrine carcinoma of stomach (HCC)  Follows with Dr. Up.         Essential hypertension  Blood pressure well-controlled.  Continue management as per PCP.             Return in about 3 weeks (around 4/15/2025) for Office Visit.    History of Present Illness   Chief Complaint   Patient presents with    Follow-up   HPI:  75-year-old female patient with hormone receptor positive stage IIIa (ypT2, N2a, c M0) disease status post bilateral mastectomy and right axillary lymph node dissection.  Was treated with neoadjuvant anastrozole.  Completed adjuvant radiation on 2025.  Was started on Arimidex and Ibrance.  Also had synchronous  low-grade gastric neuroendocrine tumor.  Follows with Dr. Up.    Patient presents today as a transfer of care from Dr. Quiroz and for follow-up of hormone receptor positive breast cancer.  She is s/p bilateral mastectomies.  Completed radiation treatment in February 2025.  Started Ibrance and Arimidex she has baseline neuropathy however reports worsening of neuropathy since starting the Ibrance and Arimidex.  No falls.  Still able to carry out activities of daily living. No hot flashes or night sweats. Denies joint pains or stiffness.      Oncology History   Cancer Staging   Breast cancer metastasized to axillary lymph node, right (HCC)  Staging form: Breast, AJCC 8th Edition  - Clinical stage from 10/29/2024: Stage IIIA (cT2, cN2, cM0, G3, ER+, NV+, HER2-) - Signed by Jevon Valdez MD on 11/21/2024  Stage prefix: Initial diagnosis  Histologic grading system: 3 grade system  Oncology History   Breast cancer metastasized to axillary lymph node, right ([facility])   4/4/2024 Initial Diagnosis    April 4, 2024 patient had biopsy of right breast mass showing invasive ductal carcinoma.  Right axillary node showed metastatic carcinoma consistent with breast primary.  ER 90%, NV 5-7%, HER2 +2.  FISH negative.  Similar findings in the axillary node although NV was 30%.  HER2 +2, FISH negative.  April 18, 2024 patient had CT chest ab pelvis showing soft tissue nodules right breast.  Right infrahilar and internal mammary nodes indeterminate.  Right axillary nodes up to 0.5 cm.  2 enhancing soft tissue nodules in the gastric fundus.  Bone scan showed focus of activity at anterior right seventh rib.     4/4/2024 Biopsy    Breast, Right, US BX RT BREAST 700 7CMFN 3 PASSES 12G MARQU[place]:      - Invasive mammary carcinoma of no special type (ductal NST/invasive ductal carcinoma) with apocrine features, see note      - [place] grade 2 of 3 (total score: 6 of 9)          - Tubule formation: <10%, score 3          - Nuclear  grade 2 of 3, score 2          - Mitoses < 3/mm2, (</= 7 mitoses/10HPF), score 1      - Invasive carcinoma involves 3 of 3 submitted core biopsies, max. dimension = 5 millimeters      - Ductal carcinoma in situ (DCIS): Not identified      - Microcalcifications: Absent      - Lymphovascular invasion: Present     Breast, Right, US BX RT BREAST 900 7CMFN 3 PASSES 12G [place]:      - Invasive mammary carcinoma of no special type (ductal NST/invasive ductal carcinoma) with apocrine features, see note      - [place] grade 2 of 3 (total score: 6 of 9)          - Tubule formation: <10%, score 3          - Nuclear grade 2 of 3, score 2          - Mitoses < 3/mm2, (</= 7 mitoses/10HPF), score 1      - Invasive carcinoma involves 3 of 3 submitted core biopsies, max. dimension = 14 millimeters      - Ductal carcinoma in situ (DCIS): Not identified      - Microcalcifications: Absent      - Lymphovascular invasion: Not identified    Axillary lymph node, US BX RT AXILLARY TAIL LN 10:00 12CMFN 3 PASSES 12G [place]:      - Metastatic carcinoma consistent with mammary primary,    BREAST TUMOR PROGNOSTIC PROFILE     Performed on invasive carcinoma block E1:  Test Description Result Prognostic Interpretation   Estrogen Receptor/ER  Primary Antibody: SP-1  Internal control: Positive   External control: Positive 90%  Staining Intensity: Strong  Isabela Score*: 8 Positive   Progesterone Receptor/PgR  Primary Antibody:1E2  Internal control: Positive  External control: Positive 5-7%  Staining Intensity: Strong  Isabela Score*: 4 Positive   HER2 by IHC   Primary Antibody: 4B5 2+ Equivocal *      Performed on invasive carcinoma block F2:  Test Description Result Prognostic Interpretation   Estrogen Receptor/ER  Primary Antibody: SP-1  Internal control: Positive   External control: Positive 95%  Staining Intensity: Strong  Isabela Score*: 8 Positive   Progesterone Receptor/PgR  Primary Antibody:1E2  Internal control: Positive  External  control: Positive 30%  Staining Intensity: Moderate  Isabela Score*: 5 Positive   HER2 by IHC   Primary Antibody: 4B5 2+ Equivocal *       Fluorescence in situ hybridization (FISH) analysis of HER2 overexpression by invasive breast carcinoma cells, block E1 performed at [facility], [place], [place], yields the following results:  Test Description                        Interpretation  HER2 by FISH analysis:              Negative / Not Amplified.  Results  HER2: CEP-17 ratio :                  1.5: 1  Average HER2 Signal:                5.4  Average CEP-17 Signal:                          3.5  Number of selected invasive cells scanned           100     Fluorescence in situ hybridization (FISH) analysis of HER2 overexpression by invasive breast carcinoma cells, block F2 performed at [facility], [place], [place], yields the following results:  Test Description                        Interpretation  HER2 by FISH analysis:              Negative / Not Amplified.  Results  HER2: CEP-17 ratio :                  1.3: 1  Average HER2 Signal:                3.5  Average CEP-17 Signal:                          2.6  Number of selected invasive cells scanned           50        4/4/2024 Initial Diagnosis    Malignant neoplasm of overlapping sites of right breast in female, estrogen receptor positive (HCC)     4/18/2024 Genetic Testing    AMBRY  A total of 36 genes were evaluated, including: APC, MIKE, BARD 1, BMPR1A, BRCA1, BRCA2, BRIP1, CDH1, CDK4, CKDN2A, CHEK2, DICER1, MLH1, MSH2, MSH6, MUTYH, NBN, NF1, NTHL1, PALB2, PMS2, PTEN, RAD51C, RECQL, SMAD4, SMARCA4, STK11, TP53, AXIN2, HOXB13, MSH3, POLD1, AND POLE, EPCAM, AND GREM1     LIKELY PATHOGENIC VARIANT CHEK2  VUS SDHA     10/29/2024 Surgery    Right modified radical mastectomy left simple mastectomy    RIGHT  Multifocal residual invasive mammary carcinoma of no special type (ductal) s/p neoadjuvant chemotherapy  21 mm, 8 mm, 2  mm  Grade 3   Margins negative  ER 90  LA 5  HER2 2+  FISH negative  4/4 Lymph nodes     Anatomic Stage IIIA  Prognostic Stage (use AJCC update): N/A (status post neoadjuvant therapy).    LEFT  Benign fibroadenoma (0.8 cm), Benign fibrocystic changes with atypia (atypical lobular hyperplasia/ALH     10/29/2024 -  Cancer Staged    Staging form: Breast, AJCC 8th Edition  - Clinical stage from 10/29/2024: Stage IIIA (cT2, cN2, cM0, G3, ER+, LA+, HER2-) - Signed by Jevon Valdez MD on 11/21/2024  Stage prefix: Initial diagnosis  Histologic grading system: 3 grade system       1/7/2025 - 2/11/2025 Radiation    Treatments:  Course: C1  Plan ID Energy Fractions Dose per Fraction (cGy) Dose Correction (cGy) Total Dose Delivered (cGy) Elapsed Days   BH Axilla R 6X 25 / 25 200 0 5,000 35   BH CW R 6X 25 / 25 200 0 5,000 35   Treatment Dates:  1/7/2025 - 2/11/2025     Malignant neoplasm of overlapping sites of right breast in female, estrogen receptor positive (HCC)   4/4/2024 Biopsy    Breast, Right, US BX RT BREAST 700 7CMFN 3 PASSES 12G [place]:      - Invasive mammary carcinoma of no special type (ductal NST/invasive ductal carcinoma) with apocrine features, see note      - [place] grade 2 of 3 (total score: 6 of 9)          - Tubule formation: <10%, score 3          - Nuclear grade 2 of 3, score 2          - Mitoses < 3/mm2, (</= 7 mitoses/10HPF), score 1      - Invasive carcinoma involves 3 of 3 submitted core biopsies, max. dimension = 5 millimeters      - Ductal carcinoma in situ (DCIS): Not identified      - Microcalcifications: Absent      - Lymphovascular invasion: Present     Breast, Right, US BX RT BREAST 900 7CMFN 3 PASSES 12G [place]:      - Invasive mammary carcinoma of no special type (ductal NST/invasive ductal carcinoma) with apocrine features, see note      - [place] grade 2 of 3 (total score: 6 of 9)          - Tubule formation: <10%, score 3          - Nuclear grade 2 of 3, score 2           - Mitoses < 3/mm2, (</= 7 mitoses/10HPF), score 1      - Invasive carcinoma involves 3 of 3 submitted core biopsies, max. dimension = 14 millimeters      - Ductal carcinoma in situ (DCIS): Not identified      - Microcalcifications: Absent      - Lymphovascular invasion: Not identified    Axillary lymph node, US BX RT AXILLARY TAIL LN 10:00 12CMFN 3 PASSES 12G [place]:      - Metastatic carcinoma consistent with mammary primary,    BREAST TUMOR PROGNOSTIC PROFILE     Performed on invasive carcinoma block E1:  Test Description Result Prognostic Interpretation   Estrogen Receptor/ER  Primary Antibody: SP-1  Internal control: Positive   External control: Positive 90%  Staining Intensity: Strong  Isabela Score*: 8 Positive   Progesterone Receptor/PgR  Primary Antibody:1E2  Internal control: Positive  External control: Positive 5-7%  Staining Intensity: Strong  Isabela Score*: 4 Positive   HER2 by IHC   Primary Antibody: 4B5 2+ Equivocal *      Performed on invasive carcinoma block F2:  Test Description Result Prognostic Interpretation   Estrogen Receptor/ER  Primary Antibody: SP-1  Internal control: Positive   External control: Positive 95%  Staining Intensity: Strong  Isabela Score*: 8 Positive   Progesterone Receptor/PgR  Primary Antibody:1E2  Internal control: Positive  External control: Positive 30%  Staining Intensity: Moderate  Isabela Score*: 5 Positive   HER2 by IHC   Primary Antibody: 4B5 2+ Equivocal *       Fluorescence in situ hybridization (FISH) analysis of HER2 overexpression by invasive breast carcinoma cells, block E1 performed at [facility], [place], [place], yields the following results:  Test Description                        Interpretation  HER2 by FISH analysis:              Negative / Not Amplified.  Results  HER2: CEP-17 ratio :                  1.5: 1  Average HER2 Signal:                5.4  Average CEP-17 Signal:                          3.5  Number of selected invasive  cells scanned           100     Fluorescence in situ hybridization (FISH) analysis of HER2 overexpression by invasive breast carcinoma cells, block F2 performed at [facility], [place], [place], yields the following results:  Test Description                        Interpretation  HER2 by FISH analysis:              Negative / Not Amplified.  Results  HER2: CEP-17 ratio :                  1.3: 1  Average HER2 Signal:                3.5  Average CEP-17 Signal:                          2.6  Number of selected invasive cells scanned           50        4/4/2024 Initial Diagnosis    Malignant neoplasm of overlapping sites of right breast in female, estrogen receptor positive (HCC)     4/18/2024 Genetic Testing    AMBRY  A total of 36 genes were evaluated, including: APC, MIKE, BARD 1, BMPR1A, BRCA1, BRCA2, BRIP1, CDH1, CDK4, CKDN2A, CHEK2, DICER1, MLH1, MSH2, MSH6, MUTYH, NBN, NF1, NTHL1, PALB2, PMS2, PTEN, RAD51C, RECQL, SMAD4, SMARCA4, STK11, TP53, AXIN2, HOXB13, MSH3, POLD1, AND POLE, EPCAM, AND GREM1     LIKELY PATHOGENIC VARIANT CHEK2  VUS SDHA     10/29/2024 Surgery    Right modified radical mastectomy left simple mastectomy    RIGHT  Multifocal residual invasive mammary carcinoma of no special type (ductal) s/p neoadjuvant chemotherapy  21 mm, 8 mm, 2 mm  Grade 3   Margins negative  ER 90  NE 5  HER2 2+  FISH negative  4/4 Lymph nodes     Anatomic Stage IIIA  Prognostic Stage (use AJCC update): N/A (status post neoadjuvant therapy).    LEFT  Benign fibroadenoma (0.8 cm), Benign fibrocystic changes with atypia (atypical lobular hyperplasia/ALH     1/7/2025 - 2/11/2025 Radiation    Treatments:  Course: C1  Plan ID Energy Fractions Dose per Fraction (cGy) Dose Correction (cGy) Total Dose Delivered (cGy) Elapsed Days   BH Axilla R 6X 25 / 25 200 0 5,000 35   BH CW R 6X 25 / 25 200 0 5,000 35   Treatment Dates:  1/7/2025 - 2/11/2025        Pertinent Medical History   03/25/25: Reviewed     Review of  "Systems  14 point review of systems was negative except that mentioned in HPI.        Objective   /64 (BP Location: Left arm, Patient Position: Sitting, Cuff Size: Adult)   Temp 98.2 °F (36.8 °C) (Temporal)   Resp 17   Ht 5' 6\" (1.676 m)   Wt 67.6 kg (149 lb)   BMI 24.05 kg/m²     Pain Screening:  Pain Score:   3  ECOG   1  Physical Exam  Vitals and nursing note reviewed.   Constitutional:       General: She is not in acute distress.     Appearance: Normal appearance.   HENT:      Head: Normocephalic and atraumatic.      Mouth/Throat:      Mouth: Mucous membranes are moist.      Pharynx: Oropharynx is clear.   Eyes:      General: No scleral icterus.     Extraocular Movements: Extraocular movements intact.      Conjunctiva/sclera: Conjunctivae normal.   Cardiovascular:      Rate and Rhythm: Normal rate and regular rhythm.      Pulses: Normal pulses.      Heart sounds: No murmur heard.  Pulmonary:      Effort: Pulmonary effort is normal.      Breath sounds: Normal breath sounds. No wheezing, rhonchi or rales.   Chest:   Breasts:     Right: Absent.      Left: Absent.       Abdominal:      General: Bowel sounds are normal.      Palpations: Abdomen is soft.      Tenderness: There is no abdominal tenderness.   Musculoskeletal:         General: No swelling or tenderness. Normal range of motion.      Cervical back: Neck supple.   Lymphadenopathy:      Cervical: No cervical adenopathy.   Skin:     General: Skin is warm and dry.      Coloration: Skin is not pale.      Findings: No bruising, erythema or rash.   Neurological:      General: No focal deficit present.      Mental Status: She is alert and oriented to person, place, and time.      Motor: No weakness.   Psychiatric:         Mood and Affect: Mood normal.         Behavior: Behavior normal.         Labs: I have reviewed the following labs:  Lab Results   Component Value Date/Time    WBC 2.63 (L) 03/10/2025 12:07 PM    RBC 4.13 03/10/2025 12:07 PM    " Hemoglobin 10.2 (L) 03/10/2025 12:07 PM    Hematocrit 33.2 (L) 03/10/2025 12:07 PM    MCV 80 (L) 03/10/2025 12:07 PM    MCH 24.7 (L) 03/10/2025 12:07 PM    RDW 15.0 03/10/2025 12:07 PM    Platelets 137 (L) 03/10/2025 12:07 PM    Segmented % 75 01/27/2025 09:41 AM    Lymphocytes % 9 (L) 03/10/2025 12:07 PM    Lymphocytes % 12 (L) 01/27/2025 09:41 AM    Monocytes % 1 (L) 03/10/2025 12:07 PM    Monocytes % 10 01/27/2025 09:41 AM    Eosinophils % 0 03/10/2025 12:07 PM    Eosinophils Relative 2 01/27/2025 09:41 AM    Basophils % 3 (H) 03/10/2025 12:07 PM    Basophils Relative 1 01/27/2025 09:41 AM    Immature Grans % 0 01/27/2025 09:41 AM    Absolute Neutrophils 2.43 01/27/2025 09:41 AM     Lab Results   Component Value Date/Time    Potassium 3.7 10/02/2024 11:49 AM    Chloride 101 10/02/2024 11:49 AM    CO2 31 10/02/2024 11:49 AM    BUN 19 10/02/2024 11:49 AM    Creatinine 0.82 10/02/2024 11:49 AM    Calcium 9.1 10/02/2024 11:49 AM    AST 19 10/02/2024 11:49 AM    ALT 10 10/02/2024 11:49 AM    Alkaline Phosphatase 65 10/02/2024 11:49 AM    Total Protein 6.7 10/02/2024 11:49 AM    Albumin 4.6 10/02/2024 11:49 AM    Total Bilirubin 1.44 (H) 10/02/2024 11:49 AM    eGFR 70 10/02/2024 11:49 AM             Disclaimer: This document was prepared using [employer] technology. If a word or phrase is confusing, or does not make sense, this is likely due to recognition error which was not discovered during this clinician's review. If you believe an error has occurred, please contact me through [employer] service line for will?cation.      Follow Up    All questions were answered to the patient's satisfaction during this encounter. The patient knows the contact information for our office and knows to reach out for any relevant concerns related to this encounter. They are to call for any temperature 100.4 or higher, new symptoms including but not restricted to shaking chills, decreased appetite, nausea, vomiting, diarrhea,  increased fatigue, shortness of breath or chest pain, confusion, and not feeling the strength to come to the clinic. For all other listed problems and medical diagnosis in their chart - they are managed by PCP and/or other specialists, which the patient acknowledges.     I spent 46 minutes reviewing the records (labs, clinician notes, outside records, medical history, ordering medicine/tests/procedures, monitoring of anti-neoplastic toxicities, interpreting the imaging/labs previously done) and coordination of care as well as direct time with the patient today, of which greater than 50% of the time was spent in counseling and coordination of care with the patient/family.

## 2025-03-28 ENCOUNTER — DOCUMENTATION (OUTPATIENT)
Dept: HEMATOLOGY ONCOLOGY | Facility: CLINIC | Age: 75
End: 2025-03-28

## 2025-03-28 NOTE — PROGRESS NOTES
Patient came into office today and discussed information that was sent to her via my chart regarding Ribociclib. Patient wanted to discuss further with Dr Berumen since she felt ok with the Ibrance and really didn't want to change meds. Dr berumen went in and spoke with patient further and consent was obtained.   Pt agreed to see what cost was but still feels unsure if she will proceed with Ribociclib.

## 2025-03-30 DIAGNOSIS — C77.3 BREAST CANCER METASTASIZED TO AXILLARY LYMPH NODE, RIGHT (HCC): Primary | ICD-10-CM

## 2025-03-30 DIAGNOSIS — C50.811 MALIGNANT NEOPLASM OF OVERLAPPING SITES OF RIGHT BREAST IN FEMALE, ESTROGEN RECEPTOR POSITIVE (HCC): ICD-10-CM

## 2025-03-30 DIAGNOSIS — Z17.0 MALIGNANT NEOPLASM OF OVERLAPPING SITES OF RIGHT BREAST IN FEMALE, ESTROGEN RECEPTOR POSITIVE (HCC): ICD-10-CM

## 2025-03-30 DIAGNOSIS — C50.911 BREAST CANCER METASTASIZED TO AXILLARY LYMPH NODE, RIGHT (HCC): Primary | ICD-10-CM

## 2025-03-31 ENCOUNTER — DOCUMENTATION (OUTPATIENT)
Age: 75
End: 2025-03-31

## 2025-03-31 NOTE — PROGRESS NOTES
Received request from clinical for patient to start on Kisqali 400 MG daily for 3 weeks on 1 week off. Auth has been submitted via cover my meds and this has been approved.    BIN:       [id number]  EDMOND:      MD  ID:          [id number]  GRP:      [id number]      CaseId:[id number];Status:Approved;Review Type:Prior Auth;Coverage Start Date:03/01/2025;Coverage End Date:03/31/2026;  Effective Date: 3/1/2025  Authorization Expiration Date: 3/31/2026

## 2025-04-01 ENCOUNTER — TELEPHONE (OUTPATIENT)
Dept: SURGICAL ONCOLOGY | Facility: CLINIC | Age: 75
End: 2025-04-01

## 2025-04-01 RX ORDER — RIBOCICLIB 200 MG/1
400 TABLET, FILM COATED ORAL DAILY
Qty: 42 EACH | Refills: 5 | Status: SHIPPED | OUTPATIENT
Start: 2025-04-01

## 2025-04-01 NOTE — PROGRESS NOTES
Andrea from financial advocacy discusses that when reached out to patient to discuss process for Kisquali patient refused and she states she wants to stay on Ibrance for the time being.

## 2025-04-01 NOTE — TELEPHONE ENCOUNTER
E mail received regarding the patient who is on Ibrance currently and Dr. Berumen recommends Kisqali:    From: Patricia Smith <[email or web address]>   Sent: 2025 4:16 PM  To: Colleen Briseno <[email or web address]>; Suha Saucedo <[email or web address]>; [facility] <[email or web address]>  Subject: Ribociclib/Dr Berumen/    Good afternoon,   Dr Berumen saw Juve Duarte  1950 in office other day. She was offered Ribociclib however she was just started on Ibrance from Dr Quiroz and has transitioned care to our office. Dr Berumen is recommending changing to Ribociclib 400mg. Pt agreed to sign consent but wanted to know the cost prior to proceeding and agreeing to change.   Thank you        Patricia Smith RN, BSN, OCN    From: Jamie Sotelo <[email or web address]>   Sent: 2025 8:51 AM  To: Suha Saucedo <[email or web address]>; Colleen Briseno <[email or web address]>  Cc: [facility] <[email or web address]>  Subject: RE: Ribociclib/Dr Berumen/    Prior auth required for the Kisqali…    BIN:       [id number]  PCN:      MD  ID:          [id number]  GRP:      [id number]    Jamie Fisher     From: Suha Saucedo <[email or web address]>   Sent: 2025 9:35 AM  To: Jamie Sotelo <[email or web address]>; Colleen Briseno <[email or web address]>  Cc: [facility] <[email or web address]>  Subject: RE: Ribocicljosie/Dr Berumen/JR Maloney,  This one came back approved  CaseId:[id number];Status:Approved;Review Type:Prior Auth;Coverage Start Date:2025;Coverage End Date:2026;  Effective Date: 3/1/2025  Authorization Expiration Date: 3/31/2026    Please let us know if you can fill.      From: Jamie Sotelo <[email or web address]>   Sent: 2025 1:38 PM  To: Suha Saucedo <[email or web address]>;  Colleen Briseno <[email or web address]>; [employer] <[email or web address]>  Cc: [facility] <[email or web address]>  Subject: RE: Ribociclib/Dr Berumen/JR Dc copay: $1544.14 - let me know then of any funding options.    Thanks,  Jamie     I placed a call to the patient and spoke with her  Oscar who stated that his wife has been taking the Ibrance with no problem and wants to stay with the Ibrance. I stated I would inform Patricia R and they will get back to the patient after their conversation.

## 2025-04-15 ENCOUNTER — CLINICAL SUPPORT (OUTPATIENT)
Age: 75
End: 2025-04-15
Payer: MEDICARE

## 2025-04-15 DIAGNOSIS — E53.8 B12 DEFICIENCY: Primary | ICD-10-CM

## 2025-04-15 PROCEDURE — 96372 THER/PROPH/DIAG INJ SC/IM: CPT

## 2025-04-15 RX ADMIN — CYANOCOBALAMIN 1000 MCG: 1000 INJECTION, SOLUTION INTRAMUSCULAR; SUBCUTANEOUS at 09:50

## 2025-04-22 ENCOUNTER — TELEPHONE (OUTPATIENT)
Dept: OBGYN CLINIC | Facility: OTHER | Age: 75
End: 2025-04-22

## 2025-04-22 ENCOUNTER — OFFICE VISIT (OUTPATIENT)
Dept: HEMATOLOGY ONCOLOGY | Facility: CLINIC | Age: 75
End: 2025-04-22
Payer: MEDICARE

## 2025-04-22 VITALS
TEMPERATURE: 97.5 F | DIASTOLIC BLOOD PRESSURE: 74 MMHG | HEIGHT: 66 IN | HEART RATE: 73 BPM | WEIGHT: 150 LBS | BODY MASS INDEX: 24.11 KG/M2 | SYSTOLIC BLOOD PRESSURE: 120 MMHG | RESPIRATION RATE: 16 BRPM | OXYGEN SATURATION: 97 %

## 2025-04-22 DIAGNOSIS — C7A.8 NEUROENDOCRINE CARCINOMA OF STOMACH (HCC): ICD-10-CM

## 2025-04-22 DIAGNOSIS — C77.3 BREAST CANCER METASTASIZED TO AXILLARY LYMPH NODE, RIGHT (HCC): Primary | ICD-10-CM

## 2025-04-22 DIAGNOSIS — Z15.89 CHEK2 GENE MUTATION POSITIVE: ICD-10-CM

## 2025-04-22 DIAGNOSIS — I10 ESSENTIAL HYPERTENSION: ICD-10-CM

## 2025-04-22 DIAGNOSIS — C50.911 BREAST CANCER METASTASIZED TO AXILLARY LYMPH NODE, RIGHT (HCC): Primary | ICD-10-CM

## 2025-04-22 PROCEDURE — G2211 COMPLEX E/M VISIT ADD ON: HCPCS | Performed by: INTERNAL MEDICINE

## 2025-04-22 PROCEDURE — 99215 OFFICE O/P EST HI 40 MIN: CPT | Performed by: INTERNAL MEDICINE

## 2025-04-22 RX ORDER — GABAPENTIN 300 MG/1
300 CAPSULE ORAL
Qty: 30 CAPSULE | Refills: 2 | Status: SHIPPED | OUTPATIENT
Start: 2025-04-22

## 2025-04-22 NOTE — PROGRESS NOTES
Name: Juve Duarte      : 1950      MRN: [id number]  Encounter Provider: Ton Berumen MD  Encounter Date: 2025   Encounter department: [facility] [place]  :  Assessment & Plan  Breast cancer metastasized to axillary lymph node, right (HCC)   Cancer Staging   Breast cancer metastasized to axillary lymph node, right (HCC)  Staging form: Breast, AJCC 8th Edition  - Clinical stage from 10/29/2024: Stage IIIA (cT2, cN2, cM0, G3, ER+, AR+, HER2-) - Signed by Jeovn Valdez MD on 2024     Status post bilateral mastectomy and right axillary lymph node dissection followed by radiation treatment that was completed on 2025.  Given the fact the patient had multiple foci of invasive disease, grade 3 with 4 lymph nodes positive she was started on adjuvant palbociclib and Arimidex after completing radiation.  Patient does not want to transition to Ribociclib.  She wants to continue Ibrance and has been taking that without any side effects.  Reiterated again that as per NCCN guidelines it is recommended to take Ribociclib.  Patient expressed understanding however still wants to proceed with continuing Ibrance.  Continue Arimidex 1 mg daily.  Continue calcium and vitamin D supplements.  Orders:    CBC and differential; Standing    Comprehensive metabolic panel; Standing    gabapentin (Neurontin) 300 mg capsule; Take 1 capsule (300 mg total) by mouth daily at bedtime    Neuroendocrine carcinoma of stomach (HCC)  Follows with Dr. Up.  She cancelled her last appointment with Dr. Up and did not reschedule.  Advised the patient to call Dr. Up's office and reschedule the appointment.           Essential hypertension  Blood pressure well-controlled.  Continue management as per PCP.           CHEK2 gene mutation positive             Return in about 3 months (around 2025) for Office Visit.    History of Present Illness   Chief Complaint   Patient  presents with    Follow-up   HPI:  75-year-old female patient with hormone receptor positive stage IIIa (ypT2, N2a, c M0) disease status post bilateral mastectomy and right axillary lymph node dissection.  Was treated with neoadjuvant anastrozole.  Completed adjuvant radiation on 2/11/2025.  Was started on Arimidex and Ibrance.  Also had synchronous low-grade gastric neuroendocrine tumor.  Follows with Dr. Up.  History of Present Illness  The patient presents for follow-up for breat cancer.     She is accompanied by her .  Patient states she decided not to transition to Ribociclib and continue Ibrance.  Adherence to the prescribed regimen of Ibrance is reported, with no adverse effects such as rash, diarrhea, joint pain, muscle aches, fever, chest pain, or shortness of breath. Additionally, she is taking Arimidex regularly and tolerating well.  She does not have recent blood work done.  The last blood work was conducted on 03/10/2025, revealing low white count and slightly low platelets. Regular monthly blood work is recommended while on Ibrance to monitor these levels.    Ibrance therapy was initiated in 02/2025, following a two-week interval post-radiation treatment. Supplementation with calcium, vitamin D, and iron is ongoing.  Continues to take gabapentin and has increased the dose to 300 mg at bedtime which has been effective.  Oncology History   Cancer Staging   Breast cancer metastasized to axillary lymph node, right (HCC)  Staging form: Breast, AJCC 8th Edition  - Clinical stage from 10/29/2024: Stage IIIA (cT2, cN2, cM0, G3, ER+, GA+, HER2-) - Signed by Jevon Valdez MD on 11/21/2024  Stage prefix: Initial diagnosis  Histologic grading system: 3 grade system  Oncology History   Breast cancer metastasized to axillary lymph node, right (HCC)   4/4/2024 Initial Diagnosis    April 4, 2024 patient had biopsy of right breast mass showing invasive ductal carcinoma.  Right axillary node showed  metastatic carcinoma consistent with breast primary.  ER 90%, NH 5-7%, HER2 +2.  FISH negative.  Similar findings in the axillary node although NH was 30%.  HER2 +2, FISH negative.  April 18, 2024 patient had CT chest ab pelvis showing soft tissue nodules right breast.  Right infrahilar and internal mammary nodes indeterminate.  Right axillary nodes up to 0.5 cm.  2 enhancing soft tissue nodules in the gastric fundus.  Bone scan showed focus of activity at anterior right seventh rib.     4/4/2024 Biopsy    Breast, Right, US BX RT BREAST 700 7CMFN 3 PASSES 12G MARQU[place]:      - Invasive mammary carcinoma of no special type (ductal NST/invasive ductal carcinoma) with apocrine features, see note      - Elsy grade 2 of 3 (total score: 6 of 9)          - Tubule formation: <10%, score 3          - Nuclear grade 2 of 3, score 2          - Mitoses < 3/mm2, (</= 7 mitoses/10HPF), score 1      - Invasive carcinoma involves 3 of 3 submitted core biopsies, max. dimension = 5 millimeters      - Ductal carcinoma in situ (DCIS): Not identified      - Microcalcifications: Absent      - Lymphovascular invasion: Present     Breast, Right, US BX RT BREAST 900 7CMFN 3 PASSES 12G [place]:      - Invasive mammary carcinoma of no special type (ductal NST/invasive ductal carcinoma) with apocrine features, see note      - [place] grade 2 of 3 (total score: 6 of 9)          - Tubule formation: <10%, score 3          - Nuclear grade 2 of 3, score 2          - Mitoses < 3/mm2, (</= 7 mitoses/10HPF), score 1      - Invasive carcinoma involves 3 of 3 submitted core biopsies, max. dimension = 14 millimeters      - Ductal carcinoma in situ (DCIS): Not identified      - Microcalcifications: Absent      - Lymphovascular invasion: Not identified    Axillary lymph node, US BX RT AXILLARY TAIL LN 10:00 12CMFN 3 PASSES 12G [place]:      - Metastatic carcinoma consistent with mammary primary,    BREAST TUMOR PROGNOSTIC PROFILE     Performed on  invasive carcinoma block E1:  Test Description Result Prognostic Interpretation   Estrogen Receptor/ER  Primary Antibody: SP-1  Internal control: Positive   External control: Positive 90%  Staining Intensity: Strong  Isabela Score*: 8 Positive   Progesterone Receptor/PgR  Primary Antibody:1E2  Internal control: Positive  External control: Positive 5-7%  Staining Intensity: Strong  Isabela Score*: 4 Positive   HER2 by IHC   Primary Antibody: 4B5 2+ Equivocal *      Performed on invasive carcinoma block F2:  Test Description Result Prognostic Interpretation   Estrogen Receptor/ER  Primary Antibody: SP-1  Internal control: Positive   External control: Positive 95%  Staining Intensity: Strong  Isabela Score*: 8 Positive   Progesterone Receptor/PgR  Primary Antibody:1E2  Internal control: Positive  External control: Positive 30%  Staining Intensity: Moderate  Isabela Score*: 5 Positive   HER2 by IHC   Primary Antibody: 4B5 2+ Equivocal *       Fluorescence in situ hybridization (FISH) analysis of HER2 overexpression by invasive breast carcinoma cells, block E1 [facility], [place], [place], yields the following results:  Test Description                        Interpretation  HER2 by FISH analysis:              Negative / Not Amplified.  Results  HER2: CEP-17 ratio :                  1.5: 1  Average HER2 Signal:                5.4  Average CEP-17 Signal:                          3.5  Number of selected invasive cells scanned           100     Fluorescence in situ hybridization (FISH) analysis of HER2 overexpression by invasive breast carcinoma cells, block F2 performed at [facility], [place], [place], yields the following results:  Test Description                        Interpretation  HER2 by FISH analysis:              Negative / Not Amplified.  Results  HER2: CEP-17 ratio :                  1.3: 1  Average HER2 Signal:                3.5  Average CEP-17 Signal:                           2.6  Number of selected invasive cells scanned           50        4/4/2024 Initial Diagnosis    Malignant neoplasm of overlapping sites of right breast in female, estrogen receptor positive (HCC)     4/18/2024 Genetic Testing    AMBRY  A total of 36 genes were evaluated, including: APC, MIKE, BARD 1, BMPR1A, BRCA1, BRCA2, BRIP1, CDH1, CDK4, CKDN2A, CHEK2, DICER1, MLH1, MSH2, MSH6, MUTYH, NBN, NF1, NTHL1, PALB2, PMS2, PTEN, RAD51C, RECQL, SMAD4, SMARCA4, STK11, TP53, AXIN2, HOXB13, MSH3, POLD1, AND POLE, EPCAM, AND GREM1     LIKELY PATHOGENIC VARIANT CHEK2  VUS SDHA     10/29/2024 Surgery    Right modified radical mastectomy left simple mastectomy    RIGHT  Multifocal residual invasive mammary carcinoma of no special type (ductal) s/p neoadjuvant chemotherapy  21 mm, 8 mm, 2 mm  Grade 3   Margins negative  ER 90  MO 5  HER2 2+  FISH negative  4/4 Lymph nodes     Anatomic Stage IIIA  Prognostic Stage (use AJCC update): N/A (status post neoadjuvant therapy).    LEFT  Benign fibroadenoma (0.8 cm), Benign fibrocystic changes with atypia (atypical lobular hyperplasia/ALH     10/29/2024 -  Cancer Staged    Staging form: Breast, AJCC 8th Edition  - Clinical stage from 10/29/2024: Stage IIIA (cT2, cN2, cM0, G3, ER+, MO+, HER2-) - Signed by Jevon Valdez MD on 11/21/2024  Stage prefix: Initial diagnosis  Histologic grading system: 3 grade system       1/7/2025 - 2/11/2025 Radiation    Treatments:  Course: C1  Plan ID Energy Fractions Dose per Fraction (cGy) Dose Correction (cGy) Total Dose Delivered (cGy) Elapsed Days   BH Axilla R 6X 25 / 25 200 0 5,000 35   BH CW R 6X 25 / 25 200 0 5,000 35   Treatment Dates:  1/7/2025 - 2/11/2025     Malignant neoplasm of overlapping sites of right breast in female, estrogen receptor positive (HCC)   4/4/2024 Biopsy    Breast, Right, US BX RT BREAST 700 7CMFN 3 PASSES 12G [place]:      - Invasive mammary carcinoma of no special type (ductal NST/invasive ductal  carcinoma) with apocrine features, see note      - [place]sy grade 2 of 3 (total score: 6 of 9)          - Tubule formation: <10%, score 3          - Nuclear grade 2 of 3, score 2          - Mitoses < 3/mm2, (</= 7 mitoses/10HPF), score 1      - Invasive carcinoma involves 3 of 3 submitted core biopsies, max. dimension = 5 millimeters      - Ductal carcinoma in situ (DCIS): Not identified      - Microcalcifications: Absent      - Lymphovascular invasion: Present     Breast, Right, US BX RT BREAST 900 7CMFN 3 PASSES 12G [place]:      - Invasive mammary carcinoma of no special type (ductal NST/invasive ductal carcinoma) with apocrine features, see note      - [place] grade 2 of 3 (total score: 6 of 9)          - Tubule formation: <10%, score 3          - Nuclear grade 2 of 3, score 2          - Mitoses < 3/mm2, (</= 7 mitoses/10HPF), score 1      - Invasive carcinoma involves 3 of 3 submitted core biopsies, max. dimension = 14 millimeters      - Ductal carcinoma in situ (DCIS): Not identified      - Microcalcifications: Absent      - Lymphovascular invasion: Not identified    Axillary lymph node, US BX RT AXILLARY TAIL LN 10:00 12CMFN 3 PASSES 12G [place]:      - Metastatic carcinoma consistent with mammary primary,    BREAST TUMOR PROGNOSTIC PROFILE     Performed on invasive carcinoma block E1:  Test Description Result Prognostic Interpretation   Estrogen Receptor/ER  Primary Antibody: SP-1  Internal control: Positive   External control: Positive 90%  Staining Intensity: Strong  Isabela Score*: 8 Positive   Progesterone Receptor/PgR  Primary Antibody:1E2  Internal control: Positive  External control: Positive 5-7%  Staining Intensity: Strong  Isabela Score*: 4 Positive   HER2 by IHC   Primary Antibody: 4B5 2+ Equivocal *      Performed on invasive carcinoma block F2:  Test Description Result Prognostic Interpretation   Estrogen Receptor/ER  Primary Antibody: SP-1  Internal control: Positive   External control:  Positive 95%  Staining Intensity: Strong  Isabela Score*: 8 Positive   Progesterone Receptor/PgR  Primary Antibody:1E2  Internal control: Positive  External control: Positive 30%  Staining Intensity: Moderate  Isabela Score*: 5 Positive   HER2 by IHC   Primary Antibody: 4B5 2+ Equivocal *       Fluorescence in situ hybridization (FISH) analysis of HER2 overexpression by invasive breast carcinoma cells, block E1 performed at [facility], [place], [place], yields the following results:  Test Description                        Interpretation  HER2 by FISH analysis:              Negative / Not Amplified.  Results  HER2: CEP-17 ratio :                  1.5: 1  Average HER2 Signal:                5.4  Average CEP-17 Signal:                          3.5  Number of selected invasive cells scanned           100     Fluorescence in situ hybridization (FISH) analysis of HER2 overexpression by invasive breast carcinoma cells, block F2 performed at [facility], [place], [place], yields the following results:  Test Description                        Interpretation  HER2 by FISH analysis:              Negative / Not Amplified.  Results  HER2: CEP-17 ratio :                  1.3: 1  Average HER2 Signal:                3.5  Average CEP-17 Signal:                          2.6  Number of selected invasive cells scanned           50        4/4/2024 Initial Diagnosis    Malignant neoplasm of overlapping sites of right breast in female, estrogen receptor positive (HCC)     4/18/2024 Genetic Testing    AMBRY  A total of 36 genes were evaluated, including: APC, MIKE, BARD 1, BMPR1A, BRCA1, BRCA2, BRIP1, CDH1, CDK4, CKDN2A, CHEK2, DICER1, MLH1, MSH2, MSH6, MUTYH, NBN, NF1, NTHL1, PALB2, PMS2, PTEN, RAD51C, RECQL, SMAD4, SMARCA4, STK11, TP53, AXIN2, HOXB13, MSH3, POLD1, AND POLE, EPCAM, AND GREM1     LIKELY PATHOGENIC VARIANT CHEK2  VUS SDHA     10/29/2024 Surgery    Right modified radical mastectomy left  "simple mastectomy    RIGHT  Multifocal residual invasive mammary carcinoma of no special type (ductal) s/p neoadjuvant chemotherapy  21 mm, 8 mm, 2 mm  Grade 3   Margins negative  ER 90  IA 5  HER2 2+  FISH negative  4/4 Lymph nodes     Anatomic Stage IIIA  Prognostic Stage (use AJCC update): N/A (status post neoadjuvant therapy).    LEFT  Benign fibroadenoma (0.8 cm), Benign fibrocystic changes with atypia (atypical lobular hyperplasia/ALH     1/7/2025 - 2/11/2025 Radiation    Treatments:  Course: C1  Plan ID Energy Fractions Dose per Fraction (cGy) Dose Correction (cGy) Total Dose Delivered (cGy) Elapsed Days   BH Axilla R 6X 25 / 25 200 0 5,000 35   BH CW R 6X 25 / 25 200 0 5,000 35   Treatment Dates:  1/7/2025 - 2/11/2025        Pertinent Medical History     04/22/25: Reviewed     Review of Systems  14 point review of systems was negative except that mentioned in HPI.        Objective   /74 (BP Location: Left arm, Patient Position: Sitting, Cuff Size: Adult)   Pulse 73   Temp 97.5 °F (36.4 °C) (Temporal)   Resp 16   Ht 5' 6\" (1.676 m)   Wt 68 kg (150 lb)   SpO2 97%   BMI 24.21 kg/m²     Pain Screening:  Pain Score: 0-No pain  ECOG   1  Physical Exam  Vitals and nursing note reviewed.   Constitutional:       General: She is not in acute distress.     Appearance: Normal appearance.   HENT:      Head: Normocephalic and atraumatic.      Mouth/Throat:      Mouth: Mucous membranes are moist.      Pharynx: Oropharynx is clear.   Eyes:      General: No scleral icterus.     Extraocular Movements: Extraocular movements intact.      Conjunctiva/sclera: Conjunctivae normal.   Cardiovascular:      Rate and Rhythm: Normal rate and regular rhythm.      Pulses: Normal pulses.      Heart sounds: No murmur heard.  Pulmonary:      Effort: Pulmonary effort is normal.      Breath sounds: Normal breath sounds. No wheezing, rhonchi or rales.   Chest:   Breasts:     Right: Absent.      Left: Absent.   Abdominal:      " General: Bowel sounds are normal.      Palpations: Abdomen is soft.      Tenderness: There is no abdominal tenderness.   Musculoskeletal:         General: No swelling or tenderness. Normal range of motion.      Cervical back: Neck supple.   Lymphadenopathy:      Cervical: No cervical adenopathy.      Upper Body:      Right upper body: No supraclavicular or axillary adenopathy.      Left upper body: No supraclavicular or axillary adenopathy.   Skin:     General: Skin is warm and dry.      Coloration: Skin is not pale.      Findings: No bruising, erythema or rash.   Neurological:      General: No focal deficit present.      Mental Status: She is alert and oriented to person, place, and time.      Motor: No weakness.   Psychiatric:         Mood and Affect: Mood normal.         Behavior: Behavior normal.       Physical Exam  Constitutional: No fever.  Respiratory: No shortness of breath.  Gastrointestinal: No nausea, vomiting, or diarrhea.  Musculoskeletal: No joint pains or muscle aches.  Integument/Skin: No rash.    Results  Labs   - Blood work: 03/10/2025, WBC 2.63, hemoglobin 10.2, platelets 137.  ANC 2.24.  Imaging   - DEXA scan: 02/2024, Low bone mass  Labs: I have reviewed the following labs:  Lab Results   Component Value Date/Time    WBC 2.63 (L) 03/10/2025 12:07 PM    RBC 4.13 03/10/2025 12:07 PM    Hemoglobin 10.2 (L) 03/10/2025 12:07 PM    Hematocrit 33.2 (L) 03/10/2025 12:07 PM    MCV 80 (L) 03/10/2025 12:07 PM    MCH 24.7 (L) 03/10/2025 12:07 PM    RDW 15.0 03/10/2025 12:07 PM    Platelets 137 (L) 03/10/2025 12:07 PM    Segmented % 75 01/27/2025 09:41 AM    Lymphocytes % 9 (L) 03/10/2025 12:07 PM    Lymphocytes % 12 (L) 01/27/2025 09:41 AM    Monocytes % 1 (L) 03/10/2025 12:07 PM    Monocytes % 10 01/27/2025 09:41 AM    Eosinophils % 0 03/10/2025 12:07 PM    Eosinophils Relative 2 01/27/2025 09:41 AM    Basophils % 3 (H) 03/10/2025 12:07 PM    Basophils Relative 1 01/27/2025 09:41 AM    Immature Grans % 0  01/27/2025 09:41 AM    Absolute Neutrophils 2.43 01/27/2025 09:41 AM     Lab Results   Component Value Date/Time    Potassium 3.7 10/02/2024 11:49 AM    Chloride 101 10/02/2024 11:49 AM    CO2 31 10/02/2024 11:49 AM    BUN 19 10/02/2024 11:49 AM    Creatinine 0.82 10/02/2024 11:49 AM    Calcium 9.1 10/02/2024 11:49 AM    AST 19 10/02/2024 11:49 AM    ALT 10 10/02/2024 11:49 AM    Alkaline Phosphatase 65 10/02/2024 11:49 AM    Total Protein 6.7 10/02/2024 11:49 AM    Albumin 4.6 10/02/2024 11:49 AM    Total Bilirubin 1.44 (H) 10/02/2024 11:49 AM    eGFR 70 10/02/2024 11:49 AM             Disclaimer: This document was prepared using [employer] technology. If a word or phrase is confusing, or does not make sense, this is likely due to recognition error which was not discovered during this clinician's review. If you believe an error has occurred, please contact me through [facility] service line for will?cation.      Follow Up    All questions were answered to the patient's satisfaction during this encounter. The patient knows the contact information for our office and knows to reach out for any relevant concerns related to this encounter. They are to call for any temperature 100.4 or higher, new symptoms including but not restricted to shaking chills, decreased appetite, nausea, vomiting, diarrhea, increased fatigue, shortness of breath or chest pain, confusion, and not feeling the strength to come to the clinic. For all other listed problems and medical diagnosis in their chart - they are managed by PCP and/or other specialists, which the patient acknowledges.     I spent 40 minutes reviewing the records (labs, clinician notes, outside records, medical history, ordering medicine/tests/procedures, monitoring of anti-neoplastic toxicities, interpreting the imaging/labs previously done) and coordination of care as well as direct time with the patient today, of which greater than 50% of the time was spent in  counseling and coordination of care with the patient/family.

## 2025-04-22 NOTE — ASSESSMENT & PLAN NOTE
Cancer Staging   Breast cancer metastasized to axillary lymph node, right (HCC)  Staging form: Breast, AJCC 8th Edition  - Clinical stage from 10/29/2024: Stage IIIA (cT2, cN2, cM0, G3, ER+, MA+, HER2-) - Signed by Jevon Valdez MD on 11/21/2024     Status post bilateral mastectomy and right axillary lymph node dissection followed by radiation treatment that was completed on 2/11/2025.  Given the fact the patient had multiple foci of invasive disease, grade 3 with 4 lymph nodes positive she was started on adjuvant palbociclib and Arimidex after completing radiation.  Patient does not want to transition to Ribociclib.  She wants to continue Ibrance and has been taking that without any side effects.  Reiterated again that as per NCCN guidelines it is recommended to take Ribociclib.  Patient expressed understanding however still wants to proceed with continuing Ibrance.  Continue Arimidex 1 mg daily.  Continue calcium and vitamin D supplements.  Orders:    CBC and differential; Standing    Comprehensive metabolic panel; Standing    gabapentin (Neurontin) 300 mg capsule; Take 1 capsule (300 mg total) by mouth daily at bedtime

## 2025-04-22 NOTE — ASSESSMENT & PLAN NOTE
Follows with Dr. Up.  She cancelled her last appointment with Dr. Up and did not reschedule.  Advised the patient to call Dr. Up's office and reschedule the appointment.

## 2025-04-23 ENCOUNTER — APPOINTMENT (OUTPATIENT)
Age: 75
End: 2025-04-23
Attending: INTERNAL MEDICINE
Payer: MEDICARE

## 2025-04-23 DIAGNOSIS — C77.3 BREAST CANCER METASTASIZED TO AXILLARY LYMPH NODE, RIGHT (HCC): ICD-10-CM

## 2025-04-23 DIAGNOSIS — C50.911 BREAST CANCER METASTASIZED TO AXILLARY LYMPH NODE, RIGHT (HCC): ICD-10-CM

## 2025-04-23 LAB
ALBUMIN SERPL BCG-MCNC: 4.3 G/DL (ref 3.5–5)
ALP SERPL-CCNC: 63 U/L (ref 34–104)
ALT SERPL W P-5'-P-CCNC: 12 U/L (ref 7–52)
ANION GAP SERPL CALCULATED.3IONS-SCNC: 10 MMOL/L (ref 4–13)
AST SERPL W P-5'-P-CCNC: 19 U/L (ref 13–39)
BASOPHILS # BLD AUTO: 0.04 THOUSANDS/ÂΜL (ref 0–0.1)
BASOPHILS NFR BLD AUTO: 3 % (ref 0–1)
BILIRUB SERPL-MCNC: 1.4 MG/DL (ref 0.2–1)
BUN SERPL-MCNC: 18 MG/DL (ref 5–25)
CALCIUM SERPL-MCNC: 9.2 MG/DL (ref 8.4–10.2)
CHLORIDE SERPL-SCNC: 103 MMOL/L (ref 96–108)
CO2 SERPL-SCNC: 27 MMOL/L (ref 21–32)
CREAT SERPL-MCNC: 0.91 MG/DL (ref 0.6–1.3)
EOSINOPHIL # BLD AUTO: 0.02 THOUSAND/ÂΜL (ref 0–0.61)
EOSINOPHIL NFR BLD AUTO: 2 % (ref 0–6)
ERYTHROCYTE [DISTWIDTH] IN BLOOD BY AUTOMATED COUNT: 23 % (ref 11.6–15.1)
GFR SERPL CREATININE-BSD FRML MDRD: 61 ML/MIN/1.73SQ M
GLUCOSE SERPL-MCNC: 117 MG/DL (ref 65–140)
HCT VFR BLD AUTO: 32.6 % (ref 34.8–46.1)
HGB BLD-MCNC: 10.5 G/DL (ref 11.5–15.4)
IMM GRANULOCYTES # BLD AUTO: 0 THOUSAND/UL (ref 0–0.2)
IMM GRANULOCYTES NFR BLD AUTO: 0 % (ref 0–2)
LYMPHOCYTES # BLD AUTO: 0.35 THOUSANDS/ÂΜL (ref 0.6–4.47)
LYMPHOCYTES NFR BLD AUTO: 27 % (ref 14–44)
MCH RBC QN AUTO: 28.9 PG (ref 26.8–34.3)
MCHC RBC AUTO-ENTMCNC: 32.2 G/DL (ref 31.4–37.4)
MCV RBC AUTO: 90 FL (ref 82–98)
MONOCYTES # BLD AUTO: 0.15 THOUSAND/ÂΜL (ref 0.17–1.22)
MONOCYTES NFR BLD AUTO: 12 % (ref 4–12)
NEUTROPHILS # BLD AUTO: 0.75 THOUSANDS/ÂΜL (ref 1.85–7.62)
NEUTS SEG NFR BLD AUTO: 56 % (ref 43–75)
NRBC BLD AUTO-RTO: 0 /100 WBCS
PLATELET # BLD AUTO: 106 THOUSANDS/UL (ref 149–390)
PMV BLD AUTO: 12.1 FL (ref 8.9–12.7)
POTASSIUM SERPL-SCNC: 3.9 MMOL/L (ref 3.5–5.3)
PROT SERPL-MCNC: 6.1 G/DL (ref 6.4–8.4)
RBC # BLD AUTO: 3.63 MILLION/UL (ref 3.81–5.12)
SODIUM SERPL-SCNC: 140 MMOL/L (ref 135–147)
WBC # BLD AUTO: 1.31 THOUSAND/UL (ref 4.31–10.16)

## 2025-04-23 PROCEDURE — 85025 COMPLETE CBC W/AUTO DIFF WBC: CPT

## 2025-04-23 PROCEDURE — 80053 COMPREHEN METABOLIC PANEL: CPT

## 2025-04-23 PROCEDURE — 36415 COLL VENOUS BLD VENIPUNCTURE: CPT

## 2025-04-24 ENCOUNTER — TELEPHONE (OUTPATIENT)
Dept: HEMATOLOGY ONCOLOGY | Facility: CLINIC | Age: 75
End: 2025-04-24

## 2025-04-24 ENCOUNTER — TELEPHONE (OUTPATIENT)
Age: 75
End: 2025-04-24

## 2025-04-24 DIAGNOSIS — Z17.0 MALIGNANT NEOPLASM OF OVERLAPPING SITES OF RIGHT BREAST IN FEMALE, ESTROGEN RECEPTOR POSITIVE (HCC): Primary | ICD-10-CM

## 2025-04-24 DIAGNOSIS — M79.671 BILATERAL FOOT PAIN: Primary | ICD-10-CM

## 2025-04-24 DIAGNOSIS — C50.811 MALIGNANT NEOPLASM OF OVERLAPPING SITES OF RIGHT BREAST IN FEMALE, ESTROGEN RECEPTOR POSITIVE (HCC): Primary | ICD-10-CM

## 2025-04-24 DIAGNOSIS — M79.672 BILATERAL FOOT PAIN: Primary | ICD-10-CM

## 2025-04-24 NOTE — TELEPHONE ENCOUNTER
Attempted to call patient and left detailed voicemail regarding cbc results and made her aware to please HOLD the Ibrance. Dr singh would like for her to repeat cbc next week to see if numbers increase.  Hope line number was left to return our call at their earliest convenience.

## 2025-04-24 NOTE — TELEPHONE ENCOUNTER
Patient called and stated that the podiatrist referral number that was provided for scheduling is located in [place] and that is too far for her. She also stated that when she called that number the person that she spoke to stated that they have never heard of Dr. Iniguez. Patient would like a referral to a closer location and with a different doctor.    Please contact patient to advise.

## 2025-04-29 ENCOUNTER — APPOINTMENT (OUTPATIENT)
Age: 75
End: 2025-04-29
Payer: MEDICARE

## 2025-04-29 DIAGNOSIS — C50.911 BREAST CANCER METASTASIZED TO AXILLARY LYMPH NODE, RIGHT (HCC): ICD-10-CM

## 2025-04-29 DIAGNOSIS — C77.3 BREAST CANCER METASTASIZED TO AXILLARY LYMPH NODE, RIGHT (HCC): ICD-10-CM

## 2025-04-29 LAB
ALBUMIN SERPL BCG-MCNC: 4.4 G/DL (ref 3.5–5)
ALP SERPL-CCNC: 60 U/L (ref 34–104)
ALT SERPL W P-5'-P-CCNC: 12 U/L (ref 7–52)
ANION GAP SERPL CALCULATED.3IONS-SCNC: 5 MMOL/L (ref 4–13)
AST SERPL W P-5'-P-CCNC: 20 U/L (ref 13–39)
BASOPHILS # BLD AUTO: 0.04 THOUSANDS/ÂΜL (ref 0–0.1)
BASOPHILS NFR BLD AUTO: 2 % (ref 0–1)
BILIRUB SERPL-MCNC: 0.93 MG/DL (ref 0.2–1)
BUN SERPL-MCNC: 14 MG/DL (ref 5–25)
CALCIUM SERPL-MCNC: 9.3 MG/DL (ref 8.4–10.2)
CHLORIDE SERPL-SCNC: 104 MMOL/L (ref 96–108)
CO2 SERPL-SCNC: 31 MMOL/L (ref 21–32)
CREAT SERPL-MCNC: 0.75 MG/DL (ref 0.6–1.3)
EOSINOPHIL # BLD AUTO: 0.02 THOUSAND/ÂΜL (ref 0–0.61)
EOSINOPHIL NFR BLD AUTO: 1 % (ref 0–6)
ERYTHROCYTE [DISTWIDTH] IN BLOOD BY AUTOMATED COUNT: 22.4 % (ref 11.6–15.1)
GFR SERPL CREATININE-BSD FRML MDRD: 78 ML/MIN/1.73SQ M
GLUCOSE P FAST SERPL-MCNC: 106 MG/DL (ref 65–99)
HCT VFR BLD AUTO: 34.9 % (ref 34.8–46.1)
HGB BLD-MCNC: 11 G/DL (ref 11.5–15.4)
IMM GRANULOCYTES # BLD AUTO: 0.01 THOUSAND/UL (ref 0–0.2)
IMM GRANULOCYTES NFR BLD AUTO: 1 % (ref 0–2)
LYMPHOCYTES # BLD AUTO: 0.33 THOUSANDS/ÂΜL (ref 0.6–4.47)
LYMPHOCYTES NFR BLD AUTO: 19 % (ref 14–44)
MCH RBC QN AUTO: 28.9 PG (ref 26.8–34.3)
MCHC RBC AUTO-ENTMCNC: 31.5 G/DL (ref 31.4–37.4)
MCV RBC AUTO: 92 FL (ref 82–98)
MONOCYTES # BLD AUTO: 0.26 THOUSAND/ÂΜL (ref 0.17–1.22)
MONOCYTES NFR BLD AUTO: 15 % (ref 4–12)
NEUTROPHILS # BLD AUTO: 1.09 THOUSANDS/ÂΜL (ref 1.85–7.62)
NEUTS SEG NFR BLD AUTO: 62 % (ref 43–75)
NRBC BLD AUTO-RTO: 0 /100 WBCS
PLATELET # BLD AUTO: 114 THOUSANDS/UL (ref 149–390)
PMV BLD AUTO: 11.8 FL (ref 8.9–12.7)
POTASSIUM SERPL-SCNC: 4 MMOL/L (ref 3.5–5.3)
PROT SERPL-MCNC: 6 G/DL (ref 6.4–8.4)
RBC # BLD AUTO: 3.81 MILLION/UL (ref 3.81–5.12)
SODIUM SERPL-SCNC: 140 MMOL/L (ref 135–147)
WBC # BLD AUTO: 1.75 THOUSAND/UL (ref 4.31–10.16)

## 2025-04-29 PROCEDURE — 85025 COMPLETE CBC W/AUTO DIFF WBC: CPT

## 2025-04-29 PROCEDURE — 36415 COLL VENOUS BLD VENIPUNCTURE: CPT

## 2025-04-29 PROCEDURE — 80053 COMPREHEN METABOLIC PANEL: CPT

## 2025-04-30 ENCOUNTER — TELEPHONE (OUTPATIENT)
Dept: HEMATOLOGY ONCOLOGY | Facility: CLINIC | Age: 75
End: 2025-04-30

## 2025-04-30 DIAGNOSIS — Z17.0 MALIGNANT NEOPLASM OF OVERLAPPING SITES OF RIGHT BREAST IN FEMALE, ESTROGEN RECEPTOR POSITIVE (HCC): Primary | ICD-10-CM

## 2025-04-30 DIAGNOSIS — C50.811 MALIGNANT NEOPLASM OF OVERLAPPING SITES OF RIGHT BREAST IN FEMALE, ESTROGEN RECEPTOR POSITIVE (HCC): Primary | ICD-10-CM

## 2025-04-30 NOTE — TELEPHONE ENCOUNTER
ANC 1.09. Call placed to patient to make her aware. Continue to hold Ibrance, repeat CBCD next week. Patient verbalized understanding.

## 2025-05-06 ENCOUNTER — APPOINTMENT (OUTPATIENT)
Age: 75
End: 2025-05-06
Payer: MEDICARE

## 2025-05-06 DIAGNOSIS — Z17.0 MALIGNANT NEOPLASM OF OVERLAPPING SITES OF RIGHT BREAST IN FEMALE, ESTROGEN RECEPTOR POSITIVE (HCC): ICD-10-CM

## 2025-05-06 DIAGNOSIS — C50.811 MALIGNANT NEOPLASM OF OVERLAPPING SITES OF RIGHT BREAST IN FEMALE, ESTROGEN RECEPTOR POSITIVE (HCC): ICD-10-CM

## 2025-05-06 LAB
BASOPHILS # BLD AUTO: 0.07 THOUSANDS/ÂΜL (ref 0–0.1)
BASOPHILS NFR BLD AUTO: 3 % (ref 0–1)
EOSINOPHIL # BLD AUTO: 0.03 THOUSAND/ÂΜL (ref 0–0.61)
EOSINOPHIL NFR BLD AUTO: 1 % (ref 0–6)
ERYTHROCYTE [DISTWIDTH] IN BLOOD BY AUTOMATED COUNT: 21.1 % (ref 11.6–15.1)
HCT VFR BLD AUTO: 35.6 % (ref 34.8–46.1)
HGB BLD-MCNC: 11.6 G/DL (ref 11.5–15.4)
IMM GRANULOCYTES # BLD AUTO: 0.02 THOUSAND/UL (ref 0–0.2)
IMM GRANULOCYTES NFR BLD AUTO: 1 % (ref 0–2)
LYMPHOCYTES # BLD AUTO: 0.42 THOUSANDS/ÂΜL (ref 0.6–4.47)
LYMPHOCYTES NFR BLD AUTO: 17 % (ref 14–44)
MCH RBC QN AUTO: 29.5 PG (ref 26.8–34.3)
MCHC RBC AUTO-ENTMCNC: 32.6 G/DL (ref 31.4–37.4)
MCV RBC AUTO: 91 FL (ref 82–98)
MONOCYTES # BLD AUTO: 0.32 THOUSAND/ÂΜL (ref 0.17–1.22)
MONOCYTES NFR BLD AUTO: 13 % (ref 4–12)
NEUTROPHILS # BLD AUTO: 1.67 THOUSANDS/ÂΜL (ref 1.85–7.62)
NEUTS SEG NFR BLD AUTO: 65 % (ref 43–75)
NRBC BLD AUTO-RTO: 0 /100 WBCS
PLATELET # BLD AUTO: 126 THOUSANDS/UL (ref 149–390)
PMV BLD AUTO: 12.3 FL (ref 8.9–12.7)
RBC # BLD AUTO: 3.93 MILLION/UL (ref 3.81–5.12)
WBC # BLD AUTO: 2.53 THOUSAND/UL (ref 4.31–10.16)

## 2025-05-06 PROCEDURE — 36415 COLL VENOUS BLD VENIPUNCTURE: CPT

## 2025-05-06 PROCEDURE — 85025 COMPLETE CBC W/AUTO DIFF WBC: CPT

## 2025-05-07 ENCOUNTER — TELEPHONE (OUTPATIENT)
Dept: HEMATOLOGY ONCOLOGY | Facility: CLINIC | Age: 75
End: 2025-05-07

## 2025-05-07 DIAGNOSIS — Z17.0 MALIGNANT NEOPLASM OF OVERLAPPING SITES OF RIGHT BREAST IN FEMALE, ESTROGEN RECEPTOR POSITIVE (HCC): Primary | ICD-10-CM

## 2025-05-07 DIAGNOSIS — C50.811 MALIGNANT NEOPLASM OF OVERLAPPING SITES OF RIGHT BREAST IN FEMALE, ESTROGEN RECEPTOR POSITIVE (HCC): Primary | ICD-10-CM

## 2025-05-07 NOTE — TELEPHONE ENCOUNTER
Called and spoke with patient  and made her aware that based on lab results she is ok to resume her Ibrance at same dose. She is made aware that we will need labs on a weekly basis and standing orders will be placed.

## 2025-05-08 DIAGNOSIS — Z12.31 BREAST CANCER SCREENING BY MAMMOGRAM: ICD-10-CM

## 2025-05-08 RX ORDER — HYDROCHLOROTHIAZIDE 25 MG/1
25 TABLET ORAL DAILY
Qty: 90 TABLET | Refills: 1 | Status: SHIPPED | OUTPATIENT
Start: 2025-05-08

## 2025-05-13 ENCOUNTER — APPOINTMENT (OUTPATIENT)
Age: 75
End: 2025-05-13
Attending: INTERNAL MEDICINE
Payer: MEDICARE

## 2025-05-13 ENCOUNTER — CLINICAL SUPPORT (OUTPATIENT)
Age: 75
End: 2025-05-13
Payer: MEDICARE

## 2025-05-13 DIAGNOSIS — C50.811 MALIGNANT NEOPLASM OF OVERLAPPING SITES OF RIGHT BREAST IN FEMALE, ESTROGEN RECEPTOR POSITIVE (HCC): ICD-10-CM

## 2025-05-13 DIAGNOSIS — Z17.0 MALIGNANT NEOPLASM OF OVERLAPPING SITES OF RIGHT BREAST IN FEMALE, ESTROGEN RECEPTOR POSITIVE (HCC): ICD-10-CM

## 2025-05-13 DIAGNOSIS — D51.0 PERNICIOUS ANEMIA: Primary | ICD-10-CM

## 2025-05-13 LAB
ALBUMIN SERPL BCG-MCNC: 4.3 G/DL (ref 3.5–5)
ALP SERPL-CCNC: 77 U/L (ref 34–104)
ALT SERPL W P-5'-P-CCNC: 15 U/L (ref 7–52)
ANION GAP SERPL CALCULATED.3IONS-SCNC: 9 MMOL/L (ref 4–13)
AST SERPL W P-5'-P-CCNC: 24 U/L (ref 13–39)
BASOPHILS # BLD AUTO: 0.05 THOUSANDS/ÂΜL (ref 0–0.1)
BASOPHILS NFR BLD AUTO: 2 % (ref 0–1)
BILIRUB SERPL-MCNC: 1.18 MG/DL (ref 0.2–1)
BUN SERPL-MCNC: 20 MG/DL (ref 5–25)
CALCIUM SERPL-MCNC: 9.5 MG/DL (ref 8.4–10.2)
CHLORIDE SERPL-SCNC: 104 MMOL/L (ref 96–108)
CO2 SERPL-SCNC: 29 MMOL/L (ref 21–32)
CREAT SERPL-MCNC: 0.89 MG/DL (ref 0.6–1.3)
EOSINOPHIL # BLD AUTO: 0.05 THOUSAND/ÂΜL (ref 0–0.61)
EOSINOPHIL NFR BLD AUTO: 2 % (ref 0–6)
ERYTHROCYTE [DISTWIDTH] IN BLOOD BY AUTOMATED COUNT: 19.9 % (ref 11.6–15.1)
GFR SERPL CREATININE-BSD FRML MDRD: 63 ML/MIN/1.73SQ M
GLUCOSE SERPL-MCNC: 78 MG/DL (ref 65–140)
HCT VFR BLD AUTO: 36.2 % (ref 34.8–46.1)
HGB BLD-MCNC: 11.7 G/DL (ref 11.5–15.4)
IMM GRANULOCYTES # BLD AUTO: 0.01 THOUSAND/UL (ref 0–0.2)
IMM GRANULOCYTES NFR BLD AUTO: 0 % (ref 0–2)
LYMPHOCYTES # BLD AUTO: 0.36 THOUSANDS/ÂΜL (ref 0.6–4.47)
LYMPHOCYTES NFR BLD AUTO: 11 % (ref 14–44)
MCH RBC QN AUTO: 29.3 PG (ref 26.8–34.3)
MCHC RBC AUTO-ENTMCNC: 32.3 G/DL (ref 31.4–37.4)
MCV RBC AUTO: 91 FL (ref 82–98)
MONOCYTES # BLD AUTO: 0.12 THOUSAND/ÂΜL (ref 0.17–1.22)
MONOCYTES NFR BLD AUTO: 4 % (ref 4–12)
NEUTROPHILS # BLD AUTO: 2.63 THOUSANDS/ÂΜL (ref 1.85–7.62)
NEUTS SEG NFR BLD AUTO: 81 % (ref 43–75)
NRBC BLD AUTO-RTO: 0 /100 WBCS
PLATELET # BLD AUTO: 160 THOUSANDS/UL (ref 149–390)
PMV BLD AUTO: 12.2 FL (ref 8.9–12.7)
POTASSIUM SERPL-SCNC: 4.1 MMOL/L (ref 3.5–5.3)
PROT SERPL-MCNC: 6.3 G/DL (ref 6.4–8.4)
RBC # BLD AUTO: 4 MILLION/UL (ref 3.81–5.12)
SODIUM SERPL-SCNC: 142 MMOL/L (ref 135–147)
WBC # BLD AUTO: 3.22 THOUSAND/UL (ref 4.31–10.16)

## 2025-05-13 PROCEDURE — 36415 COLL VENOUS BLD VENIPUNCTURE: CPT

## 2025-05-13 PROCEDURE — 96372 THER/PROPH/DIAG INJ SC/IM: CPT

## 2025-05-13 PROCEDURE — 80053 COMPREHEN METABOLIC PANEL: CPT

## 2025-05-13 PROCEDURE — 85025 COMPLETE CBC W/AUTO DIFF WBC: CPT

## 2025-05-13 RX ADMIN — CYANOCOBALAMIN 1000 MCG: 1000 INJECTION, SOLUTION INTRAMUSCULAR; SUBCUTANEOUS at 10:41

## 2025-05-14 ENCOUNTER — TELEPHONE (OUTPATIENT)
Age: 75
End: 2025-05-14

## 2025-05-16 ENCOUNTER — DOCUMENTATION (OUTPATIENT)
Dept: HEMATOLOGY ONCOLOGY | Facility: CLINIC | Age: 75
End: 2025-05-16

## 2025-05-16 NOTE — PROGRESS NOTES
Obtained tavon funding:   Domino ID  [id number]  Reference Number  [id number] Duarte  [place]  Breast Cancer - Medicare Access  Assistance Type  Co-pay  Start Date  04/16/2025  End Date  04/15/2026  Card No.  [id number]  Card Status  Active  BIN  [id number]  N  [id number]   Group  [id number]      Tamika Culver MPH  Phone: [phone]  Email: [email or web address]

## 2025-05-20 ENCOUNTER — APPOINTMENT (OUTPATIENT)
Age: 75
End: 2025-05-20
Attending: INTERNAL MEDICINE
Payer: MEDICARE

## 2025-05-20 DIAGNOSIS — C50.811 MALIGNANT NEOPLASM OF OVERLAPPING SITES OF RIGHT BREAST IN FEMALE, ESTROGEN RECEPTOR POSITIVE (HCC): ICD-10-CM

## 2025-05-20 DIAGNOSIS — Z17.0 MALIGNANT NEOPLASM OF OVERLAPPING SITES OF RIGHT BREAST IN FEMALE, ESTROGEN RECEPTOR POSITIVE (HCC): ICD-10-CM

## 2025-05-20 LAB
ALBUMIN SERPL BCG-MCNC: 4.3 G/DL (ref 3.5–5)
ALP SERPL-CCNC: 69 U/L (ref 34–104)
ALT SERPL W P-5'-P-CCNC: 13 U/L (ref 7–52)
ANION GAP SERPL CALCULATED.3IONS-SCNC: 8 MMOL/L (ref 4–13)
AST SERPL W P-5'-P-CCNC: 21 U/L (ref 13–39)
BASOPHILS # BLD AUTO: 0.03 THOUSANDS/ÂΜL (ref 0–0.1)
BASOPHILS NFR BLD AUTO: 2 % (ref 0–1)
BILIRUB SERPL-MCNC: 1.26 MG/DL (ref 0.2–1)
BUN SERPL-MCNC: 18 MG/DL (ref 5–25)
CALCIUM SERPL-MCNC: 9 MG/DL (ref 8.4–10.2)
CHLORIDE SERPL-SCNC: 105 MMOL/L (ref 96–108)
CO2 SERPL-SCNC: 29 MMOL/L (ref 21–32)
CREAT SERPL-MCNC: 0.85 MG/DL (ref 0.6–1.3)
EOSINOPHIL # BLD AUTO: 0.04 THOUSAND/ÂΜL (ref 0–0.61)
EOSINOPHIL NFR BLD AUTO: 2 % (ref 0–6)
ERYTHROCYTE [DISTWIDTH] IN BLOOD BY AUTOMATED COUNT: 19.2 % (ref 11.6–15.1)
GFR SERPL CREATININE-BSD FRML MDRD: 67 ML/MIN/1.73SQ M
GLUCOSE SERPL-MCNC: 105 MG/DL (ref 65–140)
HCT VFR BLD AUTO: 35.6 % (ref 34.8–46.1)
HGB BLD-MCNC: 11.5 G/DL (ref 11.5–15.4)
IMM GRANULOCYTES # BLD AUTO: 0 THOUSAND/UL (ref 0–0.2)
IMM GRANULOCYTES NFR BLD AUTO: 0 % (ref 0–2)
LYMPHOCYTES # BLD AUTO: 0.37 THOUSANDS/ÂΜL (ref 0.6–4.47)
LYMPHOCYTES NFR BLD AUTO: 20 % (ref 14–44)
MCH RBC QN AUTO: 30 PG (ref 26.8–34.3)
MCHC RBC AUTO-ENTMCNC: 32.3 G/DL (ref 31.4–37.4)
MCV RBC AUTO: 93 FL (ref 82–98)
MONOCYTES # BLD AUTO: 0.11 THOUSAND/ÂΜL (ref 0.17–1.22)
MONOCYTES NFR BLD AUTO: 6 % (ref 4–12)
NEUTROPHILS # BLD AUTO: 1.29 THOUSANDS/ÂΜL (ref 1.85–7.62)
NEUTS SEG NFR BLD AUTO: 70 % (ref 43–75)
NRBC BLD AUTO-RTO: 0 /100 WBCS
PLATELET # BLD AUTO: 144 THOUSANDS/UL (ref 149–390)
PMV BLD AUTO: 12 FL (ref 8.9–12.7)
POTASSIUM SERPL-SCNC: 4.1 MMOL/L (ref 3.5–5.3)
PROT SERPL-MCNC: 6.3 G/DL (ref 6.4–8.4)
RBC # BLD AUTO: 3.83 MILLION/UL (ref 3.81–5.12)
SODIUM SERPL-SCNC: 142 MMOL/L (ref 135–147)
WBC # BLD AUTO: 1.84 THOUSAND/UL (ref 4.31–10.16)

## 2025-05-20 PROCEDURE — 80053 COMPREHEN METABOLIC PANEL: CPT

## 2025-05-20 PROCEDURE — 85025 COMPLETE CBC W/AUTO DIFF WBC: CPT

## 2025-05-20 PROCEDURE — 36415 COLL VENOUS BLD VENIPUNCTURE: CPT

## 2025-05-21 ENCOUNTER — RESULTS FOLLOW-UP (OUTPATIENT)
Dept: HEMATOLOGY ONCOLOGY | Facility: CLINIC | Age: 75
End: 2025-05-21

## 2025-05-21 DIAGNOSIS — C50.811 MALIGNANT NEOPLASM OF OVERLAPPING SITES OF RIGHT BREAST IN FEMALE, ESTROGEN RECEPTOR POSITIVE (HCC): Primary | ICD-10-CM

## 2025-05-21 DIAGNOSIS — Z17.0 MALIGNANT NEOPLASM OF OVERLAPPING SITES OF RIGHT BREAST IN FEMALE, ESTROGEN RECEPTOR POSITIVE (HCC): Primary | ICD-10-CM

## 2025-05-21 NOTE — TELEPHONE ENCOUNTER
----- Message from Ton Berumen MD sent at 5/21/2025  8:01 AM EDT -----  Will need repeat labs on Friday (CBC with diff)  ----- Message -----  From: Lab, Background User  Sent: 5/20/2025   6:47 PM EDT  To: Ton Berumen MD

## 2025-05-21 NOTE — TELEPHONE ENCOUNTER
Called and spoke with patient. She is made aware of low wbc and neutropenic precautions and discussed  about having repeat labs on Friday. Orders placed and she is aware to have done.

## 2025-05-23 ENCOUNTER — APPOINTMENT (OUTPATIENT)
Age: 75
End: 2025-05-23
Payer: MEDICARE

## 2025-05-23 DIAGNOSIS — C50.811 MALIGNANT NEOPLASM OF OVERLAPPING SITES OF RIGHT BREAST IN FEMALE, ESTROGEN RECEPTOR POSITIVE (HCC): ICD-10-CM

## 2025-05-23 DIAGNOSIS — Z17.0 MALIGNANT NEOPLASM OF OVERLAPPING SITES OF RIGHT BREAST IN FEMALE, ESTROGEN RECEPTOR POSITIVE (HCC): ICD-10-CM

## 2025-05-23 LAB
ALBUMIN SERPL BCG-MCNC: 4.5 G/DL (ref 3.5–5)
ALP SERPL-CCNC: 73 U/L (ref 34–104)
ALT SERPL W P-5'-P-CCNC: 13 U/L (ref 7–52)
ANION GAP SERPL CALCULATED.3IONS-SCNC: 9 MMOL/L (ref 4–13)
ANISOCYTOSIS BLD QL SMEAR: PRESENT
AST SERPL W P-5'-P-CCNC: 19 U/L (ref 13–39)
BASOPHILS # BLD MANUAL: 0.04 THOUSAND/UL (ref 0–0.1)
BASOPHILS NFR MAR MANUAL: 2 % (ref 0–1)
BILIRUB SERPL-MCNC: 1.24 MG/DL (ref 0.2–1)
BUN SERPL-MCNC: 22 MG/DL (ref 5–25)
CALCIUM SERPL-MCNC: 9.2 MG/DL (ref 8.4–10.2)
CHLORIDE SERPL-SCNC: 104 MMOL/L (ref 96–108)
CO2 SERPL-SCNC: 30 MMOL/L (ref 21–32)
CREAT SERPL-MCNC: 0.91 MG/DL (ref 0.6–1.3)
EOSINOPHIL # BLD MANUAL: 0.02 THOUSAND/UL (ref 0–0.4)
EOSINOPHIL NFR BLD MANUAL: 1 % (ref 0–6)
ERYTHROCYTE [DISTWIDTH] IN BLOOD BY AUTOMATED COUNT: 19.2 % (ref 11.6–15.1)
GFR SERPL CREATININE-BSD FRML MDRD: 61 ML/MIN/1.73SQ M
GLUCOSE P FAST SERPL-MCNC: 89 MG/DL (ref 65–99)
HCT VFR BLD AUTO: 36.4 % (ref 34.8–46.1)
HGB BLD-MCNC: 11.7 G/DL (ref 11.5–15.4)
LYMPHOCYTES # BLD AUTO: 0.38 THOUSAND/UL (ref 0.6–4.47)
LYMPHOCYTES # BLD AUTO: 15 % (ref 14–44)
MACROCYTES BLD QL AUTO: PRESENT
MCH RBC QN AUTO: 30.1 PG (ref 26.8–34.3)
MCHC RBC AUTO-ENTMCNC: 32.1 G/DL (ref 31.4–37.4)
MCV RBC AUTO: 94 FL (ref 82–98)
MONOCYTES # BLD AUTO: 0.1 THOUSAND/UL (ref 0–1.22)
MONOCYTES NFR BLD: 5 % (ref 4–12)
NEUTROPHILS # BLD MANUAL: 1.37 THOUSAND/UL (ref 1.85–7.62)
NEUTS BAND NFR BLD MANUAL: 2 % (ref 0–8)
NEUTS SEG NFR BLD AUTO: 70 % (ref 43–75)
OVALOCYTES BLD QL SMEAR: PRESENT
PLATELET # BLD AUTO: 112 THOUSANDS/UL (ref 149–390)
PLATELET BLD QL SMEAR: ABNORMAL
PMV BLD AUTO: 11.2 FL (ref 8.9–12.7)
POIKILOCYTOSIS BLD QL SMEAR: PRESENT
POLYCHROMASIA BLD QL SMEAR: PRESENT
POTASSIUM SERPL-SCNC: 3.6 MMOL/L (ref 3.5–5.3)
PROT SERPL-MCNC: 6.5 G/DL (ref 6.4–8.4)
RBC # BLD AUTO: 3.89 MILLION/UL (ref 3.81–5.12)
RBC MORPH BLD: PRESENT
SODIUM SERPL-SCNC: 143 MMOL/L (ref 135–147)
VARIANT LYMPHS # BLD AUTO: 5 %
WBC # BLD AUTO: 1.9 THOUSAND/UL (ref 4.31–10.16)

## 2025-05-23 PROCEDURE — 85027 COMPLETE CBC AUTOMATED: CPT

## 2025-05-23 PROCEDURE — 80053 COMPREHEN METABOLIC PANEL: CPT

## 2025-05-23 PROCEDURE — 85007 BL SMEAR W/DIFF WBC COUNT: CPT

## 2025-05-23 PROCEDURE — 36415 COLL VENOUS BLD VENIPUNCTURE: CPT

## 2025-05-27 ENCOUNTER — APPOINTMENT (OUTPATIENT)
Age: 75
End: 2025-05-27
Payer: MEDICARE

## 2025-05-27 DIAGNOSIS — C50.811 MALIGNANT NEOPLASM OF OVERLAPPING SITES OF RIGHT BREAST IN FEMALE, ESTROGEN RECEPTOR POSITIVE (HCC): ICD-10-CM

## 2025-05-27 DIAGNOSIS — Z17.0 MALIGNANT NEOPLASM OF OVERLAPPING SITES OF RIGHT BREAST IN FEMALE, ESTROGEN RECEPTOR POSITIVE (HCC): ICD-10-CM

## 2025-05-27 PROBLEM — Z85.3 ENCOUNTER FOR FOLLOW-UP SURVEILLANCE OF BREAST CANCER: Status: ACTIVE | Noted: 2025-05-27

## 2025-05-27 PROBLEM — Z08 ENCOUNTER FOR FOLLOW-UP SURVEILLANCE OF BREAST CANCER: Status: ACTIVE | Noted: 2025-05-27

## 2025-05-27 PROBLEM — Z90.13 HISTORY OF BILATERAL MASTECTOMY: Status: ACTIVE | Noted: 2025-05-27

## 2025-05-27 PROBLEM — Z79.811 USE OF ANASTROZOLE: Status: ACTIVE | Noted: 2025-05-27

## 2025-05-27 LAB
ALBUMIN SERPL BCG-MCNC: 4.2 G/DL (ref 3.5–5)
ALP SERPL-CCNC: 65 U/L (ref 34–104)
ALT SERPL W P-5'-P-CCNC: 12 U/L (ref 7–52)
ANION GAP SERPL CALCULATED.3IONS-SCNC: 8 MMOL/L (ref 4–13)
ANISOCYTOSIS BLD QL SMEAR: PRESENT
AST SERPL W P-5'-P-CCNC: 18 U/L (ref 13–39)
BASOPHILS # BLD MANUAL: 0.06 THOUSAND/UL (ref 0–0.1)
BASOPHILS NFR MAR MANUAL: 4 % (ref 0–1)
BILIRUB SERPL-MCNC: 1.21 MG/DL (ref 0.2–1)
BUN SERPL-MCNC: 14 MG/DL (ref 5–25)
CALCIUM SERPL-MCNC: 8.8 MG/DL (ref 8.4–10.2)
CHLORIDE SERPL-SCNC: 108 MMOL/L (ref 96–108)
CO2 SERPL-SCNC: 28 MMOL/L (ref 21–32)
CREAT SERPL-MCNC: 0.9 MG/DL (ref 0.6–1.3)
DIFFERENTIAL COMMENT: ABNORMAL
EOSINOPHIL # BLD MANUAL: 0.04 THOUSAND/UL (ref 0–0.4)
EOSINOPHIL NFR BLD MANUAL: 3 % (ref 0–6)
ERYTHROCYTE [DISTWIDTH] IN BLOOD BY AUTOMATED COUNT: 19.1 % (ref 11.6–15.1)
GFR SERPL CREATININE-BSD FRML MDRD: 62 ML/MIN/1.73SQ M
GLUCOSE P FAST SERPL-MCNC: 103 MG/DL (ref 65–99)
HCT VFR BLD AUTO: 34.7 % (ref 34.8–46.1)
HGB BLD-MCNC: 11.3 G/DL (ref 11.5–15.4)
LYMPHOCYTES # BLD AUTO: 0.29 THOUSAND/UL (ref 0.6–4.47)
LYMPHOCYTES # BLD AUTO: 20 % (ref 14–44)
MCH RBC QN AUTO: 30.2 PG (ref 26.8–34.3)
MCHC RBC AUTO-ENTMCNC: 32.6 G/DL (ref 31.4–37.4)
MCV RBC AUTO: 93 FL (ref 82–98)
MONOCYTES # BLD AUTO: 0.15 THOUSAND/UL (ref 0–1.22)
MONOCYTES NFR BLD: 10 % (ref 4–12)
NEUTROPHILS # BLD MANUAL: 0.92 THOUSAND/UL (ref 1.85–7.62)
NEUTS SEG NFR BLD AUTO: 63 % (ref 43–75)
NRBC BLD AUTO-RTO: 1 /100 WBC (ref 0–2)
PLATELET BLD QL SMEAR: ABNORMAL
POIKILOCYTOSIS BLD QL SMEAR: PRESENT
POTASSIUM SERPL-SCNC: 4.3 MMOL/L (ref 3.5–5.3)
PROT SERPL-MCNC: 5.7 G/DL (ref 6.4–8.4)
RBC # BLD AUTO: 3.74 MILLION/UL (ref 3.81–5.12)
RBC MORPH BLD: PRESENT
SMUDGE CELLS BLD QL SMEAR: PRESENT
SODIUM SERPL-SCNC: 144 MMOL/L (ref 135–147)
WBC # BLD AUTO: 1.46 THOUSAND/UL (ref 4.31–10.16)

## 2025-05-27 PROCEDURE — 85027 COMPLETE CBC AUTOMATED: CPT

## 2025-05-27 PROCEDURE — 85007 BL SMEAR W/DIFF WBC COUNT: CPT

## 2025-05-27 PROCEDURE — 80053 COMPREHEN METABOLIC PANEL: CPT

## 2025-05-27 PROCEDURE — 36415 COLL VENOUS BLD VENIPUNCTURE: CPT

## 2025-05-28 ENCOUNTER — RESULTS FOLLOW-UP (OUTPATIENT)
Dept: HEMATOLOGY ONCOLOGY | Facility: CLINIC | Age: 75
End: 2025-05-28

## 2025-05-28 NOTE — TELEPHONE ENCOUNTER
Called and spoke to patient today, she states she had understood she could take the Ibrance so she was taking it.   Discussed that we want her to hold until her counts come back up. Made her aware to have repeat labs on Monday and we will check to see if they have improved.

## 2025-05-28 NOTE — TELEPHONE ENCOUNTER
----- Message from Ton Berumen MD sent at 5/28/2025  8:22 AM EDT -----  Can we please check if she resumed her Ibrance?  ----- Message -----  From: Lab, Background User  Sent: 5/27/2025   7:30 PM EDT  To: Ton Berumen MD

## 2025-05-29 ENCOUNTER — OFFICE VISIT (OUTPATIENT)
Dept: SURGICAL ONCOLOGY | Facility: CLINIC | Age: 75
End: 2025-05-29
Payer: MEDICARE

## 2025-05-29 VITALS
HEART RATE: 70 BPM | TEMPERATURE: 98.3 F | HEIGHT: 66 IN | WEIGHT: 151 LBS | DIASTOLIC BLOOD PRESSURE: 60 MMHG | BODY MASS INDEX: 24.27 KG/M2 | SYSTOLIC BLOOD PRESSURE: 110 MMHG | OXYGEN SATURATION: 96 %

## 2025-05-29 DIAGNOSIS — C50.911 BREAST CANCER METASTASIZED TO AXILLARY LYMPH NODE, RIGHT (HCC): ICD-10-CM

## 2025-05-29 DIAGNOSIS — Z15.89 CHEK2 GENE MUTATION POSITIVE: ICD-10-CM

## 2025-05-29 DIAGNOSIS — Z17.0 MALIGNANT NEOPLASM OF OVERLAPPING SITES OF RIGHT BREAST IN FEMALE, ESTROGEN RECEPTOR POSITIVE (HCC): Primary | ICD-10-CM

## 2025-05-29 DIAGNOSIS — C77.3 BREAST CANCER METASTASIZED TO AXILLARY LYMPH NODE, RIGHT (HCC): ICD-10-CM

## 2025-05-29 DIAGNOSIS — C50.811 MALIGNANT NEOPLASM OF OVERLAPPING SITES OF RIGHT BREAST IN FEMALE, ESTROGEN RECEPTOR POSITIVE (HCC): Primary | ICD-10-CM

## 2025-05-29 DIAGNOSIS — Z08 ENCOUNTER FOR FOLLOW-UP SURVEILLANCE OF BREAST CANCER: ICD-10-CM

## 2025-05-29 DIAGNOSIS — Z79.811 USE OF ANASTROZOLE: ICD-10-CM

## 2025-05-29 DIAGNOSIS — Z85.3 ENCOUNTER FOR FOLLOW-UP SURVEILLANCE OF BREAST CANCER: ICD-10-CM

## 2025-05-29 DIAGNOSIS — Z12.11 SCREENING FOR COLON CANCER: ICD-10-CM

## 2025-05-29 DIAGNOSIS — Z90.13 HISTORY OF BILATERAL MASTECTOMY: ICD-10-CM

## 2025-05-29 DIAGNOSIS — C7A.8 NEUROENDOCRINE CARCINOMA OF STOMACH (HCC): ICD-10-CM

## 2025-05-29 PROCEDURE — 99215 OFFICE O/P EST HI 40 MIN: CPT | Performed by: SURGERY

## 2025-05-29 NOTE — PROGRESS NOTES
Name: Juve Duarte      : 1950      MRN: [id number]  Encounter Provider: Jevon Valdez MD  Encounter Date: 2025   Encounter department: [facility] [place]  :  Assessment & Plan  Encounter for follow-up surveillance of breast cancer         Breast cancer metastasized to axillary lymph node, right (HCC)         Malignant neoplasm of overlapping sites of right breast in female, estrogen receptor positive (HCC)    Orders:    Ambulatory Referral to Obstetrics / Gynecology; Future    CHEK2 gene mutation positive         Neuroendocrine carcinoma of stomach (HCC)    Orders:    Ambulatory Referral to Gastroenterology; Future    Use of anastrozole         History of bilateral mastectomy    Orders:    Ambulatory Referral to Obstetrics / Gynecology; Future    Screening for colon cancer         75-year-old female follow-up with stage IIIa right breast cancer s/p right modified radical mastectomy and left total mastectomy followed by right breast radiation.  Also with well-differentiated neuroendocrine gastric polyp.  She is on Ibrance and anastrozole.  Discussed with medical oncologist and concern for low hemoglobin due to Ibrance and they have instructed her to hold and repeat the blood next week.  Further reviewing her studies she also had a gastritis and she states she has tarry stools it will be best served with upper and lower endoscopy as well.  She has not had a colonoscopy for last 5 years.  She states she has tarry stools however she is on iron supplements.  At today's visit I do not appreciate any local regional recurrence.  We did discuss the benefit of anastrozole alternative and possible complications as well    Survivorship  Discussed symptoms related to disease recurrence, Yes     Evaluated for late effects related to cancer treatment, Yes     Screening current for cervical cancer, No     Screening current for colon cancer, If no, explain: Will refer  to gastroenterology     Cancer rehabilitation services addressed, Yes     Screening current for osteoporosis, Yes     Oncology Treatment Summary reviewed with patient and copy provided, Yes     Referral placed for psychosocial evaluation/screening to oncology social work  No    History of Present Illness   Juve Duarte is a 75 y.o. year old female who presents for follow-up with stage IIIa breast cancer.     Oncology History   Cancer Staging   Breast cancer metastasized to axillary lymph node, right (HCC)  Staging form: Breast, AJCC 8th Edition  - Clinical stage from 10/29/2024: Stage IIIA (cT2, cN2, cM0, G3, ER+, NV+, HER2-) - Signed by Jevon Valdez MD on 11/21/2024  Stage prefix: Initial diagnosis  Histologic grading system: 3 grade system  Oncology History   Breast cancer metastasized to axillary lymph node, right (HCC)   4/4/2024 Initial Diagnosis    April 4, 2024 patient had biopsy of right breast mass showing invasive ductal carcinoma.  Right axillary node showed metastatic carcinoma consistent with breast primary.  ER 90%, NV 5-7%, HER2 +2.  FISH negative.  Similar findings in the axillary node although NV was 30%.  HER2 +2, FISH negative.  April 18, 2024 patient had CT chest ab pelvis showing soft tissue nodules right breast.  Right infrahilar and internal mammary nodes indeterminate.  Right axillary nodes up to 0.5 cm.  2 enhancing soft tissue nodules in the gastric fundus.  Bone scan showed focus of activity at anterior right seventh rib.     4/4/2024 Biopsy    Breast, Right, US BX RT BREAST 700 7CMFN 3 PASSES 12G MARQU[place]:      - Invasive mammary carcinoma of no special type (ductal NST/invasive ductal carcinoma) with apocrine features, see note      - [place]sy grade 2 of 3 (total score: 6 of 9)          - Tubule formation: <10%, score 3          - Nuclear grade 2 of 3, score 2          - Mitoses < 3/mm2, (</= 7 mitoses/10HPF), score 1      - Invasive carcinoma involves 3 of 3 submitted core  biopsies, max. dimension = 5 millimeters      - Ductal carcinoma in situ (DCIS): Not identified      - Microcalcifications: Absent      - Lymphovascular invasion: Present     Breast, Right, US BX RT BREAST 900 7CMFN 3 PASSES 12G [place]:      - Invasive mammary carcinoma of no special type (ductal NST/invasive ductal carcinoma) with apocrine features, see note      - [place] grade 2 of 3 (total score: 6 of 9)          - Tubule formation: <10%, score 3          - Nuclear grade 2 of 3, score 2          - Mitoses < 3/mm2, (</= 7 mitoses/10HPF), score 1      - Invasive carcinoma involves 3 of 3 submitted core biopsies, max. dimension = 14 millimeters      - Ductal carcinoma in situ (DCIS): Not identified      - Microcalcifications: Absent      - Lymphovascular invasion: Not identified    Axillary lymph node, US BX RT AXILLARY TAIL LN 10:00 12CMFN 3 PASSES 12G [place]:      - Metastatic carcinoma consistent with mammary primary,    BREAST TUMOR PROGNOSTIC PROFILE     Performed on invasive carcinoma block E1:  Test Description Result Prognostic Interpretation   Estrogen Receptor/ER  Primary Antibody: SP-1  Internal control: Positive   External control: Positive 90%  Staining Intensity: Strong  Isabela Score*: 8 Positive   Progesterone Receptor/PgR  Primary Antibody:1E2  Internal control: Positive  External control: Positive 5-7%  Staining Intensity: Strong  Isabela Score*: 4 Positive   HER2 by IHC   Primary Antibody: 4B5 2+ Equivocal *      Performed on invasive carcinoma block F2:  Test Description Result Prognostic Interpretation   Estrogen Receptor/ER  Primary Antibody: SP-1  Internal control: Positive   External control: Positive 95%  Staining Intensity: Strong  Isabela Score*: 8 Positive   Progesterone Receptor/PgR  Primary Antibody:1E2  Internal control: Positive  External control: Positive 30%  Staining Intensity: Moderate  Isabela Score*: 5 Positive   HER2 by IHC   Primary Antibody: 4B5 2+ Equivocal *        Fluorescence in situ hybridization (FISH) analysis of HER2 overexpression by invasive breast carcinoma cells, block E1 [facility], [place], [place], yields the following results:  Test Description                        Interpretation  HER2 by FISH analysis:              Negative / Not Amplified.  Results  HER2: CEP-17 ratio :                  1.5: 1  Average HER2 Signal:                5.4  Average CEP-17 Signal:                          3.5  Number of selected invasive cells scanned           100     Fluorescence in situ hybridization (FISH) analysis of HER2 overexpression by invasive breast carcinoma cells, block F2 performed at [facility], [place], [place], yields the following results:  Test Description                        Interpretation  HER2 by FISH analysis:              Negative / Not Amplified.  Results  HER2: CEP-17 ratio :                  1.3: 1  Average HER2 Signal:                3.5  Average CEP-17 Signal:                          2.6  Number of selected invasive cells scanned           50        4/4/2024 Initial Diagnosis    Malignant neoplasm of overlapping sites of right breast in female, estrogen receptor positive (HCC)     4/18/2024 Genetic Testing    AMBRY  A total of 36 genes were evaluated, including: APC, MIKE, BARD 1, BMPR1A, BRCA1, BRCA2, BRIP1, CDH1, CDK4, CKDN2A, CHEK2, DICER1, MLH1, MSH2, MSH6, MUTYH, NBN, NF1, NTHL1, PALB2, PMS2, PTEN, RAD51C, RECQL, SMAD4, SMARCA4, STK11, TP53, AXIN2, HOXB13, MSH3, POLD1, AND POLE, EPCAM, AND GREM1     LIKELY PATHOGENIC VARIANT CHEK2  VUS SDHA     10/29/2024 Surgery    Right modified radical mastectomy left simple mastectomy    RIGHT  Multifocal residual invasive mammary carcinoma of no special type (ductal) s/p neoadjuvant chemotherapy  21 mm, 8 mm, 2 mm  Grade 3   Margins negative  ER 90  AZ 5  HER2 2+  FISH negative  4/4 Lymph nodes     Anatomic Stage IIIA  Prognostic Stage (use AJCC  update): N/A (status post neoadjuvant therapy).    LEFT  Benign fibroadenoma (0.8 cm), Benign fibrocystic changes with atypia (atypical lobular hyperplasia/ALH     10/29/2024 -  Cancer Staged    Staging form: Breast, AJCC 8th Edition  - Clinical stage from 10/29/2024: Stage IIIA (cT2, cN2, cM0, G3, ER+, HI+, HER2-) - Signed by Jevon Valdez MD on 11/21/2024  Stage prefix: Initial diagnosis  Histologic grading system: 3 grade system       1/7/2025 - 2/11/2025 Radiation    Treatments:  Course: C1  Plan ID Energy Fractions Dose per Fraction (cGy) Dose Correction (cGy) Total Dose Delivered (cGy) Elapsed Days   BH Axilla R 6X 25 / 25 200 0 5,000 35   BH CW R 6X 25 / 25 200 0 5,000 35   Treatment Dates:  1/7/2025 - 2/11/2025 2/2025 -  Hormone Therapy    Anastrozole 1mg  Dr. Berumen     Malignant neoplasm of overlapping sites of right breast in female, estrogen receptor positive (HCC)   4/4/2024 Biopsy    Breast, Right, US BX RT BREAST 700 7CMFN 3 PASSES 12G [place]:      - Invasive mammary carcinoma of no special type (ductal NST/invasive ductal carcinoma) with apocrine features, see note      - [place] grade 2 of 3 (total score: 6 of 9)          - Tubule formation: <10%, score 3          - Nuclear grade 2 of 3, score 2          - Mitoses < 3/mm2, (</= 7 mitoses/10HPF), score 1      - Invasive carcinoma involves 3 of 3 submitted core biopsies, max. dimension = 5 millimeters      - Ductal carcinoma in situ (DCIS): Not identified      - Microcalcifications: Absent      - Lymphovascular invasion: Present     Breast, Right, US BX RT BREAST 900 7CMFN 3 PASSES 12G [place]:      - Invasive mammary carcinoma of no special type (ductal NST/invasive ductal carcinoma) with apocrine features, see note      - [place] grade 2 of 3 (total score: 6 of 9)          - Tubule formation: <10%, score 3          - Nuclear grade 2 of 3, score 2          - Mitoses < 3/mm2, (</= 7 mitoses/10HPF), score 1      - Invasive  carcinoma involves 3 of 3 submitted core biopsies, max. dimension = 14 millimeters      - Ductal carcinoma in situ (DCIS): Not identified      - Microcalcifications: Absent      - Lymphovascular invasion: Not identified    Axillary lymph node, US BX RT AXILLARY TAIL LN 10:00 12CMFN 3 PASSES 12G [place]:      - Metastatic carcinoma consistent with mammary primary,    BREAST TUMOR PROGNOSTIC PROFILE     Performed on invasive carcinoma block E1:  Test Description Result Prognostic Interpretation   Estrogen Receptor/ER  Primary Antibody: SP-1  Internal control: Positive   External control: Positive 90%  Staining Intensity: Strong  Isabela Score*: 8 Positive   Progesterone Receptor/PgR  Primary Antibody:1E2  Internal control: Positive  External control: Positive 5-7%  Staining Intensity: Strong  Isabela Score*: 4 Positive   HER2 by IHC   Primary Antibody: 4B5 2+ Equivocal *      Performed on invasive carcinoma block F2:  Test Description Result Prognostic Interpretation   Estrogen Receptor/ER  Primary Antibody: SP-1  Internal control: Positive   External control: Positive 95%  Staining Intensity: Strong  Isabela Score*: 8 Positive   Progesterone Receptor/PgR  Primary Antibody:1E2  Internal control: Positive  External control: Positive 30%  Staining Intensity: Moderate  Isabela Score*: 5 Positive   HER2 by IHC   Primary Antibody: 4B5 2+ Equivocal *       Fluorescence in situ hybridization (FISH) analysis of HER2 overexpression by invasive breast carcinoma cells, block E1 performed at [facility], [place], [place], yields the following results:  Test Description                        Interpretation  HER2 by FISH analysis:              Negative / Not Amplified.  Results  HER2: CEP-17 ratio :                  1.5: 1  Average HER2 Signal:                5.4  Average CEP-17 Signal:                          3.5  Number of selected invasive cells scanned           100     Fluorescence in situ hybridization  (FISH) analysis of HER2 overexpression by invasive breast carcinoma cells, block F2 [facility], [place], [place], yields the following results:  Test Description                        Interpretation  HER2 by FISH analysis:              Negative / Not Amplified.  Results  HER2: CEP-17 ratio :                  1.3: 1  Average HER2 Signal:                3.5  Average CEP-17 Signal:                          2.6  Number of selected invasive cells scanned           50        4/4/2024 Initial Diagnosis    Malignant neoplasm of overlapping sites of right breast in female, estrogen receptor positive (HCC)     4/18/2024 Genetic Testing    AMBRY  A total of 36 genes were evaluated, including: APC, MIKE, BARD 1, BMPR1A, BRCA1, BRCA2, BRIP1, CDH1, CDK4, CKDN2A, CHEK2, DICER1, MLH1, MSH2, MSH6, MUTYH, NBN, NF1, NTHL1, PALB2, PMS2, PTEN, RAD51C, RECQL, SMAD4, SMARCA4, STK11, TP53, AXIN2, HOXB13, MSH3, POLD1, AND POLE, EPCAM, AND GREM1     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    LEFT  Benign fibroadenoma (0.8 cm), Benign fibrocystic changes with atypia (atypical lobular hyperplasia/ALH     1/7/2025 - 2/11/2025 Radiation    Treatments:  Course: C1  Plan ID Energy Fractions Dose per Fraction (cGy) Dose Correction (cGy) Total Dose Delivered (cGy) Elapsed Days   BH Axilla R 6X 25 / 25 200 0 5,000 35   BH CW R 6X 25 / 25 200 0 5,000 35   Treatment Dates:  1/7/2025 - 2/11/2025        Review of Systems   Constitutional:  Negative for chills and fever.   HENT:  Negative for ear pain and sore throat.    Eyes:   "Negative for pain and visual disturbance.   Respiratory:  Negative for cough and shortness of breath.    Cardiovascular:  Negative for chest pain and palpitations.   Gastrointestinal:  Negative for abdominal pain and vomiting.   Genitourinary:  Negative for dysuria and hematuria.   Musculoskeletal:  Negative for arthralgias and back pain.   Skin:  Negative for color change and rash.   Neurological:  Negative for seizures and syncope.   All other systems reviewed and are negative.   A complete review of systems is negative other than that noted above in the HPI.    Medical History Reviewed by provider this encounter:  Tobacco  Allergies  Meds  Problems  Med Hx  Surg Hx  Fam Hx     .       Objective   /60 (BP Location: Left arm, Patient Position: Sitting)   Pulse 70   Temp 98.3 °F (36.8 °C) (Temporal)   Ht 5' 6\" (1.676 m)   Wt 68.5 kg (151 lb)   SpO2 96%   BMI 24.37 kg/m²     Pain Screening:     ECOG    Physical Exam  Constitutional:       Appearance: Normal appearance.   HENT:      Head: Normocephalic and atraumatic.      Nose: Nose normal.      Mouth/Throat:      Mouth: Mucous membranes are moist.     Eyes:      Pupils: Pupils are equal, round, and reactive to light.       Cardiovascular:      Rate and Rhythm: Normal rate.      Pulses: Normal pulses.      Heart sounds: Normal heart sounds.   Pulmonary:      Effort: Pulmonary effort is normal.      Breath sounds: Normal breath sounds.   Chest:        Comments: Bilateral mastectomy flaps are clean intact.  No palpable nodules, mass or masses.  Bilateral axillary and supraclavicular examination no palpable adenopathy.  Patient was examined seated as well as supine position  Abdominal:      General: Bowel sounds are normal.      Palpations: Abdomen is soft.     Musculoskeletal:         General: Normal range of motion.      Cervical back: Normal range of motion and neck supple.     Skin:     General: Skin is warm.     Neurological:      General: No " focal deficit present.      Mental Status: She is alert and oriented to person, place, and time.     Psychiatric:         Mood and Affect: Mood normal.         Behavior: Behavior normal.         Thought Content: Thought content normal.         Judgment: Judgment normal.          Labs: I have reviewed pertinent labs.   Lab Results   Component Value Date/Time    WBC 1.46 (L) 05/27/2025 11:13 AM    RBC 3.74 (L) 05/27/2025 11:13 AM    Hemoglobin 11.3 (L) 05/27/2025 11:13 AM    Hematocrit 34.7 (L) 05/27/2025 11:13 AM    MCV 93 05/27/2025 11:13 AM    MCH 30.2 05/27/2025 11:13 AM    RDW 19.1 (H) 05/27/2025 11:13 AM    Platelets 112 (L) 05/23/2025 08:41 AM    Segmented % 70 05/20/2025 11:04 AM    Bands % 2 05/23/2025 08:41 AM    Lymphocytes % 20 05/27/2025 11:13 AM    Lymphocytes % 20 05/20/2025 11:04 AM    Monocytes % 10 05/27/2025 11:13 AM    Monocytes % 6 05/20/2025 11:04 AM    Eosinophils % 3 05/27/2025 11:13 AM    Eosinophils Relative 2 05/20/2025 11:04 AM    Basophils % 4 (H) 05/27/2025 11:13 AM    Basophils Relative 2 (H) 05/20/2025 11:04 AM    Immature Grans % 0 05/20/2025 11:04 AM    Absolute Neutrophils 1.29 (L) 05/20/2025 11:04 AM      Lab Results   Component Value Date/Time    Sodium 144 05/27/2025 11:13 AM    Potassium 4.3 05/27/2025 11:13 AM    Chloride 108 05/27/2025 11:13 AM    CO2 28 05/27/2025 11:13 AM    ANION GAP 8 05/27/2025 11:13 AM    BUN 14 05/27/2025 11:13 AM    Creatinine 0.90 05/27/2025 11:13 AM    Glucose 105 05/20/2025 11:04 AM    Glucose, Fasting 103 (H) 05/27/2025 11:13 AM    Calcium 8.8 05/27/2025 11:13 AM    AST 18 05/27/2025 11:13 AM    ALT 12 05/27/2025 11:13 AM    Alkaline Phosphatase 65 05/27/2025 11:13 AM    Total Protein 5.7 (L) 05/27/2025 11:13 AM    Albumin 4.2 05/27/2025 11:13 AM    Total Bilirubin 1.21 (H) 05/27/2025 11:13 AM    eGFR 62 05/27/2025 11:13 AM      Appointment on 05/27/2025   Component Date Value Ref Range Status    WBC 05/27/2025 1.46 (L)  4.31 - 10.16 Thousand/uL  Final    RBC 05/27/2025 3.74 (L)  3.81 - 5.12 Million/uL Final    Hemoglobin 05/27/2025 11.3 (L)  11.5 - 15.4 g/dL Final    Hematocrit 05/27/2025 34.7 (L)  34.8 - 46.1 % Final    MCV 05/27/2025 93  82 - 98 fL Final    MCH 05/27/2025 30.2  26.8 - 34.3 pg Final    MCHC 05/27/2025 32.6  31.4 - 37.4 g/dL Final    RDW 05/27/2025 19.1 (H)  11.6 - 15.1 % Final    Platelets 05/27/2025    Final    Not reported due to platelet clumping.    Sodium 05/27/2025 144  135 - 147 mmol/L Final    Potassium 05/27/2025 4.3  3.5 - 5.3 mmol/L Final    Chloride 05/27/2025 108  96 - 108 mmol/L Final    CO2 05/27/2025 28  21 - 32 mmol/L Final    ANION GAP 05/27/2025 8  4 - 13 mmol/L Final    BUN 05/27/2025 14  5 - 25 mg/dL Final    Creatinine 05/27/2025 0.90  0.60 - 1.30 mg/dL Final    Standardized to IDMS reference method    Glucose, Fasting 05/27/2025 103 (H)  65 - 99 mg/dL Final    Calcium 05/27/2025 8.8  8.4 - 10.2 mg/dL Final    AST 05/27/2025 18  13 - 39 U/L Final    ALT 05/27/2025 12  7 - 52 U/L Final    Specimen collection should occur prior to Sulfasalazine administration due to the potential for falsely depressed results.     Alkaline Phosphatase 05/27/2025 65  34 - 104 U/L Final    Total Protein 05/27/2025 5.7 (L)  6.4 - 8.4 g/dL Final    Albumin 05/27/2025 4.2  3.5 - 5.0 g/dL Final    Total Bilirubin 05/27/2025 1.21 (H)  0.20 - 1.00 mg/dL Final    Use of this assay is not recommended for patients undergoing treatment with eltrombopag due to the potential for falsely elevated results.  N-acetyl-p-benzoquinone imine (metabolite of Acetaminophen) will generate erroneously low results in samples for patients that have taken an overdose of Acetaminophen.    eGFR 05/27/2025 62  ml/min/1.73sq m Final    Segmented % 05/27/2025 63  43 - 75 % Final    Lymphocytes % 05/27/2025 20  14 - 44 % Final    Monocytes % 05/27/2025 10  4 - 12 % Final    Eosinophils % 05/27/2025 3  0 - 6 % Final    Basophils % 05/27/2025 4 (H)  0 - 1 % Final     Absolute Neutrophils 05/27/2025 0.92 (L)  1.85 - 7.62 Thousand/uL Final    Absolute Lymphocytes 05/27/2025 0.29 (L)  0.60 - 4.47 Thousand/uL Final    Absolute Monocytes 05/27/2025 0.15  0.00 - 1.22 Thousand/uL Final    Absolute Eosinophils 05/27/2025 0.04  0.00 - 0.40 Thousand/uL Final    Absolute Basophils 05/27/2025 0.06  0.00 - 0.10 Thousand/uL Final    nRBC 05/27/2025 1  0 - 2 /100 WBC Final    Smudge Cells 05/27/2025 Present   Final    RBC Morphology 05/27/2025 Present   Final    Platelet Estimate 05/27/2025 Unable to Estimate due to clumped platelets (A)  Adequate Final    Differential Comment 05/27/2025 see note   Final    Albumin Smear.    Anisocytosis 05/27/2025 Present   Final    Poikilocytes 05/27/2025 Present   Final   Appointment on 05/23/2025   Component Date Value Ref Range Status    WBC 05/23/2025 1.90 (L)  4.31 - 10.16 Thousand/uL Final    RBC 05/23/2025 3.89  3.81 - 5.12 Million/uL Final    Hemoglobin 05/23/2025 11.7  11.5 - 15.4 g/dL Final    Hematocrit 05/23/2025 36.4  34.8 - 46.1 % Final    MCV 05/23/2025 94  82 - 98 fL Final    MCH 05/23/2025 30.1  26.8 - 34.3 pg Final    MCHC 05/23/2025 32.1  31.4 - 37.4 g/dL Final    RDW 05/23/2025 19.2 (H)  11.6 - 15.1 % Final    MPV 05/23/2025 11.2  8.9 - 12.7 fL Final    Platelets 05/23/2025 112 (L)  149 - 390 Thousands/uL Final    Sodium 05/23/2025 143  135 - 147 mmol/L Final    Potassium 05/23/2025 3.6  3.5 - 5.3 mmol/L Final    Chloride 05/23/2025 104  96 - 108 mmol/L Final    CO2 05/23/2025 30  21 - 32 mmol/L Final    ANION GAP 05/23/2025 9  4 - 13 mmol/L Final    BUN 05/23/2025 22  5 - 25 mg/dL Final    Creatinine 05/23/2025 0.91  0.60 - 1.30 mg/dL Final    Standardized to IDMS reference method    Glucose, Fasting 05/23/2025 89  65 - 99 mg/dL Final    Calcium 05/23/2025 9.2  8.4 - 10.2 mg/dL Final    AST 05/23/2025 19  13 - 39 U/L Final    ALT 05/23/2025 13  7 - 52 U/L Final    Specimen collection should occur prior to Sulfasalazine administration due  to the potential for falsely depressed results.     Alkaline Phosphatase 05/23/2025 73  34 - 104 U/L Final    Total Protein 05/23/2025 6.5  6.4 - 8.4 g/dL Final    Albumin 05/23/2025 4.5  3.5 - 5.0 g/dL Final    Total Bilirubin 05/23/2025 1.24 (H)  0.20 - 1.00 mg/dL Final    Use of this assay is not recommended for patients undergoing treatment with eltrombopag due to the potential for falsely elevated results.  N-acetyl-p-benzoquinone imine (metabolite of Acetaminophen) will generate erroneously low results in samples for patients that have taken an overdose of Acetaminophen.    eGFR 05/23/2025 61  ml/min/1.73sq m Final    Segmented % 05/23/2025 70  43 - 75 % Final    Bands % 05/23/2025 2  0 - 8 % Final    Lymphocytes % 05/23/2025 15  14 - 44 % Final    Monocytes % 05/23/2025 5  4 - 12 % Final    Eosinophils % 05/23/2025 1  0 - 6 % Final    Basophils % 05/23/2025 2 (H)  0 - 1 % Final    Atypical Lymphocytes % 05/23/2025 5 (H)  <=0 % Final    Absolute Neutrophils 05/23/2025 1.37 (L)  1.85 - 7.62 Thousand/uL Final    Absolute Lymphocytes 05/23/2025 0.38 (L)  0.60 - 4.47 Thousand/uL Final    Absolute Monocytes 05/23/2025 0.10  0.00 - 1.22 Thousand/uL Final    Absolute Eosinophils 05/23/2025 0.02  0.00 - 0.40 Thousand/uL Final    Absolute Basophils 05/23/2025 0.04  0.00 - 0.10 Thousand/uL Final    RBC Morphology 05/23/2025 Present   Final    Platelet Estimate 05/23/2025 Borderline (A)  Adequate Final    Anisocytosis 05/23/2025 Present   Final    Macrocytes 05/23/2025 Present   Final    Ovalocytes 05/23/2025 Present   Final    Poikilocytes 05/23/2025 Present   Final    Polychromasia 05/23/2025 Present   Final   Appointment on 05/20/2025   Component Date Value Ref Range Status    WBC 05/20/2025 1.84 (L)  4.31 - 10.16 Thousand/uL Final    RBC 05/20/2025 3.83  3.81 - 5.12 Million/uL Final    Hemoglobin 05/20/2025 11.5  11.5 - 15.4 g/dL Final    Hematocrit 05/20/2025 35.6  34.8 - 46.1 % Final    MCV 05/20/2025 93  82 - 98  fL Final    MCH 05/20/2025 30.0  26.8 - 34.3 pg Final    MCHC 05/20/2025 32.3  31.4 - 37.4 g/dL Final    RDW 05/20/2025 19.2 (H)  11.6 - 15.1 % Final    MPV 05/20/2025 12.0  8.9 - 12.7 fL Final    Platelets 05/20/2025 144 (L)  149 - 390 Thousands/uL Final    nRBC 05/20/2025 0  /100 WBCs Final    Segmented % 05/20/2025 70  43 - 75 % Final    Immature Grans % 05/20/2025 0  0 - 2 % Final    Lymphocytes % 05/20/2025 20  14 - 44 % Final    Monocytes % 05/20/2025 6  4 - 12 % Final    Eosinophils Relative 05/20/2025 2  0 - 6 % Final    Basophils Relative 05/20/2025 2 (H)  0 - 1 % Final    Absolute Neutrophils 05/20/2025 1.29 (L)  1.85 - 7.62 Thousands/µL Final    Absolute Immature Grans 05/20/2025 0.00  0.00 - 0.20 Thousand/uL Final    Absolute Lymphocytes 05/20/2025 0.37 (L)  0.60 - 4.47 Thousands/µL Final    Absolute Monocytes 05/20/2025 0.11 (L)  0.17 - 1.22 Thousand/µL Final    Eosinophils Absolute 05/20/2025 0.04  0.00 - 0.61 Thousand/µL Final    Basophils Absolute 05/20/2025 0.03  0.00 - 0.10 Thousands/µL Final    Sodium 05/20/2025 142  135 - 147 mmol/L Final    Potassium 05/20/2025 4.1  3.5 - 5.3 mmol/L Final    Chloride 05/20/2025 105  96 - 108 mmol/L Final    CO2 05/20/2025 29  21 - 32 mmol/L Final    ANION GAP 05/20/2025 8  4 - 13 mmol/L Final    BUN 05/20/2025 18  5 - 25 mg/dL Final    Creatinine 05/20/2025 0.85  0.60 - 1.30 mg/dL Final    Standardized to IDMS reference method    Glucose 05/20/2025 105  65 - 140 mg/dL Final    If the patient is fasting, the ADA then defines impaired fasting glucose as > 100 mg/dL and diabetes as > or equal to 123 mg/dL.    Calcium 05/20/2025 9.0  8.4 - 10.2 mg/dL Final    AST 05/20/2025 21  13 - 39 U/L Final    ALT 05/20/2025 13  7 - 52 U/L Final    Specimen collection should occur prior to Sulfasalazine administration due to the potential for falsely depressed results.     Alkaline Phosphatase 05/20/2025 69  34 - 104 U/L Final    Total Protein 05/20/2025 6.3 (L)  6.4 - 8.4 g/dL  Final    Albumin 05/20/2025 4.3  3.5 - 5.0 g/dL Final    Total Bilirubin 05/20/2025 1.26 (H)  0.20 - 1.00 mg/dL Final    Use of this assay is not recommended for patients undergoing treatment with eltrombopag due to the potential for falsely elevated results.  N-acetyl-p-benzoquinone imine (metabolite of Acetaminophen) will generate erroneously low results in samples for patients that have taken an overdose of Acetaminophen.    eGFR 05/20/2025 67  ml/min/1.73sq m Final   Appointment on 05/13/2025   Component Date Value Ref Range Status    WBC 05/13/2025 3.22 (L)  4.31 - 10.16 Thousand/uL Final    RBC 05/13/2025 4.00  3.81 - 5.12 Million/uL Final    Hemoglobin 05/13/2025 11.7  11.5 - 15.4 g/dL Final    Hematocrit 05/13/2025 36.2  34.8 - 46.1 % Final    MCV 05/13/2025 91  82 - 98 fL Final    MCH 05/13/2025 29.3  26.8 - 34.3 pg Final    MCHC 05/13/2025 32.3  31.4 - 37.4 g/dL Final    RDW 05/13/2025 19.9 (H)  11.6 - 15.1 % Final    MPV 05/13/2025 12.2  8.9 - 12.7 fL Final    Platelets 05/13/2025 160  149 - 390 Thousands/uL Final    nRBC 05/13/2025 0  /100 WBCs Final    Segmented % 05/13/2025 81 (H)  43 - 75 % Final    Immature Grans % 05/13/2025 0  0 - 2 % Final    Lymphocytes % 05/13/2025 11 (L)  14 - 44 % Final    Monocytes % 05/13/2025 4  4 - 12 % Final    Eosinophils Relative 05/13/2025 2  0 - 6 % Final    Basophils Relative 05/13/2025 2 (H)  0 - 1 % Final    Absolute Neutrophils 05/13/2025 2.63  1.85 - 7.62 Thousands/µL Final    Absolute Immature Grans 05/13/2025 0.01  0.00 - 0.20 Thousand/uL Final    Absolute Lymphocytes 05/13/2025 0.36 (L)  0.60 - 4.47 Thousands/µL Final    Absolute Monocytes 05/13/2025 0.12 (L)  0.17 - 1.22 Thousand/µL Final    Eosinophils Absolute 05/13/2025 0.05  0.00 - 0.61 Thousand/µL Final    Basophils Absolute 05/13/2025 0.05  0.00 - 0.10 Thousands/µL Final    Sodium 05/13/2025 142  135 - 147 mmol/L Final    Potassium 05/13/2025 4.1  3.5 - 5.3 mmol/L Final    Chloride 05/13/2025 104  96 -  108 mmol/L Final    CO2 05/13/2025 29  21 - 32 mmol/L Final    ANION GAP 05/13/2025 9  4 - 13 mmol/L Final    BUN 05/13/2025 20  5 - 25 mg/dL Final    Creatinine 05/13/2025 0.89  0.60 - 1.30 mg/dL Final    Standardized to IDMS reference method    Glucose 05/13/2025 78  65 - 140 mg/dL Final    If the patient is fasting, the ADA then defines impaired fasting glucose as > 100 mg/dL and diabetes as > or equal to 123 mg/dL.    Calcium 05/13/2025 9.5  8.4 - 10.2 mg/dL Final    AST 05/13/2025 24  13 - 39 U/L Final    ALT 05/13/2025 15  7 - 52 U/L Final    Specimen collection should occur prior to Sulfasalazine administration due to the potential for falsely depressed results.     Alkaline Phosphatase 05/13/2025 77  34 - 104 U/L Final    Total Protein 05/13/2025 6.3 (L)  6.4 - 8.4 g/dL Final    Albumin 05/13/2025 4.3  3.5 - 5.0 g/dL Final    Total Bilirubin 05/13/2025 1.18 (H)  0.20 - 1.00 mg/dL Final    Use of this assay is not recommended for patients undergoing treatment with eltrombopag due to the potential for falsely elevated results.  N-acetyl-p-benzoquinone imine (metabolite of Acetaminophen) will generate erroneously low results in samples for patients that have taken an overdose of Acetaminophen.    eGFR 05/13/2025 63  ml/min/1.73sq m Final   Appointment on 05/06/2025   Component Date Value Ref Range Status    WBC 05/06/2025 2.53 (L)  4.31 - 10.16 Thousand/uL Final    RBC 05/06/2025 3.93  3.81 - 5.12 Million/uL Final    Hemoglobin 05/06/2025 11.6  11.5 - 15.4 g/dL Final    Hematocrit 05/06/2025 35.6  34.8 - 46.1 % Final    MCV 05/06/2025 91  82 - 98 fL Final    MCH 05/06/2025 29.5  26.8 - 34.3 pg Final    MCHC 05/06/2025 32.6  31.4 - 37.4 g/dL Final    RDW 05/06/2025 21.1 (H)  11.6 - 15.1 % Final    MPV 05/06/2025 12.3  8.9 - 12.7 fL Final    Platelets 05/06/2025 126 (L)  149 - 390 Thousands/uL Final    nRBC 05/06/2025 0  /100 WBCs Final    Segmented % 05/06/2025 65  43 - 75 % Final    Immature Grans % 05/06/2025  1  0 - 2 % Final    Lymphocytes % 05/06/2025 17  14 - 44 % Final    Monocytes % 05/06/2025 13 (H)  4 - 12 % Final    Eosinophils Relative 05/06/2025 1  0 - 6 % Final    Basophils Relative 05/06/2025 3 (H)  0 - 1 % Final    Absolute Neutrophils 05/06/2025 1.67 (L)  1.85 - 7.62 Thousands/µL Final    Absolute Immature Grans 05/06/2025 0.02  0.00 - 0.20 Thousand/uL Final    Absolute Lymphocytes 05/06/2025 0.42 (L)  0.60 - 4.47 Thousands/µL Final    Absolute Monocytes 05/06/2025 0.32  0.17 - 1.22 Thousand/µL Final    Eosinophils Absolute 05/06/2025 0.03  0.00 - 0.61 Thousand/µL Final    Basophils Absolute 05/06/2025 0.07  0.00 - 0.10 Thousands/µL Final     Pathology: I have reviewed pathology reports described above.    Radiology Results Review : No pertinent imaging studies reviewed.  [place]: yes    Administrative Statements   I have spent a total time of 40 minutes in caring for this patient on the day of the visit/encounter including Prognosis, Risks and benefits of tx options, Instructions for management, Patient and family education, Importance of tx compliance, Risk factor reductions, Impressions, Counseling / Coordination of care, Documenting in the medical record, Reviewing/placing orders in the medical record (including tests, medications, and/or procedures), Obtaining or reviewing history  , and Communicating with other healthcare professionals .

## 2025-05-30 ENCOUNTER — TELEPHONE (OUTPATIENT)
Dept: HEMATOLOGY ONCOLOGY | Facility: CLINIC | Age: 75
End: 2025-05-30

## 2025-05-30 NOTE — TELEPHONE ENCOUNTER
LMOM to inform Juve of change in their upcoming appointment with Dr Berumen on 7/7  as provider is ooo that day. Left detailed message with new appt date and time and provided hope line # if needed to change new appt.

## 2025-06-01 DIAGNOSIS — E61.1 IRON DEFICIENCY: ICD-10-CM

## 2025-06-02 ENCOUNTER — APPOINTMENT (OUTPATIENT)
Age: 75
End: 2025-06-02
Attending: INTERNAL MEDICINE
Payer: MEDICARE

## 2025-06-02 ENCOUNTER — OFFICE VISIT (OUTPATIENT)
Dept: HEMATOLOGY ONCOLOGY | Facility: CLINIC | Age: 75
End: 2025-06-02
Payer: MEDICARE

## 2025-06-02 VITALS
SYSTOLIC BLOOD PRESSURE: 156 MMHG | DIASTOLIC BLOOD PRESSURE: 64 MMHG | BODY MASS INDEX: 24.43 KG/M2 | HEART RATE: 72 BPM | TEMPERATURE: 97.7 F | RESPIRATION RATE: 16 BRPM | OXYGEN SATURATION: 95 % | WEIGHT: 152 LBS | HEIGHT: 66 IN

## 2025-06-02 DIAGNOSIS — E78.2 MIXED HYPERLIPIDEMIA: ICD-10-CM

## 2025-06-02 DIAGNOSIS — Z15.89 CHEK2 GENE MUTATION POSITIVE: ICD-10-CM

## 2025-06-02 DIAGNOSIS — C50.811 MALIGNANT NEOPLASM OF OVERLAPPING SITES OF RIGHT BREAST IN FEMALE, ESTROGEN RECEPTOR POSITIVE (HCC): ICD-10-CM

## 2025-06-02 DIAGNOSIS — I10 ESSENTIAL HYPERTENSION: ICD-10-CM

## 2025-06-02 DIAGNOSIS — Z90.13 HISTORY OF BILATERAL MASTECTOMY: ICD-10-CM

## 2025-06-02 DIAGNOSIS — C77.3 BREAST CANCER METASTASIZED TO AXILLARY LYMPH NODE, RIGHT (HCC): Primary | ICD-10-CM

## 2025-06-02 DIAGNOSIS — Z08 ENCOUNTER FOR FOLLOW-UP SURVEILLANCE OF BREAST CANCER: ICD-10-CM

## 2025-06-02 DIAGNOSIS — C50.911 BREAST CANCER METASTASIZED TO AXILLARY LYMPH NODE, RIGHT (HCC): Primary | ICD-10-CM

## 2025-06-02 DIAGNOSIS — Z17.0 MALIGNANT NEOPLASM OF OVERLAPPING SITES OF RIGHT BREAST IN FEMALE, ESTROGEN RECEPTOR POSITIVE (HCC): ICD-10-CM

## 2025-06-02 DIAGNOSIS — Z85.3 ENCOUNTER FOR FOLLOW-UP SURVEILLANCE OF BREAST CANCER: ICD-10-CM

## 2025-06-02 DIAGNOSIS — C7A.8 NEUROENDOCRINE CARCINOMA OF STOMACH (HCC): ICD-10-CM

## 2025-06-02 LAB
ANISOCYTOSIS BLD QL SMEAR: PRESENT
BASOPHILS # BLD AUTO: 0.03 THOUSANDS/ÂΜL (ref 0–0.1)
BASOPHILS NFR BLD AUTO: 2 % (ref 0–1)
EOSINOPHIL # BLD AUTO: 0.02 THOUSAND/ÂΜL (ref 0–0.61)
EOSINOPHIL NFR BLD AUTO: 1 % (ref 0–6)
ERYTHROCYTE [DISTWIDTH] IN BLOOD BY AUTOMATED COUNT: 18.6 % (ref 11.6–15.1)
HCT VFR BLD AUTO: 35 % (ref 34.8–46.1)
HGB BLD-MCNC: 11.5 G/DL (ref 11.5–15.4)
IMM GRANULOCYTES # BLD AUTO: 0 THOUSAND/UL (ref 0–0.2)
IMM GRANULOCYTES NFR BLD AUTO: 0 % (ref 0–2)
LYMPHOCYTES # BLD AUTO: 0.38 THOUSANDS/ÂΜL (ref 0.6–4.47)
LYMPHOCYTES NFR BLD AUTO: 27 % (ref 14–44)
MCH RBC QN AUTO: 30.7 PG (ref 26.8–34.3)
MCHC RBC AUTO-ENTMCNC: 32.9 G/DL (ref 31.4–37.4)
MCV RBC AUTO: 94 FL (ref 82–98)
MONOCYTES # BLD AUTO: 0.18 THOUSAND/ÂΜL (ref 0.17–1.22)
MONOCYTES NFR BLD AUTO: 13 % (ref 4–12)
NEUTROPHILS # BLD AUTO: 0.81 THOUSANDS/ÂΜL (ref 1.85–7.62)
NEUTS SEG NFR BLD AUTO: 57 % (ref 43–75)
NRBC BLD AUTO-RTO: 0 /100 WBCS
PLATELET # BLD AUTO: 91 THOUSANDS/UL (ref 149–390)
PLATELET BLD QL SMEAR: ABNORMAL
PMV BLD AUTO: 11.6 FL (ref 8.9–12.7)
RBC # BLD AUTO: 3.74 MILLION/UL (ref 3.81–5.12)
RBC MORPH BLD: PRESENT
WBC # BLD AUTO: 1.42 THOUSAND/UL (ref 4.31–10.16)

## 2025-06-02 PROCEDURE — G2211 COMPLEX E/M VISIT ADD ON: HCPCS | Performed by: INTERNAL MEDICINE

## 2025-06-02 PROCEDURE — 36415 COLL VENOUS BLD VENIPUNCTURE: CPT

## 2025-06-02 PROCEDURE — 85025 COMPLETE CBC W/AUTO DIFF WBC: CPT

## 2025-06-02 PROCEDURE — 99214 OFFICE O/P EST MOD 30 MIN: CPT | Performed by: INTERNAL MEDICINE

## 2025-06-02 NOTE — ASSESSMENT & PLAN NOTE
Cancer Staging   Breast cancer metastasized to axillary lymph node, right (HCC)  Staging form: Breast, AJCC 8th Edition  - Clinical stage from 10/29/2024: Stage IIIA (cT2, cN2, cM0, G3, ER+, CO+, HER2-) - Signed by Jevon Valdez MD on 11/21/2024     Status post bilateral mastectomy and right axillary lymph node dissection followed by radiation treatment that was completed on 2/11/2025.  Given the fact the patient had multiple foci of invasive disease, grade 3 with 4 lymph nodes positive she was started on adjuvant palbociclib and Arimidex after completing radiation.  Continue Arimidex 1 mg daily.  I have currently on hold due to neutropenia.  Labs from earlier today are pending.  Will follow-up on the labs and advise the patient if she could resume Ibrance.  This is her week of.  If counts are stable she could resume Ibrance.  Continue calcium and vitamin D supplements.  Follows with Dr. Valdez.

## 2025-06-02 NOTE — PROGRESS NOTES
Name: Juve Duarte      : 1950      MRN: [id number]  Encounter Provider: Ton Berumen MD  Encounter Date: 2025   Encounter department: [facility]AT[facility] [place]  :  Assessment & Plan  Breast cancer metastasized to axillary lymph node, right (HCC)   Cancer Staging   Breast cancer metastasized to axillary lymph node, right (HCC)  Staging form: Breast, AJCC 8th Edition  - Clinical stage from 10/29/2024: Stage IIIA (cT2, cN2, cM0, G3, ER+, [place]+, HER2-) - Signed by Jevon Valdez MD on 2024     Status post bilateral mastectomy and right axillary lymph node dissection followed by radiation treatment that was completed on 2025.  Given the fact the patient had multiple foci of invasive disease, grade 3 with 4 lymph nodes positive she was started on adjuvant palbociclib and Arimidex after completing radiation.  Continue Arimidex 1 mg daily.  I have currently on hold due to neutropenia.  Labs from earlier today are pending.  Will follow-up on the labs and advise the patient if she could resume Ibrance.  This is her week of.  If counts are stable she could resume Ibrance.  Continue calcium and vitamin D supplements.  Follows with Dr. Valdez.          Malignant neoplasm of overlapping sites of right breast in female, estrogen receptor positive (HCC)  As above       Encounter for follow-up surveillance of breast cancer  As above       History of bilateral mastectomy  As above       Mixed hyperlipidemia  Continue statins as per PCP.            CHEK2 gene mutation positive            Essential hypertension  Continue management as per PCP.          Neuroendocrine carcinoma of stomach (HCC)  Follows with Dr. Up.  She cancelled her last appointment with Dr. Up and did not reschedule.  Advised the patient to call Dr. Up's office and reschedule the appointment.                  Return in about 3 months (around 2025) for Office Visit.    History of Present  Illness   Chief Complaint   Patient presents with    Follow-up   HPI:  75-year-old female patient with hormone receptor positive stage IIIa (ypT2, N2a, c M0) disease status post bilateral mastectomy and right axillary lymph node dissection.  Was treated with neoadjuvant anastrozole.  Completed adjuvant radiation on 2/11/2025.  Was started on Arimidex and Ibrance.  Also had synchronous low-grade gastric neuroendocrine tumor.  Follows with Dr. Up.  History of Present Illness  The patient is a 75-year-old female with breast cancer who presents today for follow-up.    She has not yet initiated her Ibrance regimen and is currently in a 7-day break period. She reports no new symptoms since the last consultation, including diarrhea, chest pain, or shortness of breath. Rib pain is noted, which she attributes to her sleeping position, and she has not reported this symptom to Dr. Valdez. She reports no headaches, lightheadedness, dizziness, mouth sores, difficulty swallowing, night sweats, or weight loss. Weight gain has been noted. No abdominal pain is reported.    She had a consultation with Dr. Valdez on Friday, who recommended a colonoscopy. Dark stools have been experienced since starting iron supplementation on 03/11/2025. Her diet includes protein shakes, chicken, and turkey, but excludes fish. Monthly B12 injections are received, with the most recent one administered 05/13. She is under the care of Dr. Up for gastrointestinal issues and is yet to schedule an appointment.    Oncology History   Cancer Staging   Breast cancer metastasized to axillary lymph node, right (HCC)  Staging form: Breast, AJCC 8th Edition  - Clinical stage from 10/29/2024: Stage IIIA (cT2, cN2, cM0, G3, ER+, [place]+, HER2-) - Signed by Jevon Valdez MD on 11/21/2024  Stage prefix: Initial diagnosis  Histologic grading system: 3 grade system  Oncology History   Breast cancer metastasized to axillary lymph node, right (HCC)   4/4/2024  Initial Diagnosis    April 4, 2024 patient had biopsy of right breast mass showing invasive ductal carcinoma.  Right axillary node showed metastatic carcinoma consistent with breast primary.  ER 90%, [place] 5-7%, HER2 +2.  FISH negative.  Similar findings in the axillary node although [place] was 30%.  HER2 +2, FISH negative.  April 18, 2024 patient had CT chest ab pelvis showing soft tissue nodules right breast.  Right infrahilar and internal mammary nodes indeterminate.  Right axillary nodes up to 0.5 cm.  2 enhancing soft tissue nodules in the gastric fundus.  Bone scan showed focus of activity at anterior right seventh rib.     4/4/2024 Biopsy    Breast, Right, US BX RT BREAST 700 7CMFN 3 PASSES 12G [place]:      - Invasive mammary carcinoma of no special type (ductal NST/invasive ductal carcinoma) with apocrine features, see note      - [place] grade 2 of 3 (total score: 6 of 9)          - Tubule formation: <10%, score 3          - Nuclear grade 2 of 3, score 2          - Mitoses < 3/mm2, (</= 7 mitoses/10HPF), score 1      - Invasive carcinoma involves 3 of 3 submitted core biopsies, max. dimension = 5 millimeters      - Ductal carcinoma in situ (DCIS): Not identified      - Microcalcifications: Absent      - Lymphovascular invasion: Present     Breast, Right, US BX RT BREAST 900 7CMFN 3 PASSES 12G [place]:      - Invasive mammary carcinoma of no special type (ductal NST/invasive ductal carcinoma) with apocrine features, see note      - [place] grade 2 of 3 (total score: 6 of 9)          - Tubule formation: <10%, score 3          - Nuclear grade 2 of 3, score 2          - Mitoses < 3/mm2, (</= 7 mitoses/10HPF), score 1      - Invasive carcinoma involves 3 of 3 submitted core biopsies, max. dimension = 14 millimeters      - Ductal carcinoma in situ (DCIS): Not identified      - Microcalcifications: Absent      - Lymphovascular invasion: Not identified    Axillary lymph node, US BX RT AXILLARY TAIL LN 10:00 12CMFN  3 PASSES 12G [place]:      - Metastatic carcinoma consistent with mammary primary,    BREAST TUMOR PROGNOSTIC PROFILE     Performed on invasive carcinoma block E1:  Test Description Result Prognostic Interpretation   Estrogen Receptor/ER  Primary Antibody: SP-1  Internal control: Positive   External control: Positive 90%  Staining Intensity: Strong  Isabela Score*: 8 Positive   Progesterone Receptor/PgR  Primary Antibody:1E2  Internal control: Positive  External control: Positive 5-7%  Staining Intensity: Strong  Isabela Score*: 4 Positive   HER2 by IHC   Primary Antibody: 4B5 2+ Equivocal *      Performed on invasive carcinoma block F2:  Test Description Result Prognostic Interpretation   Estrogen Receptor/ER  Primary Antibody: SP-1  Internal control: Positive   External control: Positive 95%  Staining Intensity: Strong  Isabela Score*: 8 Positive   Progesterone Receptor/PgR  Primary Antibody:1E2  Internal control: Positive  External control: Positive 30%  Staining Intensity: Moderate  Isabela Score*: 5 Positive   HER2 by IHC   Primary Antibody: 4B5 2+ Equivocal *       Fluorescence in situ hybridization (FISH) analysis of HER2 overexpression by invasive breast carcinoma cells, block E1 performed at [facility], [place], [place], yields the following results:  Test Description                        Interpretation  HER2 by FISH analysis:              Negative / Not Amplified.  Results  HER2: CEP-17 ratio :                  1.5: 1  Average HER2 Signal:                5.4  Average CEP-17 Signal:                          3.5  Number of selected invasive cells scanned           100     Fluorescence in situ hybridization (FISH) analysis of HER2 overexpression by invasive breast carcinoma cells, block F2 performed at [facility], [place], [place], yields the following results:  Test Description                        Interpretation  HER2 by FISH analysis:              Negative / Not  Amplified.  Results  HER2: CEP-17 ratio :                  1.3: 1  Average HER2 Signal:                3.5  Average CEP-17 Signal:                          2.6  Number of selected invasive cells scanned           50        4/4/2024 Initial Diagnosis    Malignant neoplasm of overlapping sites of right breast in female, estrogen receptor positive (HCC)     4/18/2024 Genetic Testing    AMBRY  A total of 36 genes were evaluated, including: APC, MIKE, BARD 1, BMPR1A, BRCA1, BRCA2, BRIP1, CDH1, CDK4, CKDN2A, CHEK2, DICER1, MLH1, MSH2, MSH6, MUTYH, NBN, NF1, NTHL1, PALB2, PMS2, PTEN, RAD51C, RECQL, SMAD4, SMARCA4, STK11, TP53, AXIN2, HOXB13, MSH3, POLD1, AND POLE, EPCAM, AND GREM1     LIKELY PATHOGENIC VARIANT CHEK2  VUS SDHA     10/29/2024 Surgery    Right modified radical mastectomy left simple mastectomy    RIGHT  Multifocal residual invasive mammary carcinoma of no special type (ductal) s/p neoadjuvant chemotherapy  21 mm, 8 mm, 2 mm  Grade 3   Margins negative  ER 90  GA 5  HER2 2+  FISH negative  4/4 Lymph nodes     Anatomic Stage IIIA  Prognostic Stage (use AJCC update): N/A (status post neoadjuvant therapy).    LEFT  Benign fibroadenoma (0.8 cm), Benign fibrocystic changes with atypia (atypical lobular hyperplasia/ALH     10/29/2024 -  Cancer Staged    Staging form: Breast, AJCC 8th Edition  - Clinical stage from 10/29/2024: Stage IIIA (cT2, cN2, cM0, G3, ER+, GA+, HER2-) - Signed by Jevon Valdez MD on 11/21/2024  Stage prefix: Initial diagnosis  Histologic grading system: 3 grade system       1/7/2025 - 2/11/2025 Radiation    Treatments:  Course: C1  Plan ID Energy Fractions Dose per Fraction (cGy) Dose Correction (cGy) Total Dose Delivered (cGy) Elapsed Days   BH Axilla R 6X 25 / 25 200 0 5,000 35   BH CW R 6X 25 / 25 200 0 5,000 35   Treatment Dates:  1/7/2025 - 2/11/2025 2/2025 -  Hormone Therapy    Anastrozole 1mg  Dr. Berumen     Malignant neoplasm of overlapping sites of right breast in  female, estrogen receptor positive (HCC)   4/4/2024 Biopsy    Breast, Right, US BX RT BREAST 700 7CMFN 3 PASSES 12G [place]:      - Invasive mammary carcinoma of no special type (ductal NST/invasive ductal carcinoma) with apocrine features, see note      - [place] grade 2 of 3 (total score: 6 of 9)          - Tubule formation: <10%, score 3          - Nuclear grade 2 of 3, score 2          - Mitoses < 3/mm2, (</= 7 mitoses/10HPF), score 1      - Invasive carcinoma involves 3 of 3 submitted core biopsies, max. dimension = 5 millimeters      - Ductal carcinoma in situ (DCIS): Not identified      - Microcalcifications: Absent      - Lymphovascular invasion: Present     Breast, Right, US BX RT BREAST 900 7CMFN 3 PASSES 12G [place]:      - Invasive mammary carcinoma of no special type (ductal NST/invasive ductal carcinoma) with apocrine features, see note      - [place] grade 2 of 3 (total score: 6 of 9)          - Tubule formation: <10%, score 3          - Nuclear grade 2 of 3, score 2          - Mitoses < 3/mm2, (</= 7 mitoses/10HPF), score 1      - Invasive carcinoma involves 3 of 3 submitted core biopsies, max. dimension = 14 millimeters      - Ductal carcinoma in situ (DCIS): Not identified      - Microcalcifications: Absent      - Lymphovascular invasion: Not identified    Axillary lymph node, US BX RT AXILLARY TAIL LN 10:00 12CMFN 3 PASSES 12G [place]:      - Metastatic carcinoma consistent with mammary primary,    BREAST TUMOR PROGNOSTIC PROFILE     Performed on invasive carcinoma block E1:  Test Description Result Prognostic Interpretation   Estrogen Receptor/ER  Primary Antibody: SP-1  Internal control: Positive   External control: Positive 90%  Staining Intensity: Strong  Isabela Score*: 8 Positive   Progesterone Receptor/PgR  Primary Antibody:1E2  Internal control: Positive  External control: Positive 5-7%  Staining Intensity: Strong  Isabela Score*: 4 Positive   HER2 by IHC   Primary Antibody: 4B5 2+  Equivocal *      Performed on invasive carcinoma block F2:  Test Description Result Prognostic Interpretation   Estrogen Receptor/ER  Primary Antibody: SP-1  Internal control: Positive   External control: Positive 95%  Staining Intensity: Strong  Isabela Score*: 8 Positive   Progesterone Receptor/PgR  Primary Antibody:1E2  Internal control: Positive  External control: Positive 30%  Staining Intensity: Moderate  Isabela Score*: 5 Positive   HER2 by IHC   Primary Antibody: 4B5 2+ Equivocal *       Fluorescence in situ hybridization (FISH) analysis of HER2 overexpression by invasive breast carcinoma cells, block E1 [facility] [place] [place], yields the following results:  Test Description                        Interpretation  HER2 by FISH analysis:              Negative / Not Amplified.  Results  HER2: CEP-17 ratio :                  1.5: 1  Average HER2 Signal:                5.4  Average CEP-17 Signal:                          3.5  Number of selected invasive cells scanned           100     Fluorescence in situ hybridization (FISH) analysis of HER2 overexpression by invasive breast carcinoma cells, block F2 performed at [facility], [place], [place], yields the following results:  Test Description                        Interpretation  HER2 by FISH analysis:              Negative / Not Amplified.  Results  HER2: CEP-17 ratio :                  1.3: 1  Average HER2 Signal:                3.5  Average CEP-17 Signal:                          2.6  Number of selected invasive cells scanned           50        4/4/2024 Initial Diagnosis    Malignant neoplasm of overlapping sites of right breast in female, estrogen receptor positive (HCC)     4/18/2024 Genetic Testing    AMBRY  A total of 36 genes were evaluated, including: APC, MIKE, BARD 1, BMPR1A, BRCA1, BRCA2, BRIP1, CDH1, CDK4, CKDN2A, CHEK2, DICER1, MLH1, MSH2, MSH6, MUTYH, NBN, NF1, NTHL1, PALB2, PMS2, PTEN,  "RAD51C, RECQL, SMAD4, SMARCA4, STK11, TP53, AXIN2, HOXB13, MSH3, POLD1, AND POLE, EPCAM, AND GREM1     LIKELY PATHOGENIC VARIANT CHEK2  VUS SDHA     10/29/2024 Surgery    Right modified radical mastectomy left simple mastectomy    RIGHT  Multifocal residual invasive mammary carcinoma of no special type (ductal) s/p neoadjuvant chemotherapy  21 mm, 8 mm, 2 mm  Grade 3   Margins negative  ER 90  SC 5  HER2 2+  FISH negative  4/4 Lymph nodes     Anatomic Stage IIIA  Prognostic Stage (use AJCC update): N/A (status post neoadjuvant therapy).    LEFT  Benign fibroadenoma (0.8 cm), Benign fibrocystic changes with atypia (atypical lobular hyperplasia/ALH     1/7/2025 - 2/11/2025 Radiation    Treatments:  Course: C1  Plan ID Energy Fractions Dose per Fraction (cGy) Dose Correction (cGy) Total Dose Delivered (cGy) Elapsed Days   BH Axilla R 6X 25 / 25 200 0 5,000 35   BH CW R 6X 25 / 25 200 0 5,000 35   Treatment Dates:  1/7/2025 - 2/11/2025        Pertinent Medical History     06/02/25: Reviewed     Review of Systems  14 point review of systems was negative except that mentioned in HPI.        Objective   /64   Pulse 72   Temp 97.7 °F (36.5 °C) (Temporal)   Resp 16   Ht 5' 6\" (1.676 m)   Wt 68.9 kg (152 lb)   SpO2 95%   BMI 24.53 kg/m²     Pain Screening:  Pain Score: 0-No pain  ECOG   1  Physical Exam  Vitals and nursing note reviewed.   Constitutional:       General: She is not in acute distress.     Appearance: Normal appearance.   HENT:      Head: Normocephalic and atraumatic.      Mouth/Throat:      Mouth: Mucous membranes are moist.      Pharynx: Oropharynx is clear.     Eyes:      General: No scleral icterus.     Extraocular Movements: Extraocular movements intact.      Conjunctiva/sclera: Conjunctivae normal.       Cardiovascular:      Rate and Rhythm: Normal rate and regular rhythm.      Pulses: Normal pulses.      Heart sounds: No murmur heard.  Pulmonary:      Effort: Pulmonary effort is normal.      " Breath sounds: Normal breath sounds. No wheezing, rhonchi or rales.   Chest:   Breasts:     Right: Absent.      Left: Absent.   Abdominal:      General: Bowel sounds are normal.      Palpations: Abdomen is soft.      Tenderness: There is no abdominal tenderness.     Musculoskeletal:         General: No swelling or tenderness. Normal range of motion.      Cervical back: Neck supple.   Lymphadenopathy:      Cervical: No cervical adenopathy.      Upper Body:      Right upper body: No supraclavicular or axillary adenopathy.      Left upper body: No supraclavicular or axillary adenopathy.     Skin:     General: Skin is warm and dry.      Coloration: Skin is not pale.      Findings: No bruising, erythema or rash.     Neurological:      General: No focal deficit present.      Mental Status: She is alert and oriented to person, place, and time.      Motor: No weakness.     Psychiatric:         Mood and Affect: Mood normal.         Behavior: Behavior normal.       Physical Exam  Constitutional: No fever.  Respiratory: No shortness of breath.  Gastrointestinal: No nausea, vomiting, or diarrhea.  Musculoskeletal: No joint pains or muscle aches.  Integument/Skin: No rash.      ENT: Oropharynx is clear. No mouth sores.  Respiratory: Lungs are clear to auscultation bilaterally.    Labs: I have reviewed the following labs:  Lab Results   Component Value Date/Time    WBC 1.46 (L) 05/27/2025 11:13 AM    RBC 3.74 (L) 05/27/2025 11:13 AM    Hemoglobin 11.3 (L) 05/27/2025 11:13 AM    Hematocrit 34.7 (L) 05/27/2025 11:13 AM    MCV 93 05/27/2025 11:13 AM    MCH 30.2 05/27/2025 11:13 AM    RDW 19.1 (H) 05/27/2025 11:13 AM    Platelets 112 (L) 05/23/2025 08:41 AM    Segmented % 70 05/20/2025 11:04 AM    Lymphocytes % 20 05/27/2025 11:13 AM    Lymphocytes % 20 05/20/2025 11:04 AM    Monocytes % 10 05/27/2025 11:13 AM    Monocytes % 6 05/20/2025 11:04 AM    Eosinophils % 3 05/27/2025 11:13 AM    Eosinophils Relative 2 05/20/2025 11:04 AM     Basophils % 4 (H) 05/27/2025 11:13 AM    Basophils Relative 2 (H) 05/20/2025 11:04 AM    Immature Grans % 0 05/20/2025 11:04 AM    Absolute Neutrophils 1.29 (L) 05/20/2025 11:04 AM     Lab Results   Component Value Date/Time    Potassium 4.3 05/27/2025 11:13 AM    Chloride 108 05/27/2025 11:13 AM    CO2 28 05/27/2025 11:13 AM    BUN 14 05/27/2025 11:13 AM    Creatinine 0.90 05/27/2025 11:13 AM    Glucose, Fasting 103 (H) 05/27/2025 11:13 AM    Calcium 8.8 05/27/2025 11:13 AM    AST 18 05/27/2025 11:13 AM    ALT 12 05/27/2025 11:13 AM    Alkaline Phosphatase 65 05/27/2025 11:13 AM    Total Protein 5.7 (L) 05/27/2025 11:13 AM    Albumin 4.2 05/27/2025 11:13 AM    Total Bilirubin 1.21 (H) 05/27/2025 11:13 AM    eGFR 62 05/27/2025 11:13 AM             Disclaimer: This document was prepared using [employer] technology. If a word or phrase is confusing, or does not make sense, this is likely due to recognition error which was not discovered during this clinician's review. If you believe an error has occurred, please contact me through [facility] service line for will?cation.      Follow Up    All questions were answered to the patient's satisfaction during this encounter. The patient knows the contact information for our office and knows to reach out for any relevant concerns related to this encounter. They are to call for any temperature 100.4 or higher, new symptoms including but not restricted to shaking chills, decreased appetite, nausea, vomiting, diarrhea, increased fatigue, shortness of breath or chest pain, confusion, and not feeling the strength to come to the clinic. For all other listed problems and medical diagnosis in their chart - they are managed by PCP and/or other specialists, which the patient acknowledges.     I spent 38 minutes reviewing the records (labs, clinician notes, outside records, medical history, ordering medicine/tests/procedures, monitoring of anti-neoplastic toxicities, interpreting  the imaging/labs previously done) and coordination of care as well as direct time with the patient today, of which greater than 50% of the time was spent in counseling and coordination of care with the patient/family.

## 2025-06-03 ENCOUNTER — TELEPHONE (OUTPATIENT)
Dept: HEMATOLOGY ONCOLOGY | Facility: CLINIC | Age: 75
End: 2025-06-03

## 2025-06-03 RX ORDER — FERROUS SULFATE 324(65)MG
324 TABLET, DELAYED RELEASE (ENTERIC COATED) ORAL
Qty: 180 TABLET | Refills: 0 | Status: SHIPPED | OUTPATIENT
Start: 2025-06-03

## 2025-06-03 NOTE — TELEPHONE ENCOUNTER
Called and spoke to patient and made her aware to continue holding the Ibrance and to repeat labs next week and depending on results we would have her resume. Pt verbalizes that she is to continue holding pills.

## 2025-06-05 DIAGNOSIS — E78.2 MIXED HYPERLIPIDEMIA: ICD-10-CM

## 2025-06-06 RX ORDER — PRAVASTATIN SODIUM 40 MG
40 TABLET ORAL
Qty: 90 TABLET | Refills: 1 | Status: SHIPPED | OUTPATIENT
Start: 2025-06-06

## 2025-06-09 ENCOUNTER — APPOINTMENT (OUTPATIENT)
Age: 75
End: 2025-06-09
Payer: MEDICARE

## 2025-06-09 DIAGNOSIS — C77.3 BREAST CANCER METASTASIZED TO AXILLARY LYMPH NODE, RIGHT (HCC): ICD-10-CM

## 2025-06-09 DIAGNOSIS — C50.911 BREAST CANCER METASTASIZED TO AXILLARY LYMPH NODE, RIGHT (HCC): ICD-10-CM

## 2025-06-09 LAB
ALBUMIN SERPL BCG-MCNC: 4.3 G/DL (ref 3.5–5)
ALP SERPL-CCNC: 65 U/L (ref 34–104)
ALT SERPL W P-5'-P-CCNC: 13 U/L (ref 7–52)
ANION GAP SERPL CALCULATED.3IONS-SCNC: 9 MMOL/L (ref 4–13)
AST SERPL W P-5'-P-CCNC: 21 U/L (ref 13–39)
BASOPHILS # BLD AUTO: 0.05 THOUSANDS/ÂΜL (ref 0–0.1)
BASOPHILS NFR BLD AUTO: 2 % (ref 0–1)
BILIRUB SERPL-MCNC: 1.1 MG/DL (ref 0.2–1)
BUN SERPL-MCNC: 12 MG/DL (ref 5–25)
CALCIUM SERPL-MCNC: 9.2 MG/DL (ref 8.4–10.2)
CHLORIDE SERPL-SCNC: 104 MMOL/L (ref 96–108)
CO2 SERPL-SCNC: 31 MMOL/L (ref 21–32)
CREAT SERPL-MCNC: 0.73 MG/DL (ref 0.6–1.3)
EOSINOPHIL # BLD AUTO: 0.01 THOUSAND/ÂΜL (ref 0–0.61)
EOSINOPHIL NFR BLD AUTO: 1 % (ref 0–6)
ERYTHROCYTE [DISTWIDTH] IN BLOOD BY AUTOMATED COUNT: 17.2 % (ref 11.6–15.1)
GFR SERPL CREATININE-BSD FRML MDRD: 80 ML/MIN/1.73SQ M
GLUCOSE SERPL-MCNC: 91 MG/DL (ref 65–140)
HCT VFR BLD AUTO: 35.1 % (ref 34.8–46.1)
HGB BLD-MCNC: 11.5 G/DL (ref 11.5–15.4)
IMM GRANULOCYTES # BLD AUTO: 0.02 THOUSAND/UL (ref 0–0.2)
IMM GRANULOCYTES NFR BLD AUTO: 1 % (ref 0–2)
LYMPHOCYTES # BLD AUTO: 0.37 THOUSANDS/ÂΜL (ref 0.6–4.47)
LYMPHOCYTES NFR BLD AUTO: 18 % (ref 14–44)
MCH RBC QN AUTO: 30.6 PG (ref 26.8–34.3)
MCHC RBC AUTO-ENTMCNC: 32.8 G/DL (ref 31.4–37.4)
MCV RBC AUTO: 93 FL (ref 82–98)
MONOCYTES # BLD AUTO: 0.23 THOUSAND/ÂΜL (ref 0.17–1.22)
MONOCYTES NFR BLD AUTO: 11 % (ref 4–12)
NEUTROPHILS # BLD AUTO: 1.43 THOUSANDS/ÂΜL (ref 1.85–7.62)
NEUTS SEG NFR BLD AUTO: 67 % (ref 43–75)
NRBC BLD AUTO-RTO: 0 /100 WBCS
PLATELET # BLD AUTO: 111 THOUSANDS/UL (ref 149–390)
PMV BLD AUTO: 11 FL (ref 8.9–12.7)
POTASSIUM SERPL-SCNC: 3.6 MMOL/L (ref 3.5–5.3)
PROT SERPL-MCNC: 6.1 G/DL (ref 6.4–8.4)
RBC # BLD AUTO: 3.76 MILLION/UL (ref 3.81–5.12)
SODIUM SERPL-SCNC: 144 MMOL/L (ref 135–147)
WBC # BLD AUTO: 2.11 THOUSAND/UL (ref 4.31–10.16)

## 2025-06-09 PROCEDURE — 36415 COLL VENOUS BLD VENIPUNCTURE: CPT

## 2025-06-09 PROCEDURE — 85025 COMPLETE CBC W/AUTO DIFF WBC: CPT

## 2025-06-09 PROCEDURE — 80053 COMPREHEN METABOLIC PANEL: CPT

## 2025-06-10 ENCOUNTER — CLINICAL SUPPORT (OUTPATIENT)
Age: 75
End: 2025-06-10
Payer: MEDICARE

## 2025-06-10 DIAGNOSIS — D51.0 PERNICIOUS ANEMIA: Primary | ICD-10-CM

## 2025-06-10 PROCEDURE — 96372 THER/PROPH/DIAG INJ SC/IM: CPT

## 2025-06-10 RX ADMIN — CYANOCOBALAMIN 1000 MCG: 1000 INJECTION, SOLUTION INTRAMUSCULAR; SUBCUTANEOUS at 09:56

## 2025-06-12 ENCOUNTER — TELEPHONE (OUTPATIENT)
Age: 75
End: 2025-06-12

## 2025-06-12 NOTE — TELEPHONE ENCOUNTER
Attempted to call patient and left vm confirming that I did discuss with Dr Berumen and she is to resume taking Ibrance at same dose and have repeat labs done in two weeks.  [facility] line number was left to return our call at their earliest convenience.

## 2025-06-12 NOTE — TELEPHONE ENCOUNTER
"PROVIDER: Dr James    Pt calling had labs repeated 6/9 and was wondering if she can restart her Ibrance.  Secure chat to Patricia RN/office and informed pt the following information from her:  \"yes she can resume- her counts improved but will speak with dr singh and if any changes will call her in a few min\"    Pt understood.  "

## 2025-06-24 ENCOUNTER — TELEPHONE (OUTPATIENT)
Dept: HEMATOLOGY ONCOLOGY | Facility: CLINIC | Age: 75
End: 2025-06-24

## 2025-06-24 NOTE — TELEPHONE ENCOUNTER
Attempted to call patient and left voicemail regarding lab draw. She was made aware she should be due for her labs this week per our last conversation so wanted to remind her to please get done.  [facility] line number was left to return our call at their earliest convenience.

## 2025-06-24 NOTE — TELEPHONE ENCOUNTER
Patient is returning the call from Patricia, she is aware that she has blood work due.  She will be getting it done this week.

## 2025-06-25 ENCOUNTER — APPOINTMENT (OUTPATIENT)
Age: 75
End: 2025-06-25
Attending: INTERNAL MEDICINE
Payer: MEDICARE

## 2025-06-25 DIAGNOSIS — C77.3 BREAST CANCER METASTASIZED TO AXILLARY LYMPH NODE, RIGHT (HCC): ICD-10-CM

## 2025-06-25 DIAGNOSIS — C50.911 BREAST CANCER METASTASIZED TO AXILLARY LYMPH NODE, RIGHT (HCC): ICD-10-CM

## 2025-06-25 LAB
ALBUMIN SERPL BCG-MCNC: 4.3 G/DL (ref 3.5–5)
ALP SERPL-CCNC: 63 U/L (ref 34–104)
ALT SERPL W P-5'-P-CCNC: 16 U/L (ref 7–52)
ANION GAP SERPL CALCULATED.3IONS-SCNC: 7 MMOL/L (ref 4–13)
AST SERPL W P-5'-P-CCNC: 24 U/L (ref 13–39)
BASOPHILS # BLD MANUAL: 0.04 THOUSAND/UL (ref 0–0.1)
BASOPHILS NFR MAR MANUAL: 2 % (ref 0–1)
BILIRUB SERPL-MCNC: 1.75 MG/DL (ref 0.2–1)
BUN SERPL-MCNC: 15 MG/DL (ref 5–25)
CALCIUM SERPL-MCNC: 9 MG/DL (ref 8.4–10.2)
CHLORIDE SERPL-SCNC: 106 MMOL/L (ref 96–108)
CO2 SERPL-SCNC: 29 MMOL/L (ref 21–32)
CREAT SERPL-MCNC: 0.9 MG/DL (ref 0.6–1.3)
EOSINOPHIL # BLD MANUAL: 0.02 THOUSAND/UL (ref 0–0.4)
EOSINOPHIL NFR BLD MANUAL: 1 % (ref 0–6)
ERYTHROCYTE [DISTWIDTH] IN BLOOD BY AUTOMATED COUNT: 15.3 % (ref 11.6–15.1)
GFR SERPL CREATININE-BSD FRML MDRD: 62 ML/MIN/1.73SQ M
GLUCOSE P FAST SERPL-MCNC: 91 MG/DL (ref 65–99)
HCT VFR BLD AUTO: 34.4 % (ref 34.8–46.1)
HGB BLD-MCNC: 11.5 G/DL (ref 11.5–15.4)
LYMPHOCYTES # BLD AUTO: 0.36 THOUSAND/UL (ref 0.6–4.47)
LYMPHOCYTES # BLD AUTO: 20 % (ref 14–44)
MCH RBC QN AUTO: 31.3 PG (ref 26.8–34.3)
MCHC RBC AUTO-ENTMCNC: 33.4 G/DL (ref 31.4–37.4)
MCV RBC AUTO: 94 FL (ref 82–98)
MONOCYTES # BLD AUTO: 0.09 THOUSAND/UL (ref 0–1.22)
MONOCYTES NFR BLD: 5 % (ref 4–12)
NEUTROPHILS # BLD MANUAL: 1.3 THOUSAND/UL (ref 1.85–7.62)
NEUTS SEG NFR BLD AUTO: 72 % (ref 43–75)
PLATELET # BLD AUTO: 136 THOUSANDS/UL (ref 149–390)
PLATELET BLD QL SMEAR: ABNORMAL
PMV BLD AUTO: 11.8 FL (ref 8.9–12.7)
POTASSIUM SERPL-SCNC: 4.1 MMOL/L (ref 3.5–5.3)
PROT SERPL-MCNC: 6 G/DL (ref 6.4–8.4)
RBC # BLD AUTO: 3.68 MILLION/UL (ref 3.81–5.12)
RBC MORPH BLD: NORMAL
SODIUM SERPL-SCNC: 142 MMOL/L (ref 135–147)
WBC # BLD AUTO: 1.81 THOUSAND/UL (ref 4.31–10.16)

## 2025-06-25 PROCEDURE — 80053 COMPREHEN METABOLIC PANEL: CPT

## 2025-06-25 PROCEDURE — 85007 BL SMEAR W/DIFF WBC COUNT: CPT

## 2025-06-25 PROCEDURE — 85027 COMPLETE CBC AUTOMATED: CPT

## 2025-06-25 PROCEDURE — 36415 COLL VENOUS BLD VENIPUNCTURE: CPT

## 2025-06-26 ENCOUNTER — TELEPHONE (OUTPATIENT)
Dept: HEMATOLOGY ONCOLOGY | Facility: CLINIC | Age: 75
End: 2025-06-26

## 2025-06-26 NOTE — TELEPHONE ENCOUNTER
----- Message from Jaylyn Steel PA-C sent at 6/26/2025  3:38 PM EDT -----    ----- Message -----  From: Jaylyn Steel PA-C  Sent: 6/26/2025  11:58 AM EDT  To: Jaylyn Steel PA-C; Ton Monetro#    Okay to continue Ibrance. Repeat labs again in 2 weeks.  ----- Message -----  From: Ton Berumen MD  Sent: 6/26/2025   9:25 AM EDT  To: Jaylyn Steel PA-C      ----- Message -----  From: Patricia Smith RN  Sent: 6/26/2025   8:25 AM EDT  To: Ton Berumen MD    Her labs dropped slightly, anc is still 1.30. let me know if you advise any change. She goes for labs every 2 weeks

## 2025-06-26 NOTE — TELEPHONE ENCOUNTER
.Attempted to call patient and left voicemail regarding lab results and making her aware she is to continue taking same dosage and repeat labs in 2 week.  [facility] line number was left to return our call at their earliest convenience.

## 2025-06-26 NOTE — TELEPHONE ENCOUNTER
----- Message from Jaylyn Steel PA-C sent at 6/26/2025  3:38 PM EDT -----    ----- Message -----  From: Jaylyn Steel PA-C  Sent: 6/26/2025  11:58 AM EDT  To: Jaylyn Steel PA-C; Ton Montero#    Okay to continue Ibrance. Repeat labs again in 2 weeks.  ----- Message -----  From: Ton Berumen MD  Sent: 6/26/2025   9:25 AM EDT  To: Jaylyn Steel PA-C      ----- Message -----  From: Patricia Smith RN  Sent: 6/26/2025   8:25 AM EDT  To: Ton Berumen MD    Her labs dropped slightly, anc is still 1.30. let me know if you advise any change. She goes for labs every 2 weeks

## 2025-06-28 PROBLEM — Z12.11 SCREENING FOR COLON CANCER: Status: RESOLVED | Noted: 2020-09-09 | Resolved: 2025-06-28

## 2025-07-08 ENCOUNTER — APPOINTMENT (OUTPATIENT)
Age: 75
End: 2025-07-08
Payer: MEDICARE

## 2025-07-08 ENCOUNTER — CLINICAL SUPPORT (OUTPATIENT)
Age: 75
End: 2025-07-08
Payer: MEDICARE

## 2025-07-08 DIAGNOSIS — D51.0 PERNICIOUS ANEMIA: Primary | ICD-10-CM

## 2025-07-08 DIAGNOSIS — C77.3 BREAST CANCER METASTASIZED TO AXILLARY LYMPH NODE, RIGHT (HCC): ICD-10-CM

## 2025-07-08 DIAGNOSIS — C50.911 BREAST CANCER METASTASIZED TO AXILLARY LYMPH NODE, RIGHT (HCC): ICD-10-CM

## 2025-07-08 LAB
ALBUMIN SERPL BCG-MCNC: 4.6 G/DL (ref 3.5–5)
ALP SERPL-CCNC: 64 U/L (ref 34–104)
ALT SERPL W P-5'-P-CCNC: 11 U/L (ref 7–52)
ANION GAP SERPL CALCULATED.3IONS-SCNC: 7 MMOL/L (ref 4–13)
AST SERPL W P-5'-P-CCNC: 18 U/L (ref 13–39)
BASOPHILS # BLD AUTO: 0.04 THOUSANDS/ÂΜL (ref 0–0.1)
BASOPHILS NFR BLD AUTO: 3 % (ref 0–1)
BILIRUB SERPL-MCNC: 1.65 MG/DL (ref 0.2–1)
BUN SERPL-MCNC: 16 MG/DL (ref 5–25)
CALCIUM SERPL-MCNC: 9.1 MG/DL (ref 8.4–10.2)
CHLORIDE SERPL-SCNC: 103 MMOL/L (ref 96–108)
CO2 SERPL-SCNC: 30 MMOL/L (ref 21–32)
CREAT SERPL-MCNC: 0.89 MG/DL (ref 0.6–1.3)
EOSINOPHIL # BLD AUTO: 0.01 THOUSAND/ÂΜL (ref 0–0.61)
EOSINOPHIL NFR BLD AUTO: 1 % (ref 0–6)
ERYTHROCYTE [DISTWIDTH] IN BLOOD BY AUTOMATED COUNT: 15.7 % (ref 11.6–15.1)
GFR SERPL CREATININE-BSD FRML MDRD: 63 ML/MIN/1.73SQ M
GLUCOSE SERPL-MCNC: 113 MG/DL (ref 65–140)
HCT VFR BLD AUTO: 34 % (ref 34.8–46.1)
HGB BLD-MCNC: 11.4 G/DL (ref 11.5–15.4)
IMM GRANULOCYTES # BLD AUTO: 0.02 THOUSAND/UL (ref 0–0.2)
IMM GRANULOCYTES NFR BLD AUTO: 1 % (ref 0–2)
LYMPHOCYTES # BLD AUTO: 0.38 THOUSANDS/ÂΜL (ref 0.6–4.47)
LYMPHOCYTES NFR BLD AUTO: 27 % (ref 14–44)
MCH RBC QN AUTO: 32 PG (ref 26.8–34.3)
MCHC RBC AUTO-ENTMCNC: 33.5 G/DL (ref 31.4–37.4)
MCV RBC AUTO: 96 FL (ref 82–98)
MONOCYTES # BLD AUTO: 0.2 THOUSAND/ÂΜL (ref 0.17–1.22)
MONOCYTES NFR BLD AUTO: 14 % (ref 4–12)
NEUTROPHILS # BLD AUTO: 0.78 THOUSANDS/ÂΜL (ref 1.85–7.62)
NEUTS SEG NFR BLD AUTO: 54 % (ref 43–75)
NRBC BLD AUTO-RTO: 0 /100 WBCS
PLATELET # BLD AUTO: 104 THOUSANDS/UL (ref 149–390)
PMV BLD AUTO: 11.1 FL (ref 8.9–12.7)
POTASSIUM SERPL-SCNC: 3.7 MMOL/L (ref 3.5–5.3)
PROT SERPL-MCNC: 6.5 G/DL (ref 6.4–8.4)
RBC # BLD AUTO: 3.56 MILLION/UL (ref 3.81–5.12)
SODIUM SERPL-SCNC: 140 MMOL/L (ref 135–147)
WBC # BLD AUTO: 1.43 THOUSAND/UL (ref 4.31–10.16)

## 2025-07-08 PROCEDURE — 96372 THER/PROPH/DIAG INJ SC/IM: CPT

## 2025-07-08 PROCEDURE — 36415 COLL VENOUS BLD VENIPUNCTURE: CPT

## 2025-07-08 PROCEDURE — 80053 COMPREHEN METABOLIC PANEL: CPT

## 2025-07-08 PROCEDURE — 85025 COMPLETE CBC W/AUTO DIFF WBC: CPT

## 2025-07-08 RX ADMIN — CYANOCOBALAMIN 1000 MCG: 1000 INJECTION, SOLUTION INTRAMUSCULAR; SUBCUTANEOUS at 09:54

## 2025-07-13 ENCOUNTER — RESULTS FOLLOW-UP (OUTPATIENT)
Dept: HEMATOLOGY ONCOLOGY | Facility: CLINIC | Age: 75
End: 2025-07-13

## 2025-07-14 NOTE — TELEPHONE ENCOUNTER
----- Message from Jaylyn Steel PA-C sent at 7/13/2025  9:47 AM EDT -----  She should of been on Ibrance for 2 weeks when these labs were drawn. She should continue Ibrance through day 21 to complete cycle. Repeat CBC the day after last dose of Ibrance (Day 22). If she   develops fever > or = 100.4 she could notify our office immediately and stop Ibrance.   ----- Message -----  From: Ton Berumen MD  Sent: 7/8/2025   7:13 PM EDT  To: Jaylyn Steel PA-C      ----- Message -----  From: Lab, Background User  Sent: 7/8/2025   4:03 PM EDT  To: Ton Berumen MD

## 2025-07-14 NOTE — TELEPHONE ENCOUNTER
Jaylyn,   I called pt to clarify what date in treatment she was on and she states she only started taking the Ibrance on 7/11- She is leaving to [place] tomorrow and driving down. I'm concerned because she is just starting then the cycle and her counts are low, not because she was already taking and had labs. Wanted to make sure you still wanted her take or hold while she is away. They will be back last week of this month.

## 2025-07-14 NOTE — TELEPHONE ENCOUNTER
"Attempted to call patient and left voicemail regarding communication from Jaylyn.   Wanted to confirm day of Ibrance that she is on and to relay communication. Asked if she could please call our office to confirm    \"She should of been on Ibrance for 2 weeks when these labs were drawn. She should continue Ibrance through day 21 to complete cycle. Repeat CBC the day after last dose of Ibrance (Day 22). If she develops fever > or = 100.4 she could notify our office immediately and stop Ibrance.\"  Hope line number was left to return our call at their earliest convenience.  "

## 2025-07-21 DIAGNOSIS — C77.3 BREAST CANCER METASTASIZED TO AXILLARY LYMPH NODE, RIGHT (HCC): Primary | ICD-10-CM

## 2025-07-21 DIAGNOSIS — C50.911 BREAST CANCER METASTASIZED TO AXILLARY LYMPH NODE, RIGHT (HCC): Primary | ICD-10-CM

## 2025-07-21 NOTE — TELEPHONE ENCOUNTER
----- Message from Jaylyn Stele PA-C sent at 7/20/2025  5:44 PM EDT -----  I apologize for the delay as I was out of office. Dr. Gross was covering my in basket- did she already address this?     She needs to Hold ibrance and repeat CBC as soon as she returns. She should practice neutropenia precautions while she is in [place] and wear a mask. If she has any fevers while she is down there she   should seek medical attention and have her CBC checked.  ----- Message -----  From: Patricia Smith RN  Sent: 7/14/2025   3:44 PM EDT  To: Jaylyn Steel PA-C    ----- Message from Patricia Smith RN sent at 7/14/2025  3:44 PM EDT -----

## 2025-07-21 NOTE — TELEPHONE ENCOUNTER
Called and spoke with patient and instructed to hold her Ibrance. She was explained provider was under impression she had been on when the call was made last week but she was due for starting. She denies any signs or infections or fevers. She is made aware to repeat labs once she is back. Pt verbalizes understanding.

## 2025-07-22 DIAGNOSIS — C77.3 BREAST CANCER METASTASIZED TO AXILLARY LYMPH NODE, RIGHT (HCC): ICD-10-CM

## 2025-07-22 DIAGNOSIS — C50.911 BREAST CANCER METASTASIZED TO AXILLARY LYMPH NODE, RIGHT (HCC): ICD-10-CM

## 2025-07-22 RX ORDER — GABAPENTIN 300 MG/1
300 CAPSULE ORAL
Qty: 30 CAPSULE | Refills: 2 | Status: SHIPPED | OUTPATIENT
Start: 2025-07-22

## 2025-07-29 ENCOUNTER — APPOINTMENT (OUTPATIENT)
Age: 75
End: 2025-07-29
Payer: MEDICARE

## 2025-07-29 DIAGNOSIS — C77.3 BREAST CANCER METASTASIZED TO AXILLARY LYMPH NODE, RIGHT (HCC): ICD-10-CM

## 2025-07-29 DIAGNOSIS — C50.911 BREAST CANCER METASTASIZED TO AXILLARY LYMPH NODE, RIGHT (HCC): ICD-10-CM

## 2025-07-29 LAB
BASOPHILS # BLD AUTO: 0.05 THOUSANDS/ÂΜL (ref 0–0.1)
BASOPHILS NFR BLD AUTO: 3 % (ref 0–1)
EOSINOPHIL # BLD AUTO: 0.01 THOUSAND/ÂΜL (ref 0–0.61)
EOSINOPHIL NFR BLD AUTO: 1 % (ref 0–6)
ERYTHROCYTE [DISTWIDTH] IN BLOOD BY AUTOMATED COUNT: 15.5 % (ref 11.6–15.1)
HCT VFR BLD AUTO: 33.7 % (ref 34.8–46.1)
HGB BLD-MCNC: 11.4 G/DL (ref 11.5–15.4)
IMM GRANULOCYTES # BLD AUTO: 0 THOUSAND/UL (ref 0–0.2)
IMM GRANULOCYTES NFR BLD AUTO: 0 % (ref 0–2)
LYMPHOCYTES # BLD AUTO: 0.41 THOUSANDS/ÂΜL (ref 0.6–4.47)
LYMPHOCYTES NFR BLD AUTO: 24 % (ref 14–44)
MCH RBC QN AUTO: 31.9 PG (ref 26.8–34.3)
MCHC RBC AUTO-ENTMCNC: 33.8 G/DL (ref 31.4–37.4)
MCV RBC AUTO: 94 FL (ref 82–98)
MONOCYTES # BLD AUTO: 0.28 THOUSAND/ÂΜL (ref 0.17–1.22)
MONOCYTES NFR BLD AUTO: 17 % (ref 4–12)
NEUTROPHILS # BLD AUTO: 0.94 THOUSANDS/ÂΜL (ref 1.85–7.62)
NEUTS SEG NFR BLD AUTO: 55 % (ref 43–75)
NRBC BLD AUTO-RTO: 0 /100 WBCS
PLATELET # BLD AUTO: 118 THOUSANDS/UL (ref 149–390)
PMV BLD AUTO: 11.5 FL (ref 8.9–12.7)
RBC # BLD AUTO: 3.57 MILLION/UL (ref 3.81–5.12)
WBC # BLD AUTO: 1.69 THOUSAND/UL (ref 4.31–10.16)

## 2025-07-29 PROCEDURE — 36415 COLL VENOUS BLD VENIPUNCTURE: CPT

## 2025-07-29 PROCEDURE — 80053 COMPREHEN METABOLIC PANEL: CPT

## 2025-07-29 PROCEDURE — 85025 COMPLETE CBC W/AUTO DIFF WBC: CPT

## 2025-07-30 LAB
ALBUMIN SERPL BCG-MCNC: 4.3 G/DL (ref 3.5–5)
ALP SERPL-CCNC: 57 U/L (ref 34–104)
ALT SERPL W P-5'-P-CCNC: 11 U/L (ref 7–52)
ANION GAP SERPL CALCULATED.3IONS-SCNC: 11 MMOL/L (ref 4–13)
AST SERPL W P-5'-P-CCNC: 17 U/L (ref 13–39)
BILIRUB SERPL-MCNC: 1.35 MG/DL (ref 0.2–1)
BUN SERPL-MCNC: 21 MG/DL (ref 5–25)
CALCIUM SERPL-MCNC: 9.5 MG/DL (ref 8.4–10.2)
CHLORIDE SERPL-SCNC: 104 MMOL/L (ref 96–108)
CO2 SERPL-SCNC: 28 MMOL/L (ref 21–32)
CREAT SERPL-MCNC: 0.75 MG/DL (ref 0.6–1.3)
GFR SERPL CREATININE-BSD FRML MDRD: 78 ML/MIN/1.73SQ M
GLUCOSE SERPL-MCNC: 115 MG/DL (ref 65–140)
POTASSIUM SERPL-SCNC: 3.5 MMOL/L (ref 3.5–5.3)
PROT SERPL-MCNC: 6.2 G/DL (ref 6.4–8.4)
SODIUM SERPL-SCNC: 143 MMOL/L (ref 135–147)

## 2025-07-31 ENCOUNTER — RESULTS FOLLOW-UP (OUTPATIENT)
Dept: HEMATOLOGY ONCOLOGY | Facility: CLINIC | Age: 75
End: 2025-07-31

## 2025-07-31 DIAGNOSIS — C50.911 BREAST CANCER METASTASIZED TO AXILLARY LYMPH NODE, RIGHT (HCC): Primary | ICD-10-CM

## 2025-07-31 DIAGNOSIS — C77.3 BREAST CANCER METASTASIZED TO AXILLARY LYMPH NODE, RIGHT (HCC): Primary | ICD-10-CM

## 2025-08-05 ENCOUNTER — OFFICE VISIT (OUTPATIENT)
Age: 75
End: 2025-08-05

## 2025-08-05 ENCOUNTER — CLINICAL SUPPORT (OUTPATIENT)
Age: 75
End: 2025-08-05
Payer: MEDICARE

## 2025-08-05 ENCOUNTER — APPOINTMENT (OUTPATIENT)
Age: 75
End: 2025-08-05
Attending: INTERNAL MEDICINE
Payer: MEDICARE

## 2025-08-05 VITALS
DIASTOLIC BLOOD PRESSURE: 60 MMHG | HEART RATE: 84 BPM | WEIGHT: 152 LBS | SYSTOLIC BLOOD PRESSURE: 120 MMHG | BODY MASS INDEX: 24.43 KG/M2 | HEIGHT: 66 IN

## 2025-08-05 DIAGNOSIS — D51.0 PERNICIOUS ANEMIA: Primary | ICD-10-CM

## 2025-08-05 DIAGNOSIS — C7A.8 NEUROENDOCRINE CARCINOMA OF STOMACH (HCC): ICD-10-CM

## 2025-08-05 DIAGNOSIS — C50.911 BREAST CANCER METASTASIZED TO AXILLARY LYMPH NODE, RIGHT (HCC): ICD-10-CM

## 2025-08-05 DIAGNOSIS — C77.3 BREAST CANCER METASTASIZED TO AXILLARY LYMPH NODE, RIGHT (HCC): ICD-10-CM

## 2025-08-05 LAB
BASOPHILS # BLD AUTO: 0.07 THOUSANDS/ÂΜL (ref 0–0.1)
BASOPHILS NFR BLD AUTO: 3 % (ref 0–1)
EOSINOPHIL # BLD AUTO: 0.02 THOUSAND/ÂΜL (ref 0–0.61)
EOSINOPHIL NFR BLD AUTO: 1 % (ref 0–6)
ERYTHROCYTE [DISTWIDTH] IN BLOOD BY AUTOMATED COUNT: 14.7 % (ref 11.6–15.1)
HCT VFR BLD AUTO: 35.9 % (ref 34.8–46.1)
HGB BLD-MCNC: 11.9 G/DL (ref 11.5–15.4)
IMM GRANULOCYTES # BLD AUTO: 0.02 THOUSAND/UL (ref 0–0.2)
IMM GRANULOCYTES NFR BLD AUTO: 1 % (ref 0–2)
LYMPHOCYTES # BLD AUTO: 0.58 THOUSANDS/ÂΜL (ref 0.6–4.47)
LYMPHOCYTES NFR BLD AUTO: 21 % (ref 14–44)
MCH RBC QN AUTO: 31.6 PG (ref 26.8–34.3)
MCHC RBC AUTO-ENTMCNC: 33.1 G/DL (ref 31.4–37.4)
MCV RBC AUTO: 95 FL (ref 82–98)
MONOCYTES # BLD AUTO: 0.35 THOUSAND/ÂΜL (ref 0.17–1.22)
MONOCYTES NFR BLD AUTO: 13 % (ref 4–12)
NEUTROPHILS # BLD AUTO: 1.69 THOUSANDS/ÂΜL (ref 1.85–7.62)
NEUTS SEG NFR BLD AUTO: 61 % (ref 43–75)
NRBC BLD AUTO-RTO: 0 /100 WBCS
PLATELET # BLD AUTO: 115 THOUSANDS/UL (ref 149–390)
PMV BLD AUTO: 12.2 FL (ref 8.9–12.7)
RBC # BLD AUTO: 3.77 MILLION/UL (ref 3.81–5.12)
WBC # BLD AUTO: 2.73 THOUSAND/UL (ref 4.31–10.16)

## 2025-08-05 PROCEDURE — 96372 THER/PROPH/DIAG INJ SC/IM: CPT

## 2025-08-05 PROCEDURE — 36415 COLL VENOUS BLD VENIPUNCTURE: CPT

## 2025-08-05 PROCEDURE — PBNCHG PB NO CHARGE PLACEHOLDER: Performed by: INTERNAL MEDICINE

## 2025-08-05 PROCEDURE — 85025 COMPLETE CBC W/AUTO DIFF WBC: CPT

## 2025-08-05 RX ADMIN — CYANOCOBALAMIN 1000 MCG: 1000 INJECTION, SOLUTION INTRAMUSCULAR; SUBCUTANEOUS at 09:48

## 2025-08-06 ENCOUNTER — CONSULT (OUTPATIENT)
Dept: OBGYN CLINIC | Facility: CLINIC | Age: 75
End: 2025-08-06
Payer: MEDICARE

## 2025-08-06 ENCOUNTER — RESULTS FOLLOW-UP (OUTPATIENT)
Dept: HEMATOLOGY ONCOLOGY | Facility: CLINIC | Age: 75
End: 2025-08-06

## 2025-08-06 ENCOUNTER — TELEPHONE (OUTPATIENT)
Age: 75
End: 2025-08-06

## 2025-08-06 ENCOUNTER — TELEPHONE (OUTPATIENT)
Dept: OBGYN CLINIC | Facility: CLINIC | Age: 75
End: 2025-08-06

## 2025-08-06 VITALS — SYSTOLIC BLOOD PRESSURE: 122 MMHG | WEIGHT: 147 LBS | DIASTOLIC BLOOD PRESSURE: 62 MMHG | BODY MASS INDEX: 23.73 KG/M2

## 2025-08-06 DIAGNOSIS — Z13.820 ENCOUNTER FOR OSTEOPOROSIS SCREENING IN ASYMPTOMATIC POSTMENOPAUSAL PATIENT: ICD-10-CM

## 2025-08-06 DIAGNOSIS — Z12.11 SCREENING FOR COLON CANCER: ICD-10-CM

## 2025-08-06 DIAGNOSIS — N95.2 VAGINAL ATROPHY: ICD-10-CM

## 2025-08-06 DIAGNOSIS — C50.811 MALIGNANT NEOPLASM OF OVERLAPPING SITES OF RIGHT BREAST IN FEMALE, ESTROGEN RECEPTOR POSITIVE (HCC): Primary | ICD-10-CM

## 2025-08-06 DIAGNOSIS — Z17.0 MALIGNANT NEOPLASM OF OVERLAPPING SITES OF RIGHT BREAST IN FEMALE, ESTROGEN RECEPTOR POSITIVE (HCC): ICD-10-CM

## 2025-08-06 DIAGNOSIS — Z01.419 ENCOUNTER FOR GYNECOLOGICAL EXAMINATION (GENERAL) (ROUTINE) WITHOUT ABNORMAL FINDINGS: Primary | ICD-10-CM

## 2025-08-06 DIAGNOSIS — C50.811 MALIGNANT NEOPLASM OF OVERLAPPING SITES OF RIGHT BREAST IN FEMALE, ESTROGEN RECEPTOR POSITIVE (HCC): ICD-10-CM

## 2025-08-06 DIAGNOSIS — Z91.89 PERSONAL RISK FACTOR FOR DISEASE: ICD-10-CM

## 2025-08-06 DIAGNOSIS — Z17.0 MALIGNANT NEOPLASM OF OVERLAPPING SITES OF RIGHT BREAST IN FEMALE, ESTROGEN RECEPTOR POSITIVE (HCC): Primary | ICD-10-CM

## 2025-08-06 DIAGNOSIS — Z78.0 ENCOUNTER FOR OSTEOPOROSIS SCREENING IN ASYMPTOMATIC POSTMENOPAUSAL PATIENT: ICD-10-CM

## 2025-08-06 DIAGNOSIS — Z90.13 HISTORY OF BILATERAL MASTECTOMY: ICD-10-CM

## 2025-08-06 PROCEDURE — G0476 HPV COMBO ASSAY CA SCREEN: HCPCS

## 2025-08-06 PROCEDURE — G0101 CA SCREEN;PELVIC/BREAST EXAM: HCPCS

## 2025-08-06 RX ORDER — ESTRADIOL 0.1 MG/G
CREAM VAGINAL
Qty: 42.5 G | Refills: 3 | Status: SHIPPED | OUTPATIENT
Start: 2025-08-06 | End: 2025-08-06 | Stop reason: CLARIF

## 2025-08-07 LAB
HPV HR 12 DNA CVX QL NAA+PROBE: NEGATIVE
HPV16 DNA CVX QL NAA+PROBE: NEGATIVE
HPV18 DNA CVX QL NAA+PROBE: NEGATIVE

## 2025-08-08 ENCOUNTER — TELEPHONE (OUTPATIENT)
Age: 75
End: 2025-08-08

## 2025-08-13 ENCOUNTER — OFFICE VISIT (OUTPATIENT)
Dept: GASTROENTEROLOGY | Facility: MEDICAL CENTER | Age: 75
End: 2025-08-13
Payer: MEDICARE

## 2025-08-13 ENCOUNTER — TELEPHONE (OUTPATIENT)
Dept: GASTROENTEROLOGY | Facility: MEDICAL CENTER | Age: 75
End: 2025-08-13

## 2025-08-13 VITALS
WEIGHT: 149 LBS | TEMPERATURE: 97.9 F | HEART RATE: 63 BPM | DIASTOLIC BLOOD PRESSURE: 62 MMHG | SYSTOLIC BLOOD PRESSURE: 199 MMHG | BODY MASS INDEX: 24.05 KG/M2

## 2025-08-13 DIAGNOSIS — D3A.8 BENIGN NEUROENDOCRINE TUMOR OF STOMACH: ICD-10-CM

## 2025-08-13 DIAGNOSIS — R14.0 BLOATING: ICD-10-CM

## 2025-08-13 DIAGNOSIS — K31.83 ACHLORHYDRIA: ICD-10-CM

## 2025-08-13 DIAGNOSIS — K29.40 AUTOIMMUNE GASTRITIS: Primary | ICD-10-CM

## 2025-08-13 DIAGNOSIS — K59.01 SLOW TRANSIT CONSTIPATION: ICD-10-CM

## 2025-08-13 LAB
LAB AP GYN PRIMARY INTERPRETATION: NORMAL
Lab: NORMAL

## 2025-08-13 PROCEDURE — 99214 OFFICE O/P EST MOD 30 MIN: CPT | Performed by: INTERNAL MEDICINE

## 2025-08-13 RX ORDER — SODIUM CHLORIDE, SODIUM LACTATE, POTASSIUM CHLORIDE, CALCIUM CHLORIDE 600; 310; 30; 20 MG/100ML; MG/100ML; MG/100ML; MG/100ML
125 INJECTION, SOLUTION INTRAVENOUS CONTINUOUS
OUTPATIENT
Start: 2025-08-13

## 2025-08-21 ENCOUNTER — RESULTS FOLLOW-UP (OUTPATIENT)
Dept: HEMATOLOGY ONCOLOGY | Facility: CLINIC | Age: 75
End: 2025-08-21

## 2025-08-21 DIAGNOSIS — C50.811 MALIGNANT NEOPLASM OF OVERLAPPING SITES OF RIGHT BREAST IN FEMALE, ESTROGEN RECEPTOR POSITIVE (HCC): ICD-10-CM

## 2025-08-21 DIAGNOSIS — Z17.0 MALIGNANT NEOPLASM OF OVERLAPPING SITES OF RIGHT BREAST IN FEMALE, ESTROGEN RECEPTOR POSITIVE (HCC): ICD-10-CM

## 2025-08-22 RX ORDER — ANASTROZOLE 1 MG/1
1 TABLET ORAL DAILY
Qty: 90 TABLET | Refills: 1 | Status: SHIPPED | OUTPATIENT
Start: 2025-08-22

## (undated) DEVICE — DRAPE SHEET THREE QUARTER

## (undated) DEVICE — GLOVE SRG BIOGEL 7.5

## (undated) DEVICE — INTENDED FOR TISSUE SEPARATION, AND OTHER PROCEDURES THAT REQUIRE A SHARP SURGICAL BLADE TO PUNCTURE OR CUT.: Brand: BARD-PARKER SAFETY BLADES SIZE 11, STERILE

## (undated) DEVICE — LIGACLIP MCA MULTIPLE CLIP APPLIERS, 20 MEDIUM CLIPS: Brand: LIGACLIP

## (undated) DEVICE — PACK UNIVERSAL DRAPES SUB-Q ICD

## (undated) DEVICE — CAUTERY TIP POLISHER: Brand: DEVON

## (undated) DEVICE — SUT SILK 2-0 SH 30 IN K833H

## (undated) DEVICE — JACKSON-PRATT 100CC BULB RESERVOIR: Brand: CARDINAL HEALTH

## (undated) DEVICE — DRAPE EQUIPMENT RF WAND

## (undated) DEVICE — ELECTRODE BLADE MOD E-Z CLEAN 2.5IN 6.4CM -0012M

## (undated) DEVICE — ADHESIVE SKIN CLOSURE SYS EXOFIN FUSION 22CM

## (undated) DEVICE — POV-IOD SOLUTION 4OZ BT

## (undated) DEVICE — CHLORAPREP HI-LITE 26ML ORANGE

## (undated) DEVICE — SUT MONOCRYL 3-0 PS-2 27 IN Y427H

## (undated) DEVICE — SHEATH, GUIDE, SAVI SCOUT®: Brand: SAVI SCOUT®

## (undated) DEVICE — PENCIL SMOKE EVAC TELESCOPING W/TUBING

## (undated) DEVICE — DRAPE SHEET X-LG

## (undated) DEVICE — ACE WRAP 4 IN UNSTERILE

## (undated) DEVICE — SHEATH INTRO MICRO SET 7FR 7CM

## (undated) DEVICE — BETHLEHEM UNIVERSAL MINOR GEN: Brand: CARDINAL HEALTH

## (undated) DEVICE — JP CHANNEL DRAIN 19FR, FULL FLUTES: Brand: JACKSON-PRATT

## (undated) DEVICE — PAD GROUNDING ADULT

## (undated) DEVICE — GLOVE SRG BIOGEL ORTHOPEDIC 7.5

## (undated) DEVICE — TELFA NON-ADHERENT ABSORBENT DRESSING: Brand: TELFA

## (undated) DEVICE — BETHLEHEM UNIVERSAL BREAST PK: Brand: CARDINAL HEALTH

## (undated) DEVICE — ADHESIVE SKIN HIGH VISCOSITY EXOFIN 1ML

## (undated) DEVICE — SPONGE LAP 18 X 18 IN

## (undated) DEVICE — PADDING CAST 4 IN  COTTON STRL

## (undated) DEVICE — GLOVE INDICATOR PI UNDERGLOVE SZ 8 BLUE

## (undated) DEVICE — DRAIN SPONGES,6 PLY: Brand: EXCILON

## (undated) DEVICE — HEMOSTAT POWDER ADSORB SURGICEL 3GM

## (undated) DEVICE — SUT ETHILON 2-0 PS 18 IN 585H

## (undated) DEVICE — ACE WRAP 6 IN UNSTERILE

## (undated) DEVICE — TIBURON SPLIT SHEET: Brand: CONVERTORS

## (undated) DEVICE — LIGACLIP MCA MULTIPLE CLIP APPLIERS, 20 SMALL CLIPS: Brand: LIGACLIP

## (undated) DEVICE — ABDOMINAL PAD: Brand: DERMACEA

## (undated) DEVICE — TOWEL SET X-RAY

## (undated) DEVICE — BULB SYRINGE,IRRIGATION WITH PROTECTIVE CAP: Brand: DOVER

## (undated) DEVICE — SUT VICRYL 2-0 SH 27 IN UNDYED J417H